# Patient Record
Sex: FEMALE | Race: WHITE | NOT HISPANIC OR LATINO | Employment: OTHER | URBAN - METROPOLITAN AREA
[De-identification: names, ages, dates, MRNs, and addresses within clinical notes are randomized per-mention and may not be internally consistent; named-entity substitution may affect disease eponyms.]

---

## 2017-01-15 DIAGNOSIS — M79.7 FIBROMYALGIA: ICD-10-CM

## 2017-01-16 DIAGNOSIS — M79.7 FIBROMYALGIA: ICD-10-CM

## 2017-01-16 RX ORDER — PREGABALIN 100 MG/1
100 CAPSULE ORAL 2 TIMES DAILY
Qty: 60 CAPSULE | Refills: 0 | Status: SHIPPED | OUTPATIENT
Start: 2017-01-16 | End: 2017-02-20 | Stop reason: SDUPTHER

## 2017-01-30 RX ORDER — FENOFIBRATE 145 MG/1
TABLET, COATED ORAL
Qty: 90 TABLET | Refills: 0 | Status: SHIPPED | OUTPATIENT
Start: 2017-01-30 | End: 2017-04-28 | Stop reason: SDUPTHER

## 2017-02-20 ENCOUNTER — TELEPHONE (OUTPATIENT)
Dept: INTERNAL MEDICINE | Age: 62
End: 2017-02-20

## 2017-02-20 DIAGNOSIS — M79.7 FIBROMYALGIA: ICD-10-CM

## 2017-02-20 RX ORDER — PREGABALIN 100 MG/1
100 CAPSULE ORAL 2 TIMES DAILY
Qty: 60 CAPSULE | Refills: 0 | Status: SHIPPED | OUTPATIENT
Start: 2017-02-20 | End: 2017-03-23 | Stop reason: SDUPTHER

## 2017-03-03 RX ORDER — CLOPIDOGREL BISULFATE 75 MG/1
TABLET ORAL
Qty: 90 TABLET | Refills: 0 | Status: SHIPPED | OUTPATIENT
Start: 2017-03-03 | End: 2017-05-30 | Stop reason: SDUPTHER

## 2017-03-03 RX ORDER — CITALOPRAM 40 MG/1
TABLET ORAL
Qty: 90 TABLET | Refills: 0 | Status: SHIPPED | OUTPATIENT
Start: 2017-03-03 | End: 2017-05-30 | Stop reason: SDUPTHER

## 2017-03-23 ENCOUNTER — TELEPHONE (OUTPATIENT)
Dept: INTERNAL MEDICINE | Age: 62
End: 2017-03-23

## 2017-03-23 DIAGNOSIS — M79.7 FIBROMYALGIA: ICD-10-CM

## 2017-03-23 RX ORDER — PREGABALIN 100 MG/1
100 CAPSULE ORAL 2 TIMES DAILY
Qty: 60 CAPSULE | Refills: 0 | Status: SHIPPED | OUTPATIENT
Start: 2017-03-23 | End: 2017-04-20 | Stop reason: SDUPTHER

## 2017-03-23 NOTE — TELEPHONE ENCOUNTER
PLEASE WRITE, ALSO NOTICED ON HER CHART THAT SHE IS TAKING PLAVIX AND MOBIC BOTH TOGETHER. PLEASE ADVISE.

## 2017-04-12 ENCOUNTER — OFFICE VISIT (OUTPATIENT)
Dept: INTERNAL MEDICINE | Age: 62
End: 2017-04-12

## 2017-04-12 VITALS
SYSTOLIC BLOOD PRESSURE: 120 MMHG | HEIGHT: 63 IN | HEART RATE: 72 BPM | DIASTOLIC BLOOD PRESSURE: 80 MMHG | WEIGHT: 215 LBS | RESPIRATION RATE: 13 BRPM | BODY MASS INDEX: 38.09 KG/M2

## 2017-04-12 DIAGNOSIS — E78.2 MIXED HYPERLIPIDEMIA: Primary | ICD-10-CM

## 2017-04-12 DIAGNOSIS — Z72.0 TOBACCO ABUSE: ICD-10-CM

## 2017-04-12 DIAGNOSIS — E66.9 NON MORBID OBESITY, UNSPECIFIED OBESITY TYPE: ICD-10-CM

## 2017-04-12 LAB
ALBUMIN SERPL-MCNC: 4.2 G/DL (ref 3.5–5.2)
ALBUMIN/GLOB SERPL: 1.6 G/DL
ALP SERPL-CCNC: 55 U/L (ref 39–117)
ALT SERPL-CCNC: 10 U/L (ref 1–33)
AST SERPL-CCNC: 23 U/L (ref 1–32)
BILIRUB SERPL-MCNC: 0.4 MG/DL (ref 0.1–1.2)
BUN SERPL-MCNC: 11 MG/DL (ref 8–23)
BUN/CREAT SERPL: 10.4 (ref 7–25)
CALCIUM SERPL-MCNC: 10.3 MG/DL (ref 8.6–10.5)
CHLORIDE SERPL-SCNC: 102 MMOL/L (ref 98–107)
CHOLEST SERPL-MCNC: 185 MG/DL (ref 0–200)
CO2 SERPL-SCNC: 30 MMOL/L (ref 22–29)
CREAT SERPL-MCNC: 1.06 MG/DL (ref 0.57–1)
GLOBULIN SER CALC-MCNC: 2.7 GM/DL
GLUCOSE SERPL-MCNC: 87 MG/DL (ref 65–99)
HDLC SERPL-MCNC: 44 MG/DL (ref 40–60)
LDLC SERPL CALC-MCNC: 104 MG/DL (ref 0–100)
POTASSIUM SERPL-SCNC: 4.1 MMOL/L (ref 3.5–5.2)
PROT SERPL-MCNC: 6.9 G/DL (ref 6–8.5)
SODIUM SERPL-SCNC: 144 MMOL/L (ref 136–145)
TRIGL SERPL-MCNC: 185 MG/DL (ref 0–150)
VLDLC SERPL CALC-MCNC: 37 MG/DL (ref 5–40)

## 2017-04-12 PROCEDURE — 99214 OFFICE O/P EST MOD 30 MIN: CPT | Performed by: INTERNAL MEDICINE

## 2017-04-12 RX ORDER — VARENICLINE TARTRATE 0.5 MG/1
0.5 TABLET, FILM COATED ORAL 2 TIMES DAILY
Qty: 60 TABLET | Refills: 2 | Status: SHIPPED | OUTPATIENT
Start: 2017-04-12 | End: 2017-12-14 | Stop reason: HOSPADM

## 2017-04-12 NOTE — PROGRESS NOTES
Brenda Michelle is a 61 y.o. female who presents with   Chief Complaint   Patient presents with   • Hyperlipidemia     Checkup; lab updates   • Obesity     Unrelated reason for today's visit: Obesity.  Patient says she keeps putting weight on an as a result cannot exercise because of the discomfort on her joints with excessive weight.  She is interested in a referral for area attic surgical evaluation.   • Nicotine Dependence     Unrelated reason for today's visit: She would like a prescription for Chantix to discontinue smoking   • Colon Polyps     Unrelated reason for today's visit: Prior history of colon polyps.  Family history of colon cancer.  She has not had a follow-up colonoscopy screening procedure in 10 years or more she says.   .    Hyperlipidemia   This is a chronic problem. The current episode started more than 1 year ago. The problem is controlled. Exacerbating diseases include obesity. Current antihyperlipidemic treatment includes fibric acid derivatives. The current treatment provides moderate improvement of lipids. There are no compliance problems.    Obesity   This is a chronic problem. The current episode started more than 1 year ago. The problem has been gradually worsening. She has tried nothing for the symptoms.   Nicotine Dependence   Presents for follow-up visit. Her urge triggers include company of smokers.        The following portions of the patient's history were reviewed and updated as appropriate: allergies, current medications, past medical history and problem list.    Review of Systems   Constitutional: Negative.    HENT: Negative.    Eyes: Negative.    Respiratory: Negative.    Cardiovascular: Negative.    Genitourinary: Negative.    Musculoskeletal: Negative.    Skin: Negative.    Neurological: Negative.    Psychiatric/Behavioral: Negative.        Objective   Physical Exam   Constitutional: She is oriented to person, place, and time. She appears well-developed and well-nourished.  "No distress.   HENT:   Head: Normocephalic and atraumatic.   Eyes: Conjunctivae and EOM are normal. Pupils are equal, round, and reactive to light.   Neck: Normal range of motion. Neck supple. No thyromegaly present.   Neck exam negative.  Carotid auscultation normal-no bruits heard.   Cardiovascular: Normal rate, regular rhythm, normal heart sounds and intact distal pulses.  Exam reveals no gallop and no friction rub.    No murmur heard.  Pulmonary/Chest: Effort normal and breath sounds normal. No respiratory distress. She has no wheezes. She has no rales. She exhibits no tenderness.   Neurological: She is alert and oriented to person, place, and time.   Psychiatric: She has a normal mood and affect. Her behavior is normal. Judgment and thought content normal.   Nursing note and vitals reviewed.      Assessment/Plan   Brenda was seen today for hyperlipidemia, obesity, nicotine dependence and colon polyps.    Diagnoses and all orders for this visit:    Mixed hyperlipidemia  -     Comprehensive Metabolic Panel  -     Lipid Panel    Non morbid obesity, unspecified obesity type  -     Ambulatory Referral to Bariatric Surgery    Tobacco abuse  -     varenicline (CHANTIX) 0.5 MG tablet; Take 1 tablet by mouth 2 (Two) Times a Day.        Plan: Labs as above.  Referral as requested for obesity.  Begin Chantix 0.5 milligrams twice a day.  Tentatively we will plan a six-month checkup/lab update visit.    I have suggested a follow-up colonoscopy and she is agreeable however I have given her the name of Dr. Vinny Lopez is a referral.  She would prefer to call that office first to find out if she can use a \"pill prep\" because she has difficulty tolerating the liquid preps.  Once she finds out this information she will let us know for a referral if needed.       "

## 2017-04-20 DIAGNOSIS — M79.7 FIBROMYALGIA: ICD-10-CM

## 2017-04-20 RX ORDER — PREGABALIN 100 MG/1
100 CAPSULE ORAL 2 TIMES DAILY
Qty: 60 CAPSULE | Refills: 0 | Status: SHIPPED | OUTPATIENT
Start: 2017-04-20 | End: 2017-05-30 | Stop reason: SDUPTHER

## 2017-04-28 DIAGNOSIS — E78.2 MIXED HYPERLIPIDEMIA: Primary | ICD-10-CM

## 2017-04-28 RX ORDER — FENOFIBRATE 145 MG/1
TABLET, COATED ORAL
Qty: 90 TABLET | Refills: 1 | Status: SHIPPED | OUTPATIENT
Start: 2017-04-28 | End: 2017-10-25 | Stop reason: SDUPTHER

## 2017-05-23 ENCOUNTER — TELEPHONE (OUTPATIENT)
Dept: INTERNAL MEDICINE | Age: 62
End: 2017-05-23

## 2017-05-30 DIAGNOSIS — M79.7 FIBROMYALGIA: ICD-10-CM

## 2017-05-30 DIAGNOSIS — M79.2 NERVE PAIN: Primary | ICD-10-CM

## 2017-05-30 RX ORDER — CITALOPRAM 40 MG/1
TABLET ORAL
Qty: 90 TABLET | Refills: 1 | Status: SHIPPED | OUTPATIENT
Start: 2017-05-30 | End: 2017-12-01 | Stop reason: SDUPTHER

## 2017-05-30 RX ORDER — PREGABALIN 100 MG/1
100 CAPSULE ORAL 2 TIMES DAILY
Qty: 60 CAPSULE | Refills: 0 | Status: SHIPPED | OUTPATIENT
Start: 2017-05-30 | End: 2017-07-03 | Stop reason: SDUPTHER

## 2017-05-30 RX ORDER — CLOPIDOGREL BISULFATE 75 MG/1
TABLET ORAL
Qty: 90 TABLET | Refills: 1 | Status: SHIPPED | OUTPATIENT
Start: 2017-05-30 | End: 2017-12-01 | Stop reason: SDUPTHER

## 2017-06-04 LAB — DRUGS UR: NORMAL

## 2017-06-14 RX ORDER — MELOXICAM 15 MG/1
TABLET ORAL
Qty: 90 TABLET | Refills: 0 | Status: SHIPPED | OUTPATIENT
Start: 2017-06-14 | End: 2017-09-12 | Stop reason: SDUPTHER

## 2017-07-02 DIAGNOSIS — M79.7 FIBROMYALGIA: ICD-10-CM

## 2017-07-03 DIAGNOSIS — M79.7 FIBROMYALGIA: ICD-10-CM

## 2017-07-03 RX ORDER — PREGABALIN 100 MG/1
100 CAPSULE ORAL 2 TIMES DAILY
Qty: 60 CAPSULE | Refills: 0 | Status: SHIPPED | OUTPATIENT
Start: 2017-07-03 | End: 2017-08-14 | Stop reason: SDUPTHER

## 2017-08-14 DIAGNOSIS — M79.7 FIBROMYALGIA: ICD-10-CM

## 2017-08-14 RX ORDER — PREGABALIN 100 MG/1
100 CAPSULE ORAL 2 TIMES DAILY
Qty: 60 CAPSULE | Refills: 0 | Status: SHIPPED | OUTPATIENT
Start: 2017-08-14 | End: 2017-09-22 | Stop reason: SDUPTHER

## 2017-09-12 ENCOUNTER — TELEPHONE (OUTPATIENT)
Dept: INTERNAL MEDICINE | Age: 62
End: 2017-09-12

## 2017-09-12 RX ORDER — MELOXICAM 15 MG/1
TABLET ORAL
Qty: 90 TABLET | Refills: 0 | Status: ON HOLD | OUTPATIENT
Start: 2017-09-12 | End: 2017-12-11 | Stop reason: SDUPTHER

## 2017-09-12 NOTE — TELEPHONE ENCOUNTER
Pt called stating she spoke with [facility] for refill on her Vit D and was told they have faxed requests, but I don't see anything in pt's chart regarding that medication. She needs her Vit D, 90 day supply.  [facility]   Pt's # [phone]  Thanks SP

## 2017-09-12 NOTE — TELEPHONE ENCOUNTER
Called patient 9/12/17 @ 10:39 to let her know that we did not deny her vitamin D because [facility] did not send over a refill for and instead they sent over a refill request for vitamin K injectable that she does not take. I mailed her a copy of the refill for the vit K that was denied. I also informed her the she has not had lab work for her vitamin D since last year. She is going to wait until her next visit with you to have her lab drawn.

## 2017-09-22 DIAGNOSIS — M79.7 FIBROMYALGIA: ICD-10-CM

## 2017-09-22 RX ORDER — PREGABALIN 100 MG/1
100 CAPSULE ORAL 2 TIMES DAILY
Qty: 60 CAPSULE | Refills: 0 | Status: SHIPPED | OUTPATIENT
Start: 2017-09-22 | End: 2017-10-23 | Stop reason: SDUPTHER

## 2017-10-11 ENCOUNTER — OFFICE VISIT (OUTPATIENT)
Dept: INTERNAL MEDICINE | Age: 62
End: 2017-10-11

## 2017-10-11 VITALS
DIASTOLIC BLOOD PRESSURE: 78 MMHG | RESPIRATION RATE: 12 BRPM | HEIGHT: 63 IN | SYSTOLIC BLOOD PRESSURE: 118 MMHG | BODY MASS INDEX: 38.62 KG/M2 | HEART RATE: 72 BPM | WEIGHT: 218 LBS

## 2017-10-11 DIAGNOSIS — Z00.00 HEALTHCARE MAINTENANCE: ICD-10-CM

## 2017-10-11 DIAGNOSIS — Z23 ENCOUNTER FOR IMMUNIZATION: ICD-10-CM

## 2017-10-11 DIAGNOSIS — D12.6 ADENOMATOUS POLYP OF COLON, UNSPECIFIED PART OF COLON: ICD-10-CM

## 2017-10-11 DIAGNOSIS — E78.2 MIXED HYPERLIPIDEMIA: Primary | ICD-10-CM

## 2017-10-11 PROCEDURE — 99214 OFFICE O/P EST MOD 30 MIN: CPT | Performed by: INTERNAL MEDICINE

## 2017-10-11 PROCEDURE — 90658 IIV3 VACCINE SPLT 0.5 ML IM: CPT | Performed by: INTERNAL MEDICINE

## 2017-10-11 PROCEDURE — 90471 IMMUNIZATION ADMIN: CPT | Performed by: INTERNAL MEDICINE

## 2017-10-23 DIAGNOSIS — M79.7 FIBROMYALGIA: ICD-10-CM

## 2017-10-23 RX ORDER — PREGABALIN 100 MG/1
100 CAPSULE ORAL 2 TIMES DAILY
Qty: 60 CAPSULE | Refills: 0 | Status: SHIPPED | OUTPATIENT
Start: 2017-10-23 | End: 2017-11-22 | Stop reason: SDUPTHER

## 2017-10-25 DIAGNOSIS — E78.2 MIXED HYPERLIPIDEMIA: ICD-10-CM

## 2017-10-25 RX ORDER — FENOFIBRATE 145 MG/1
TABLET, COATED ORAL
Qty: 90 TABLET | Refills: 1 | Status: SHIPPED | OUTPATIENT
Start: 2017-10-25 | End: 2018-04-23 | Stop reason: SDUPTHER

## 2017-11-22 DIAGNOSIS — M79.7 FIBROMYALGIA: ICD-10-CM

## 2017-11-22 RX ORDER — PREGABALIN 100 MG/1
100 CAPSULE ORAL 2 TIMES DAILY
Qty: 60 CAPSULE | Refills: 0 | Status: SHIPPED | OUTPATIENT
Start: 2017-11-22 | End: 2017-12-21 | Stop reason: SDUPTHER

## 2017-12-01 DIAGNOSIS — I77.9 PERIPHERAL ARTERIAL OCCLUSIVE DISEASE (HCC): Primary | ICD-10-CM

## 2017-12-01 DIAGNOSIS — F32.A DEPRESSION, UNSPECIFIED DEPRESSION TYPE: ICD-10-CM

## 2017-12-01 RX ORDER — CLOPIDOGREL BISULFATE 75 MG/1
75 TABLET ORAL DAILY
Qty: 90 TABLET | Refills: 1 | Status: SHIPPED | OUTPATIENT
Start: 2017-12-01 | End: 2018-05-30 | Stop reason: SDUPTHER

## 2017-12-01 RX ORDER — CITALOPRAM 40 MG/1
40 TABLET ORAL DAILY
Qty: 90 TABLET | Refills: 1 | Status: SHIPPED | OUTPATIENT
Start: 2017-12-01 | End: 2018-05-30 | Stop reason: SDUPTHER

## 2017-12-10 ENCOUNTER — HOSPITAL ENCOUNTER (INPATIENT)
Facility: HOSPITAL | Age: 62
LOS: 4 days | Discharge: HOME HEALTH | End: 2017-12-14
Attending: INTERNAL MEDICINE | Admitting: INTERNAL MEDICINE

## 2017-12-10 DIAGNOSIS — S22.41XA CLOSED FRACTURE OF MULTIPLE RIBS OF RIGHT SIDE, INITIAL ENCOUNTER: Primary | ICD-10-CM

## 2017-12-10 DIAGNOSIS — J96.01 ACUTE RESPIRATORY FAILURE WITH HYPOXIA (HCC): ICD-10-CM

## 2017-12-10 PROBLEM — R09.02 HYPOXIA: Status: ACTIVE | Noted: 2017-12-10

## 2017-12-10 PROBLEM — S22.39XA RIB FRACTURE: Status: ACTIVE | Noted: 2017-12-10

## 2017-12-10 PROCEDURE — 25010000002 HYDROMORPHONE PER 4 MG: Performed by: INTERNAL MEDICINE

## 2017-12-10 PROCEDURE — G0378 HOSPITAL OBSERVATION PER HR: HCPCS

## 2017-12-10 RX ORDER — PREGABALIN 100 MG/1
100 CAPSULE ORAL 2 TIMES DAILY
Status: DISCONTINUED | OUTPATIENT
Start: 2017-12-10 | End: 2017-12-14 | Stop reason: HOSPADM

## 2017-12-10 RX ORDER — ACETAMINOPHEN 325 MG/1
650 TABLET ORAL EVERY 4 HOURS PRN
Status: DISCONTINUED | OUTPATIENT
Start: 2017-12-10 | End: 2017-12-14 | Stop reason: HOSPADM

## 2017-12-10 RX ORDER — MELOXICAM 7.5 MG/1
15 TABLET ORAL DAILY
Status: DISCONTINUED | OUTPATIENT
Start: 2017-12-10 | End: 2017-12-12

## 2017-12-10 RX ORDER — NALOXONE HCL 0.4 MG/ML
0.4 VIAL (ML) INJECTION
Status: DISCONTINUED | OUTPATIENT
Start: 2017-12-10 | End: 2017-12-14 | Stop reason: HOSPADM

## 2017-12-10 RX ORDER — BISACODYL 5 MG/1
5 TABLET, DELAYED RELEASE ORAL DAILY PRN
Status: DISCONTINUED | OUTPATIENT
Start: 2017-12-10 | End: 2017-12-14 | Stop reason: HOSPADM

## 2017-12-10 RX ORDER — HYDROCODONE BITARTRATE AND ACETAMINOPHEN 7.5; 325 MG/1; MG/1
1 TABLET ORAL EVERY 4 HOURS PRN
Status: DISCONTINUED | OUTPATIENT
Start: 2017-12-10 | End: 2017-12-11

## 2017-12-10 RX ORDER — SENNA AND DOCUSATE SODIUM 50; 8.6 MG/1; MG/1
2 TABLET, FILM COATED ORAL 2 TIMES DAILY
Status: DISCONTINUED | OUTPATIENT
Start: 2017-12-10 | End: 2017-12-14 | Stop reason: HOSPADM

## 2017-12-10 RX ORDER — HYDROMORPHONE HYDROCHLORIDE 1 MG/ML
0.5 INJECTION, SOLUTION INTRAMUSCULAR; INTRAVENOUS; SUBCUTANEOUS
Status: DISCONTINUED | OUTPATIENT
Start: 2017-12-10 | End: 2017-12-14 | Stop reason: HOSPADM

## 2017-12-10 RX ORDER — ALUMINA, MAGNESIA, AND SIMETHICONE 2400; 2400; 240 MG/30ML; MG/30ML; MG/30ML
15 SUSPENSION ORAL EVERY 6 HOURS PRN
Status: DISCONTINUED | OUTPATIENT
Start: 2017-12-10 | End: 2017-12-14 | Stop reason: HOSPADM

## 2017-12-10 RX ORDER — CITALOPRAM 40 MG/1
40 TABLET ORAL DAILY
Status: DISCONTINUED | OUTPATIENT
Start: 2017-12-10 | End: 2017-12-14 | Stop reason: HOSPADM

## 2017-12-10 RX ORDER — ONDANSETRON 4 MG/1
4 TABLET, FILM COATED ORAL EVERY 6 HOURS PRN
Status: DISCONTINUED | OUTPATIENT
Start: 2017-12-10 | End: 2017-12-14 | Stop reason: HOSPADM

## 2017-12-10 RX ORDER — ONDANSETRON 4 MG/1
4 TABLET, ORALLY DISINTEGRATING ORAL EVERY 6 HOURS PRN
Status: DISCONTINUED | OUTPATIENT
Start: 2017-12-10 | End: 2017-12-14 | Stop reason: HOSPADM

## 2017-12-10 RX ORDER — ASPIRIN 81 MG/1
81 TABLET ORAL DAILY
Status: DISCONTINUED | OUTPATIENT
Start: 2017-12-10 | End: 2017-12-14 | Stop reason: HOSPADM

## 2017-12-10 RX ORDER — ONDANSETRON 2 MG/ML
4 INJECTION INTRAMUSCULAR; INTRAVENOUS EVERY 6 HOURS PRN
Status: DISCONTINUED | OUTPATIENT
Start: 2017-12-10 | End: 2017-12-14 | Stop reason: HOSPADM

## 2017-12-10 RX ORDER — CLOPIDOGREL BISULFATE 75 MG/1
75 TABLET ORAL DAILY
Status: DISCONTINUED | OUTPATIENT
Start: 2017-12-10 | End: 2017-12-14 | Stop reason: HOSPADM

## 2017-12-10 RX ADMIN — HYDROCODONE BITARTRATE AND ACETAMINOPHEN 1 TABLET: 7.5; 325 TABLET ORAL at 20:45

## 2017-12-10 RX ADMIN — MELOXICAM 15 MG: 7.5 TABLET ORAL at 22:02

## 2017-12-10 RX ADMIN — PREGABALIN 100 MG: 100 CAPSULE ORAL at 22:03

## 2017-12-10 RX ADMIN — DOCUSATE SODIUM -SENNOSIDES 2 TABLET: 50; 8.6 TABLET, COATED ORAL at 22:03

## 2017-12-10 RX ADMIN — HYDROMORPHONE HYDROCHLORIDE 0.5 MG: 1 INJECTION, SOLUTION INTRAMUSCULAR; INTRAVENOUS; SUBCUTANEOUS at 22:07

## 2017-12-10 RX ADMIN — ASPIRIN 81 MG: 81 TABLET ORAL at 22:02

## 2017-12-10 RX ADMIN — CLOPIDOGREL 75 MG: 75 TABLET, FILM COATED ORAL at 22:03

## 2017-12-10 RX ADMIN — CITALOPRAM 40 MG: 40 TABLET, FILM COATED ORAL at 22:03

## 2017-12-11 PROBLEM — J96.01 ACUTE RESPIRATORY FAILURE WITH HYPOXIA: Status: ACTIVE | Noted: 2017-12-11

## 2017-12-11 PROBLEM — F17.200 TOBACCO DEPENDENCE: Status: ACTIVE | Noted: 2017-12-11
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
## 2017-12-11 PROBLEM — J44.9 COPD (CHRONIC OBSTRUCTIVE PULMONARY DISEASE) (HCC): Status: ACTIVE | Noted: 2017-12-11

## 2017-12-11 LAB
ANION GAP SERPL CALCULATED.3IONS-SCNC: 8.7 MMOL/L
BASOPHILS # BLD AUTO: 0.02 10*3/MM3 (ref 0–0.2)
BASOPHILS NFR BLD AUTO: 0.3 % (ref 0–1.5)
BUN BLD-MCNC: 15 MG/DL (ref 8–23)
BUN/CREAT SERPL: 17.9 (ref 7–25)
CALCIUM SPEC-SCNC: 9.4 MG/DL (ref 8.6–10.5)
CHLORIDE SERPL-SCNC: 102 MMOL/L (ref 98–107)
CO2 SERPL-SCNC: 31.3 MMOL/L (ref 22–29)
CREAT BLD-MCNC: 0.84 MG/DL (ref 0.57–1)
DEPRECATED RDW RBC AUTO: 64.3 FL (ref 37–54)
EOSINOPHIL # BLD AUTO: 0.11 10*3/MM3 (ref 0–0.7)
EOSINOPHIL NFR BLD AUTO: 1.5 % (ref 0.3–6.2)
ERYTHROCYTE [DISTWIDTH] IN BLOOD BY AUTOMATED COUNT: 16.4 % (ref 11.7–13)
GFR SERPL CREATININE-BSD FRML MDRD: 69 ML/MIN/1.73
GLUCOSE BLD-MCNC: 108 MG/DL (ref 65–99)
HCT VFR BLD AUTO: 44.4 % (ref 35.6–45.5)
HGB BLD-MCNC: 14 G/DL (ref 11.9–15.5)
IMM GRANULOCYTES # BLD: 0.02 10*3/MM3 (ref 0–0.03)
IMM GRANULOCYTES NFR BLD: 0.3 % (ref 0–0.5)
LYMPHOCYTES # BLD AUTO: 2.02 10*3/MM3 (ref 0.9–4.8)
LYMPHOCYTES NFR BLD AUTO: 27.4 % (ref 19.6–45.3)
MCH RBC QN AUTO: 34 PG (ref 26.9–32)
MCHC RBC AUTO-ENTMCNC: 31.5 G/DL (ref 32.4–36.3)
MCV RBC AUTO: 107.8 FL (ref 80.5–98.2)
MONOCYTES # BLD AUTO: 0.76 10*3/MM3 (ref 0.2–1.2)
MONOCYTES NFR BLD AUTO: 10.3 % (ref 5–12)
NEUTROPHILS # BLD AUTO: 4.45 10*3/MM3 (ref 1.9–8.1)
NEUTROPHILS NFR BLD AUTO: 60.2 % (ref 42.7–76)
PLAT MORPH BLD: NORMAL
PLATELET # BLD AUTO: 200 10*3/MM3 (ref 140–500)
PMV BLD AUTO: 10.7 FL (ref 6–12)
POTASSIUM BLD-SCNC: 4.1 MMOL/L (ref 3.5–5.2)
RBC # BLD AUTO: 4.12 10*6/MM3 (ref 3.9–5.2)
RBC MORPH BLD: NORMAL
SODIUM BLD-SCNC: 142 MMOL/L (ref 136–145)
WBC MORPH BLD: NORMAL
WBC NRBC COR # BLD: 7.38 10*3/MM3 (ref 4.5–10.7)

## 2017-12-11 PROCEDURE — 97161 PT EVAL LOW COMPLEX 20 MIN: CPT

## 2017-12-11 PROCEDURE — 94640 AIRWAY INHALATION TREATMENT: CPT

## 2017-12-11 PROCEDURE — 85007 BL SMEAR W/DIFF WBC COUNT: CPT | Performed by: INTERNAL MEDICINE

## 2017-12-11 PROCEDURE — G8979 MOBILITY GOAL STATUS: HCPCS

## 2017-12-11 PROCEDURE — 94799 UNLISTED PULMONARY SVC/PX: CPT

## 2017-12-11 PROCEDURE — G8980 MOBILITY D/C STATUS: HCPCS

## 2017-12-11 PROCEDURE — 85025 COMPLETE CBC W/AUTO DIFF WBC: CPT | Performed by: INTERNAL MEDICINE

## 2017-12-11 PROCEDURE — G8978 MOBILITY CURRENT STATUS: HCPCS

## 2017-12-11 PROCEDURE — 80048 BASIC METABOLIC PNL TOTAL CA: CPT | Performed by: INTERNAL MEDICINE

## 2017-12-11 RX ORDER — IPRATROPIUM BROMIDE AND ALBUTEROL SULFATE 2.5; .5 MG/3ML; MG/3ML
3 SOLUTION RESPIRATORY (INHALATION)
Status: DISCONTINUED | OUTPATIENT
Start: 2017-12-11 | End: 2017-12-14 | Stop reason: HOSPADM

## 2017-12-11 RX ORDER — HYDROCODONE BITARTRATE AND ACETAMINOPHEN 10; 325 MG/1; MG/1
1 TABLET ORAL EVERY 4 HOURS PRN
Status: DISCONTINUED | OUTPATIENT
Start: 2017-12-11 | End: 2017-12-14 | Stop reason: HOSPADM

## 2017-12-11 RX ORDER — LIDOCAINE 50 MG/G
1 PATCH TOPICAL
Status: DISCONTINUED | OUTPATIENT
Start: 2017-12-11 | End: 2017-12-14 | Stop reason: HOSPADM

## 2017-12-11 RX ORDER — HYDROCODONE BITARTRATE AND ACETAMINOPHEN 10; 325 MG/1; MG/1
2 TABLET ORAL EVERY 4 HOURS PRN
Status: DISCONTINUED | OUTPATIENT
Start: 2017-12-11 | End: 2017-12-14 | Stop reason: HOSPADM

## 2017-12-11 RX ORDER — GUAIFENESIN 600 MG/1
600 TABLET, EXTENDED RELEASE ORAL 2 TIMES DAILY
Status: DISCONTINUED | OUTPATIENT
Start: 2017-12-11 | End: 2017-12-14 | Stop reason: HOSPADM

## 2017-12-11 RX ADMIN — PREGABALIN 100 MG: 100 CAPSULE ORAL at 17:32

## 2017-12-11 RX ADMIN — ASPIRIN 81 MG: 81 TABLET ORAL at 21:18

## 2017-12-11 RX ADMIN — MELOXICAM 15 MG: 7.5 TABLET ORAL at 21:18

## 2017-12-11 RX ADMIN — CITALOPRAM 40 MG: 40 TABLET, FILM COATED ORAL at 23:01

## 2017-12-11 RX ADMIN — HYDROCODONE BITARTRATE AND ACETAMINOPHEN 1 TABLET: 10; 325 TABLET ORAL at 06:20

## 2017-12-11 RX ADMIN — IPRATROPIUM BROMIDE AND ALBUTEROL SULFATE 3 ML: .5; 3 SOLUTION RESPIRATORY (INHALATION) at 11:20

## 2017-12-11 RX ADMIN — DOCUSATE SODIUM -SENNOSIDES 2 TABLET: 50; 8.6 TABLET, COATED ORAL at 17:29

## 2017-12-11 RX ADMIN — PREGABALIN 100 MG: 100 CAPSULE ORAL at 09:54

## 2017-12-11 RX ADMIN — GUAIFENESIN 600 MG: 600 TABLET, EXTENDED RELEASE ORAL at 17:29

## 2017-12-11 RX ADMIN — GUAIFENESIN 600 MG: 600 TABLET, EXTENDED RELEASE ORAL at 09:50

## 2017-12-11 RX ADMIN — DOCUSATE SODIUM -SENNOSIDES 2 TABLET: 50; 8.6 TABLET, COATED ORAL at 09:50

## 2017-12-11 RX ADMIN — IPRATROPIUM BROMIDE AND ALBUTEROL SULFATE 3 ML: .5; 3 SOLUTION RESPIRATORY (INHALATION) at 16:46

## 2017-12-11 RX ADMIN — HYDROCODONE BITARTRATE AND ACETAMINOPHEN 1 TABLET: 10; 325 TABLET ORAL at 09:55

## 2017-12-11 RX ADMIN — HYDROCODONE BITARTRATE AND ACETAMINOPHEN 1 TABLET: 10; 325 TABLET ORAL at 16:20

## 2017-12-11 RX ADMIN — LIDOCAINE 1 PATCH: 50 PATCH CUTANEOUS at 21:20

## 2017-12-11 RX ADMIN — CLOPIDOGREL 75 MG: 75 TABLET, FILM COATED ORAL at 21:18

## 2017-12-11 RX ADMIN — HYDROCODONE BITARTRATE AND ACETAMINOPHEN 1 TABLET: 10; 325 TABLET ORAL at 01:00

## 2017-12-11 NOTE — PLAN OF CARE
Problem: Patient Care Overview (Adult)  Goal: Plan of Care Review    12/11/17 0832   Coping/Psychosocial Response Interventions   Plan Of Care Reviewed With patient   Outcome Evaluation   Outcome Summary/Follow up Plan Pt presents from home w/rib fracture of R 6th rib. Upon exam, pt demonstrates pain in rib cage but otherwise w/o complaint. Pt demonstrates independence with all mobility; no significant deficits in strength, gait, or balance. Ambulated independently in hallway with pain limiting distance; however, pt states she does not typically walk long distances even at baseline. Pt appears to be at or near baseline mobility and is appropriate to continue ambulating with [facility] staff during remainder of stay. Pt verbalized understanding of activity recommendations for walking and getting up to chair daily; no further acute PT concerns. Discussed above with RNLinda. Will sign off.

## 2017-12-11 NOTE — THERAPY DISCHARGE NOTE
Acute Care - Physical Therapy Initial Eval/Discharge  [facility]     Patient Name: Brenda Michelle  : 1955  MRN: [id number]  Today's Date: 2017   Onset of Illness/Injury or Date of Surgery Date: 12/10/17     Referring Physician: Emeka      Admit Date: 12/10/2017    Visit Dx:  No diagnosis found.  Patient Active Problem List   Diagnosis   • Acute deep vein thrombosis of distal leg   • Adenomatous polyp of colon   • Depression   • Fibromyalgia   • Hyperlipidemia   • Low back pain   • Peripheral arterial occlusive disease   • Vitamin D deficiency   • Gastroesophageal reflux disease   • Irritable bowel syndrome   • Disorder of intervertebral disc   • Peripheral neuropathy   • Rib fracture   • Fall   • Tobacco dependence   • Acute respiratory failure with hypoxia   • COPD (chronic obstructive pulmonary disease)     Past Medical History:   Diagnosis Date   • Anxiety    • benign polyps of large intestine    • Depression    • Disc degeneration, lumbar    • Fibromyositis    • Hyperlipidemia    • IBS (irritable bowel syndrome)    • Nephrolithiasis    • PVD (peripheral vascular disease)    • Vitamin D deficiency      Past Surgical History:   Procedure Laterality Date   • COLONOSCOPY  2004    Colon polyps; family history of colon cancer   • KNEE ARTHROPLASTY, PARTIAL REPLACEMENT     • KNEE ARTHROSCOPY Left     Meniscus   • POPLITEAL ARTERY ANGIOPLASTY Bilateral    • TONSILLECTOMY     • TUBAL ABDOMINAL LIGATION            PT ASSESSMENT (last 72 hours)      PT Evaluation       17 0813 12/10/17 2223    Rehab Evaluation    Document Type evaluation;discharge summary  -EE     Subjective Information agree to therapy;complains of;pain  -EE     Patient Effort, Rehab Treatment good  -EE     Symptoms Noted During/After Treatment none  -EE     General Information    Onset of Illness/Injury or Date of Surgery Date 12/10/17  -EE     Referring Physician Emeka  -EE     General Observations Pt supine in bed in no acute  distress  -EE     Pertinent History Of Current Problem R 6th rib fx, COPD  -EE     Precautions/Limitations fall precautions;oxygen therapy device and L/min   3 L O2  -EE     Prior Level of Function independent:;all household mobility   states she doesn't walk long distances at home  -EE     Equipment Currently Used at Home none  -EE     Plans/Goals Discussed With patient;agreed upon  -EE     Barriers to Rehab none identified  -EE     Living Environment    Lives With  spouse  -JJ    Living Arrangements  house  -JJ    Home Accessibility stairs to enter home  -EE     Number of Stairs to Enter Home 3  -EE     Stair Railings at Home outside, present at both sides  -EE outside, present at both sides;inside, present on left side  -JJ    Type of Financial/Environmental Concern  none  -JJ    Transportation Available  car  -JJ    Clinical Impression    Patient/Family Goals Statement Decrease pain, go home  -EE     Criteria for Skilled Therapeutic Interventions Met no problems identified which require skilled intervention  -EE     Vital Signs    Pre SpO2 (%) 92  -EE     O2 Delivery Pre Treatment supplemental O2  -EE     Intra SpO2 (%) 87   immediately after returning from ambulation  -EE     O2 Delivery Intra Treatment supplemental O2  -EE     Post SpO2 (%) 91  -EE     O2 Delivery Post Treatment supplemental O2  -EE     Pre Patient Position Supine  -EE     Intra Patient Position Standing  -EE     Post Patient Position Sitting  -EE     Recovery Time 1 min  -EE     Pain Assessment    Pain Assessment 0-10  -EE     Pain Score 8  -EE     Pain Type Acute pain  -EE     Pain Location Rib cage  -EE     Pain Orientation Right  -EE     Pain Intervention(s) Repositioned;Ambulation/increased activity  -EE     Response to Interventions tolerated  -EE     Cognitive Assessment/Intervention    Current Cognitive/Communication Assessment functional  -EE     Orientation Status oriented x 4  -EE     Follows Commands/Answers Questions 100% of the  time  -EE     Personal Safety WNL/WFL  -EE     Personal Safety Interventions fall prevention program maintained;nonskid shoes/slippers when out of bed;supervised activity  -EE     ROM (Range of Motion)    General ROM no range of motion deficits identified  -EE     MMT (Manual Muscle Testing)    General MMT Assessment no strength deficits identified  -EE     General MMT Assessment Detail B LEs grossly WFL  -EE     Bed Mobility, Assessment/Treatment    Bed Mob, Supine to Sit, [place] independent  -EE     Bed Mob, Sit to Supine, [place] not tested   up in chair  -EE     Bed Mobility, Impairments pain  -EE     Transfer Assessment/Treatment    Transfers, Sit-Stand [place] conditional independence  -EE     Transfers, Stand-Sit [place] conditional independence  -EE     Transfer, Impairments pain  -EE     Gait Assessment/Treatment    Gait, [place] Level conditional independence  -EE     Gait, Distance (Feet) 110  -EE     Gait, Gait Deviations marta decreased;step length decreased  -EE     Gait, Safety Issues step length decreased;supplemental O2  -EE     Gait, Impairments pain  -EE     Gait, Comment Pain limiting distance  -EE     Motor Skills/Interventions    Additional Documentation Balance Skills Training (Group)  -EE     Balance Skills Training    Sitting-Level of Assistance Independent  -EE     Standing-Level of Assistance Independent  -EE     Gait Balance-Level of Assistance Independent  -EE     Positioning and Restraints    Pre-Treatment Position in bed  -EE     Post Treatment Position chair  -EE     In Chair sitting;call light within reach;encouraged to call for assist;notified ns  -EE       12/10/17 0735       General Information    Equipment Currently Used at Home none  -JJ       User Key  (r) = Recorded By, (t) = Taken By, (c) = Cosigned By    Initials Name Provider Type    EE Lawanda Cui PT Physical Therapist    SHARON Kline RN Registered Nurse          Physical Therapy  Education     Title: PT OT SLP Therapies (Resolved)     Topic: Physical Therapy (Resolved)     Point: Mobility training (Resolved)    Learning Progress Summary    Learner Readiness Method Response Comment Documented by Status   Patient Acceptance JESSICA DURON   12/11/17 0832 Done                      User Key     Initials Effective Dates Name Provider Type Discipline     12/01/15 -  Lawanda Cui, AYLA Physical Therapist PT                PT Recommendation and Plan  PT Frequency: evaluation only  Plan of Care Review  Plan Of Care Reviewed With: patient  Outcome Summary/Follow up Plan: Pt presents from home w/rib fracture of R 6th rib. Upon exam, pt demonstrates pain in rib cage but otherwise w/o complaint. Pt demonstrates independence with all mobility; no significant deficits in strength, gait, or balance. Ambulated independently in hallway with pain limiting distance; however, pt states she does not typically walk long distances even at baseline. Pt appears to be at or near baseline mobility and is appropriate to continue ambulating with [facility] during remainder of stay. Pt verbalized understanding of activity recommendations for walking and getting up to chair daily; no further acute PT concerns. Discussed above with RNLinda. Will sign off.               Outcome Measures       12/11/17 0800          How much help from another person do you currently need...    Turning from your back to your side while in flat bed without using bedrails? 4  -EE      Moving from lying on back to sitting on the side of a flat bed without bedrails? 4  -EE      Moving to and from a bed to a chair (including a wheelchair)? 4  -EE      Standing up from a chair using your arms (e.g., wheelchair, bedside chair)? 4  -EE      Climbing 3-5 steps with a railing? 4  -EE      To walk in hospital room? 4  -EE      AM-PAC 6 Clicks Score 24  -EE      Functional Assessment    Outcome Measure Options AM-PAC 6 Clicks Basic Mobility (PT)  -EE         User Key  (r) = Recorded By, (t) = Taken By, (c) = Cosigned By    Initials Name Provider Type    EE Lawanda Cui PT Physical Therapist           Time Calculation:         PT Charges       12/11/17 0835          Time Calculation    Start Time 0812  -EE      Stop Time 0825  -EE      Time Calculation (min) 13 min  -EE      PT Received On 12/11/17  -EE        User Key  (r) = Recorded By, (t) = Taken By, (c) = Cosigned By    Initials Name Provider Type    EE Lawanda Cui PT Physical Therapist          Therapy Charges for Today     Code Description Service Date Service Provider Modifiers Qty    70292237593 HC PT MOBILITY CURRENT 12/11/2017 Lawanda Cui, PT GP, [place] 1    69717892786 HC PT MOBILITY PROJECTED 12/11/2017 Lawanda Cui PT GP, CH 1    62275291981 HC PT MOBILITY DISCHARGE 12/11/2017 Lawanda Cui PT GP, [place] 1    76023271969 HC PT EVAL LOW COMPLEXITY 1 12/11/2017 Lawanda Cui, PT GP 1          PT G-Codes  Outcome Measure Options: AM-PAC 6 Clicks Basic Mobility (PT)  Functional Limitation: Mobility: Walking and moving around  Mobility: Walking and Moving Around Current Status (): 0 percent impaired, limited or restricted  Mobility: Walking and Moving Around Goal Status (): 0 percent impaired, limited or restricted  Mobility: Walking and Moving Around Discharge Status (): 0 percent impaired, limited or restricted    PT Discharge Summary  Reason for Discharge: Independent    Lawanda Cui PT  12/11/2017

## 2017-12-11 NOTE — PROGRESS NOTES
[facility]     LOS: 0 days     Subjective:    Eating ok    Pain 10/10 at th worst    Feels soa    Chest pain on the right    Objective:    Vital Signs:  Temp:  [97.1 °F (36.2 °C)-98.4 °F (36.9 °C)] 97.7 °F (36.5 °C)  Heart Rate:  [65-71] 66  Resp:  [14-16] 16  BP: (122-145)/(73-79) 133/78    General Appearance: no acute distress, appears comfortable  Heart: regular rate and rhythm  Lungs: wheezing poor air entry  Abdomen: soft, non-tender, no guarding, no rebound, non-distended  Extremities: no edema, no cyanosis  Neurology: speech normal, CN grossly normal  Psychiatric: normal mood and affect    Results Review:    Glucose   Date Value Ref Range Status   12/11/2017 108 (H) 65 - 99 mg/dL Final       Results from last 7 days  Lab Units 12/11/17  0443   WBC 10*3/mm3 7.38   HEMOGLOBIN g/dL 14.0   HEMATOCRIT % 44.4   PLATELETS 10*3/mm3 200       Results from last 7 days  Lab Units 12/11/17  0027   SODIUM mmol/L 142   POTASSIUM mmol/L 4.1   CHLORIDE mmol/L 102   CO2 mmol/L 31.3*   BUN mg/dL 15   CREATININE mg/dL 0.84   CALCIUM mg/dL 9.4   GLUCOSE mg/dL 108*                 Cultures:       I have reviewed daily medications and changes in CPOE    Scheduled meds    aspirin 81 mg Oral Daily   citalopram 40 mg Oral Daily   clopidogrel 75 mg Oral Daily   guaiFENesin 600 mg Oral BID   ipratropium-albuterol 3 mL Nebulization 4x Daily - RT   lidocaine 1 patch Transdermal Q24H   meloxicam 15 mg Oral Daily   pregabalin 100 mg Oral BID   sennosides-docusate sodium 2 tablet Oral BID          PRN meds  •  acetaminophen  •  aluminum-magnesium hydroxide-simethicone  •  bisacodyl  •  HYDROcodone-acetaminophen **OR** HYDROcodone-acetaminophen  •  HYDROmorphone **AND** naloxone  •  ondansetron **OR** ondansetron ODT **OR** ondansetron      Active Problems:    Rib fracture    Fall    Tobacco dependence    Acute respiratory failure with hypoxia    COPD (chronic obstructive pulmonary  disease)      Assessment/Plan:      Rib fracture-  - mobic  -  -mininebs  - continuous o2 sat monitor      Fall      Tobacco dependence      Acute respiratory failure with hypoxia    pvd- aspirin and plavix      COPD (chronic obstructive pulmonary disease)  - duonebs  - pulmonary consult    Wilmer Tenorio MD  12/11/17  4:39 PM

## 2017-12-11 NOTE — PROGRESS NOTES
Discharge Planning Assessment  [facility]     Patient Name: Brenda Michelle  MRN: [id number]  Today's Date: 12/11/2017    Admit Date: 12/10/2017          Discharge Needs Assessment       12/11/17 0425    Living Environment    Lives With spouse    Living Arrangements house    Home Accessibility bed and bath on same level;stairs (1 railing present);stairs to enter home;stairs within home;grab bars present (bathtub)    Number of Stairs to Enter Home 3    Number of Stairs Within Home 12    Stair Railings at Home inside, present on left side;outside, present on left side    Type of Financial/Environmental Concern none    Transportation Available car;family or friend will provide    Living Environment    Provides Primary Care For no one, unable/limited ability to care for self    Primary Care Provided By spouse/significant other    Quality Of Family Relationships supportive;helpful;involved    Able to Return to Prior Living Arrangements yes    Discharge Needs Assessment    Concerns To Be Addressed discharge planning concerns    Equipment Currently Used at Home none    Equipment Needed After Discharge oxygen    Discharge Disposition home or self-care;home healthcare service            Discharge Plan       12/11/17 8920    Case Management/Social Work Plan    Plan Home with assistance from her spouse and Kaley with [facility] following to assist with arranging home O2.     Patient/Family In Agreement With Plan yes    Additional Comments Met with the patient at bedside; explained role of CCP, verified facesheet and discussed discharge planning needs. The patient stated that she plans to return home with assistance from her spouse, uses no DME, has 3 steps to enter her single story home with a basement.  The patient's PCP is Víctor Contreras, pharmacy is [facility] and the patient denies any trouble remembering to take her medication or with affording her medication.  The patient denies any HH/SNF history, her spouse  was provided with a HH/SNF list and the patient stated that she would be agreeable to [facility] to assist with home O2.  Kaley with [facility] was contacted to assist with home O2.  CCP consult submitted for home O2 for patient and an oximetry is needed on the patient to obtain qualifying sats.  Sticky note left for MD to submit orders or sign DWO on chart/ a DWO is also on table in CCP office in case MD comes in there first.  CCP will follow to assist the patient with obtaining home O2 from [facility].  CHRISTINA Wang        Discharge Placement     No information found                Demographic Summary       12/11/17 1655    Referral Information    Admission Type inpatient    Arrived From home or self-care    Referral Source nursing;physician    Reason For Consult discharge planning    Record Reviewed medical record;history and physical    Primary Care Physician Information    Name Víctor Contreras MD            Functional Status       12/11/17 6269    Functional Status Current    Ambulation 2-->assistive person    Transferring 2-->assistive person    Toileting 2-->assistive person    Bathing 2-->assistive person    Dressing 0-->independent    Eating 0-->independent    Communication 0-->understands/communicates without difficulty    Swallowing (if score 2 or more for any item, consult Rehab Services) 0-->swallows foods/liquids without difficulty    Functional Status Prior    Ambulation 0-->independent    Transferring 0-->independent    Toileting 0-->independent    Bathing 0-->independent    Dressing 0-->independent    Eating 0-->independent    Communication 0-->understands/communicates without difficulty    Swallowing 0-->swallows foods/liquids without difficulty    IADL    Medications assistive person    Meal Preparation assistive person    Housekeeping assistive person    Laundry assistive person    Shopping assistive person    Oral Care assistive person    Cognitive/Perceptual/Developmental    Current Mental  Status/Cognitive Functioning no deficits noted    Recent Changes in Mental Status/Cognitive Functioning no changes    Developmental Stage (Eriks[place]'s Stages of Development) Stage 7 (35-65 years/Middle Adulthood) Generativity vs. Stagnation            Psychosocial     None            Abuse/Neglect     None            Legal     None            Substance Abuse     None            Patient Forms       12/11/17 9745    Patient Forms    Provider Choice List Delivered    Delivered to Support person   Patient's spouse    Method of delivery In person          CHRISTINA Shook

## 2017-12-11 NOTE — H&P
Name: Brenda Michelle ADMIT: 12/10/2017   : 1955  PCP: Víctor Contreras MD    MRN: [id number] LOS: 0 days   AGE/SEX: 62 y.o. female  ROOM: [facility]     No chief complaint on file.       History of Present Illness  62-year-old female who fell 2 days ago while she was walking to the bathroom in the dark.  She thinks that she hit the sink and she developed immediate right chest wall pain.  This has worsened to the point that she was seen at the [facility] emergency room where an x-ray revealed a right sixth rib fracture.  When she initially arrived there her O2 sat on room air was 83%.  She does smoke one half packs per day although she has not had a cigarette since the injury.  She has never been diagnosed with COPD but the chest x-ray did reveal chronic fibrotic changes that would be consistent with this.  She also has a chronic cough that is usually productive of clear sputum.  She is having problems managing to  get anything up because it hurts so much to try and cough      Past Medical History:   Diagnosis Date   • Anxiety    • benign polyps of large intestine    • Depression    • Disc degeneration, lumbar    • Fibromyositis    • Hyperlipidemia    • IBS (irritable bowel syndrome)    • Nephrolithiasis    • PVD (peripheral vascular disease)    • Vitamin D deficiency      Past Surgical History:   Procedure Laterality Date   • COLONOSCOPY      Colon polyps; family history of colon cancer   • KNEE ARTHROPLASTY, PARTIAL REPLACEMENT     • KNEE ARTHROSCOPY Left     Meniscus   • POPLITEAL ARTERY ANGIOPLASTY Bilateral    • TONSILLECTOMY     • TUBAL ABDOMINAL LIGATION         Allergies:  Aripiprazole; Atorvastatin; Bupropion; Cilostazol; Codeine sulfate; Ferrous sulfate; Welchol  [colesevelam hcl]; and Adhesive tape    Prescriptions Prior to Admission   Medication Sig Dispense Refill Last Dose   • Aspirin (ASPIR-81 PO) Take 81 mg by mouth Daily.      • citalopram (CeleXA) 40 MG tablet Take 1 tablet by mouth  "Daily. 90 tablet 1    • clopidogrel (PLAVIX) 75 MG tablet Take 1 tablet by mouth Daily. 90 tablet 1    • fenofibrate (TRICOR) 145 MG tablet TAKE 1 TABLET DAILY 90 tablet 1    • meloxicam (MOBIC) 15 MG tablet TAKE 1 TABLET DAILY 90 tablet 0    • pregabalin (LYRICA) 100 MG capsule Take 1 capsule by mouth 2 (Two) Times a Day. 60 capsule 0    • D3-50 88908 UNITS capsule TAKE 1 CAPSULE EVERY 7 DAYS 12 capsule 2    • oxyCODONE-acetaminophen (PERCOCET) 7.5-325 MG per tablet Take 1 tablet by mouth every 3 (three) hours as needed for moderate pain (4-6) (1 to 2 tablets 3 times a day as needed for pain).      • traMADol (ULTRAM) 50 MG tablet       • varenicline (CHANTIX) 0.5 MG tablet Take 1 tablet by mouth 2 (Two) Times a Day. 60 tablet 2        Social History   Substance Use Topics   • Smoking status: Current Every Day Smoker   • Smokeless tobacco: Never Used   • Alcohol use Yes       Family History   Problem Relation Age of Onset   • COPD Mother       at 67 yo    • Colon cancer Mother    • Lung cancer Father      mesothelioma       Review of Systems   Constitutional: weight stable. Appetite good. No recent fevers. No chills.  Had to take medical leave because of fibromyalgia.   HEENT: No vision changes. No rhinorrhea, sore throat.  No change in hearing.   Respiratory: activity is limited by fibromyalgia pain. Doesn't get MITCHELL. No PND  Cardiovascular: No chest pain or palpitations.  No problems with edema  Gastrointestinal:  occasional problems with heartburn or indigestion. Bowel movements normal. No blood noted. Has c-scope scheduled  Endocrine: no DM  Genitourinary: No difficulty urinating, dysuria or frequency. Has \"horrible\" smell to it. No recent problems with stones  Musculoskeletal:  has chronic lower back pain. Right leg doesn't feel the same as left leg. Seems to be weaker or more swollen. Left leg is shorter than right leg.  Skin: No rash    Neurological: Negative for syncope or headaches, except she did have " one today. No paresthesias  Hematological:  Does bruise easily. No h/o DVTs  Psychiatric/Behavioral: No confusion. The patient is not nervous/anxious.       Objective    Vital Signs  Temp:  [97.1 °F (36.2 °C)-97.5 °F (36.4 °C)] 97.5 °F (36.4 °C)  Heart Rate:  [65-71] 65  Resp:  [16] 16  BP: (133-145)/(76-79) 133/76  SpO2:  [90 %-92 %] 92 %  on  Flow (L/min):  [3] 3;   O2 Device: nasal cannula  Body mass index is 38.44 kg/(m^2).    Physical Exam   Obese female in obvious pain  PERRL, EOMI, sclera nonicteric. Oropharynx benign, tongue midline, palate elevates symmetrically. Neck supple without adenopathy or thyromegaly. No JVD.  Lungs scattered rhonchi & expiratory wheezing. This did improve once she was able to finally cough more but did not completely clear  Heart RRR without murmur, gallop or rub.   Abdomen soft, nontender, bowel sounds present throughout.  Extremities no cyanosis, clubbing or edema.   Skin warm & dry  Neuro  alert & oriented. Speech fluent.       Results Review:   I reviewed the patient's new clinical results.    Lab Results (last 24 hours)     Procedure Component Value Units Date/Time    Basic Metabolic Panel [[id number]]  (Abnormal) Collected:  12/11/17 0027    Specimen:  Blood Updated:  12/11/17 0053     Glucose 108 (H) mg/dL      BUN 15 mg/dL      Creatinine 0.84 mg/dL      Sodium 142 mmol/L      Potassium 4.1 mmol/L      Chloride 102 mmol/L      CO2 31.3 (H) mmol/L      Calcium 9.4 mg/dL      eGFR Non African Amer 69 mL/min/1.73      BUN/Creatinine Ratio 17.9     Anion Gap 8.7 mmol/L     Narrative:       GFR Normal >60  Chronic Kidney Disease <60  Kidney Failure <15                Results from last 7 days  Lab Units 12/11/17 0027   SODIUM mmol/L 142   POTASSIUM mmol/L 4.1   CHLORIDE mmol/L 102   CO2 mmol/L 31.3*   BUN mg/dL 15   CREATININE mg/dL 0.84   GLUCOSE mg/dL 108*   Estimated Creatinine Clearance: 77.6 mL/min (by C-G formula based on Cr of 0.84).          No orders to display      Assessment/Plan   Assessment:     Active Hospital Problems (** Indicates Principal Problem)    Diagnosis Date Noted   • Fall [W19.XXXA] 12/11/2017   • Tobacco dependence [F17.200] 12/11/2017   • Acute respiratory failure with hypoxia [J96.01] 12/11/2017   • COPD (chronic obstructive pulmonary disease) [J44.9] 12/11/2017   • Rib fracture [S22.39XA] 12/10/2017      Resolved Hospital Problems    Diagnosis Date Noted Date Resolved   No resolved problems to display.       Plan:     Blood work wasn't done at [facility] so I will order a CBC & BMP.   She was trying to cough when I was in seeing her and she was congested and wheezing.  I think she definitely has COPD and has little to no lung reserve.  On 3 L of nasal cannula her O2 sat is only 90-92%.  I'm going to start some scheduled Mini-Neb's and add on some Mucinex to try and help break up the mucus.  This is only one rib fracture but she is at significant risk of developing pneumonia.  I did stress to her the importance of smoking cessation or she will soon be permanently on O2.  She said that in the past she has never really wanted to quit but this time she does.  I've ordered hydrocodone and IV dilaudid prn for the pain.  In addition to the mini nebs I've also ordered incentive spirometry.  As I am finishing this dictation, her BMP has come back and does show a mild elevation of the CO2 level.  The last lab available to me in the computer is from May 2015 and at that time her CO2 level was 24.  Currently it is 31.3 so she likely is already having some CO2 retention.  There could also be some obstructive sleep apnea present.      I discussed the patients findings and my recommendations with pt.  Patient was seen prior to midnight on 12/10/2017 although this dictation is being completed later          Tere Hendrickson MD  [facility]  12/11/17  12:59 AM

## 2017-12-11 NOTE — PLAN OF CARE
Problem: Patient Care Overview (Adult)  Goal: Plan of Care Review  Outcome: Ongoing (interventions implemented as appropriate)    12/11/17 6883   Coping/Psychosocial Response Interventions   Plan Of Care Reviewed With patient;spouse   Patient Care Overview   Progress progress toward functional goals as expected   Outcome Evaluation   Outcome Summary/Follow up Plan dilaudid x 1 and norco for pain. O2 at 3 L nasal cannula. neb treatments. tolerates diet, no c/o nausea.

## 2017-12-12 ENCOUNTER — APPOINTMENT (OUTPATIENT)
Dept: GENERAL RADIOLOGY | Facility: HOSPITAL | Age: 62
End: 2017-12-12
Attending: INTERNAL MEDICINE

## 2017-12-12 LAB
ANION GAP SERPL CALCULATED.3IONS-SCNC: 7.1 MMOL/L
BASOPHILS # BLD AUTO: 0.02 10*3/MM3 (ref 0–0.2)
BASOPHILS NFR BLD AUTO: 0.3 % (ref 0–1.5)
BUN BLD-MCNC: 18 MG/DL (ref 8–23)
BUN/CREAT SERPL: 25.7 (ref 7–25)
CALCIUM SPEC-SCNC: 9.3 MG/DL (ref 8.6–10.5)
CHLORIDE SERPL-SCNC: 101 MMOL/L (ref 98–107)
CO2 SERPL-SCNC: 33.9 MMOL/L (ref 22–29)
CREAT BLD-MCNC: 0.7 MG/DL (ref 0.57–1)
DEPRECATED RDW RBC AUTO: 63.5 FL (ref 37–54)
EOSINOPHIL # BLD AUTO: 0.18 10*3/MM3 (ref 0–0.7)
EOSINOPHIL NFR BLD AUTO: 2.3 % (ref 0.3–6.2)
ERYTHROCYTE [DISTWIDTH] IN BLOOD BY AUTOMATED COUNT: 16 % (ref 11.7–13)
GFR SERPL CREATININE-BSD FRML MDRD: 85 ML/MIN/1.73
GLUCOSE BLD-MCNC: 83 MG/DL (ref 65–99)
HCT VFR BLD AUTO: 44.6 % (ref 35.6–45.5)
HGB BLD-MCNC: 13.7 G/DL (ref 11.9–15.5)
IMM GRANULOCYTES # BLD: 0.02 10*3/MM3 (ref 0–0.03)
IMM GRANULOCYTES NFR BLD: 0.3 % (ref 0–0.5)
LYMPHOCYTES # BLD AUTO: 1.17 10*3/MM3 (ref 0.9–4.8)
LYMPHOCYTES NFR BLD AUTO: 15.3 % (ref 19.6–45.3)
MCH RBC QN AUTO: 33.3 PG (ref 26.9–32)
MCHC RBC AUTO-ENTMCNC: 30.7 G/DL (ref 32.4–36.3)
MCV RBC AUTO: 108.5 FL (ref 80.5–98.2)
MONOCYTES # BLD AUTO: 0.69 10*3/MM3 (ref 0.2–1.2)
MONOCYTES NFR BLD AUTO: 9 % (ref 5–12)
NEUTROPHILS # BLD AUTO: 5.58 10*3/MM3 (ref 1.9–8.1)
NEUTROPHILS NFR BLD AUTO: 72.8 % (ref 42.7–76)
PLATELET # BLD AUTO: 211 10*3/MM3 (ref 140–500)
PMV BLD AUTO: 10.7 FL (ref 6–12)
POTASSIUM BLD-SCNC: 4.4 MMOL/L (ref 3.5–5.2)
PROCALCITONIN SERPL-MCNC: 0.06 NG/ML (ref 0.1–0.25)
RBC # BLD AUTO: 4.11 10*6/MM3 (ref 3.9–5.2)
SODIUM BLD-SCNC: 142 MMOL/L (ref 136–145)
WBC NRBC COR # BLD: 7.66 10*3/MM3 (ref 4.5–10.7)

## 2017-12-12 PROCEDURE — 25010000002 METHYLPREDNISOLONE PER 40 MG: Performed by: INTERNAL MEDICINE

## 2017-12-12 PROCEDURE — 80048 BASIC METABOLIC PNL TOTAL CA: CPT | Performed by: INTERNAL MEDICINE

## 2017-12-12 PROCEDURE — 85025 COMPLETE CBC W/AUTO DIFF WBC: CPT | Performed by: INTERNAL MEDICINE

## 2017-12-12 PROCEDURE — 71020 HC CHEST PA AND LATERAL: CPT

## 2017-12-12 PROCEDURE — 84145 PROCALCITONIN (PCT): CPT | Performed by: INTERNAL MEDICINE

## 2017-12-12 PROCEDURE — 94799 UNLISTED PULMONARY SVC/PX: CPT

## 2017-12-12 RX ORDER — MELOXICAM 15 MG/1
TABLET ORAL
Qty: 90 TABLET | Refills: 0 | Status: SHIPPED | OUTPATIENT
Start: 2017-12-12 | End: 2018-03-11 | Stop reason: SDUPTHER

## 2017-12-12 RX ORDER — METHYLPREDNISOLONE SODIUM SUCCINATE 40 MG/ML
40 INJECTION, POWDER, LYOPHILIZED, FOR SOLUTION INTRAMUSCULAR; INTRAVENOUS EVERY 8 HOURS
Status: DISCONTINUED | OUTPATIENT
Start: 2017-12-12 | End: 2017-12-14 | Stop reason: HOSPADM

## 2017-12-12 RX ORDER — PANTOPRAZOLE SODIUM 40 MG/1
40 TABLET, DELAYED RELEASE ORAL
Status: DISCONTINUED | OUTPATIENT
Start: 2017-12-12 | End: 2017-12-14 | Stop reason: HOSPADM

## 2017-12-12 RX ADMIN — GUAIFENESIN 600 MG: 600 TABLET, EXTENDED RELEASE ORAL at 08:23

## 2017-12-12 RX ADMIN — HYDROCODONE BITARTRATE AND ACETAMINOPHEN 1 TABLET: 10; 325 TABLET ORAL at 04:42

## 2017-12-12 RX ADMIN — METHYLPREDNISOLONE SODIUM SUCCINATE 40 MG: 40 INJECTION, POWDER, FOR SOLUTION INTRAMUSCULAR; INTRAVENOUS at 09:54

## 2017-12-12 RX ADMIN — ASPIRIN 81 MG: 81 TABLET ORAL at 21:30

## 2017-12-12 RX ADMIN — PREGABALIN 100 MG: 100 CAPSULE ORAL at 08:23

## 2017-12-12 RX ADMIN — CLOPIDOGREL 75 MG: 75 TABLET, FILM COATED ORAL at 22:45

## 2017-12-12 RX ADMIN — IPRATROPIUM BROMIDE AND ALBUTEROL SULFATE 3 ML: .5; 3 SOLUTION RESPIRATORY (INHALATION) at 20:38

## 2017-12-12 RX ADMIN — HYDROCODONE BITARTRATE AND ACETAMINOPHEN 1 TABLET: 10; 325 TABLET ORAL at 21:30

## 2017-12-12 RX ADMIN — IPRATROPIUM BROMIDE AND ALBUTEROL SULFATE 3 ML: .5; 3 SOLUTION RESPIRATORY (INHALATION) at 15:19

## 2017-12-12 RX ADMIN — GUAIFENESIN 600 MG: 600 TABLET, EXTENDED RELEASE ORAL at 18:08

## 2017-12-12 RX ADMIN — HYDROCODONE BITARTRATE AND ACETAMINOPHEN 1 TABLET: 10; 325 TABLET ORAL at 14:55

## 2017-12-12 RX ADMIN — METHYLPREDNISOLONE SODIUM SUCCINATE 40 MG: 40 INJECTION, POWDER, FOR SOLUTION INTRAMUSCULAR; INTRAVENOUS at 22:45

## 2017-12-12 RX ADMIN — PREGABALIN 100 MG: 100 CAPSULE ORAL at 18:08

## 2017-12-12 RX ADMIN — LIDOCAINE 1 PATCH: 50 PATCH CUTANEOUS at 08:26

## 2017-12-12 RX ADMIN — METHYLPREDNISOLONE SODIUM SUCCINATE 40 MG: 40 INJECTION, POWDER, FOR SOLUTION INTRAMUSCULAR; INTRAVENOUS at 15:43

## 2017-12-12 RX ADMIN — PANTOPRAZOLE SODIUM 40 MG: 40 TABLET, DELAYED RELEASE ORAL at 21:37

## 2017-12-12 RX ADMIN — CITALOPRAM 40 MG: 40 TABLET, FILM COATED ORAL at 21:30

## 2017-12-12 NOTE — PROGRESS NOTES
[facility]     LOS: 2 days     Subjective:    Eating ok    Pain 10/10 at the worst but is slightly better    Feels soa    Chest pain on the right    Objective:    Vital Signs:  Temp:  [97 °F (36.1 °C)-98.4 °F (36.9 °C)] 97.3 °F (36.3 °C)  Heart Rate:  [63-71] 68  Resp:  [16-20] 20  BP: (122-149)/(68-85) 131/85    General Appearance: no acute distress, appears comfortable  Heart: regular rate and rhythm  Lungs: wheezing poor air entry  Abdomen: soft, non-tender, no guarding, no rebound, non-distended  Extremities: no edema, no cyanosis  Neurology: speech normal, CN grossly normal  Psychiatric: normal mood and affect    Results Review:    Glucose   Date Value Ref Range Status   12/12/2017 83 65 - 99 mg/dL Final   12/11/2017 108 (H) 65 - 99 mg/dL Final       Results from last 7 days  Lab Units 12/12/17  0631   WBC 10*3/mm3 7.66   HEMOGLOBIN g/dL 13.7   HEMATOCRIT % 44.6   PLATELETS 10*3/mm3 211       Results from last 7 days  Lab Units 12/12/17  0631   SODIUM mmol/L 142   POTASSIUM mmol/L 4.4   CHLORIDE mmol/L 101   CO2 mmol/L 33.9*   BUN mg/dL 18   CREATININE mg/dL 0.70   CALCIUM mg/dL 9.3   GLUCOSE mg/dL 83                 Cultures:       I have reviewed daily medications and changes in CPOE    Scheduled meds    aspirin 81 mg Oral Daily   citalopram 40 mg Oral Daily   clopidogrel 75 mg Oral Daily   guaiFENesin 600 mg Oral BID   ipratropium-albuterol 3 mL Nebulization 4x Daily - RT   lidocaine 1 patch Transdermal Q24H   meloxicam 15 mg Oral Daily   methylPREDNISolone sodium succinate 40 mg Intravenous Q8H   pregabalin 100 mg Oral BID   sennosides-docusate sodium 2 tablet Oral BID          PRN meds  •  acetaminophen  •  aluminum-magnesium hydroxide-simethicone  •  bisacodyl  •  HYDROcodone-acetaminophen **OR** HYDROcodone-acetaminophen  •  HYDROmorphone **AND** naloxone  •  ondansetron **OR** ondansetron ODT **OR** ondansetron      Active Problems:    Rib fracture    Fall    Tobacco  dependence    Acute respiratory failure with hypoxia    COPD (chronic obstructive pulmonary disease)      Assessment/Plan:      Rib fracture-  -steroids so will start high dose ppi  -IS about the same  -helen  - continuous o2 sat monitor  -lidoderm patch    Fall    Tobacco dependence    Acute respiratory failure with hypoxia- requiring 4 liters of oxygen    pvd- aspirin and plavix    COPD (chronic obstructive pulmonary disease)  - tre Tenorio MD  12/12/17  6:06 PM

## 2017-12-12 NOTE — CONSULTS
"Group: [facility]         CONSULT NOTE    Patient Identification:    Brenda Michelle  62 y.o.  female  1955  [id number]            Patient Care Team:  Víctor Contreras MD as PCP - General    Requesting physician:     Reason for Consultation:  SOA    CC: Chest pain     History of Present Illness:  Miss Brenda Michelle is a resident morbidly obese 62-year-old white female active long-term smoker without diagnosis of COPD or emphysema with a past medical history significant for peripheral arterial disease requiring angioplasties chronic,  who was admitted to the hospital after mechanical fall when she slipped in the bathroom and hit her right chest to the sink area.  She says that she noticed one of her leg \"gave away\".  She denies any loss of consciousness, worsening cough, shortness of breath or hemoptysis before or after the fall.  She has noted acute pain which gets worse with deep breath and cough which limits her breathing movements at this point.  She denies any radiation palpitations or syncope or presyncopal episodes.  She is unable to take a deep breath because of the pain involved.     She was diagnosed to have right-sided rib fracture clinically and was placed on oral NSAID's To help with pain.  Patient at baseline and does not use any supplemental oxygen but is requiring supplemental oxygen up to 2-3 L nasal cannula while at rest now.  She was never tested or diagnosed of obstructive sleep apnea.  She says that since she came in her pain as bothering her significantly and she is requiring her as needed pain medication frequently.     On a daily basis patient coughs with clear to white sputum production without any evidence of loss of weight or loss of appetite along with hemoptysis.  He has at least moderate to exacerbations requiring steroids and antibiotics being prescribed as an outpatient and has not had hospital admissions with COPD exacerbations or pneumonia     Review of " Systems:  Constitutional: No fever, chills, weight loss or loss of appetite.   ENMT: No sinus congestion, post nasal drip, epistaxis, sore throat  Cardiovascular: No chest pain, palpitation or legs swelling.    Respiratory: No hemoptysis, orthopnea, PND.  Gastrointestinal: No constipation, diarrhea or abdominal pain   Neurology: No headache, focal weakness, numbness or dizziness.   Musculoskeletal: No joints stiffness or swelling.   Psychiatry: No agitation or behavioral changes  Lymphatic: No swollen neck glands.  Integumentary: No rash.    Past Medical History:  Past Medical History:   Diagnosis Date   • Anxiety    • benign polyps of large intestine    • Depression    • Disc degeneration, lumbar    • Fibromyositis    • Hyperlipidemia    • IBS (irritable bowel syndrome)    • Nephrolithiasis    • PVD (peripheral vascular disease)    • Vitamin D deficiency        Past Surgical History:  Past Surgical History:   Procedure Laterality Date   • COLONOSCOPY  2004    Colon polyps; family history of colon cancer   • KNEE ARTHROPLASTY, PARTIAL REPLACEMENT  2015   • KNEE ARTHROSCOPY Left     Meniscus   • POPLITEAL ARTERY ANGIOPLASTY Bilateral    • TONSILLECTOMY     • TUBAL ABDOMINAL LIGATION          Home Meds:  Prescriptions Prior to Admission   Medication Sig Dispense Refill Last Dose   • Aspirin (ASPIR-81 PO) Take 81 mg by mouth Daily.      • citalopram (CeleXA) 40 MG tablet Take 1 tablet by mouth Daily. 90 tablet 1    • clopidogrel (PLAVIX) 75 MG tablet Take 1 tablet by mouth Daily. 90 tablet 1    • fenofibrate (TRICOR) 145 MG tablet TAKE 1 TABLET DAILY 90 tablet 1    • meloxicam (MOBIC) 15 MG tablet TAKE 1 TABLET DAILY 90 tablet 0    • pregabalin (LYRICA) 100 MG capsule Take 1 capsule by mouth 2 (Two) Times a Day. 60 capsule 0    • D3-50 53226 UNITS capsule TAKE 1 CAPSULE EVERY 7 DAYS 12 capsule 2    • oxyCODONE-acetaminophen (PERCOCET) 7.5-325 MG per tablet Take 1 tablet by mouth every 3 (three) hours as needed for  "moderate pain (4-6) (1 to 2 tablets 3 times a day as needed for pain).      • traMADol (ULTRAM) 50 MG tablet       • varenicline (CHANTIX) 0.5 MG tablet Take 1 tablet by mouth 2 (Two) Times a Day. 60 tablet 2        Allergies:  Allergies   Allergen Reactions   • Aripiprazole    • Atorvastatin Other (See Comments)     MYALGIAS   • Bupropion Itching   • Cilostazol Other (See Comments)     HA   • Codeine Sulfate    • Ferrous Sulfate Nausea Only   • Welchol  [Colesevelam Hcl] Nausea Only   • Adhesive Tape Rash       Social History:   Social History     Social History   • Marital status:      Spouse name: N/A   • Number of children: N/A   • Years of education: N/A     Occupational History   • Not on file.     Social History Main Topics   • Smoking status: Current Every Day Smoker   • Smokeless tobacco: Never Used   • Alcohol use Yes   • Drug use: Not on file   • Sexual activity: Not on file     Other Topics Concern   • Not on file     Social History Narrative       Family History:  Family History   Problem Relation Age of Onset   • COPD Mother       at 67 yo    • Colon cancer Mother    • Lung cancer Father      mesothelioma       Physical Exam:  /72 (BP Location: Left arm)  Pulse 67  Temp 97.5 °F (36.4 °C) (Oral)   Resp 16  Ht 160 cm (63\")  Wt 98.4 kg (217 lb)  SpO2 (!) 88% Comment: -  BMI 38.44 kg/m2 Body mass index is 38.44 kg/(m^2). (!) 88% 98.4 kg (217 lb)    Physical Exam     Constitutional: Morbidly obese white female complaining of pain but in no acute respiratory distress  Head: - NCAT  Eyes: No pallor, Anicteric conjunctiva, EOMI.  ENMT:  Mallampati 4 ,No oral thrush. No palpable cervical LApathy  Heart: RRR, no murmur  Lungs: SCARLET +, bilateral wheezes and rhonchi heard but no crackles.  Point tenderness noted on the right lower ribs anteriorly    Abdomen: Obese. Soft. No tenderness, guarding or rigidity  Extremities: No cyanosis or clubbing. Pitting edema  Neuro: Conscious, alert, " oriented x3, no gross focal neuro deficits    Lab Review:   LABS:  Lab Results   Component Value Date    CALCIUM 9.4 12/11/2017     Results from last 7 days  Lab Units 12/11/17  0443 12/11/17  0027   SODIUM mmol/L  --  142   POTASSIUM mmol/L  --  4.1   CHLORIDE mmol/L  --  102   CO2 mmol/L  --  31.3*   BUN mg/dL  --  15   CREATININE mg/dL  --  0.84   GLUCOSE mg/dL  --  108*   CALCIUM mg/dL  --  9.4   WBC 10*3/mm3 7.38  --    HEMOGLOBIN g/dL 14.0  --    PLATELETS 10*3/mm3 200  --      No results found for: CKTOTAL, CKMB, CKMBINDEX, TROPONINI, TROPONINT                  Lab Results   Component Value Date    TSH 1.190 09/28/2016     Estimated Creatinine Clearance: 77.6 mL/min (by C-G formula based on Cr of 0.84).         Imaging: I personally visualized the images of scans/x-rays performed within last 3 days.    Imaging Results (all)     None          ASSESSMENT:  Acute hypoxic respiratory failure  Acute COPD exacerbation  Right rib contusion from mechanical trauma  Bilateral atelectasis  Tobacco abuse    RECOMMENDATIONS:  Patient's hypoxia is likely related to atelectasis more than pneumonia.  I will get a pro-calcitonin to rule out pneumonia at this point.  I will get a chest x-ray to confirm the diagnosis of rib fractures.  I will give her shortness dose of steroids and hold antibiotics  We will offer the patient nicotine patch.  Patient will need good pain control to prevent a pneumonia.  I will use as needed narcotics which she uses at home anyways.  Full code DVT prophylaxis  OOB/PT/ Flutter valve    Thank you for letting me participate in the care of this pleasant patient.  I have discussed my findings and recommendations with patient, nursing staff and primary care team.     Gal Ruiz MD  12/12/2017  5:56 AM

## 2017-12-12 NOTE — PLAN OF CARE
Problem: Patient Care Overview (Adult)  Goal: Plan of Care Review  Outcome: Ongoing (interventions implemented as appropriate)    12/12/17 0456   Coping/Psychosocial Response Interventions   Plan Of Care Reviewed With patient   Patient Care Overview   Progress no change   Outcome Evaluation   Outcome Summary/Follow up Plan VSS. SOA due to pain. Pain medicine given. O2 saturation ranges from 88-91 O2 inhalation at 4L/min. With nasal congestion.  Encouraged deep breathing and use of IS.        Goal: Adult Individualization and Mutuality  Outcome: Ongoing (interventions implemented as appropriate)  Goal: Discharge Needs Assessment  Outcome: Ongoing (interventions implemented as appropriate)    Problem: Respiratory Insufficiency (Adult)  Goal: Identify Related Risk Factors and Signs and Symptoms  Outcome: Ongoing (interventions implemented as appropriate)  Goal: Acid/Base Balance  Outcome: Ongoing (interventions implemented as appropriate)  Goal: Effective Ventilation  Outcome: Ongoing (interventions implemented as appropriate)

## 2017-12-12 NOTE — PLAN OF CARE
Problem: Patient Care Overview (Adult)  Goal: Plan of Care Review  Outcome: Ongoing (interventions implemented as appropriate)    12/12/17 1641   Coping/Psychosocial Response Interventions   Plan Of Care Reviewed With patient   Patient Care Overview   Progress improving   Outcome Evaluation   Outcome Summary/Follow up Plan VSS. SOA with exertion. O2 cont on 4L. Amb in [place] with . IV steroids started. CXR complete. IS enc. Pain meds given as needed.        Goal: Adult Individualization and Mutuality  Outcome: Ongoing (interventions implemented as appropriate)  Goal: Discharge Needs Assessment  Outcome: Ongoing (interventions implemented as appropriate)    Problem: Respiratory Insufficiency (Adult)  Goal: Identify Related Risk Factors and Signs and Symptoms  Outcome: Ongoing (interventions implemented as appropriate)  Goal: Acid/Base Balance  Outcome: Ongoing (interventions implemented as appropriate)  Goal: Effective Ventilation  Outcome: Ongoing (interventions implemented as appropriate)

## 2017-12-13 ENCOUNTER — APPOINTMENT (OUTPATIENT)
Dept: GENERAL RADIOLOGY | Facility: HOSPITAL | Age: 62
End: 2017-12-13
Attending: INTERNAL MEDICINE

## 2017-12-13 PROBLEM — S22.41XA FRACTURE OF MULTIPLE RIBS OF RIGHT SIDE: Status: ACTIVE | Noted: 2017-12-10

## 2017-12-13 LAB
ANION GAP SERPL CALCULATED.3IONS-SCNC: 9.3 MMOL/L
BASOPHILS # BLD AUTO: 0 10*3/MM3 (ref 0–0.2)
BASOPHILS NFR BLD AUTO: 0 % (ref 0–1.5)
BUN BLD-MCNC: 17 MG/DL (ref 8–23)
BUN/CREAT SERPL: 25.8 (ref 7–25)
CALCIUM SPEC-SCNC: 9.9 MG/DL (ref 8.6–10.5)
CHLORIDE SERPL-SCNC: 102 MMOL/L (ref 98–107)
CO2 SERPL-SCNC: 29.7 MMOL/L (ref 22–29)
CREAT BLD-MCNC: 0.66 MG/DL (ref 0.57–1)
DEPRECATED RDW RBC AUTO: 63.2 FL (ref 37–54)
EOSINOPHIL # BLD AUTO: 0 10*3/MM3 (ref 0–0.7)
EOSINOPHIL NFR BLD AUTO: 0 % (ref 0.3–6.2)
ERYTHROCYTE [DISTWIDTH] IN BLOOD BY AUTOMATED COUNT: 16.1 % (ref 11.7–13)
GFR SERPL CREATININE-BSD FRML MDRD: 91 ML/MIN/1.73
GLUCOSE BLD-MCNC: 122 MG/DL (ref 65–99)
HCT VFR BLD AUTO: 44.2 % (ref 35.6–45.5)
HGB BLD-MCNC: 13.9 G/DL (ref 11.9–15.5)
IMM GRANULOCYTES # BLD: 0 10*3/MM3 (ref 0–0.03)
IMM GRANULOCYTES NFR BLD: 0 % (ref 0–0.5)
LYMPHOCYTES # BLD AUTO: 0.52 10*3/MM3 (ref 0.9–4.8)
LYMPHOCYTES NFR BLD AUTO: 8.4 % (ref 19.6–45.3)
MCH RBC QN AUTO: 33.9 PG (ref 26.9–32)
MCHC RBC AUTO-ENTMCNC: 31.4 G/DL (ref 32.4–36.3)
MCV RBC AUTO: 107.8 FL (ref 80.5–98.2)
MONOCYTES # BLD AUTO: 0.14 10*3/MM3 (ref 0.2–1.2)
MONOCYTES NFR BLD AUTO: 2.3 % (ref 5–12)
NEUTROPHILS # BLD AUTO: 5.51 10*3/MM3 (ref 1.9–8.1)
NEUTROPHILS NFR BLD AUTO: 89.3 % (ref 42.7–76)
PLATELET # BLD AUTO: 237 10*3/MM3 (ref 140–500)
PMV BLD AUTO: 10.6 FL (ref 6–12)
POTASSIUM BLD-SCNC: 4.8 MMOL/L (ref 3.5–5.2)
RBC # BLD AUTO: 4.1 10*6/MM3 (ref 3.9–5.2)
SODIUM BLD-SCNC: 141 MMOL/L (ref 136–145)
WBC NRBC COR # BLD: 6.17 10*3/MM3 (ref 4.5–10.7)

## 2017-12-13 PROCEDURE — 94799 UNLISTED PULMONARY SVC/PX: CPT

## 2017-12-13 PROCEDURE — 71101 X-RAY EXAM UNILAT RIBS/CHEST: CPT

## 2017-12-13 PROCEDURE — 85025 COMPLETE CBC W/AUTO DIFF WBC: CPT | Performed by: INTERNAL MEDICINE

## 2017-12-13 PROCEDURE — 25010000002 METHYLPREDNISOLONE PER 40 MG: Performed by: INTERNAL MEDICINE

## 2017-12-13 PROCEDURE — 80048 BASIC METABOLIC PNL TOTAL CA: CPT | Performed by: INTERNAL MEDICINE

## 2017-12-13 PROCEDURE — 99253 IP/OBS CNSLTJ NEW/EST LOW 45: CPT | Performed by: NURSE PRACTITIONER

## 2017-12-13 RX ADMIN — ASPIRIN 81 MG: 81 TABLET ORAL at 21:17

## 2017-12-13 RX ADMIN — DOCUSATE SODIUM -SENNOSIDES 2 TABLET: 50; 8.6 TABLET, COATED ORAL at 18:06

## 2017-12-13 RX ADMIN — IPRATROPIUM BROMIDE AND ALBUTEROL SULFATE 3 ML: .5; 3 SOLUTION RESPIRATORY (INHALATION) at 20:51

## 2017-12-13 RX ADMIN — HYDROCODONE BITARTRATE AND ACETAMINOPHEN 1 TABLET: 10; 325 TABLET ORAL at 21:16

## 2017-12-13 RX ADMIN — CLOPIDOGREL 75 MG: 75 TABLET, FILM COATED ORAL at 21:17

## 2017-12-13 RX ADMIN — IPRATROPIUM BROMIDE AND ALBUTEROL SULFATE 3 ML: .5; 3 SOLUTION RESPIRATORY (INHALATION) at 14:56

## 2017-12-13 RX ADMIN — HYDROCODONE BITARTRATE AND ACETAMINOPHEN 1 TABLET: 10; 325 TABLET ORAL at 14:17

## 2017-12-13 RX ADMIN — PANTOPRAZOLE SODIUM 40 MG: 40 TABLET, DELAYED RELEASE ORAL at 06:36

## 2017-12-13 RX ADMIN — HYDROCODONE BITARTRATE AND ACETAMINOPHEN 1 TABLET: 10; 325 TABLET ORAL at 06:38

## 2017-12-13 RX ADMIN — PANTOPRAZOLE SODIUM 40 MG: 40 TABLET, DELAYED RELEASE ORAL at 16:46

## 2017-12-13 RX ADMIN — DOCUSATE SODIUM -SENNOSIDES 2 TABLET: 50; 8.6 TABLET, COATED ORAL at 08:53

## 2017-12-13 RX ADMIN — CITALOPRAM 40 MG: 40 TABLET, FILM COATED ORAL at 21:16

## 2017-12-13 RX ADMIN — METHYLPREDNISOLONE SODIUM SUCCINATE 40 MG: 40 INJECTION, POWDER, FOR SOLUTION INTRAMUSCULAR; INTRAVENOUS at 14:17

## 2017-12-13 RX ADMIN — GUAIFENESIN 600 MG: 600 TABLET, EXTENDED RELEASE ORAL at 08:53

## 2017-12-13 RX ADMIN — METHYLPREDNISOLONE SODIUM SUCCINATE 40 MG: 40 INJECTION, POWDER, FOR SOLUTION INTRAMUSCULAR; INTRAVENOUS at 23:18

## 2017-12-13 RX ADMIN — PREGABALIN 100 MG: 100 CAPSULE ORAL at 08:53

## 2017-12-13 RX ADMIN — GUAIFENESIN 600 MG: 600 TABLET, EXTENDED RELEASE ORAL at 18:07

## 2017-12-13 RX ADMIN — METHYLPREDNISOLONE SODIUM SUCCINATE 40 MG: 40 INJECTION, POWDER, FOR SOLUTION INTRAMUSCULAR; INTRAVENOUS at 06:36

## 2017-12-13 RX ADMIN — IPRATROPIUM BROMIDE AND ALBUTEROL SULFATE 3 ML: .5; 3 SOLUTION RESPIRATORY (INHALATION) at 10:49

## 2017-12-13 RX ADMIN — PREGABALIN 100 MG: 100 CAPSULE ORAL at 18:07

## 2017-12-13 RX ADMIN — HYDROCODONE BITARTRATE AND ACETAMINOPHEN 1 TABLET: 10; 325 TABLET ORAL at 10:16

## 2017-12-13 NOTE — PLAN OF CARE
Problem: Patient Care Overview (Adult)  Goal: Plan of Care Review  Outcome: Ongoing (interventions implemented as appropriate)    12/13/17 0428   Patient Care Overview   Progress improving   Outcome Evaluation   Outcome Summary/Follow up Plan VSS.SOA with exertion. Still on O2 @2L . O2 Saturation above 91% .Medicated for pain.       Goal: Adult Individualization and Mutuality  Outcome: Ongoing (interventions implemented as appropriate)  Goal: Discharge Needs Assessment  Outcome: Ongoing (interventions implemented as appropriate)    Problem: Respiratory Insufficiency (Adult)  Goal: Identify Related Risk Factors and Signs and Symptoms  Outcome: Ongoing (interventions implemented as appropriate)  Goal: Acid/Base Balance  Outcome: Ongoing (interventions implemented as appropriate)  Goal: Effective Ventilation  Outcome: Ongoing (interventions implemented as appropriate)

## 2017-12-13 NOTE — PLAN OF CARE
Problem: Patient Care Overview (Adult)  Goal: Plan of Care Review  Outcome: Ongoing (interventions implemented as appropriate)    12/13/17 7281   Coping/Psychosocial Response Interventions   Plan Of Care Reviewed With patient;spouse   Patient Care Overview   Progress no change   Outcome Evaluation   Outcome Summary/Follow up Plan SOA with exertion, O2 4L. Will go home with O2. C/o constant right chest pain r/t rib fracture x 3. Medicated with PO pain medication. Consulted thoracic surgery.       Goal: Adult Individualization and Mutuality  Outcome: Ongoing (interventions implemented as appropriate)  Goal: Discharge Needs Assessment  Outcome: Ongoing (interventions implemented as appropriate)    Problem: Respiratory Insufficiency (Adult)  Goal: Identify Related Risk Factors and Signs and Symptoms  Outcome: Outcome(s) achieved Date Met:  12/13/17  Goal: Acid/Base Balance  Outcome: Ongoing (interventions implemented as appropriate)  Goal: Effective Ventilation  Outcome: Ongoing (interventions implemented as appropriate)

## 2017-12-13 NOTE — PROGRESS NOTES
Continued Stay Note  [facility]     Patient Name: Brenda Michelle  MRN: [id number]  Today's Date: 12/13/2017    Admit Date: 12/10/2017          Discharge Plan       12/13/17 0946    Case Management/Social Work Plan    Plan Home with family, Felida to provide oxygen at discharge    Additional Comments Yesterday I spoke with Kaley and updated her that sat on 4L was 88%, she said that will support need for home oxygen, I advised attending anticipates discharge in a couple of days (Hard copy for DME order obtained).  Today I spoke with Sara at PCP's office to make (LACE) follow-up visit [facility] PCP Dr Contreras, she said PCP is out next week, she made follow-up visit ELI Tijerina Dec 21 @ 10:00 AM              Discharge Codes     None            Briana Ruiz RN

## 2017-12-13 NOTE — PROGRESS NOTES
[facility]     LOS: 3 days     Subjective:    Eating is alright    Pain 10/10 when she moves, not any improved, doesn't want adjustment in meds    Feels soa    Chest pain on the right    Rib xray reviewed    Objective:    Vital Signs:  Temp:  [97.3 °F (36.3 °C)-98.8 °F (37.1 °C)] 97.7 °F (36.5 °C)  Heart Rate:  [63-78] 78  Resp:  [18-20] 18  BP: (127-149)/(65-85) 127/65    General Appearance: no acute distress, appears comfortable  Heart: regular rate and rhythm  Lungs: wheezing poor air entry b/l, right side chest tenderness in anterior/lateral  Abdomen: soft, non-tender, no guarding, no rebound, non-distended  Extremities: no edema, no cyanosis  Neurology: speech normal, CN grossly normal  Psychiatric: normal mood and affect    Results Review:    Glucose   Date Value Ref Range Status   12/13/2017 122 (H) 65 - 99 mg/dL Final   12/12/2017 83 65 - 99 mg/dL Final   12/11/2017 108 (H) 65 - 99 mg/dL Final       Results from last 7 days  Lab Units 12/13/17  0551   WBC 10*3/mm3 6.17   HEMOGLOBIN g/dL 13.9   HEMATOCRIT % 44.2   PLATELETS 10*3/mm3 237       Results from last 7 days  Lab Units 12/13/17  0551   SODIUM mmol/L 141   POTASSIUM mmol/L 4.8   CHLORIDE mmol/L 102   CO2 mmol/L 29.7*   BUN mg/dL 17   CREATININE mg/dL 0.66   CALCIUM mg/dL 9.9   GLUCOSE mg/dL 122*                 Cultures:       I have reviewed daily medications and changes in CPOE    Scheduled meds    aspirin 81 mg Oral Daily   citalopram 40 mg Oral Daily   clopidogrel 75 mg Oral Daily   guaiFENesin 600 mg Oral BID   ipratropium-albuterol 3 mL Nebulization 4x Daily - RT   lidocaine 1 patch Transdermal Q24H   methylPREDNISolone sodium succinate 40 mg Intravenous Q8H   pantoprazole 40 mg Oral BID AC   pregabalin 100 mg Oral BID   sennosides-docusate sodium 2 tablet Oral BID          PRN meds  •  acetaminophen  •  aluminum-magnesium hydroxide-simethicone  •  bisacodyl  •  HYDROcodone-acetaminophen **OR**  HYDROcodone-acetaminophen  •  HYDROmorphone **AND** naloxone  •  ondansetron **OR** ondansetron ODT **OR** ondansetron      Principal Problem:    Fracture of multiple ribs of right side  Active Problems:    Fall    Tobacco dependence    Acute respiratory failure with hypoxia    COPD (chronic obstructive pulmonary disease)      Assessment/Plan:      Rib fractures- rib xray revealed fx 4-6  -ask thoracic to see given multiple rib fractures and continued pain  -on steroids so  ppi started  -instructed on IS, did poorly due to pain  -shellybs  -continuous o2 sat monitor  -lidoderm patch    Fall    Tobacco dependence    Acute respiratory failure with hypoxia- atelecatsis from rib fractures, requiring 4 liters of oxygen    pvd- aspirin and plavix    COPD (chronic obstructive pulmonary disease)  - tre Wood MD  12/13/17  9:54 AM

## 2017-12-13 NOTE — CONSULTS
Inpatient Consult to Thoracic Surgery  Consult performed by: LENI LAGUNA  Consult ordered by: AMELIA BERNARDO          Patient Care Team:  Víctor Contreras MD as PCP - General    Chief complaint:  Right rib pain and shortness of breath    Subjective     History of Present Illness  Brenda Michelle is a 62 year old female who presented to the hospital due to pain after a fall on Saturday.  Patient states she was walking to the bathroom in the dark when she believes she hit her side on a sink and developed immediate right-sided chest wall pain.  She went to [facility] emergency room for evaluation in which his chest x-ray was completed which revealed a rib fracture on the right side of the sixth rib.  She had decreased O2 sats in the 80s along with nonproductive cough.  She was transferred to [facility] for further management.  She had complaints of increased chest congestion, wheezing and nonproductive cough.  She was started on Mucinex and mini neb treatments.  O2 sats increased to 90-92% on 3 L per nasal cannula.  Due to her continued acute hypoxia pulmonary service was consulted.  Ordered a repeat chest x-ray with right rib detail.  This x-ray showed 3 fractures on the right side of the fourth, fifth and sixth ribs.  Patient had increased pain at a 10 on 0-to-10 scale yesterday.  States she feels she let her pain get out of control.  Due to the continued O2 requirements and continued rib pain we were consulted for further recommendations.  At time of consult today, she had recently received pain medication which she feels helping control her pain.  She currently rates it at a 2 with movement and cough.  Pain was an 8 on a 0-to-10 scale prior to pain medication given.  Her pain is mainly located under the right breast.  Pain worsens with any movement or cough.  She continues to have a cough that is becoming more productive at this time.  No hemoptysis.  She continues to require O2 at 3 L/m to  keep O2 sats greater than 90%.  She denies any fevers, chills or any other concerns.    Review of Systems   Constitutional: Negative.  Negative for fever.   HENT: Negative.    Respiratory: Positive for cough and shortness of breath.    Cardiovascular: Positive for chest pain.   Gastrointestinal: Negative.    Endocrine: Negative.    Genitourinary: Negative.    Musculoskeletal: Negative.    Skin: Negative.    Neurological: Negative.    Hematological: Negative.    Psychiatric/Behavioral: Negative.         Patient Active Problem List   Diagnosis   • Acute deep vein thrombosis of distal leg   • Adenomatous polyp of colon   • Depression   • Fibromyalgia   • Hyperlipidemia   • Low back pain   • Peripheral arterial occlusive disease   • Vitamin D deficiency   • Gastroesophageal reflux disease   • Irritable bowel syndrome   • Disorder of intervertebral disc   • Peripheral neuropathy   • Fracture of multiple ribs of right side   • Fall   • Tobacco dependence   • Acute respiratory failure with hypoxia   • COPD (chronic obstructive pulmonary disease)     Past Medical History:   Diagnosis Date   • Anxiety    • benign polyps of large intestine    • Depression    • Disc degeneration, lumbar    • Fibromyositis    • Hyperlipidemia    • IBS (irritable bowel syndrome)    • Nephrolithiasis    • PVD (peripheral vascular disease)    • Vitamin D deficiency      Past Surgical History:   Procedure Laterality Date   • COLONOSCOPY  2004    Colon polyps; family history of colon cancer   • KNEE ARTHROPLASTY, PARTIAL REPLACEMENT     • KNEE ARTHROSCOPY Left     Meniscus   • POPLITEAL ARTERY ANGIOPLASTY Bilateral    • TONSILLECTOMY     • TUBAL ABDOMINAL LIGATION       Family History   Problem Relation Age of Onset   • COPD Mother       at 69 yo    • Colon cancer Mother    • Lung cancer Father      mesothelioma     Social History     Social History   • Marital status:      Spouse name: N/A   • Number of children: N/A   • Years of  education: N/A     Occupational History   • Not on file.     Social History Main Topics   • Smoking status: Current Every Day Smoker   • Smokeless tobacco: Never Used   • Alcohol use Yes   • Drug use: Not on file   • Sexual activity: Not on file     Other Topics Concern   • Not on file     Social History Narrative     Prescriptions Prior to Admission   Medication Sig Dispense Refill Last Dose   • Aspirin (ASPIR-81 PO) Take 81 mg by mouth Daily.      • citalopram (CeleXA) 40 MG tablet Take 1 tablet by mouth Daily. 90 tablet 1    • clopidogrel (PLAVIX) 75 MG tablet Take 1 tablet by mouth Daily. 90 tablet 1    • fenofibrate (TRICOR) 145 MG tablet TAKE 1 TABLET DAILY 90 tablet 1    • pregabalin (LYRICA) 100 MG capsule Take 1 capsule by mouth 2 (Two) Times a Day. 60 capsule 0    • D3-50 40731 UNITS capsule TAKE 1 CAPSULE EVERY 7 DAYS 12 capsule 2    • oxyCODONE-acetaminophen (PERCOCET) 7.5-325 MG per tablet Take 1 tablet by mouth every 3 (three) hours as needed for moderate pain (4-6) (1 to 2 tablets 3 times a day as needed for pain).      • traMADol (ULTRAM) 50 MG tablet       • varenicline (CHANTIX) 0.5 MG tablet Take 1 tablet by mouth 2 (Two) Times a Day. 60 tablet 2      Allergies   Allergen Reactions   • Aripiprazole    • Atorvastatin Other (See Comments)     MYALGIAS   • Bupropion Itching   • Cilostazol Other (See Comments)     HA   • Codeine Sulfate    • Ferrous Sulfate Nausea Only   • Welchol  [Colesevelam Hcl] Nausea Only   • Adhesive Tape Rash       Objective      Vital Signs  Temp:  [96.8 °F (36 °C)-98.8 °F (37.1 °C)] 96.8 °F (36 °C)  Heart Rate:  [68-87] 78  Resp:  [16-20] 18  BP: (127-148)/(65-82) 132/72    Intake & Output (last day)       12/12 0701 - 12/13 0700 12/13 0701 - 12/14 0700    P.O. 1080     Total Intake(mL/kg) 1080 (11)     Urine (mL/kg/hr)      Stool      Total Output        Net +1080            Unmeasured Urine Occurrence  4 x          Physical Exam   Constitutional: She is oriented to person,  place, and time. Vital signs are normal. She appears well-developed.   HENT:   Head: Normocephalic and atraumatic.   Neck: Normal range of motion. Neck supple.   Cardiovascular: Normal rate, regular rhythm, normal heart sounds and intact distal pulses.    No murmur heard.  Pulmonary/Chest: Effort normal. She has decreased breath sounds. She has wheezes in the left upper field and the left middle field. She has rhonchi in the left upper field and the left middle field. She has no rales. She exhibits tenderness.   Right chest wall tenderness noted under right breast.  No crepitus, bruising or redness noted.  O2 at 3L/M per N/C.     Abdominal: Soft. There is no tenderness.   Exam limited due to obesity   Musculoskeletal: Normal range of motion. She exhibits no edema or tenderness.   Neurological: She is alert and oriented to person, place, and time. She has normal strength.   Skin: Skin is warm and dry. No rash noted. No cyanosis or erythema.   Psychiatric: She has a normal mood and affect. Her behavior is normal.       Results Review:    I reviewed the patient's new clinical results.  I reviewed the patient's new imaging results and agree with the interpretation.    Imaging Results (last 24 hours)     Procedure Component Value Units Date/Time    XR Ribs Right With PA Chest [[id number]] Collected:  12/13/17 0759     Updated:  12/13/17 0804    Narrative:       CHEST X-RAY AND RIGHT RIBS 5 VIEWS     HISTORY: Morbidly obese 62-year-old female who fell and struck the right  side of her chest complains of chest pain, currently on oxygen     COMPARISON: 12/12/2017     FINDINGS:  1. Imaging is limited by body habitus.  2. Acute fractures of the right fourth fifth and sixth ribs.  3. Mild right basilar atelectasis, small right pleural effusion no right  pneumothorax.  4. Persistent left basilar atelectasis and effusion, no focal pneumonia.     This report was finalized on 12/13/2017 8:01 AM by Dr. Joseluis Jimenes MD.                Lab Results:  Lab Results (last 24 hours)     Procedure Component Value Units Date/Time    CBC & Differential [[id number]] Collected:  12/13/17 0551    Specimen:  Blood Updated:  12/13/17 0644    Narrative:       The following orders were created for panel order CBC & Differential.  Procedure                               Abnormality         Status                     ---------                               -----------         ------                     Scan Slide[[id number]]                                                                  CBC Auto Differential[[id number]]        Abnormal            Final result                 Please view results for these tests on the individual orders.    CBC Auto Differential [[id number]]  (Abnormal) Collected:  12/13/17 0551    Specimen:  Blood Updated:  12/13/17 0644     WBC 6.17 10*3/mm3      RBC 4.10 10*6/mm3      Hemoglobin 13.9 g/dL      Hematocrit 44.2 %      .8 (H) fL      MCH 33.9 (H) pg      MCHC 31.4 (L) g/dL      RDW 16.1 (H) %      RDW-SD 63.2 (H) fl      MPV 10.6 fL      Platelets 237 10*3/mm3      Neutrophil % 89.3 (H) %      Lymphocyte % 8.4 (L) %      Monocyte % 2.3 (L) %      Eosinophil % 0.0 (L) %      Basophil % 0.0 %      Immature Grans % 0.0 %      Neutrophils, Absolute 5.51 10*3/mm3      Lymphocytes, Absolute 0.52 (L) 10*3/mm3      Monocytes, Absolute 0.14 (L) 10*3/mm3      Eosinophils, Absolute 0.00 10*3/mm3      Basophils, Absolute 0.00 10*3/mm3      Immature Grans, Absolute 0.00 10*3/mm3     Basic Metabolic Panel [[id number]]  (Abnormal) Collected:  12/13/17 0551    Specimen:  Blood Updated:  12/13/17 0654     Glucose 122 (H) mg/dL      BUN 17 mg/dL      Creatinine 0.66 mg/dL      Sodium 141 mmol/L      Potassium 4.8 mmol/L      Chloride 102 mmol/L      CO2 29.7 (H) mmol/L      Calcium 9.9 mg/dL      eGFR Non African Amer 91 mL/min/1.73      BUN/Creatinine Ratio 25.8 (H)     Anion Gap 9.3 mmol/L     Narrative:       GFR Normal >60  Chronic  Kidney Disease <60  Kidney Failure <15              Assessment/Plan     Principal Problem:    Fracture of multiple ribs of right side  Active Problems:    Fall    Tobacco dependence    Acute respiratory failure with hypoxia    COPD (chronic obstructive pulmonary disease)      Assessment:    Condition: In stable condition.   (Multiple right-sided rib fractures).     Plan:   (After evaluation of patient and review of recent chest x-ray with right rib detail, no surgical intervention recommended at this time concerning the right rib fractures of the fourth, fifth and sixth ribs.  Recommend pain management, pulmonary hygiene, and splinting side with cough and deep breath.  All of these measures are currently in place including IS and Aerobika therapy.  Chest x-ray did not show any evidence of pneumothorax.  We will repeat chest x-ray in the a.m.  Recommend continuing current treatment plan at this time.).       I discussed the patients findings and our recommendations with patient and family    Thank you for this consult and allowing us to participate in the care of your patient.  We will follow along with you during this hospitalization.       Kesha Higgins, APRN  [facility]  12/13/17  4:36 PM

## 2017-12-14 ENCOUNTER — APPOINTMENT (OUTPATIENT)
Dept: GENERAL RADIOLOGY | Facility: HOSPITAL | Age: 62
End: 2017-12-14

## 2017-12-14 ENCOUNTER — TELEPHONE (OUTPATIENT)
Dept: INTERNAL MEDICINE | Age: 62
End: 2017-12-14

## 2017-12-14 VITALS
TEMPERATURE: 97.3 F | SYSTOLIC BLOOD PRESSURE: 117 MMHG | WEIGHT: 217 LBS | RESPIRATION RATE: 16 BRPM | DIASTOLIC BLOOD PRESSURE: 71 MMHG | HEIGHT: 63 IN | OXYGEN SATURATION: 91 % | HEART RATE: 68 BPM | BODY MASS INDEX: 38.45 KG/M2

## 2017-12-14 DIAGNOSIS — S22.49XD CLOSED FRACTURE OF MULTIPLE RIBS WITH ROUTINE HEALING, UNSPECIFIED LATERALITY, SUBSEQUENT ENCOUNTER: Primary | ICD-10-CM

## 2017-12-14 LAB
ANION GAP SERPL CALCULATED.3IONS-SCNC: 9 MMOL/L
BASOPHILS # BLD AUTO: 0 10*3/MM3 (ref 0–0.2)
BASOPHILS NFR BLD AUTO: 0 % (ref 0–1.5)
BUN BLD-MCNC: 25 MG/DL (ref 8–23)
BUN/CREAT SERPL: 31.6 (ref 7–25)
CALCIUM SPEC-SCNC: 9.7 MG/DL (ref 8.6–10.5)
CHLORIDE SERPL-SCNC: 102 MMOL/L (ref 98–107)
CO2 SERPL-SCNC: 30 MMOL/L (ref 22–29)
CREAT BLD-MCNC: 0.79 MG/DL (ref 0.57–1)
DEPRECATED RDW RBC AUTO: 62.8 FL (ref 37–54)
EOSINOPHIL # BLD AUTO: 0 10*3/MM3 (ref 0–0.7)
EOSINOPHIL NFR BLD AUTO: 0 % (ref 0.3–6.2)
ERYTHROCYTE [DISTWIDTH] IN BLOOD BY AUTOMATED COUNT: 16 % (ref 11.7–13)
GFR SERPL CREATININE-BSD FRML MDRD: 74 ML/MIN/1.73
GLUCOSE BLD-MCNC: 125 MG/DL (ref 65–99)
HCT VFR BLD AUTO: 44.6 % (ref 35.6–45.5)
HGB BLD-MCNC: 14 G/DL (ref 11.9–15.5)
IMM GRANULOCYTES # BLD: 0.02 10*3/MM3 (ref 0–0.03)
IMM GRANULOCYTES NFR BLD: 0.2 % (ref 0–0.5)
LYMPHOCYTES # BLD AUTO: 0.52 10*3/MM3 (ref 0.9–4.8)
LYMPHOCYTES NFR BLD AUTO: 5.2 % (ref 19.6–45.3)
MCH RBC QN AUTO: 33.6 PG (ref 26.9–32)
MCHC RBC AUTO-ENTMCNC: 31.4 G/DL (ref 32.4–36.3)
MCV RBC AUTO: 107 FL (ref 80.5–98.2)
MONOCYTES # BLD AUTO: 0.37 10*3/MM3 (ref 0.2–1.2)
MONOCYTES NFR BLD AUTO: 3.7 % (ref 5–12)
NEUTROPHILS # BLD AUTO: 9.14 10*3/MM3 (ref 1.9–8.1)
NEUTROPHILS NFR BLD AUTO: 90.9 % (ref 42.7–76)
PLATELET # BLD AUTO: 253 10*3/MM3 (ref 140–500)
PMV BLD AUTO: 10.4 FL (ref 6–12)
POTASSIUM BLD-SCNC: 4.8 MMOL/L (ref 3.5–5.2)
RBC # BLD AUTO: 4.17 10*6/MM3 (ref 3.9–5.2)
SODIUM BLD-SCNC: 141 MMOL/L (ref 136–145)
WBC NRBC COR # BLD: 10.05 10*3/MM3 (ref 4.5–10.7)

## 2017-12-14 PROCEDURE — 25010000002 METHYLPREDNISOLONE PER 40 MG: Performed by: INTERNAL MEDICINE

## 2017-12-14 PROCEDURE — 94620 HC PULMONARY STRESS TEST SIMPLE: CPT

## 2017-12-14 PROCEDURE — 71010 HC CHEST PA OR AP: CPT

## 2017-12-14 PROCEDURE — 94799 UNLISTED PULMONARY SVC/PX: CPT

## 2017-12-14 PROCEDURE — 80048 BASIC METABOLIC PNL TOTAL CA: CPT | Performed by: INTERNAL MEDICINE

## 2017-12-14 PROCEDURE — 85025 COMPLETE CBC W/AUTO DIFF WBC: CPT | Performed by: INTERNAL MEDICINE

## 2017-12-14 RX ORDER — GUAIFENESIN 600 MG/1
600 TABLET, EXTENDED RELEASE ORAL 2 TIMES DAILY
Qty: 30 TABLET | Refills: 0 | Status: SHIPPED | OUTPATIENT
Start: 2017-12-14 | End: 2017-12-14

## 2017-12-14 RX ORDER — ALBUTEROL SULFATE 90 UG/1
2 AEROSOL, METERED RESPIRATORY (INHALATION) EVERY 4 HOURS PRN
Qty: 1 INHALER | Refills: 0 | Status: SHIPPED | OUTPATIENT
Start: 2017-12-14 | End: 2017-12-14

## 2017-12-14 RX ORDER — LIDOCAINE 50 MG/G
1 PATCH TOPICAL
Qty: 30 PATCH | Refills: 0 | Status: SHIPPED | OUTPATIENT
Start: 2017-12-15 | End: 2017-12-14

## 2017-12-14 RX ORDER — LIDOCAINE 50 MG/G
1 PATCH TOPICAL
Qty: 30 PATCH | Refills: 0 | Status: SHIPPED | OUTPATIENT
Start: 2017-12-15 | End: 2017-12-21

## 2017-12-14 RX ORDER — HYDROCODONE BITARTRATE AND ACETAMINOPHEN 10; 325 MG/1; MG/1
1 TABLET ORAL EVERY 4 HOURS PRN
Qty: 18 TABLET | Refills: 0 | Status: SHIPPED | OUTPATIENT
Start: 2017-12-14 | End: 2017-12-21 | Stop reason: SDUPTHER

## 2017-12-14 RX ORDER — ALBUTEROL SULFATE 90 UG/1
2 AEROSOL, METERED RESPIRATORY (INHALATION) EVERY 4 HOURS PRN
Qty: 1 INHALER | Refills: 0 | Status: SHIPPED | OUTPATIENT
Start: 2017-12-14 | End: 2021-12-02

## 2017-12-14 RX ORDER — GUAIFENESIN 600 MG/1
600 TABLET, EXTENDED RELEASE ORAL 2 TIMES DAILY
Qty: 30 TABLET | Refills: 0 | Status: SHIPPED | OUTPATIENT
Start: 2017-12-14 | End: 2018-07-06

## 2017-12-14 RX ADMIN — IPRATROPIUM BROMIDE AND ALBUTEROL SULFATE 3 ML: .5; 3 SOLUTION RESPIRATORY (INHALATION) at 09:05

## 2017-12-14 RX ADMIN — GUAIFENESIN 600 MG: 600 TABLET, EXTENDED RELEASE ORAL at 08:28

## 2017-12-14 RX ADMIN — HYDROCODONE BITARTRATE AND ACETAMINOPHEN 1 TABLET: 10; 325 TABLET ORAL at 03:39

## 2017-12-14 RX ADMIN — PANTOPRAZOLE SODIUM 40 MG: 40 TABLET, DELAYED RELEASE ORAL at 06:53

## 2017-12-14 RX ADMIN — DOCUSATE SODIUM -SENNOSIDES 2 TABLET: 50; 8.6 TABLET, COATED ORAL at 08:28

## 2017-12-14 RX ADMIN — METHYLPREDNISOLONE SODIUM SUCCINATE 40 MG: 40 INJECTION, POWDER, FOR SOLUTION INTRAMUSCULAR; INTRAVENOUS at 06:53

## 2017-12-14 RX ADMIN — LIDOCAINE 1 PATCH: 50 PATCH CUTANEOUS at 08:28

## 2017-12-14 RX ADMIN — HYDROCODONE BITARTRATE AND ACETAMINOPHEN 1 TABLET: 10; 325 TABLET ORAL at 08:29

## 2017-12-14 RX ADMIN — PREGABALIN 100 MG: 100 CAPSULE ORAL at 08:29

## 2017-12-14 NOTE — PROGRESS NOTES
Continued Stay Note  [facility]     Patient Name: Brenda Michelle  MRN: [id number]  Today's Date: 12/14/2017    Admit Date: 12/10/2017          Discharge Plan       12/14/17 1420    Case Management/Social Work Plan    Additional Comments I met with pt and her  Shiva, I provided a home health list and pt selected [facility], referral called to Alyce Brizuela.  I provided LACE appointment with APRN for Dr Contreras,  Fabi Hernandez, ELI for Dec 21 @ 10:00 AM, Kaley with [facility] on unit to provide home oxygen.       12/14/17 1409    Case Management/Social Work Plan    Plan Home with [facility] and [facility] to provide home oxygen              Discharge Codes       12/14/17 1424    Discharge Codes    Discharge Codes 06  Discharged/transferred to home under care of organized home health service organization in anticipation of skilled care        Expected Discharge Date and Time     Expected Discharge Date Expected Discharge Time    Dec 14, 2017             Briana Ruiz RN

## 2017-12-14 NOTE — TELEPHONE ENCOUNTER
"Alyce with [facility] called regarding Dr Contreras pt. She will be d/c today from the hospital and is asking for a verbal to follow the pt for \"skilled nursing\"?  Alyce's # [phone]  Thanks SP  "

## 2017-12-14 NOTE — PROGRESS NOTES
Daily Progress Note.     12/13/2017    Brenda Michelle ,  62 y.o. ,female  [place] [address] [address]      Brief problem (summary)  · Fall  · Right rib fracture 4 to 6  · obesity      Today, c/o pain    PMS/FH/SH: reviewed and unchanged.  Medications:     aspirin 81 mg Oral Daily   citalopram 40 mg Oral Daily   clopidogrel 75 mg Oral Daily   guaiFENesin 600 mg Oral BID   ipratropium-albuterol 3 mL Nebulization 4x Daily - RT   lidocaine 1 patch Transdermal Q24H   methylPREDNISolone sodium succinate 40 mg Intravenous Q8H   pantoprazole 40 mg Oral BID AC   pregabalin 100 mg Oral BID   sennosides-docusate sodium 2 tablet Oral BID          ROS:  Respiratory ROS: positive for - chest wall apin    Objective  Temp:  [96.8 °F (36 °C)-98.1 °F (36.7 °C)] 97 °F (36.1 °C)  Heart Rate:  [70-87] 71  Resp:  [16-20] 18  BP: (125-148)/(63-82) 125/63  I/O last 3 completed shifts:  In: 1080 [P.O.:1080]  Out: -        Physical Exam   Constitutional: She is oriented to person, place, and time. She appears well-developed and well-nourished. No distress.   HENT:   Head: Normocephalic and atraumatic.   Eyes: Pupils are equal, round, and reactive to light. No scleral icterus.   Cardiovascular: Normal rate and regular rhythm.    Pulmonary/Chest: Effort normal. No respiratory distress. She has decreased breath sounds in the right middle field and the right lower field. She has no wheezes.   Abdominal: Soft. Bowel sounds are normal. There is no hepatosplenomegaly.   Neurological: She is alert and oriented to person, place, and time.   Skin: No cyanosis. Nails show no clubbing.   Psychiatric: She has a normal mood and affect. Her behavior is normal.         Results from last 7 days  Lab Units 12/13/17  0551 12/12/17  0631 12/11/17  0443   WBC 10*3/mm3 6.17 7.66 7.38   HEMOGLOBIN g/dL 13.9 13.7 14.0   HEMATOCRIT % 44.2 44.6 44.4   PLATELETS 10*3/mm3 237 211 200       Results from last 7 days  Lab Units 12/13/17  0551 12/12/17  0631  12/11/17  0027   SODIUM mmol/L 141 142 142   POTASSIUM mmol/L 4.8 4.4 4.1   CHLORIDE mmol/L 102 101 102   CO2 mmol/L 29.7* 33.9* 31.3*   BUN mg/dL 17 18 15   CREATININE mg/dL 0.66 0.70 0.84   GLUCOSE mg/dL 122* 83 108*   CALCIUM mg/dL 9.9 9.3 9.4               ASSESSMENT/ PLAN:  · Right rib fracture, pain control  · Hx of fall  · Obseity  · ? COPD, need PFT as a out patient, now now due to pain  · TOB use       OK to dischargev       Geoffrey Retana MD, [facility]  Pulmonary, Critical Care and Sleep Medicine.  [facility]    12/13/2017 ,    Much of this encounter note is an electronic transcription/translation of spoken language to printed text. The electronic translation of spoken language may permit erroneous, or at times, nonsensical words or phrases to be inadvertently transcribed; Although I have reviewed the note for such errors, some may still exist.

## 2017-12-14 NOTE — PROGRESS NOTES
Exercise Oximetry    Patient Name:Brenda Michelle   MRN: [id number]   Date: 12/14/17             ROOM AIR BASELINE   SpO2% 89   Heart Rate 88   Blood Pressure     EXERCISE ON ROOM AIR SpO2% EXERCISE ON O2 @ 4 LPM SpO2%   1 MINUTE 84 1 MINUTE 87   2 MINUTES  2 MINUTES 88   3 MINUTES  3 MINUTES 89   4 MINUTES  4 MINUTES    5 MINUTES  5 MINUTES    6 MINUTES  6 MINUTES               Distance Walked   Distance Walked   Dyspnea (Nancy Scale)   Dyspnea (Nancy Scale)   Fatigue (Nancy Scale) Fatigue (Nancy Scale)   SpO2% Post Exercise 90   SpO2% Post Exercise   HR Post Exercise  74 HR Post Exercise   Time to Recovery  3 minutes Time to Recovery     Comments: pt unable to finish walking oximetry, stated she felt very weak.

## 2017-12-14 NOTE — PLAN OF CARE
Problem: Patient Care Overview (Adult)  Goal: Plan of Care Review  Outcome: Ongoing (interventions implemented as appropriate)    12/14/17 0452   Coping/Psychosocial Response Interventions   Plan Of Care Reviewed With patient   Patient Care Overview   Progress no change   Outcome Evaluation   Outcome Summary/Follow up Plan up ad neelima, still on 3l of O2, tried to lynette down to 2L but sats dropped to 89%, IS and deep breathing encouraged, occassional SOA with exertion, intermittent pain rt thoracic, medicated,        Goal: Adult Individualization and Mutuality  Outcome: Ongoing (interventions implemented as appropriate)  Goal: Discharge Needs Assessment  Outcome: Ongoing (interventions implemented as appropriate)    Problem: Respiratory Insufficiency (Adult)  Goal: Acid/Base Balance  Outcome: Ongoing (interventions implemented as appropriate)  Goal: Effective Ventilation  Outcome: Ongoing (interventions implemented as appropriate)

## 2017-12-14 NOTE — PROGRESS NOTES
"      Chief Complaint: Rib fractures  S/P: Fall    Subjective:  Symptoms:  Stable.  (Patient states she feels better today.  Continues on O2 at 4 L/m per nasal cannula but does not fill any worsening shortness of breath.  Her pain is currently controlled with current treatment regimen.  She feels ready for discharge home.).        Vital Signs:  Temp:  [97 °F (36.1 °C)-97.7 °F (36.5 °C)] 97.3 °F (36.3 °C)  Heart Rate:  [62-78] 68  Resp:  [16-20] 16  BP: (117-147)/(60-79) 117/71    Intake & Output (last day)       12/13 0701 - 12/14 0700 12/14 0701 - 12/15 0700    P.O. 120 460    Total Intake(mL/kg) 120 (1.2) 460 (4.7)    Net +120 +460          Unmeasured Urine Occurrence 6 x           Objective:  General Appearance:  Comfortable and in no acute distress.    Vital signs: (most recent): Blood pressure 117/71, pulse 68, temperature 97.3 °F (36.3 °C), temperature source Oral, resp. rate 16, height 160 cm (63\"), weight 98.4 kg (217 lb), SpO2 91 %.  Vital signs are normal.  No fever.    Lungs:  Normal respiratory rate and normal effort.  (O2 at 4L/M)  Heart: Normal rate.    Extremities: There is no dependent edema.    Neurological: Patient is alert and oriented to person, place and time.    Skin:  Warm and dry.              Results Review:     I reviewed the patient's new clinical results.  I reviewed the patient's new imaging results and agree with the interpretation.    Imaging Results (last 24 hours)     Procedure Component Value Units Date/Time    XR Chest 1 View [[id number]] Collected:  12/14/17 0535     Updated:  12/14/17 0550    Narrative:       X-RAY CHEST 1 VIEW.     HISTORY: Rib fracture.     COMPARISON: 12/13/2017.     FINDINGS: Limited study due to underpenetration.     Cardiomediastinal silhouette is within normal limits.         Bilateral lung opacities, there is slight worsening. Lung volumes are  low.      Right rib fractures are not clearly visualized.          Impression:       Worsening opacities in both " lung bases concerning for atelectasis, less  likely infiltrates. Follow-up is recommended.                 Lab Results:     Lab Results (last 24 hours)     Procedure Component Value Units Date/Time    CBC Auto Differential [[id number]]  (Abnormal) Collected:  12/14/17 0444    Specimen:  Blood Updated:  12/14/17 0526     WBC 10.05 10*3/mm3      RBC 4.17 10*6/mm3      Hemoglobin 14.0 g/dL      Hematocrit 44.6 %      .0 (H) fL      MCH 33.6 (H) pg      MCHC 31.4 (L) g/dL      RDW 16.0 (H) %      RDW-SD 62.8 (H) fl      MPV 10.4 fL      Platelets 253 10*3/mm3      Neutrophil % 90.9 (H) %      Lymphocyte % 5.2 (L) %      Monocyte % 3.7 (L) %      Eosinophil % 0.0 (L) %      Basophil % 0.0 %      Immature Grans % 0.2 %      Neutrophils, Absolute 9.14 (H) 10*3/mm3      Lymphocytes, Absolute 0.52 (L) 10*3/mm3      Monocytes, Absolute 0.37 10*3/mm3      Eosinophils, Absolute 0.00 10*3/mm3      Basophils, Absolute 0.00 10*3/mm3      Immature Grans, Absolute 0.02 10*3/mm3     CBC & Differential [[id number]] Collected:  12/14/17 0444    Specimen:  Blood Updated:  12/14/17 0526    Narrative:       The following orders were created for panel order CBC & Differential.  Procedure                               Abnormality         Status                     ---------                               -----------         ------                     Scan Slide[[id number]]                                                                  CBC Auto Differential[[id number]]        Abnormal            Final result                 Please view results for these tests on the individual orders.    Basic Metabolic Panel [[id number]]  (Abnormal) Collected:  12/14/17 0444    Specimen:  Blood Updated:  12/14/17 0542     Glucose 125 (H) mg/dL      BUN 25 (H) mg/dL      Creatinine 0.79 mg/dL      Sodium 141 mmol/L      Potassium 4.8 mmol/L      Chloride 102 mmol/L      CO2 30.0 (H) mmol/L      Calcium 9.7 mg/dL      eGFR Non African Amer 74 mL/min/1.73       BUN/Creatinine Ratio 31.6 (H)     Anion Gap 9.0 mmol/L     Narrative:       GFR Normal >60  Chronic Kidney Disease <60  Kidney Failure <15           Assessment/Plan     Principal Problem:    Fracture of multiple ribs of right side  Active Problems:    Fall    Tobacco dependence    Acute respiratory failure with hypoxia    COPD (chronic obstructive pulmonary disease)       Assessment:    Condition: In stable condition.  Improving.   (Right rib fractures).     Plan:   (Patient's pain is currently controlled concerning the right-sided rib fractures with current treatment regimen.  Chest x-ray continues to show atelectasis bilaterally.  She needs to continue strict pulmonary hygiene including incentive spirometer and Aerobika therapy.  Also to splint right side with cough and deep breath.  Plans are for home with oxygen also.  No surgical intervention recommended at this time concerning the right rib fractures.  She is okay for discharge prior standpoint.  She is scheduled for follow-up appointment with me on 01/03/2018 with a repeat chest x-ray at 12:45 PM in the meantime, instructed to contact us sooner for any problems or concerns.).       Kesha Higgins, APRN  [facility]  12/14/17  1:57 PM

## 2017-12-14 NOTE — DISCHARGE SUMMARY
Name: Brenda Michelle  Age: 62 y.o.  Sex: female  :  1955  MRN: [id number]         Primary Care Physician: Víctor Contreras MD      Date of Admission:  12/10/2017  Date of Discharge:  2017      CHIEF COMPLAINT  No chief complaint on file.  Chief complaint is fall and right rib pain    DISCHARGE DIAGNOSIS  Active Hospital Problems (** Indicates Principal Problem)    Diagnosis Date Noted   • **Fracture of multiple ribs of right side [S22.41XA] 12/10/2017   • Fall [W19.XXXA] 2017   • Tobacco dependence [F17.200] 2017   • Acute respiratory failure with hypoxia [J96.01] 2017   • COPD (chronic obstructive pulmonary disease) [J44.9] 2017      Resolved Hospital Problems    Diagnosis Date Noted Date Resolved   No resolved problems to display.       SECONDARY DIAGNOSES  Past Medical History:   Diagnosis Date   • Anxiety    • benign polyps of large intestine    • Depression    • Disc degeneration, lumbar    • Fibromyositis    • Hyperlipidemia    • IBS (irritable bowel syndrome)    • Nephrolithiasis    • PVD (peripheral vascular disease)    • Vitamin D deficiency        CONSULTS   Dr. Ruiz: Pulmonary disease  Dr. Leon: Thoracic surgery    PROCEDURES PERFORMED    Study Result      CHEST X-RAY AND RIGHT RIBS 5 VIEWS      HISTORY: Morbidly obese 62-year-old female who fell and struck the right  side of her chest complains of chest pain, currently on oxygen      COMPARISON: 2017      FINDINGS:  1. Imaging is limited by body habitus.  2. Acute fractures of the right fourth fifth and sixth ribs.  3. Mild right basilar atelectasis, small right pleural effusion no right  pneumothorax.  4. Persistent left basilar atelectasis and effusion, no focal pneumonia.       HOSPITAL COURSE    The patient is a 62-year-old female who fell 2 days prior to admission.  She sustained injury to the right RIBS and came to the hospital.  X-ray revealed a rib fracture on the right side and she was  admitted to our service for hypoxia and hypoxic respiratory failure satting 83% on room air.  She was admitted to our service and was continued on supplemental oxygen, initially it was thought that she may have pneumonia but later decided that she only had atelectasis due to her severe rib pain and decreased deep inspirations.   She was given some steroids for her pain and dyspnea.  This improved her pain.  She continued to improve and did walk with nursing staff.  She does require home oxygen at 3 L nasal cannula O2 saturations greater than 90%.  Repeat x-ray showed multiple rib fractures 4 through 6 and thoracic surgery was consulted.  There is no surgical need and there is no signs of pneumothorax and she is going to follow-up with thoracic surgery on January 3, 2018 with repeat chest x-ray at 12:45 PM.  I've requested her to follow-up with her primary doctor within one week.  I will discharge her with albuterol inhaler but I do not believe steroids are needed at this time.  PHYSICAL EXAM  Temp:  [97 °F (36.1 °C)-97.7 °F (36.5 °C)] 97.3 °F (36.3 °C)  Heart Rate:  [62-74] 68  Resp:  [16-18] 16  BP: (117-147)/(60-79) 117/71  Body mass index is 38.44 kg/(m^2).  Physical Exam  General Appearance: no acute distress, appears comfortable  Heart: regular rate and rhythm  Lungs: wheezing poor air entry b/l, right side chest tenderness in anterior/lateral  Abdomen: soft, non-tender, no guarding, no rebound, non-distended  Extremities: no edema, no cyanosis  Neurology: speech normal, CN grossly normal  Psychiatric: normal mood and affect    CONDITION ON DISCHARGE  Stable.      DISCHARGE DISPOSITION   Home      ALLERGIES  Allergies   Allergen Reactions   • Aripiprazole    • Atorvastatin Other (See Comments)     MYALGIAS   • Bupropion Itching   • Cilostazol Other (See Comments)     HA   • Codeine Sulfate    • Ferrous Sulfate Nausea Only   • Welchol  [Colesevelam Hcl] Nausea Only   • Adhesive Tape Rash         DISCHARGE  MEDICATIONS     Your medication list       As of 12/14/2017  2:13 PM      START taking these medications       Instructions Last Dose Given Next Dose Due    albuterol 108 (90 Base) MCG/ACT inhaler   Commonly known as:  PROVENTIL HFA;VENTOLIN HFA        Inhale 2 puffs Every 4 (Four) Hours As Needed for Wheezing.         guaiFENesin 600 MG 12 hr tablet   Commonly known as:  MUCINEX        Take 1 tablet by mouth 2 (Two) Times a Day.         HYDROcodone-acetaminophen  MG per tablet   Commonly known as:  NORCO        Take 1 tablet by mouth Every 4 (Four) Hours As Needed for Moderate Pain  for up to 7 days.         lidocaine 5 %   Commonly known as:  LIDODERM   Start taking on:  12/15/2017        Place 1 patch on the skin Daily. Remove & Discard patch within 12 hours or as directed by MD           CONTINUE taking these medications       Instructions Last Dose Given Next Dose Due    ASPIR-81 PO              citalopram 40 MG tablet   Commonly known as:  CeleXA        Take 1 tablet by mouth Daily.         clopidogrel 75 MG tablet   Commonly known as:  PLAVIX        Take 1 tablet by mouth Daily.         fenofibrate 145 MG tablet   Commonly known as:  TRICOR        TAKE 1 TABLET DAILY         meloxicam 15 MG tablet   Commonly known as:  MOBIC        TAKE 1 TABLET DAILY         pregabalin 100 MG capsule   Commonly known as:  LYRICA        Take 1 capsule by mouth 2 (Two) Times a Day.           STOP taking these medications          D3-50 04136 units capsule   Generic drug:  cholecalciferol           oxyCODONE-acetaminophen 7.5-325 MG per tablet   Commonly known as:  PERCOCET           traMADol 50 MG tablet   Commonly known as:  ULTRAM           varenicline 0.5 MG tablet   Commonly known as:  CHANTIX                Where to Get Your Medications      These medications were sent to [facility] - [place], [place] - [address] [place] [address] - [phone]  - [phone] [address]  [address]     Phone:  [phone]    • meloxicam 15 MG tablet         These medications were sent to [address] - [address] [address] - [phone]  - [phone]   [address] [address], [facility] [address]     Phone:  [phone]    • albuterol 108 (90 Base) MCG/ACT inhaler   • guaiFENesin 600 MG 12 hr tablet   • lidocaine 5 %         You can get these medications from any pharmacy     Bring a paper prescription for each of these medications    • HYDROcodone-acetaminophen  MG per tablet            Future Appointments  Date Time Provider Department Center   12/21/2017 10:00 AM ELI Redman None   1/3/2018 1:45 PM ELI Matute TS [facility] None   4/11/2018 10:40 AM MD HILARIO Underwood None     Additional Instructions for the Follow-ups that You Need to Schedule     Ambulatory Referral to Home Health    As directed    Face to Face Visit Date:  12/14/2017    Follow-up Provider for Plan of Care?:  I will be treating the patient on an ongoing basis.  Please send me the Plan of Care for signature.    Follow-up Provider:  GILDARDO DOLL [[id number]]    Reason/Clinical Findings:  hypoxia    Describe mobility limitations that make leaving home difficult:  hypoxia    Nursing/Therapeutic Services Requested:  Skilled Nursing    Skilled nursing orders:  O2 instruction Cardiopulmonary assessments    Frequency:  1 Week 1                 Follow-up Information     Follow up with ELI Lane. Go on 1/3/2018.    Specialty:  Thoracic Surgery    Why:  CXR first at [facility] at 12:45 pm, then to office for appointment.      Contact information:    [address]  [place] [id number] [phone]          Follow up with Gildardo Doll MD Follow up in 3 day(s).    Specialty:  Internal Medicine    Contact information:    [address]  [facility] [id number] [phone]          Follow up with [facility] .    Specialty:  Home  Health Services    Contact information:    [address]  [facility] [address] [phone]          TEST  RESULTS PENDING AT DISCHARGE  None.     CODE STATUS  Full Code        Lionel Wood MD  [facility]  12/14/17  3:09 PM      Time: greater than 30 minutes.

## 2017-12-15 NOTE — PROGRESS NOTES
Continued Stay Note  [facility]     Patient Name: Brenda Michelle  MRN: [id number]  Today's Date: 12/15/2017    Admit Date: 12/10/2017          Discharge Plan       12/15/17 1543    Case Management/Social Work Plan    Additional Comments Mercedes with [facility] confirmed they have picked pt up              Discharge Codes     None        Expected Discharge Date and Time     Expected Discharge Date Expected Discharge Time    Dec 14, 2017             Briana Ruiz RN

## 2017-12-18 ENCOUNTER — TELEPHONE (OUTPATIENT)
Dept: INTERNAL MEDICINE | Age: 62
End: 2017-12-18

## 2017-12-18 DIAGNOSIS — S22.49XS OPEN FRACTURE OF MULTIPLE RIBS, UNSPECIFIED LATERALITY, SEQUELA: Primary | ICD-10-CM

## 2017-12-18 NOTE — PROGRESS NOTES
Case Management Discharge Note    Final Note: Home; Jacklyn Hernandez CSW     Discharge Placement     Facility/Agency Request Status Selected? Address Phone Number Fax Number    [facility] Accepted    Yes [address] [place] [address], [facility] [address] [phone]        Other: Other (car)    Discharge Codes: 06  Discharged/transferred to home under care of organized home health service organization in anticipation of skilled care

## 2017-12-18 NOTE — TELEPHONE ENCOUNTER
----- Message from Víctor Contreras MD sent at 12/17/2017  1:59 PM EST -----  Chest x-ray shows no evidence of pneumonia .  Continue any  treatment as prescribed.  Follow-up is recommended by radiology. Would suggest another chest x-ray in 8 weeks.

## 2017-12-19 ENCOUNTER — TELEPHONE (OUTPATIENT)
Dept: INTERNAL MEDICINE | Age: 62
End: 2017-12-19

## 2017-12-19 NOTE — TELEPHONE ENCOUNTER
P/Pipe w/[facility], pt is requesting a rx refill of Norco 10-325mg that was prescribed during the pt's hosp stay by Dr. Lionel Wood due to pt's rib fractures on RT side.    Pt is scheduled to see ELI Dunlap on Thursday 12/21/17 for a hosp f/u.    Pls advise.    Pls call Pipe #[phone].

## 2017-12-19 NOTE — TELEPHONE ENCOUNTER
Patient was discharged on the 14th, with a prescription for Norco for 7 days.  That will be the same day  that she is seen here in the office on the 21st.  No prescription needed at this time.

## 2017-12-20 NOTE — TELEPHONE ENCOUNTER
She has appt tomorrow with you. She is going to ask about pain refill then. Dr Matias is wanting her to wait til then.

## 2017-12-21 ENCOUNTER — OFFICE VISIT (OUTPATIENT)
Dept: INTERNAL MEDICINE | Age: 62
End: 2017-12-21

## 2017-12-21 VITALS
BODY MASS INDEX: 39.05 KG/M2 | HEIGHT: 63 IN | TEMPERATURE: 97.2 F | OXYGEN SATURATION: 98 % | SYSTOLIC BLOOD PRESSURE: 138 MMHG | DIASTOLIC BLOOD PRESSURE: 78 MMHG | HEART RATE: 63 BPM | WEIGHT: 220.4 LBS

## 2017-12-21 DIAGNOSIS — J44.9 CHRONIC OBSTRUCTIVE PULMONARY DISEASE, UNSPECIFIED COPD TYPE (HCC): ICD-10-CM

## 2017-12-21 DIAGNOSIS — G62.9 PERIPHERAL POLYNEUROPATHY: ICD-10-CM

## 2017-12-21 DIAGNOSIS — Z99.81 REQUIRES OXYGEN THERAPY: ICD-10-CM

## 2017-12-21 DIAGNOSIS — S22.41XA CLOSED FRACTURE OF MULTIPLE RIBS OF RIGHT SIDE, INITIAL ENCOUNTER: Primary | ICD-10-CM

## 2017-12-21 DIAGNOSIS — M79.7 FIBROMYALGIA: ICD-10-CM

## 2017-12-21 PROCEDURE — 99214 OFFICE O/P EST MOD 30 MIN: CPT | Performed by: NURSE PRACTITIONER

## 2017-12-21 RX ORDER — HYDROCODONE BITARTRATE AND ACETAMINOPHEN 10; 325 MG/1; MG/1
1 TABLET ORAL EVERY 4 HOURS PRN
Qty: 18 TABLET | Refills: 0 | Status: SHIPPED | OUTPATIENT
Start: 2017-12-21 | End: 2017-12-26 | Stop reason: SDUPTHER

## 2017-12-21 RX ORDER — PREGABALIN 100 MG/1
100 CAPSULE ORAL
Qty: 60 CAPSULE | Refills: 0 | Status: SHIPPED | OUTPATIENT
Start: 2017-12-21 | End: 2018-01-23 | Stop reason: SDUPTHER

## 2017-12-21 NOTE — PROGRESS NOTES
"[facility] INTERNAL MEDICINE      Brenda SIMON Michelle / 62 y.o. / female  12/21/2017      MEDICATIONS   Current Outpatient Prescriptions   Medication Sig Dispense Refill   • albuterol (PROVENTIL HFA;VENTOLIN HFA) 108 (90 Base) MCG/ACT inhaler Inhale 2 puffs Every 4 (Four) Hours As Needed for Wheezing. 1 inhaler 0   • Aspirin (ASPIR-81 PO) Take 81 mg by mouth Daily.     • citalopram (CeleXA) 40 MG tablet Take 1 tablet by mouth Daily. 90 tablet 1   • clopidogrel (PLAVIX) 75 MG tablet Take 1 tablet by mouth Daily. 90 tablet 1   • fenofibrate (TRICOR) 145 MG tablet TAKE 1 TABLET DAILY 90 tablet 1   • guaiFENesin (MUCINEX) 600 MG 12 hr tablet Take 1 tablet by mouth 2 (Two) Times a Day. 30 tablet 0   • HYDROcodone-acetaminophen (NORCO)  MG per tablet Take 1 tablet by mouth Every 4 (Four) Hours As Needed for Moderate Pain  for up to 7 days. 18 tablet 0   • meloxicam (MOBIC) 15 MG tablet TAKE 1 TABLET DAILY 90 tablet 0   • pregabalin (LYRICA) 100 MG capsule Take 1 capsule by mouth 2 (Two) Times a Day. 60 capsule 0     No current facility-administered medications for this visit.          VITALS    Visit Vitals   • /78   • Pulse 63   • Temp 97.2 °F (36.2 °C)   • Ht 160 cm (63\")   • Wt 100 kg (220 lb 6.4 oz)   • SpO2 98%   • BMI 39.04 kg/m2       BP Readings from Last 3 Encounters:   12/21/17 138/78   12/14/17 117/71   10/11/17 118/78     Wt Readings from Last 3 Encounters:   12/21/17 100 kg (220 lb 6.4 oz)   12/10/17 98.4 kg (217 lb)   10/11/17 98.9 kg (218 lb)      Body mass index is 39.04 kg/(m^2).    CC:  Main reason(s) for today's visit: Hospital Follow Up (hosp f/u 12/10/17-12/14/17 fractured ribs, right side after fall)      HPI:     Date of admission/discharge: 12/10/17- 12/14/17  Hospital: [facility]  Principle Dx: multiple rib fractures   Secondary Dx: COPD  History prior to hospitalization:   Evaluation/Treatment:   Course: Patient was admitted to the hospital from a fall at home that subsequently " resulted in multiple broken ribs on the right side.  She was admitted for hypoxemia and respiratory failure and continued on oxygen while in hospital and is now on home oxygen as well.  She was seen by pulmonology in addition to thoracic surgery, who determined there was no need for surgical intervention and he will be following up with her on January 3 in addition to follow up CXR.     Patient reports that she was seen by pulmonology while in hospital and told she has COPD, but she has not had a previous diagnosis of this nor has she been on home oxygen therapy. Has a 50 year smoking history. She reports that she has been out of pain medication for a few days and still has considerable pain, which is limiting her ability to use her incentive spirometer and cough.    Patient Care Team:  Víctor Contreras MD as PCP - General  ____________________________________________________________________    ASSESSMENT & PLAN:    1. Closed fracture of multiple ribs of right side, initial encounter    2. Chronic obstructive pulmonary disease, unspecified COPD type    3. Requires oxygen therapy    4. Fibromyalgia    5. Peripheral polyneuropathy      Orders Placed This Encounter   Procedures   • Ambulatory Referral to Pulmonology       Summary/Discussion:    1. Closed fracture of multiple ribs of right side, initial encounter  Patient with recent discharge from hospital for multiple fractures of the right side.  She still having considerable pain with movement and reports this interferes with her ability to use her incentive spirometer and deep breathe.  I will provide patient with an additional 7 days of opioid due to the fact that she will likely have considerable rib pain for at least a few more weeks related to her injury and this pain interferes with her pulmonary status.  She was instructed to follow-up with Dr. Contreras next week for reevaluation for need for continuing narcotic pain medication.  Lung assessment demonstrates a  patiently likely still has continued atelectasis.  Discussed the importance of continuing to use her incentive spirometer, coughing, and deep breathing to help clear this. I have also completed [facility] paperwork today for patient's  as she is still requiring significant assistance at home in addition to transportation to medical follow up visits.     - HYDROcodone-acetaminophen (NORCO)  MG per tablet; Take 1 tablet by mouth Every 4 (Four) Hours As Needed for Moderate Pain  for up to 7 days.  Dispense: 18 tablet; Refill: 0  - Ambulatory Referral to Pulmonology    2. Chronic obstructive pulmonary disease, unspecified COPD type  Patient was seen by pulmonology in hospital, diagnosed with COPD at that time (this is a new diagnosis).  She is currently still on oxygen therapy at home since her discharge.  I'm going to place referral to pulmonologist who saw her in hospital for further evaluation and management of her COPD and need for home O2.  - Ambulatory Referral to Pulmonology    3. Requires oxygen therapy    - Ambulatory Referral to Pulmonology    4. Fibromyalgia  Patient reports she needs refill of her Lyrica today.  Last UDS was in May 2017.  Patient reports to me that she was told by Dr. Contreras that she only has to have this drug screen performed on a yearly basis.  I will provide her with one refill today, she will need to obtain additional refills from Dr. Contreras.     - pregabalin (LYRICA) 100 MG capsule; Take 1 capsule by mouth 2 (Two) Times a Day.  Dispense: 60 capsule; Refill: 0    5. Peripheral polyneuropathy    - pregabalin (LYRICA) 100 MG capsule; Take 1 capsule by mouth 2 (Two) Times a Day.  Dispense: 60 capsule; Refill: 0    Current outpatient and discharge medications have been reconciled for the patient.  ELI Redman      Return in about 5 days (around 12/26/2017) for Next scheduled follow up with Dr. Contreras .    Future Appointments  Date Time Provider Department Center   12/26/2017 2:00  PM MD [facility] None   1/3/2018 1:45 PM Kesha RODRIGUEZ Head, APRN [facility] None   4/11/2018 10:40 AM MD [facility] None     ____________________________________________________________________    REVIEW OF SYSTEMS    Review of Systems   Constitutional: Negative for chills, fatigue and fever.   Respiratory: Positive for shortness of breath. Negative for cough.    Cardiovascular: Negative for chest pain.   Musculoskeletal:        Right lateral rib pain         PHYSICAL EXAMINATION    Physical Exam   Constitutional: She is oriented to person, place, and time. Vital signs are normal. She appears well-developed and well-nourished. She is cooperative. She does not appear ill. No distress.   Cardiovascular: Normal rate, regular rhythm, S1 normal, S2 normal and normal heart sounds.    No murmur heard.  Pulmonary/Chest: Effort normal. She has decreased breath sounds. She has no wheezes. She has no rhonchi. She has no rales.   Still appears to have some fine crackles at bases of lungs    Neurological: She is alert and oriented to person, place, and time.   Skin: Skin is warm, dry and intact.   Psychiatric: She has a normal mood and affect. Her speech is normal and behavior is normal. Judgment and thought content normal. Cognition and memory are normal.   Nursing note and vitals reviewed.        REVIEWED DATA:    Labs:   Admission on 12/10/2017, Discharged on 12/14/2017   Component Date Value Ref Range Status   • WBC 12/11/2017 7.38  4.50 - 10.70 10*3/mm3 Final   • RBC 12/11/2017 4.12  3.90 - 5.20 10*6/mm3 Final   • Hemoglobin 12/11/2017 14.0  11.9 - 15.5 g/dL Final   • Hematocrit 12/11/2017 44.4  35.6 - 45.5 % Final   • MCV 12/11/2017 107.8* 80.5 - 98.2 fL Final   • MCH 12/11/2017 34.0* 26.9 - 32.0 pg Final   • MCHC 12/11/2017 31.5* 32.4 - 36.3 g/dL Final   • RDW 12/11/2017 16.4* 11.7 - 13.0 % Final   • RDW-SD 12/11/2017 64.3* 37.0 - 54.0 fl Final   • MPV 12/11/2017 10.7  6.0 - 12.0 fL Final   •  Platelets 12/11/2017 200  140 - 500 10*3/mm3 Final   • Neutrophil % 12/11/2017 60.2  42.7 - 76.0 % Final   • Lymphocyte % 12/11/2017 27.4  19.6 - 45.3 % Final   • Monocyte % 12/11/2017 10.3  5.0 - 12.0 % Final   • Eosinophil % 12/11/2017 1.5  0.3 - 6.2 % Final   • Basophil % 12/11/2017 0.3  0.0 - 1.5 % Final   • Immature Grans % 12/11/2017 0.3  0.0 - 0.5 % Final   • Neutrophils, Absolute 12/11/2017 4.45  1.90 - 8.10 10*3/mm3 Final   • Lymphocytes, Absolute 12/11/2017 2.02  0.90 - 4.80 10*3/mm3 Final   • Monocytes, Absolute 12/11/2017 0.76  0.20 - 1.20 10*3/mm3 Final   • Eosinophils, Absolute 12/11/2017 0.11  0.00 - 0.70 10*3/mm3 Final   • Basophils, Absolute 12/11/2017 0.02  0.00 - 0.20 10*3/mm3 Final   • Immature Grans, Absolute 12/11/2017 0.02  0.00 - 0.03 10*3/mm3 Final   • Glucose 12/11/2017 108* 65 - 99 mg/dL Final   • BUN 12/11/2017 15  8 - 23 mg/dL Final   • Creatinine 12/11/2017 0.84  0.57 - 1.00 mg/dL Final   • Sodium 12/11/2017 142  136 - 145 mmol/L Final   • Potassium 12/11/2017 4.1  3.5 - 5.2 mmol/L Final   • Chloride 12/11/2017 102  98 - 107 mmol/L Final   • CO2 12/11/2017 31.3* 22.0 - 29.0 mmol/L Final   • Calcium 12/11/2017 9.4  8.6 - 10.5 mg/dL Final   • eGFR Non African Amer 12/11/2017 69  >60 mL/min/1.73 Final   • BUN/Creatinine Ratio 12/11/2017 17.9  7.0 - 25.0 Final   • Anion Gap 12/11/2017 8.7  mmol/L Final   • RBC Morphology 12/11/2017 Normal  Normal Final   • WBC Morphology 12/11/2017 Normal  Normal Final   • Platelet Morphology 12/11/2017 Normal  Normal Final   • Glucose 12/12/2017 83  65 - 99 mg/dL Final   • BUN 12/12/2017 18  8 - 23 mg/dL Final   • Creatinine 12/12/2017 0.70  0.57 - 1.00 mg/dL Final   • Sodium 12/12/2017 142  136 - 145 mmol/L Final   • Potassium 12/12/2017 4.4  3.5 - 5.2 mmol/L Final   • Chloride 12/12/2017 101  98 - 107 mmol/L Final   • CO2 12/12/2017 33.9* 22.0 - 29.0 mmol/L Final   • Calcium 12/12/2017 9.3  8.6 - 10.5 mg/dL Final   • eGFR Non African Amer 12/12/2017 85   >60 mL/min/1.73 Final   • BUN/Creatinine Ratio 12/12/2017 25.7* 7.0 - 25.0 Final   • Anion Gap 12/12/2017 7.1  mmol/L Final   • WBC 12/12/2017 7.66  4.50 - 10.70 10*3/mm3 Final   • RBC 12/12/2017 4.11  3.90 - 5.20 10*6/mm3 Final   • Hemoglobin 12/12/2017 13.7  11.9 - 15.5 g/dL Final   • Hematocrit 12/12/2017 44.6  35.6 - 45.5 % Final   • MCV 12/12/2017 108.5* 80.5 - 98.2 fL Final   • MCH 12/12/2017 33.3* 26.9 - 32.0 pg Final   • MCHC 12/12/2017 30.7* 32.4 - 36.3 g/dL Final   • RDW 12/12/2017 16.0* 11.7 - 13.0 % Final   • RDW-SD 12/12/2017 63.5* 37.0 - 54.0 fl Final   • MPV 12/12/2017 10.7  6.0 - 12.0 fL Final   • Platelets 12/12/2017 211  140 - 500 10*3/mm3 Final   • Neutrophil % 12/12/2017 72.8  42.7 - 76.0 % Final   • Lymphocyte % 12/12/2017 15.3* 19.6 - 45.3 % Final   • Monocyte % 12/12/2017 9.0  5.0 - 12.0 % Final   • Eosinophil % 12/12/2017 2.3  0.3 - 6.2 % Final   • Basophil % 12/12/2017 0.3  0.0 - 1.5 % Final   • Immature Grans % 12/12/2017 0.3  0.0 - 0.5 % Final   • Neutrophils, Absolute 12/12/2017 5.58  1.90 - 8.10 10*3/mm3 Final   • Lymphocytes, Absolute 12/12/2017 1.17  0.90 - 4.80 10*3/mm3 Final   • Monocytes, Absolute 12/12/2017 0.69  0.20 - 1.20 10*3/mm3 Final   • Eosinophils, Absolute 12/12/2017 0.18  0.00 - 0.70 10*3/mm3 Final   • Basophils, Absolute 12/12/2017 0.02  0.00 - 0.20 10*3/mm3 Final   • Immature Grans, Absolute 12/12/2017 0.02  0.00 - 0.03 10*3/mm3 Final   • Procalcitonin 12/12/2017 0.06* 0.10 - 0.25 ng/mL Final   • Glucose 12/13/2017 122* 65 - 99 mg/dL Final   • BUN 12/13/2017 17  8 - 23 mg/dL Final   • Creatinine 12/13/2017 0.66  0.57 - 1.00 mg/dL Final   • Sodium 12/13/2017 141  136 - 145 mmol/L Final   • Potassium 12/13/2017 4.8  3.5 - 5.2 mmol/L Final   • Chloride 12/13/2017 102  98 - 107 mmol/L Final   • CO2 12/13/2017 29.7* 22.0 - 29.0 mmol/L Final   • Calcium 12/13/2017 9.9  8.6 - 10.5 mg/dL Final   • eGFR Non African Amer 12/13/2017 91  >60 mL/min/1.73 Final   • BUN/Creatinine  Ratio 12/13/2017 25.8* 7.0 - 25.0 Final   • Anion Gap 12/13/2017 9.3  mmol/L Final   • WBC 12/13/2017 6.17  4.50 - 10.70 10*3/mm3 Final   • RBC 12/13/2017 4.10  3.90 - 5.20 10*6/mm3 Final   • Hemoglobin 12/13/2017 13.9  11.9 - 15.5 g/dL Final   • Hematocrit 12/13/2017 44.2  35.6 - 45.5 % Final   • MCV 12/13/2017 107.8* 80.5 - 98.2 fL Final   • MCH 12/13/2017 33.9* 26.9 - 32.0 pg Final   • MCHC 12/13/2017 31.4* 32.4 - 36.3 g/dL Final   • RDW 12/13/2017 16.1* 11.7 - 13.0 % Final   • RDW-SD 12/13/2017 63.2* 37.0 - 54.0 fl Final   • MPV 12/13/2017 10.6  6.0 - 12.0 fL Final   • Platelets 12/13/2017 237  140 - 500 10*3/mm3 Final   • Neutrophil % 12/13/2017 89.3* 42.7 - 76.0 % Final   • Lymphocyte % 12/13/2017 8.4* 19.6 - 45.3 % Final   • Monocyte % 12/13/2017 2.3* 5.0 - 12.0 % Final   • Eosinophil % 12/13/2017 0.0* 0.3 - 6.2 % Final   • Basophil % 12/13/2017 0.0  0.0 - 1.5 % Final   • Immature Grans % 12/13/2017 0.0  0.0 - 0.5 % Final   • Neutrophils, Absolute 12/13/2017 5.51  1.90 - 8.10 10*3/mm3 Final   • Lymphocytes, Absolute 12/13/2017 0.52* 0.90 - 4.80 10*3/mm3 Final   • Monocytes, Absolute 12/13/2017 0.14* 0.20 - 1.20 10*3/mm3 Final   • Eosinophils, Absolute 12/13/2017 0.00  0.00 - 0.70 10*3/mm3 Final   • Basophils, Absolute 12/13/2017 0.00  0.00 - 0.20 10*3/mm3 Final   • Immature Grans, Absolute 12/13/2017 0.00  0.00 - 0.03 10*3/mm3 Final   • Glucose 12/14/2017 125* 65 - 99 mg/dL Final   • BUN 12/14/2017 25* 8 - 23 mg/dL Final   • Creatinine 12/14/2017 0.79  0.57 - 1.00 mg/dL Final   • Sodium 12/14/2017 141  136 - 145 mmol/L Final   • Potassium 12/14/2017 4.8  3.5 - 5.2 mmol/L Final   • Chloride 12/14/2017 102  98 - 107 mmol/L Final   • CO2 12/14/2017 30.0* 22.0 - 29.0 mmol/L Final   • Calcium 12/14/2017 9.7  8.6 - 10.5 mg/dL Final   • eGFR Non  Amer 12/14/2017 74  >60 mL/min/1.73 Final   • BUN/Creatinine Ratio 12/14/2017 31.6* 7.0 - 25.0 Final   • Anion Gap 12/14/2017 9.0  mmol/L Final   • WBC 12/14/2017  10.05  4.50 - 10.70 10*3/mm3 Final   • RBC 12/14/2017 4.17  3.90 - 5.20 10*6/mm3 Final   • Hemoglobin 12/14/2017 14.0  11.9 - 15.5 g/dL Final   • Hematocrit 12/14/2017 44.6  35.6 - 45.5 % Final   • MCV 12/14/2017 107.0* 80.5 - 98.2 fL Final   • MCH 12/14/2017 33.6* 26.9 - 32.0 pg Final   • MCHC 12/14/2017 31.4* 32.4 - 36.3 g/dL Final   • RDW 12/14/2017 16.0* 11.7 - 13.0 % Final   • RDW-SD 12/14/2017 62.8* 37.0 - 54.0 fl Final   • MPV 12/14/2017 10.4  6.0 - 12.0 fL Final   • Platelets 12/14/2017 253  140 - 500 10*3/mm3 Final   • Neutrophil % 12/14/2017 90.9* 42.7 - 76.0 % Final   • Lymphocyte % 12/14/2017 5.2* 19.6 - 45.3 % Final   • Monocyte % 12/14/2017 3.7* 5.0 - 12.0 % Final   • Eosinophil % 12/14/2017 0.0* 0.3 - 6.2 % Final   • Basophil % 12/14/2017 0.0  0.0 - 1.5 % Final   • Immature Grans % 12/14/2017 0.2  0.0 - 0.5 % Final   • Neutrophils, Absolute 12/14/2017 9.14* 1.90 - 8.10 10*3/mm3 Final   • Lymphocytes, Absolute 12/14/2017 0.52* 0.90 - 4.80 10*3/mm3 Final   • Monocytes, Absolute 12/14/2017 0.37  0.20 - 1.20 10*3/mm3 Final   • Eosinophils, Absolute 12/14/2017 0.00  0.00 - 0.70 10*3/mm3 Final   • Basophils, Absolute 12/14/2017 0.00  0.00 - 0.20 10*3/mm3 Final   • Immature Grans, Absolute 12/14/2017 0.02  0.00 - 0.03 10*3/mm3 Final       Imaging:   Xr Ribs Right With Pa Chest    Result Date: 12/13/2017  Narrative: CHEST X-RAY AND RIGHT RIBS 5 VIEWS  HISTORY: Morbidly obese 62-year-old female who fell and struck the right side of her chest complains of chest pain, currently on oxygen  COMPARISON: 12/12/2017  FINDINGS: 1. Imaging is limited by body habitus. 2. Acute fractures of the right fourth fifth and sixth ribs. 3. Mild right basilar atelectasis, small right pleural effusion no right pneumothorax. 4. Persistent left basilar atelectasis and effusion, no focal pneumonia.  This report was finalized on 12/13/2017 8:01 AM by Dr. Joseluis Jimenes MD.      Xr Chest 1 View    Result Date: 12/17/2017  Narrative:  X-RAY CHEST 1 VIEW.  HISTORY: Rib fracture.  COMPARISON: 12/13/2017.  FINDINGS: Limited study due to underpenetration.  Cardiomediastinal silhouette is within normal limits.     Bilateral lung opacities, there is slight worsening. Lung volumes are low.  Right rib fractures are not clearly visualized.       Impression: Worsening opacities in both lung bases concerning for atelectasis, less likely infiltrates. Follow-up is recommended.     This report was finalized on 12/17/2017 1:24 AM by Dr. Martín Lopez MD.      Xr Chest Pa & Lateral    Result Date: 12/12/2017  Narrative: TWO-VIEW CHEST  HISTORY: Fell with recent right rib fracture. Shortness of breath.  FINDINGS:  The lungs are moderately expanded with some minimal vascular crowding and atelectasis at the bases when compared to the study of 05/06/2015. The heart remains slightly enlarged.  This report was finalized on 12/12/2017 1:01 PM by Dr. Kameron Sanches MD.         Medical Tests:        Summary of old records / correspondence / consultant report:   DC summary re: issues addressed on HPI    Request outside records:       ALLERGIES  Allergies   Allergen Reactions   • Aripiprazole    • Atorvastatin Other (See Comments)     MYALGIAS   • Bupropion Itching   • Cilostazol Other (See Comments)     HA   • Codeine Sulfate    • Ferrous Sulfate Nausea Only   • Welchol  [Colesevelam Hcl] Nausea Only   • Adhesive Tape Rash        PFSH:     The following portions of the patient's history were reviewed and updated as appropriate: Allergies / Current Medications / Past Medical History / Surgical History / Social History / Family History    PROBLEM LIST   Patient Active Problem List   Diagnosis   • Acute deep vein thrombosis of distal leg   • Adenomatous polyp of colon   • Depression   • Fibromyalgia   • Hyperlipidemia   • Low back pain   • Peripheral arterial occlusive disease   • Vitamin D deficiency   • Gastroesophageal reflux disease   • Irritable bowel syndrome   •  Disorder of intervertebral disc   • Peripheral neuropathy   • Fracture of multiple ribs of right side   • Fall   • Tobacco dependence   • Acute respiratory failure with hypoxia   • COPD (chronic obstructive pulmonary disease)       PAST MEDICAL HISTORY  Past Medical History:   Diagnosis Date   • Anxiety    • benign polyps of large intestine    • Depression    • Disc degeneration, lumbar    • Fibromyositis    • Hyperlipidemia    • IBS (irritable bowel syndrome)    • Nephrolithiasis    • PVD (peripheral vascular disease)    • Vitamin D deficiency        SURGICAL HISTORY  Past Surgical History:   Procedure Laterality Date   • COLONOSCOPY  2004    Colon polyps; family history of colon cancer   • KNEE ARTHROPLASTY, PARTIAL REPLACEMENT     • KNEE ARTHROSCOPY Left     Meniscus   • POPLITEAL ARTERY ANGIOPLASTY Bilateral    • TONSILLECTOMY     • TUBAL ABDOMINAL LIGATION         SOCIAL HISTORY  Social History     Social History   • Marital status:      Spouse name: N/A   • Number of children: N/A   • Years of education: N/A     Social History Main Topics   • Smoking status: Former Smoker     Types: Cigarettes     Quit date: 2017   • Smokeless tobacco: Never Used   • Alcohol use Yes   • Drug use: None   • Sexual activity: Not Asked     Other Topics Concern   • None     Social History Narrative   • None       FAMILY HISTORY  Family History   Problem Relation Age of Onset   • COPD Mother       at 67 yo    • Colon cancer Mother    • Lung cancer Father      mesothelioma         **Dragon Disclaimer:   Much of this encounter note is an electronic transcription/translation of spoken language to printed text. The electronic translation of spoken language may permit erroneous, or at times, nonsensical words or phrases to be inadvertently transcribed. Although I have reviewed the note for such errors, some may still exist.

## 2017-12-21 NOTE — PROGRESS NOTES
"[facility] INTERNAL MEDICINE        Brenda SIMON Michelle / 62 y.o. / female  12/21/2017      MEDICATIONS   Current Outpatient Prescriptions   Medication Sig Dispense Refill   • albuterol (PROVENTIL HFA;VENTOLIN HFA) 108 (90 Base) MCG/ACT inhaler Inhale 2 puffs Every 4 (Four) Hours As Needed for Wheezing. 1 inhaler 0   • Aspirin (ASPIR-81 PO) Take 81 mg by mouth Daily.     • citalopram (CeleXA) 40 MG tablet Take 1 tablet by mouth Daily. 90 tablet 1   • clopidogrel (PLAVIX) 75 MG tablet Take 1 tablet by mouth Daily. 90 tablet 1   • fenofibrate (TRICOR) 145 MG tablet TAKE 1 TABLET DAILY 90 tablet 1   • guaiFENesin (MUCINEX) 600 MG 12 hr tablet Take 1 tablet by mouth 2 (Two) Times a Day. 30 tablet 0   • HYDROcodone-acetaminophen (NORCO)  MG per tablet Take 1 tablet by mouth Every 4 (Four) Hours As Needed for Moderate Pain  for up to 7 days. 18 tablet 0   • meloxicam (MOBIC) 15 MG tablet TAKE 1 TABLET DAILY 90 tablet 0   • pregabalin (LYRICA) 100 MG capsule Take 1 capsule by mouth 2 (Two) Times a Day. 60 capsule 0     No current facility-administered medications for this visit.          VITALS    Visit Vitals   • /78   • Pulse 63   • Temp 97.2 °F (36.2 °C)   • Ht 160 cm (63\")   • Wt 100 kg (220 lb 6.4 oz)   • SpO2 98%   • BMI 39.04 kg/m2       BP Readings from Last 3 Encounters:   12/21/17 138/78   12/14/17 117/71   10/11/17 118/78     Wt Readings from Last 3 Encounters:   12/21/17 100 kg (220 lb 6.4 oz)   12/10/17 98.4 kg (217 lb)   10/11/17 98.9 kg (218 lb)      Body mass index is 39.04 kg/(m^2).    CC:  Main reason(s) for today's visit: Hospital Follow Up (hosp f/u 12/10/17-12/14/17 fractured ribs, right side after fall)      HPI:     Date of admission/discharge: ***  Hospital: {[facility] NAMES:[id number]::\"[facility]\"}  Principle Dx: ***  Secondary Dx: ***  History prior to hospitalization: ***  Evaluation/Treatment: ***  Course: ***    Patient Care Team:  Víctor Contreras MD as PCP - " General  ____________________________________________________________________    ASSESSMENT & PLAN:    1. Chronic obstructive pulmonary disease, unspecified COPD type    2. Requires oxygen therapy    3. Closed fracture of multiple ribs of right side, initial encounter    4. Fibromyalgia    5. Peripheral polyneuropathy      Orders Placed This Encounter   Procedures   • Ambulatory Referral to Pulmonology       Summary/Discussion:      No Follow-up on file.    Future Appointments  Date Time Provider Department Center   1/3/2018 1:45 PM Kesha Higgins, ELI [facility] None   4/11/2018 10:40 AM MD HILARIO [facility] None     ____________________________________________________________________    REVIEW OF SYSTEMS    Review of Systems      PHYSICAL EXAMINATION    Physical Exam      REVIEWED DATA:    Labs:   Admission on 12/10/2017, Discharged on 12/14/2017   Component Date Value Ref Range Status   • WBC 12/11/2017 7.38  4.50 - 10.70 10*3/mm3 Final   • RBC 12/11/2017 4.12  3.90 - 5.20 10*6/mm3 Final   • Hemoglobin 12/11/2017 14.0  11.9 - 15.5 g/dL Final   • Hematocrit 12/11/2017 44.4  35.6 - 45.5 % Final   • MCV 12/11/2017 107.8* 80.5 - 98.2 fL Final   • MCH 12/11/2017 34.0* 26.9 - 32.0 pg Final   • MCHC 12/11/2017 31.5* 32.4 - 36.3 g/dL Final   • RDW 12/11/2017 16.4* 11.7 - 13.0 % Final   • RDW-SD 12/11/2017 64.3* 37.0 - 54.0 fl Final   • MPV 12/11/2017 10.7  6.0 - 12.0 fL Final   • Platelets 12/11/2017 200  140 - 500 10*3/mm3 Final   • Neutrophil % 12/11/2017 60.2  42.7 - 76.0 % Final   • Lymphocyte % 12/11/2017 27.4  19.6 - 45.3 % Final   • Monocyte % 12/11/2017 10.3  5.0 - 12.0 % Final   • Eosinophil % 12/11/2017 1.5  0.3 - 6.2 % Final   • Basophil % 12/11/2017 0.3  0.0 - 1.5 % Final   • Immature Grans % 12/11/2017 0.3  0.0 - 0.5 % Final   • Neutrophils, Absolute 12/11/2017 4.45  1.90 - 8.10 10*3/mm3 Final   • Lymphocytes, Absolute 12/11/2017 2.02  0.90 - 4.80 10*3/mm3 Final   • Monocytes, Absolute 12/11/2017 0.76   0.20 - 1.20 10*3/mm3 Final   • Eosinophils, Absolute 12/11/2017 0.11  0.00 - 0.70 10*3/mm3 Final   • Basophils, Absolute 12/11/2017 0.02  0.00 - 0.20 10*3/mm3 Final   • Immature Grans, Absolute 12/11/2017 0.02  0.00 - 0.03 10*3/mm3 Final   • Glucose 12/11/2017 108* 65 - 99 mg/dL Final   • BUN 12/11/2017 15  8 - 23 mg/dL Final   • Creatinine 12/11/2017 0.84  0.57 - 1.00 mg/dL Final   • Sodium 12/11/2017 142  136 - 145 mmol/L Final   • Potassium 12/11/2017 4.1  3.5 - 5.2 mmol/L Final   • Chloride 12/11/2017 102  98 - 107 mmol/L Final   • CO2 12/11/2017 31.3* 22.0 - 29.0 mmol/L Final   • Calcium 12/11/2017 9.4  8.6 - 10.5 mg/dL Final   • eGFR Non African Amer 12/11/2017 69  >60 mL/min/1.73 Final   • BUN/Creatinine Ratio 12/11/2017 17.9  7.0 - 25.0 Final   • Anion Gap 12/11/2017 8.7  mmol/L Final   • RBC Morphology 12/11/2017 Normal  Normal Final   • WBC Morphology 12/11/2017 Normal  Normal Final   • Platelet Morphology 12/11/2017 Normal  Normal Final   • Glucose 12/12/2017 83  65 - 99 mg/dL Final   • BUN 12/12/2017 18  8 - 23 mg/dL Final   • Creatinine 12/12/2017 0.70  0.57 - 1.00 mg/dL Final   • Sodium 12/12/2017 142  136 - 145 mmol/L Final   • Potassium 12/12/2017 4.4  3.5 - 5.2 mmol/L Final   • Chloride 12/12/2017 101  98 - 107 mmol/L Final   • CO2 12/12/2017 33.9* 22.0 - 29.0 mmol/L Final   • Calcium 12/12/2017 9.3  8.6 - 10.5 mg/dL Final   • eGFR Non African Amer 12/12/2017 85  >60 mL/min/1.73 Final   • BUN/Creatinine Ratio 12/12/2017 25.7* 7.0 - 25.0 Final   • Anion Gap 12/12/2017 7.1  mmol/L Final   • WBC 12/12/2017 7.66  4.50 - 10.70 10*3/mm3 Final   • RBC 12/12/2017 4.11  3.90 - 5.20 10*6/mm3 Final   • Hemoglobin 12/12/2017 13.7  11.9 - 15.5 g/dL Final   • Hematocrit 12/12/2017 44.6  35.6 - 45.5 % Final   • MCV 12/12/2017 108.5* 80.5 - 98.2 fL Final   • MCH 12/12/2017 33.3* 26.9 - 32.0 pg Final   • MCHC 12/12/2017 30.7* 32.4 - 36.3 g/dL Final   • RDW 12/12/2017 16.0* 11.7 - 13.0 % Final   • RDW-SD 12/12/2017  63.5* 37.0 - 54.0 fl Final   • MPV 12/12/2017 10.7  6.0 - 12.0 fL Final   • Platelets 12/12/2017 211  140 - 500 10*3/mm3 Final   • Neutrophil % 12/12/2017 72.8  42.7 - 76.0 % Final   • Lymphocyte % 12/12/2017 15.3* 19.6 - 45.3 % Final   • Monocyte % 12/12/2017 9.0  5.0 - 12.0 % Final   • Eosinophil % 12/12/2017 2.3  0.3 - 6.2 % Final   • Basophil % 12/12/2017 0.3  0.0 - 1.5 % Final   • Immature Grans % 12/12/2017 0.3  0.0 - 0.5 % Final   • Neutrophils, Absolute 12/12/2017 5.58  1.90 - 8.10 10*3/mm3 Final   • Lymphocytes, Absolute 12/12/2017 1.17  0.90 - 4.80 10*3/mm3 Final   • Monocytes, Absolute 12/12/2017 0.69  0.20 - 1.20 10*3/mm3 Final   • Eosinophils, Absolute 12/12/2017 0.18  0.00 - 0.70 10*3/mm3 Final   • Basophils, Absolute 12/12/2017 0.02  0.00 - 0.20 10*3/mm3 Final   • Immature Grans, Absolute 12/12/2017 0.02  0.00 - 0.03 10*3/mm3 Final   • Procalcitonin 12/12/2017 0.06* 0.10 - 0.25 ng/mL Final   • Glucose 12/13/2017 122* 65 - 99 mg/dL Final   • BUN 12/13/2017 17  8 - 23 mg/dL Final   • Creatinine 12/13/2017 0.66  0.57 - 1.00 mg/dL Final   • Sodium 12/13/2017 141  136 - 145 mmol/L Final   • Potassium 12/13/2017 4.8  3.5 - 5.2 mmol/L Final   • Chloride 12/13/2017 102  98 - 107 mmol/L Final   • CO2 12/13/2017 29.7* 22.0 - 29.0 mmol/L Final   • Calcium 12/13/2017 9.9  8.6 - 10.5 mg/dL Final   • eGFR Non African Amer 12/13/2017 91  >60 mL/min/1.73 Final   • BUN/Creatinine Ratio 12/13/2017 25.8* 7.0 - 25.0 Final   • Anion Gap 12/13/2017 9.3  mmol/L Final   • WBC 12/13/2017 6.17  4.50 - 10.70 10*3/mm3 Final   • RBC 12/13/2017 4.10  3.90 - 5.20 10*6/mm3 Final   • Hemoglobin 12/13/2017 13.9  11.9 - 15.5 g/dL Final   • Hematocrit 12/13/2017 44.2  35.6 - 45.5 % Final   • MCV 12/13/2017 107.8* 80.5 - 98.2 fL Final   • MCH 12/13/2017 33.9* 26.9 - 32.0 pg Final   • MCHC 12/13/2017 31.4* 32.4 - 36.3 g/dL Final   • RDW 12/13/2017 16.1* 11.7 - 13.0 % Final   • RDW-SD 12/13/2017 63.2* 37.0 - 54.0 fl Final   • MPV  12/13/2017 10.6  6.0 - 12.0 fL Final   • Platelets 12/13/2017 237  140 - 500 10*3/mm3 Final   • Neutrophil % 12/13/2017 89.3* 42.7 - 76.0 % Final   • Lymphocyte % 12/13/2017 8.4* 19.6 - 45.3 % Final   • Monocyte % 12/13/2017 2.3* 5.0 - 12.0 % Final   • Eosinophil % 12/13/2017 0.0* 0.3 - 6.2 % Final   • Basophil % 12/13/2017 0.0  0.0 - 1.5 % Final   • Immature Grans % 12/13/2017 0.0  0.0 - 0.5 % Final   • Neutrophils, Absolute 12/13/2017 5.51  1.90 - 8.10 10*3/mm3 Final   • Lymphocytes, Absolute 12/13/2017 0.52* 0.90 - 4.80 10*3/mm3 Final   • Monocytes, Absolute 12/13/2017 0.14* 0.20 - 1.20 10*3/mm3 Final   • Eosinophils, Absolute 12/13/2017 0.00  0.00 - 0.70 10*3/mm3 Final   • Basophils, Absolute 12/13/2017 0.00  0.00 - 0.20 10*3/mm3 Final   • Immature Grans, Absolute 12/13/2017 0.00  0.00 - 0.03 10*3/mm3 Final   • Glucose 12/14/2017 125* 65 - 99 mg/dL Final   • BUN 12/14/2017 25* 8 - 23 mg/dL Final   • Creatinine 12/14/2017 0.79  0.57 - 1.00 mg/dL Final   • Sodium 12/14/2017 141  136 - 145 mmol/L Final   • Potassium 12/14/2017 4.8  3.5 - 5.2 mmol/L Final   • Chloride 12/14/2017 102  98 - 107 mmol/L Final   • CO2 12/14/2017 30.0* 22.0 - 29.0 mmol/L Final   • Calcium 12/14/2017 9.7  8.6 - 10.5 mg/dL Final   • eGFR Non  Amer 12/14/2017 74  >60 mL/min/1.73 Final   • BUN/Creatinine Ratio 12/14/2017 31.6* 7.0 - 25.0 Final   • Anion Gap 12/14/2017 9.0  mmol/L Final   • WBC 12/14/2017 10.05  4.50 - 10.70 10*3/mm3 Final   • RBC 12/14/2017 4.17  3.90 - 5.20 10*6/mm3 Final   • Hemoglobin 12/14/2017 14.0  11.9 - 15.5 g/dL Final   • Hematocrit 12/14/2017 44.6  35.6 - 45.5 % Final   • MCV 12/14/2017 107.0* 80.5 - 98.2 fL Final   • MCH 12/14/2017 33.6* 26.9 - 32.0 pg Final   • MCHC 12/14/2017 31.4* 32.4 - 36.3 g/dL Final   • RDW 12/14/2017 16.0* 11.7 - 13.0 % Final   • RDW-SD 12/14/2017 62.8* 37.0 - 54.0 fl Final   • MPV 12/14/2017 10.4  6.0 - 12.0 fL Final   • Platelets 12/14/2017 253  140 - 500 10*3/mm3 Final   •  Neutrophil % 12/14/2017 90.9* 42.7 - 76.0 % Final   • Lymphocyte % 12/14/2017 5.2* 19.6 - 45.3 % Final   • Monocyte % 12/14/2017 3.7* 5.0 - 12.0 % Final   • Eosinophil % 12/14/2017 0.0* 0.3 - 6.2 % Final   • Basophil % 12/14/2017 0.0  0.0 - 1.5 % Final   • Immature Grans % 12/14/2017 0.2  0.0 - 0.5 % Final   • Neutrophils, Absolute 12/14/2017 9.14* 1.90 - 8.10 10*3/mm3 Final   • Lymphocytes, Absolute 12/14/2017 0.52* 0.90 - 4.80 10*3/mm3 Final   • Monocytes, Absolute 12/14/2017 0.37  0.20 - 1.20 10*3/mm3 Final   • Eosinophils, Absolute 12/14/2017 0.00  0.00 - 0.70 10*3/mm3 Final   • Basophils, Absolute 12/14/2017 0.00  0.00 - 0.20 10*3/mm3 Final   • Immature Grans, Absolute 12/14/2017 0.02  0.00 - 0.03 10*3/mm3 Final       Imaging:   Xr Ribs Right With Pa Chest    Result Date: 12/13/2017  Narrative: CHEST X-RAY AND RIGHT RIBS 5 VIEWS  HISTORY: Morbidly obese 62-year-old female who fell and struck the right side of her chest complains of chest pain, currently on oxygen  COMPARISON: 12/12/2017  FINDINGS: 1. Imaging is limited by body habitus. 2. Acute fractures of the right fourth fifth and sixth ribs. 3. Mild right basilar atelectasis, small right pleural effusion no right pneumothorax. 4. Persistent left basilar atelectasis and effusion, no focal pneumonia.  This report was finalized on 12/13/2017 8:01 AM by Dr. Joseluis Jimenes MD.      Xr Chest 1 View    Result Date: 12/17/2017  Narrative: X-RAY CHEST 1 VIEW.  HISTORY: Rib fracture.  COMPARISON: 12/13/2017.  FINDINGS: Limited study due to underpenetration.  Cardiomediastinal silhouette is within normal limits.     Bilateral lung opacities, there is slight worsening. Lung volumes are low.  Right rib fractures are not clearly visualized.       Impression: Worsening opacities in both lung bases concerning for atelectasis, less likely infiltrates. Follow-up is recommended.     This report was finalized on 12/17/2017 1:24 AM by Dr. Martín Lopez MD.      Xr Chest Pa &  Lateral    Result Date: 12/12/2017  Narrative: TWO-VIEW CHEST  HISTORY: Fell with recent right rib fracture. Shortness of breath.  FINDINGS:  The lungs are moderately expanded with some minimal vascular crowding and atelectasis at the bases when compared to the study of 05/06/2015. The heart remains slightly enlarged.  This report was finalized on 12/12/2017 1:01 PM by Dr. Kameron Sanches MD.         Medical Tests:        Summary of old records / correspondence / consultant report:   {[facility]  Hospitalreports:85960}    Request outside records:       ALLERGIES  Allergies   Allergen Reactions   • Aripiprazole    • Atorvastatin Other (See Comments)     MYALGIAS   • Bupropion Itching   • Cilostazol Other (See Comments)     HA   • Codeine Sulfate    • Ferrous Sulfate Nausea Only   • Welchol  [Colesevelam Hcl] Nausea Only   • Adhesive Tape Rash        PFSH:     The following portions of the patient's history were reviewed and updated as appropriate: Allergies / Current Medications / Past Medical History / Surgical History / Social History / Family History    PROBLEM LIST   Patient Active Problem List   Diagnosis   • Acute deep vein thrombosis of distal leg   • Adenomatous polyp of colon   • Depression   • Fibromyalgia   • Hyperlipidemia   • Low back pain   • Peripheral arterial occlusive disease   • Vitamin D deficiency   • Gastroesophageal reflux disease   • Irritable bowel syndrome   • Disorder of intervertebral disc   • Peripheral neuropathy   • Fracture of multiple ribs of right side   • Fall   • Tobacco dependence   • Acute respiratory failure with hypoxia   • COPD (chronic obstructive pulmonary disease)       PAST MEDICAL HISTORY  Past Medical History:   Diagnosis Date   • Anxiety    • benign polyps of large intestine    • Depression    • Disc degeneration, lumbar    • Fibromyositis    • Hyperlipidemia    • IBS (irritable bowel syndrome)    • Nephrolithiasis    • PVD (peripheral vascular disease)    • Vitamin D  deficiency        SURGICAL HISTORY  Past Surgical History:   Procedure Laterality Date   • COLONOSCOPY  2004    Colon polyps; family history of colon cancer   • KNEE ARTHROPLASTY, PARTIAL REPLACEMENT     • KNEE ARTHROSCOPY Left     Meniscus   • POPLITEAL ARTERY ANGIOPLASTY Bilateral    • TONSILLECTOMY     • TUBAL ABDOMINAL LIGATION         SOCIAL HISTORY  Social History     Social History   • Marital status:      Spouse name: N/A   • Number of children: N/A   • Years of education: N/A     Social History Main Topics   • Smoking status: Former Smoker     Types: Cigarettes     Quit date: 2017   • Smokeless tobacco: Never Used   • Alcohol use Yes   • Drug use: None   • Sexual activity: Not Asked     Other Topics Concern   • None     Social History Narrative   • None       FAMILY HISTORY  Family History   Problem Relation Age of Onset   • COPD Mother       at 67 yo    • Colon cancer Mother    • Lung cancer Father      mesothelioma         **[place] Disclaimer:   Much of this encounter note is an electronic transcription/translation of spoken language to printed text. The electronic translation of spoken language may permit erroneous, or at times, nonsensical words or phrases to be inadvertently transcribed. Although I have reviewed the note for such errors, some may still exist.

## 2017-12-26 ENCOUNTER — OFFICE VISIT (OUTPATIENT)
Dept: INTERNAL MEDICINE | Age: 62
End: 2017-12-26

## 2017-12-26 VITALS
HEART RATE: 70 BPM | HEIGHT: 63 IN | DIASTOLIC BLOOD PRESSURE: 70 MMHG | SYSTOLIC BLOOD PRESSURE: 110 MMHG | BODY MASS INDEX: 38.98 KG/M2 | RESPIRATION RATE: 12 BRPM | TEMPERATURE: 97.1 F | OXYGEN SATURATION: 92 % | WEIGHT: 220 LBS

## 2017-12-26 DIAGNOSIS — S22.41XA CLOSED FRACTURE OF MULTIPLE RIBS OF RIGHT SIDE, INITIAL ENCOUNTER: ICD-10-CM

## 2017-12-26 DIAGNOSIS — J44.9 CHRONIC OBSTRUCTIVE PULMONARY DISEASE, UNSPECIFIED COPD TYPE (HCC): Primary | ICD-10-CM

## 2017-12-26 PROCEDURE — 99214 OFFICE O/P EST MOD 30 MIN: CPT | Performed by: INTERNAL MEDICINE

## 2017-12-26 RX ORDER — HYDROCODONE BITARTRATE AND ACETAMINOPHEN 10; 325 MG/1; MG/1
1 TABLET ORAL EVERY 4 HOURS PRN
Qty: 30 TABLET | Refills: 0 | Status: SHIPPED | OUTPATIENT
Start: 2017-12-26 | End: 2018-01-02

## 2017-12-26 RX ORDER — FLUCONAZOLE 100 MG/1
100 TABLET ORAL DAILY
Qty: 3 TABLET | Refills: 1 | Status: SHIPPED | OUTPATIENT
Start: 2017-12-26 | End: 2018-07-06

## 2017-12-26 NOTE — PROGRESS NOTES
"  Brenda Michelle is a 62 y.o. female who presents with   Chief Complaint   Patient presents with   • Rib [id number]     Patient was in [facility] from December 10 through December 14 after she had fallen at home and sustained multiple right sided rib fractures at the fourth fifth-6 levels.  She was admitted with hypoxemia and respiratory failure and was seen by pulmonary and thoracic during her hospital stay.  She has an order entered for referral to pulmonary.  She is to see thoracic surgery for follow-up within the next 24 hours according to her .   • COPD     Patient up until her rib fractures was smoking anywhere from 1-2 packs of cigarettes daily but has since quit.  She has a known history of COPD.   .    COPD   This is a chronic problem. The current episode started more than 1 year ago. The problem has been gradually worsening. Exacerbated by: Former smoker as noted above.  Patient has stopped smoking she says over the past 2 weeks. She has tried nothing for the symptoms.      Rib fractures-right side: History as outlined above.      The following portions of the patient's history were reviewed and updated as appropriate: allergies, current medications, past medical history and problem list.    Review of Systems   Constitutional: Negative.    HENT: Negative.         Patient has been on steroids/antibiotics in the hospital and is complaining of \"thrush\".   Eyes: Negative.    Respiratory: Negative.         Right rib fracture history as outlined above.  Patient continuing to have significant pain with her rib fractures.   Cardiovascular: Negative.    Genitourinary: Negative.    Musculoskeletal: Negative.    Skin: Negative.    Neurological: Negative.    Psychiatric/Behavioral: Negative.        Objective   Physical Exam   Constitutional: She is oriented to person, place, and time. She appears well-developed and well-nourished. No distress.   HENT:   Head: Normocephalic and atraumatic. "   Mild whitish coat to her tongue is noted compatible with thrush.   Eyes: Conjunctivae and EOM are normal. Pupils are equal, round, and reactive to light.   Neck: Normal range of motion. Neck supple. No thyromegaly present.   Neck exam negative.  Carotid auscultation normal-no bruits heard.   Cardiovascular: Normal rate, regular rhythm, normal heart sounds and intact distal pulses.  Exam reveals no gallop and no friction rub.    No murmur heard.  Pulmonary/Chest: Effort normal and breath sounds normal. No respiratory distress. She has no wheezes. She has no rales. She exhibits no tenderness.   Neurological: She is alert and oriented to person, place, and time.   Psychiatric: She has a normal mood and affect. Her behavior is normal. Judgment and thought content normal.   Nursing note and vitals reviewed.      Assessment/Plan   Brenda was seen today for rib cxzhzyvtg-[id number]-ur and copd.    Diagnoses and all orders for this visit:    Chronic obstructive pulmonary disease, unspecified COPD type    Closed fracture of multiple ribs of right side, initial encounter  -     HYDROcodone-acetaminophen (NORCO)  MG per tablet; Take 1 tablet by mouth Every 4 (Four) Hours As Needed for Moderate Pain  for up to 7 days.    Other orders  -     fluconazole (DIFLUCAN) 100 MG tablet; Take 1 tablet by mouth Daily.      Plan: We will refill her pain medication as above.  We will increase her quantity from 18 tablets to 30 tablets.  She will follow through with her pulmonary referral has already made by the nurse practitioner from our office who saw her approximately 5 days ago.  She will follow through with the thoracic follow-up as planned.    Oral Diflucan as noted-daily ×3 days-one refill of needed for oral thrush.    COPD: She is currently using oxygen at 3 L -- 24/7.  Pulmonary follow-up as above.    We will plan on seeing her in March for her six-month checkup

## 2018-01-03 ENCOUNTER — HOSPITAL ENCOUNTER (OUTPATIENT)
Dept: GENERAL RADIOLOGY | Facility: HOSPITAL | Age: 63
Discharge: HOME | End: 2018-01-03
Admitting: NURSE PRACTITIONER

## 2018-01-03 ENCOUNTER — OFFICE VISIT (OUTPATIENT)
Dept: SURGERY | Facility: CLINIC | Age: 63
End: 2018-01-03

## 2018-01-03 VITALS
OXYGEN SATURATION: 93 % | DIASTOLIC BLOOD PRESSURE: 76 MMHG | SYSTOLIC BLOOD PRESSURE: 126 MMHG | HEART RATE: 72 BPM | BODY MASS INDEX: 38.45 KG/M2 | HEIGHT: 63 IN | WEIGHT: 217 LBS

## 2018-01-03 DIAGNOSIS — S22.49XD CLOSED FRACTURE OF MULTIPLE RIBS WITH ROUTINE HEALING, UNSPECIFIED LATERALITY, SUBSEQUENT ENCOUNTER: ICD-10-CM

## 2018-01-03 DIAGNOSIS — S22.41XA CLOSED FRACTURE OF MULTIPLE RIBS OF RIGHT SIDE, INITIAL ENCOUNTER: Primary | ICD-10-CM

## 2018-01-03 PROCEDURE — 99213 OFFICE O/P EST LOW 20 MIN: CPT | Performed by: NURSE PRACTITIONER

## 2018-01-03 PROCEDURE — 71046 X-RAY EXAM CHEST 2 VIEWS: CPT

## 2018-01-03 NOTE — PROGRESS NOTES
Patient ID: Brenda Michelle is a 62 y.o. female is here today for follow-up.    Subjective     Chief Complaint   Patient presents with   • Follow-up     f/u with cxr       History of Present Illness    Brenda Michelle presents to the office today for follow-up after being seen for consult on 12/13/17 in the hospital due to multiple right-sided rib fractures after a fall. No surgical intervention recommended concerning the right rib fractures of the fourth, fifth and sixth ribs.  Recommended pain management, pulmonary hygiene, and splinting side with cough and deep breath.  Follow-up chest x-rays were stable with no evidence of pneumothorax.  She did exhibit atelectasis bilaterally.  Her main concerns during hospitalization was the need for continuous oxygen.  She was discharged home on 3-4 L per nasal cannula. She was also sent home with hydrocodone for pain management.  Since she was discharged, she feels her condition has improved.  Her right-sided rib pain has decreased.  She is able to cough and take in deep breaths with minimal discomfort.  She is still on oxygen at 4L/M and she has an appointment scheduled with pulmonary service soon. She continues to have shortness of breath with exertion which is relieved with rest and oxygen. She recently seen her PCP and obtained refill on her pain medication. Since then she feels her condition has improved.  She is able to cough and take in deep breaths with minimal discomfort. She denies any complaints of fever, hemoptysis, or any other concerns.      Patient Active Problem List   Diagnosis   • Acute deep vein thrombosis of distal leg   • Adenomatous polyp of colon   • Depression   • Fibromyalgia   • Hyperlipidemia   • Low back pain   • Peripheral arterial occlusive disease   • Vitamin D deficiency   • Gastroesophageal reflux disease   • Irritable bowel syndrome   • Disorder of intervertebral disc   • Peripheral neuropathy   • Fracture of multiple ribs of right side   •  Fall   • Tobacco dependence   • Acute respiratory failure with hypoxia   • COPD (chronic obstructive pulmonary disease)     Past Medical History:   Diagnosis Date   • Anxiety    • benign polyps of large intestine    • Depression    • Disc degeneration, lumbar    • Fibromyositis    • Hyperlipidemia    • IBS (irritable bowel syndrome)    • Nephrolithiasis    • PVD (peripheral vascular disease)    • Vitamin D deficiency      Past Surgical History:   Procedure Laterality Date   • COLONOSCOPY  2004    Colon polyps; family history of colon cancer   • KNEE ARTHROPLASTY, PARTIAL REPLACEMENT     • KNEE ARTHROSCOPY Left     Meniscus   • POPLITEAL ARTERY ANGIOPLASTY Bilateral    • TONSILLECTOMY     • TUBAL ABDOMINAL LIGATION       Family History   Problem Relation Age of Onset   • COPD Mother       at 67 yo    • Colon cancer Mother    • Lung cancer Father      mesothelioma     Social History     Social History   • Marital status:      Spouse name: N/A   • Number of children: N/A   • Years of education: N/A     Occupational History   • Not on file.     Social History Main Topics   • Smoking status: Former Smoker     Types: Cigarettes     Quit date: 2017   • Smokeless tobacco: Never Used   • Alcohol use Yes   • Drug use: Not on file   • Sexual activity: Not on file     Other Topics Concern   • Not on file     Social History Narrative       Current Outpatient Prescriptions:   •  albuterol (PROVENTIL HFA;VENTOLIN HFA) 108 (90 Base) MCG/ACT inhaler, Inhale 2 puffs Every 4 (Four) Hours As Needed for Wheezing., Disp: 1 inhaler, Rfl: 0  •  Aspirin (ASPIR-81 PO), Take 81 mg by mouth Daily., Disp: , Rfl:   •  citalopram (CeleXA) 40 MG tablet, Take 1 tablet by mouth Daily., Disp: 90 tablet, Rfl: 1  •  clopidogrel (PLAVIX) 75 MG tablet, Take 1 tablet by mouth Daily., Disp: 90 tablet, Rfl: 1  •  fenofibrate (TRICOR) 145 MG tablet, TAKE 1 TABLET DAILY, Disp: 90 tablet, Rfl: 1  •  fluconazole (DIFLUCAN) 100 MG tablet,  Take 1 tablet by mouth Daily., Disp: 3 tablet, Rfl: 1  •  guaiFENesin (MUCINEX) 600 MG 12 hr tablet, Take 1 tablet by mouth 2 (Two) Times a Day., Disp: 30 tablet, Rfl: 0  •  meloxicam (MOBIC) 15 MG tablet, TAKE 1 TABLET DAILY, Disp: 90 tablet, Rfl: 0  •  pregabalin (LYRICA) 100 MG capsule, Take 1 capsule by mouth 2 (Two) Times a Day., Disp: 60 capsule, Rfl: 0  Allergies   Allergen Reactions   • Aripiprazole    • Atorvastatin Other (See Comments)     MYALGIAS   • Bupropion Itching   • Cilostazol Other (See Comments)     HA   • Codeine Sulfate    • Ferrous Sulfate Nausea Only   • Welchol  [Colesevelam Hcl] Nausea Only   • Adhesive Tape Rash         Review of Systems   Constitution: Negative.   HENT: Negative.    Eyes: Negative.    Cardiovascular: Negative.         Right rib pain   Respiratory: Positive for shortness of breath.    Endocrine: Negative.    Hematologic/Lymphatic: Negative.    Skin: Negative.    Musculoskeletal: Negative.    Gastrointestinal: Negative.    Genitourinary: Negative.    Neurological: Negative.    Psychiatric/Behavioral: Negative.        Vitals:    01/03/18 1303   BP: 126/76   Pulse: 72   SpO2: 93%       Objective     Physical Exam   Constitutional: She is oriented to person, place, and time. Vital signs are normal. She appears well-developed.   HENT:   Head: Normocephalic and atraumatic.   Neck: Normal range of motion. Neck supple.   Cardiovascular: Normal rate, regular rhythm, normal heart sounds and intact distal pulses.    No murmur heard.  Pulmonary/Chest: Effort normal. She has decreased breath sounds in the right lower field and the left lower field. She has no wheezes. She has no rhonchi. She has no rales.   Minimal right sided rib pain with palpation.  No bruising or crepitus noted.    Currently on oxygen at 4L/M with O2 sats of 93%.     Abdominal: Soft. There is no tenderness.   Musculoskeletal: Normal range of motion. She exhibits no edema or tenderness.   Neurological: She is alert  and oriented to person, place, and time. She has normal strength.   Skin: Skin is warm and dry. No rash noted. No cyanosis or erythema.   Psychiatric: She has a normal mood and affect. Her behavior is normal.       Review of Radiographic Studies:    Study Result   XR CHEST 2 VW-      HISTORY: Female who is 62 years-old,  rib fractures      TECHNIQUE: Frontal and lateral views of the chest      COMPARISON: 12/14/2017      FINDINGS: Size is normal. Aorta appears tortuous. Aeration at the bases  appears partially improved with minimal residual bibasilar likely  atelectasis. Minimal residual pleural effusion. No pneumothorax.  Previous right fourth, fifth, sixth rib fractures are not well  visualized with this technique, not obviously changed.      IMPRESSION:  Partially improved aeration at the bases.      This report was finalized on 1/3/2018 12:46 PM by Dr. Víctor Cheng MD.         Assessment/Plan   Diagnoses and all orders for this visit:    Closed fracture of multiple ribs of right side, initial encounter      SUMMARY  Brenda Michelle condition has improved since discharge from the hospital.  Her rib pain is controlled and she has noticed improvement.  She states she has enough pain medication at home to take as needed.  CXR completed prior to office visit remains stable with improvement in the atelectasis. No effusions or pneumothorax noted.  Encouraged to continue using her incentive spirometer at least 4 times per day.  Avoid any heavy lifting greater than 10 pounds for another 2 weeks, then she may advance her activity as tolerated.  Follow-up with pulmonary as scheduled.  No surgical intervention indicated concerning her rib fractures.  She will only need to follow-up with us as needed.      Kesha Higgins, APRN  01/05/18  3:36 PM

## 2018-01-23 DIAGNOSIS — G62.9 PERIPHERAL POLYNEUROPATHY: ICD-10-CM

## 2018-01-23 DIAGNOSIS — M79.7 FIBROMYALGIA: ICD-10-CM

## 2018-01-23 NOTE — TELEPHONE ENCOUNTER
Last OV 12/26/17  Next OV 4/11/18  Last RF 12/21/17 #60 No Refills  (ELI Dunlap)  Last KSP On File  UDS On File 5/30/17  Contract On File    KD

## 2018-02-23 DIAGNOSIS — G62.9 PERIPHERAL POLYNEUROPATHY: ICD-10-CM

## 2018-02-23 DIAGNOSIS — M79.7 FIBROMYALGIA: ICD-10-CM

## 2018-02-26 NOTE — TELEPHONE ENCOUNTER
Last OV 12/26/17  Next OV 4/11/18  Last RF 1/23/18 #60 No Refills  Last KSP Printed 2/26/18  UDS On File 5/30/17  Contract On File    KD

## 2018-03-12 RX ORDER — MELOXICAM 15 MG/1
TABLET ORAL
Qty: 90 TABLET | Refills: 0 | Status: SHIPPED | OUTPATIENT
Start: 2018-03-12 | End: 2018-06-10 | Stop reason: SDUPTHER

## 2018-03-27 ENCOUNTER — APPOINTMENT (OUTPATIENT)
Dept: SLEEP MEDICINE | Facility: HOSPITAL | Age: 63
End: 2018-03-27

## 2018-04-23 DIAGNOSIS — E78.2 MIXED HYPERLIPIDEMIA: ICD-10-CM

## 2018-04-23 RX ORDER — FENOFIBRATE 145 MG/1
TABLET, COATED ORAL
Qty: 90 TABLET | Refills: 0 | Status: SHIPPED | OUTPATIENT
Start: 2018-04-23 | End: 2018-07-17 | Stop reason: SINTOL

## 2018-05-29 DIAGNOSIS — G62.9 PERIPHERAL POLYNEUROPATHY: ICD-10-CM

## 2018-05-29 DIAGNOSIS — M79.7 FIBROMYALGIA: ICD-10-CM

## 2018-05-30 DIAGNOSIS — I77.9 PERIPHERAL ARTERIAL OCCLUSIVE DISEASE (HCC): ICD-10-CM

## 2018-05-30 DIAGNOSIS — F32.A DEPRESSION, UNSPECIFIED DEPRESSION TYPE: ICD-10-CM

## 2018-05-30 RX ORDER — CLOPIDOGREL BISULFATE 75 MG/1
TABLET ORAL
Qty: 90 TABLET | Refills: 1 | Status: SHIPPED | OUTPATIENT
Start: 2018-05-30 | End: 2018-11-26 | Stop reason: SDUPTHER

## 2018-05-30 RX ORDER — CITALOPRAM 40 MG/1
TABLET ORAL
Qty: 90 TABLET | Refills: 1 | Status: SHIPPED | OUTPATIENT
Start: 2018-05-30 | End: 2018-06-05

## 2018-05-31 ENCOUNTER — TELEPHONE (OUTPATIENT)
Dept: INTERNAL MEDICINE | Age: 63
End: 2018-05-31

## 2018-05-31 NOTE — TELEPHONE ENCOUNTER
Pt's  called asking if pt's Riaz was ready to p/u but not in front drawer.  He asked to be called when ready # [phone]  Thanks SP

## 2018-05-31 NOTE — TELEPHONE ENCOUNTER
LOV 12.26.17 NOV 06.05.18  CONTRACT ON FILE   MANJU 02.26.18  UDS ON FILE   LAST  02.26.18 #60 NO RF

## 2018-06-05 ENCOUNTER — OFFICE VISIT (OUTPATIENT)
Dept: INTERNAL MEDICINE | Age: 63
End: 2018-06-05

## 2018-06-05 VITALS
SYSTOLIC BLOOD PRESSURE: 138 MMHG | WEIGHT: 240 LBS | BODY MASS INDEX: 42.52 KG/M2 | OXYGEN SATURATION: 90 % | RESPIRATION RATE: 13 BRPM | HEIGHT: 63 IN | TEMPERATURE: 94.8 F | DIASTOLIC BLOOD PRESSURE: 70 MMHG | HEART RATE: 88 BPM

## 2018-06-05 DIAGNOSIS — F32.A DEPRESSION, UNSPECIFIED DEPRESSION TYPE: ICD-10-CM

## 2018-06-05 DIAGNOSIS — Z51.81 ENCOUNTER FOR THERAPEUTIC DRUG LEVEL MONITORING: ICD-10-CM

## 2018-06-05 DIAGNOSIS — E78.2 MIXED HYPERLIPIDEMIA: Primary | ICD-10-CM

## 2018-06-05 PROCEDURE — 99214 OFFICE O/P EST MOD 30 MIN: CPT | Performed by: INTERNAL MEDICINE

## 2018-06-05 NOTE — PROGRESS NOTES
Brenda Michelle is a 62 y.o. female who presents with   Chief Complaint   Patient presents with   • Hyperlipidemia   • Depression   • Urine drug screen-yearly-Lyrica usage   .    62-year-old female presents for her six-month checkup/lab update visit.  She would like to increase her citalopram 40 mg daily or change to something else if possible.  She feels that citalopram 40 mg daily is no longer effective.  She also needs her yearly drug screen for Lyrica usage.  Also her weight has increased from 217 pounds in January to her current weight of 240 pounds.  Weight watchers has been advised.      Hyperlipidemia   This is a chronic problem. The current episode started more than 1 year ago. The problem is controlled. Recent lipid tests were reviewed and are normal. Current antihyperlipidemic treatment includes fibric acid derivatives. Compliance problems include adherence to diet and adherence to exercise.    Depression   Visit Type: follow-up  Patient presents with the following symptoms: decreased concentration, depressed mood and weight gain.  Severity: moderate   Compliance with medications:  %             The following portions of the patient's history were reviewed and updated as appropriate: allergies, current medications, past medical history and problem list.    Review of Systems   Constitutional: Positive for weight gain.   HENT: Negative.    Eyes: Negative.    Respiratory: Negative.    Cardiovascular: Negative.    Genitourinary: Negative.    Musculoskeletal: Negative.    Skin: Negative.    Neurological: Negative.    Psychiatric/Behavioral: Positive for decreased concentration.       Objective   Physical Exam   Constitutional: She is oriented to person, place, and time. She appears well-developed and well-nourished. No distress.   HENT:   Head: Normocephalic and atraumatic.   Eyes: Conjunctivae and EOM are normal. Pupils are equal, round, and reactive to light.   Neck: Normal range of motion. Neck  supple. No thyromegaly present.   Neck exam negative.  Carotid auscultation normal-no bruits heard.   Cardiovascular: Normal rate, regular rhythm, normal heart sounds and intact distal pulses.  Exam reveals no gallop and no friction rub.    No murmur heard.  Pulmonary/Chest: Effort normal and breath sounds normal. No respiratory distress. She has no wheezes. She has no rales. She exhibits no tenderness.   Neurological: She is alert and oriented to person, place, and time.   Psychiatric: She has a normal mood and affect. Her behavior is normal. Judgment and thought content normal.   Nursing note and vitals reviewed.      Assessment/Plan   Brenda was seen today for hyperlipidemia, depression and urine drug screen-yearly-lyrica usage.    Diagnoses and all orders for this visit:    Mixed hyperlipidemia  -     Comprehensive Metabolic Panel  -     Lipid Panel  -     TSH+Free T4    Depression, unspecified depression type  -     Comprehensive Metabolic Panel  -     TSH+Free T4    Encounter for therapeutic drug level monitoring  -     Compliance Drug Analysis, Ur - Urine, Clean Catch    Other orders  -     sertraline (ZOLOFT) 50 MG tablet; Take 1 tablet by mouth Daily.      Plan: Labs as above for the issues as outlined.  Weight watchers has been advised for weight loss.  We'll discontinue Celexa and try her on Zoloft 50 mg at at bedtime.    Continue other medications as prescribed for now.  Will do urine drug screen.  She has requested doing it on a yearly basis because of cost involved.    Tentatively we'll plan on a six-month checkup/lab update visit.

## 2018-06-07 LAB
ALBUMIN SERPL-MCNC: 4.1 G/DL (ref 3.6–4.8)
ALBUMIN/GLOB SERPL: 1.5 {RATIO} (ref 1.2–2.2)
ALP SERPL-CCNC: 96 IU/L (ref 39–117)
ALT SERPL-CCNC: 14 IU/L (ref 0–32)
AST SERPL-CCNC: 39 IU/L (ref 0–40)
BILIRUB SERPL-MCNC: 0.3 MG/DL (ref 0–1.2)
BUN SERPL-MCNC: 9 MG/DL (ref 8–27)
BUN/CREAT SERPL: 11 (ref 12–28)
CALCIUM SERPL-MCNC: 10.4 MG/DL (ref 8.7–10.3)
CHLORIDE SERPL-SCNC: 100 MMOL/L (ref 96–106)
CHOLEST SERPL-MCNC: 195 MG/DL (ref 100–199)
CO2 SERPL-SCNC: 28 MMOL/L (ref 18–29)
CREAT SERPL-MCNC: 0.85 MG/DL (ref 0.57–1)
DRUGS UR: NORMAL
GFR SERPLBLD CREATININE-BSD FMLA CKD-EPI: 74 ML/MIN/1.73
GFR SERPLBLD CREATININE-BSD FMLA CKD-EPI: 85 ML/MIN/1.73
GLOBULIN SER CALC-MCNC: 2.7 G/DL (ref 1.5–4.5)
GLUCOSE SERPL-MCNC: 98 MG/DL (ref 65–99)
HDLC SERPL-MCNC: 47 MG/DL
LDLC SERPL CALC-MCNC: 109 MG/DL (ref 0–99)
POTASSIUM SERPL-SCNC: 4.7 MMOL/L (ref 3.5–5.2)
PROT SERPL-MCNC: 6.8 G/DL (ref 6–8.5)
SODIUM SERPL-SCNC: 144 MMOL/L (ref 134–144)
T4 FREE SERPL-MCNC: 1.27 NG/DL (ref 0.82–1.77)
TRIGL SERPL-MCNC: 197 MG/DL (ref 0–149)
TSH SERPL DL<=0.005 MIU/L-ACNC: 3.31 UIU/ML (ref 0.45–4.5)
VLDLC SERPL CALC-MCNC: 39 MG/DL (ref 5–40)

## 2018-06-11 RX ORDER — MELOXICAM 15 MG/1
TABLET ORAL
Qty: 90 TABLET | Refills: 0 | Status: SHIPPED | OUTPATIENT
Start: 2018-06-11 | End: 2018-07-17

## 2018-07-01 DIAGNOSIS — G62.9 PERIPHERAL POLYNEUROPATHY: ICD-10-CM

## 2018-07-01 DIAGNOSIS — M79.7 FIBROMYALGIA: ICD-10-CM

## 2018-07-02 ENCOUNTER — TELEPHONE (OUTPATIENT)
Dept: INTERNAL MEDICINE | Age: 63
End: 2018-07-02

## 2018-07-02 NOTE — TELEPHONE ENCOUNTER
LOV 06.05.18  NOV 12.12.18  CONTRACT 05.31.18  MANJU 02.26.18  UDS 06.05.18  LAST RF #05.31.18 #60 1 RF

## 2018-07-06 ENCOUNTER — OFFICE VISIT (OUTPATIENT)
Dept: INTERNAL MEDICINE | Age: 63
End: 2018-07-06

## 2018-07-06 ENCOUNTER — HOSPITAL ENCOUNTER (EMERGENCY)
Facility: HOSPITAL | Age: 63
Discharge: HOME | End: 2018-07-06
Attending: EMERGENCY MEDICINE | Admitting: EMERGENCY MEDICINE

## 2018-07-06 ENCOUNTER — APPOINTMENT (OUTPATIENT)
Dept: CARDIOLOGY | Facility: HOSPITAL | Age: 63
End: 2018-07-06

## 2018-07-06 ENCOUNTER — APPOINTMENT (OUTPATIENT)
Dept: GENERAL RADIOLOGY | Facility: HOSPITAL | Age: 63
End: 2018-07-06

## 2018-07-06 ENCOUNTER — APPOINTMENT (OUTPATIENT)
Dept: CT IMAGING | Facility: HOSPITAL | Age: 63
End: 2018-07-06

## 2018-07-06 ENCOUNTER — TELEPHONE (OUTPATIENT)
Dept: INTERNAL MEDICINE | Age: 63
End: 2018-07-06

## 2018-07-06 VITALS
HEART RATE: 76 BPM | HEIGHT: 62 IN | RESPIRATION RATE: 18 BRPM | WEIGHT: 241 LBS | SYSTOLIC BLOOD PRESSURE: 139 MMHG | BODY MASS INDEX: 44.35 KG/M2 | DIASTOLIC BLOOD PRESSURE: 79 MMHG | OXYGEN SATURATION: 93 % | TEMPERATURE: 97.5 F

## 2018-07-06 VITALS
SYSTOLIC BLOOD PRESSURE: 104 MMHG | DIASTOLIC BLOOD PRESSURE: 60 MMHG | BODY MASS INDEX: 42.7 KG/M2 | RESPIRATION RATE: 13 BRPM | HEIGHT: 63 IN | HEART RATE: 82 BPM | WEIGHT: 241 LBS | OXYGEN SATURATION: 94 % | TEMPERATURE: 98.1 F

## 2018-07-06 DIAGNOSIS — R06.00 DYSPNEA, UNSPECIFIED TYPE: Primary | ICD-10-CM

## 2018-07-06 DIAGNOSIS — M79.89 PAIN AND SWELLING OF LEFT LOWER LEG: ICD-10-CM

## 2018-07-06 DIAGNOSIS — M79.89 LEFT LEG SWELLING: Primary | ICD-10-CM

## 2018-07-06 DIAGNOSIS — N39.0 ACUTE UTI: ICD-10-CM

## 2018-07-06 DIAGNOSIS — R06.02 SHORTNESS OF BREATH: ICD-10-CM

## 2018-07-06 DIAGNOSIS — M79.662 PAIN AND SWELLING OF LEFT LOWER LEG: ICD-10-CM

## 2018-07-06 DIAGNOSIS — J44.1 COPD EXACERBATION (HCC): ICD-10-CM

## 2018-07-06 LAB
ALBUMIN SERPL-MCNC: 4.2 G/DL (ref 3.5–5.2)
ALBUMIN/GLOB SERPL: 1.4 G/DL
ALP SERPL-CCNC: 83 U/L (ref 39–117)
ALT SERPL W P-5'-P-CCNC: 8 U/L (ref 1–33)
ANION GAP SERPL CALCULATED.3IONS-SCNC: 10.7 MMOL/L
AST SERPL-CCNC: 20 U/L (ref 1–32)
BACTERIA UR QL AUTO: ABNORMAL /HPF
BASOPHILS # BLD AUTO: 0.03 10*3/MM3 (ref 0–0.2)
BASOPHILS NFR BLD AUTO: 0.4 % (ref 0–1.5)
BH CV LOWER VASCULAR LEFT COMMON FEMORAL AUGMENT: NORMAL
BH CV LOWER VASCULAR LEFT COMMON FEMORAL COMPETENT: NORMAL
BH CV LOWER VASCULAR LEFT COMMON FEMORAL COMPRESS: NORMAL
BH CV LOWER VASCULAR LEFT COMMON FEMORAL PHASIC: NORMAL
BH CV LOWER VASCULAR LEFT COMMON FEMORAL SPONT: NORMAL
BH CV LOWER VASCULAR LEFT DISTAL FEMORAL COMPRESS: NORMAL
BH CV LOWER VASCULAR LEFT GASTRONEMIUS COMPRESS: NORMAL
BH CV LOWER VASCULAR LEFT GREATER SAPH AK COMPRESS: NORMAL
BH CV LOWER VASCULAR LEFT GREATER SAPH BK COMPRESS: NORMAL
BH CV LOWER VASCULAR LEFT LESSER SAPH COMPRESS: NORMAL
BH CV LOWER VASCULAR LEFT MID FEMORAL AUGMENT: NORMAL
BH CV LOWER VASCULAR LEFT MID FEMORAL COMPETENT: NORMAL
BH CV LOWER VASCULAR LEFT MID FEMORAL COMPRESS: NORMAL
BH CV LOWER VASCULAR LEFT MID FEMORAL PHASIC: NORMAL
BH CV LOWER VASCULAR LEFT MID FEMORAL SPONT: NORMAL
BH CV LOWER VASCULAR LEFT PERONEAL COMPRESS: NORMAL
BH CV LOWER VASCULAR LEFT POPLITEAL AUGMENT: NORMAL
BH CV LOWER VASCULAR LEFT POPLITEAL COMPETENT: NORMAL
BH CV LOWER VASCULAR LEFT POPLITEAL COMPRESS: NORMAL
BH CV LOWER VASCULAR LEFT POPLITEAL PHASIC: NORMAL
BH CV LOWER VASCULAR LEFT POPLITEAL SPONT: NORMAL
BH CV LOWER VASCULAR LEFT POSTERIOR TIBIAL COMPRESS: NORMAL
BH CV LOWER VASCULAR LEFT PROXIMAL FEMORAL COMPRESS: NORMAL
BH CV LOWER VASCULAR LEFT SAPHENOFEMORAL JUNCTION AUGMENT: NORMAL
BH CV LOWER VASCULAR LEFT SAPHENOFEMORAL JUNCTION COMPETENT: NORMAL
BH CV LOWER VASCULAR LEFT SAPHENOFEMORAL JUNCTION COMPRESS: NORMAL
BH CV LOWER VASCULAR LEFT SAPHENOFEMORAL JUNCTION PHASIC: NORMAL
BH CV LOWER VASCULAR LEFT SAPHENOFEMORAL JUNCTION SPONT: NORMAL
BH CV LOWER VASCULAR RIGHT COMMON FEMORAL AUGMENT: NORMAL
BH CV LOWER VASCULAR RIGHT COMMON FEMORAL COMPETENT: NORMAL
BH CV LOWER VASCULAR RIGHT COMMON FEMORAL COMPRESS: NORMAL
BH CV LOWER VASCULAR RIGHT COMMON FEMORAL PHASIC: NORMAL
BH CV LOWER VASCULAR RIGHT COMMON FEMORAL SPONT: NORMAL
BILIRUB SERPL-MCNC: 0.4 MG/DL (ref 0.1–1.2)
BILIRUB UR QL STRIP: NEGATIVE
BUN BLD-MCNC: 12 MG/DL (ref 8–23)
BUN/CREAT SERPL: 13 (ref 7–25)
CALCIUM SPEC-SCNC: 10.8 MG/DL (ref 8.6–10.5)
CHLORIDE SERPL-SCNC: 100 MMOL/L (ref 98–107)
CLARITY UR: CLEAR
CO2 SERPL-SCNC: 31.3 MMOL/L (ref 22–29)
COLOR UR: YELLOW
CREAT BLD-MCNC: 0.92 MG/DL (ref 0.57–1)
DEPRECATED RDW RBC AUTO: 56.9 FL (ref 37–54)
EOSINOPHIL # BLD AUTO: 0.19 10*3/MM3 (ref 0–0.7)
EOSINOPHIL NFR BLD AUTO: 2.4 % (ref 0.3–6.2)
ERYTHROCYTE [DISTWIDTH] IN BLOOD BY AUTOMATED COUNT: 14.6 % (ref 11.7–13)
GFR SERPL CREATININE-BSD FRML MDRD: 62 ML/MIN/1.73
GLOBULIN UR ELPH-MCNC: 2.9 GM/DL
GLUCOSE BLD-MCNC: 98 MG/DL (ref 65–99)
GLUCOSE UR STRIP-MCNC: NEGATIVE MG/DL
HCT VFR BLD AUTO: 43.8 % (ref 35.6–45.5)
HGB BLD-MCNC: 13.5 G/DL (ref 11.9–15.5)
HGB UR QL STRIP.AUTO: NEGATIVE
HYALINE CASTS UR QL AUTO: ABNORMAL /LPF
IMM GRANULOCYTES # BLD: 0 10*3/MM3 (ref 0–0.03)
IMM GRANULOCYTES NFR BLD: 0 % (ref 0–0.5)
KETONES UR QL STRIP: NEGATIVE
LEUKOCYTE ESTERASE UR QL STRIP.AUTO: ABNORMAL
LYMPHOCYTES # BLD AUTO: 1.9 10*3/MM3 (ref 0.9–4.8)
LYMPHOCYTES NFR BLD AUTO: 23.8 % (ref 19.6–45.3)
MCH RBC QN AUTO: 32.7 PG (ref 26.9–32)
MCHC RBC AUTO-ENTMCNC: 30.8 G/DL (ref 32.4–36.3)
MCV RBC AUTO: 106.1 FL (ref 80.5–98.2)
MONOCYTES # BLD AUTO: 0.63 10*3/MM3 (ref 0.2–1.2)
MONOCYTES NFR BLD AUTO: 7.9 % (ref 5–12)
NEUTROPHILS # BLD AUTO: 5.23 10*3/MM3 (ref 1.9–8.1)
NEUTROPHILS NFR BLD AUTO: 65.5 % (ref 42.7–76)
NITRITE UR QL STRIP: NEGATIVE
NT-PROBNP SERPL-MCNC: 141.2 PG/ML (ref 0–900)
PH UR STRIP.AUTO: 8.5 [PH] (ref 5–8)
PLATELET # BLD AUTO: 240 10*3/MM3 (ref 140–500)
PMV BLD AUTO: 10.4 FL (ref 6–12)
POTASSIUM BLD-SCNC: 4.7 MMOL/L (ref 3.5–5.2)
PROT SERPL-MCNC: 7.1 G/DL (ref 6–8.5)
PROT UR QL STRIP: NEGATIVE
RBC # BLD AUTO: 4.13 10*6/MM3 (ref 3.9–5.2)
RBC # UR: ABNORMAL /HPF
REF LAB TEST METHOD: ABNORMAL
SODIUM BLD-SCNC: 142 MMOL/L (ref 136–145)
SP GR UR STRIP: 1.01 (ref 1–1.03)
SQUAMOUS #/AREA URNS HPF: ABNORMAL /HPF
TROPONIN T SERPL-MCNC: <0.01 NG/ML (ref 0–0.03)
UROBILINOGEN UR QL STRIP: ABNORMAL
WBC NRBC COR # BLD: 7.98 10*3/MM3 (ref 4.5–10.7)
WBC UR QL AUTO: ABNORMAL /HPF

## 2018-07-06 PROCEDURE — 71275 CT ANGIOGRAPHY CHEST: CPT

## 2018-07-06 PROCEDURE — 81001 URINALYSIS AUTO W/SCOPE: CPT | Performed by: EMERGENCY MEDICINE

## 2018-07-06 PROCEDURE — 93971 EXTREMITY STUDY: CPT

## 2018-07-06 PROCEDURE — 71046 X-RAY EXAM CHEST 2 VIEWS: CPT

## 2018-07-06 PROCEDURE — 85025 COMPLETE CBC W/AUTO DIFF WBC: CPT | Performed by: NURSE PRACTITIONER

## 2018-07-06 PROCEDURE — 96365 THER/PROPH/DIAG IV INF INIT: CPT

## 2018-07-06 PROCEDURE — 93005 ELECTROCARDIOGRAM TRACING: CPT | Performed by: EMERGENCY MEDICINE

## 2018-07-06 PROCEDURE — 93010 ELECTROCARDIOGRAM REPORT: CPT | Performed by: INTERNAL MEDICINE

## 2018-07-06 PROCEDURE — 36415 COLL VENOUS BLD VENIPUNCTURE: CPT

## 2018-07-06 PROCEDURE — 25010000002 METHYLPREDNISOLONE PER 125 MG: Performed by: EMERGENCY MEDICINE

## 2018-07-06 PROCEDURE — 84484 ASSAY OF TROPONIN QUANT: CPT | Performed by: EMERGENCY MEDICINE

## 2018-07-06 PROCEDURE — 99214 OFFICE O/P EST MOD 30 MIN: CPT | Performed by: INTERNAL MEDICINE

## 2018-07-06 PROCEDURE — 25010000002 CEFTRIAXONE PER 250 MG: Performed by: EMERGENCY MEDICINE

## 2018-07-06 PROCEDURE — 99284 EMERGENCY DEPT VISIT MOD MDM: CPT

## 2018-07-06 PROCEDURE — 80053 COMPREHEN METABOLIC PANEL: CPT | Performed by: NURSE PRACTITIONER

## 2018-07-06 PROCEDURE — 83880 ASSAY OF NATRIURETIC PEPTIDE: CPT | Performed by: NURSE PRACTITIONER

## 2018-07-06 PROCEDURE — 94799 UNLISTED PULMONARY SVC/PX: CPT

## 2018-07-06 PROCEDURE — 25010000002 IOPAMIDOL 61 % SOLUTION: Performed by: EMERGENCY MEDICINE

## 2018-07-06 PROCEDURE — 96375 TX/PRO/DX INJ NEW DRUG ADDON: CPT

## 2018-07-06 PROCEDURE — 94640 AIRWAY INHALATION TREATMENT: CPT

## 2018-07-06 RX ORDER — CEFTRIAXONE SODIUM 1 G/50ML
1 INJECTION, SOLUTION INTRAVENOUS ONCE
Status: COMPLETED | OUTPATIENT
Start: 2018-07-06 | End: 2018-07-06

## 2018-07-06 RX ORDER — METHYLPREDNISOLONE 4 MG/1
TABLET ORAL
Qty: 21 EACH | Refills: 0 | Status: SHIPPED | OUTPATIENT
Start: 2018-07-06 | End: 2018-07-17

## 2018-07-06 RX ORDER — IPRATROPIUM BROMIDE AND ALBUTEROL SULFATE 2.5; .5 MG/3ML; MG/3ML
3 SOLUTION RESPIRATORY (INHALATION) ONCE
Status: COMPLETED | OUTPATIENT
Start: 2018-07-06 | End: 2018-07-06

## 2018-07-06 RX ORDER — CEPHALEXIN 500 MG/1
500 CAPSULE ORAL 2 TIMES DAILY
Qty: 14 CAPSULE | Refills: 0 | Status: SHIPPED | OUTPATIENT
Start: 2018-07-06 | End: 2018-07-17

## 2018-07-06 RX ORDER — METHYLPREDNISOLONE SODIUM SUCCINATE 125 MG/2ML
125 INJECTION, POWDER, LYOPHILIZED, FOR SOLUTION INTRAMUSCULAR; INTRAVENOUS ONCE
Status: COMPLETED | OUTPATIENT
Start: 2018-07-06 | End: 2018-07-06

## 2018-07-06 RX ORDER — FUROSEMIDE 20 MG/1
20 TABLET ORAL DAILY
Qty: 7 TABLET | Refills: 0 | Status: SHIPPED | OUTPATIENT
Start: 2018-07-06 | End: 2019-06-07

## 2018-07-06 RX ADMIN — IOPAMIDOL 95 ML: 612 INJECTION, SOLUTION INTRAVENOUS at 19:09

## 2018-07-06 RX ADMIN — METHYLPREDNISOLONE SODIUM SUCCINATE 125 MG: 125 INJECTION, POWDER, FOR SOLUTION INTRAMUSCULAR; INTRAVENOUS at 17:39

## 2018-07-06 RX ADMIN — CEFTRIAXONE SODIUM 1 G: 1 INJECTION, SOLUTION INTRAVENOUS at 18:39

## 2018-07-06 RX ADMIN — IPRATROPIUM BROMIDE AND ALBUTEROL SULFATE 3 ML: .5; 3 SOLUTION RESPIRATORY (INHALATION) at 17:47

## 2018-07-06 NOTE — TELEPHONE ENCOUNTER
Pt called complaining of swelling in her left foot and ankle (twice the size) and also s.o.a. I told the pt with the s.o.a [place] [facility] [place]. Pt stated she wants to see Dr Contreras today if possible, again I told the pt she needs to go to the ER, but pt wanted to see Dr Contreras if possible.  Pt's # [phone]  Thanks SP

## 2018-07-06 NOTE — PROGRESS NOTES
Brenda Michelle is a 62 y.o. female who presents with   Chief Complaint   Patient presents with   • Swelling of the left leg   • Shortness of Breath   .    62-year-old female presents with a one-week history of swelling of her left foot and left lower extremity with shortness of breath.  She has had a prior past history of a DVT, peripheral vascular disease, peripheral neuropathy and COPD.      Shortness of Breath   This is a chronic problem. The current episode started more than 1 month ago. The problem has been gradually worsening. Treatments tried: Patient uses oxygen at home.      Leg swelling:-Left leg-one week.  No injury.  Some shortness of breath with exertion more so than what she usually has she says.  Oxygen saturation drops into the 80s with exertion.  Currently 94% at rest.    The following portions of the patient's history were reviewed and updated as appropriate: allergies, current medications, past medical history and problem list.    Review of Systems   Constitutional: Negative.    HENT: Negative.    Eyes: Negative.    Respiratory: Positive for shortness of breath.    Cardiovascular: Negative.    Genitourinary: Negative.    Musculoskeletal: Negative.    Skin: Negative.    Neurological: Negative.    Psychiatric/Behavioral: Negative.        Objective   Physical Exam   Constitutional: She is oriented to person, place, and time. She appears well-developed and well-nourished. No distress.   HENT:   Head: Normocephalic and atraumatic.   Eyes: Conjunctivae and EOM are normal. Pupils are equal, round, and reactive to light.   Neck: Normal range of motion. Neck supple. No thyromegaly present.   Neck exam negative.  Carotid auscultation normal-no bruits heard.   Cardiovascular: Normal rate, regular rhythm, normal heart sounds and intact distal pulses.  Exam reveals no gallop and no friction rub.    No murmur heard.  Pulmonary/Chest: Effort normal and breath sounds normal. No respiratory distress. She has no  wheezes. She has no rales. She exhibits no tenderness.   Musculoskeletal: She exhibits edema. She exhibits no tenderness or deformity.   Left leg measures 41.5 cm at the calf right leg measures 42.5 cm at the calf.  There is evidence of a right total knee replacement.  There is no calf tenderness on palpation on either side.  There is 2+ edema of the dorsum of the left foot and the left ankle.  Distal pulsations poorly palpable because of the edema.   Neurological: She is alert and oriented to person, place, and time.   Psychiatric: She has a normal mood and affect. Her behavior is normal. Judgment and thought content normal.   Nursing note and vitals reviewed.      Assessment/Plan   Brenda was seen today for swelling of the left leg and shortness of breath.    Diagnoses and all orders for this visit:    Left leg swelling    Shortness of breath        Plan: Given her prior past history of DVT and given the fact that it is later in the afternoon on a Friday and given the fact that we are not likely to get any information as quickly as we need to on an outpatient basis as to whether the patient does or does not have a new DVT in the left leg and/or blood clots to the lungs, I'm going to send her down to the emergency room for evaluation so that she can be seen and get answers quickly.  For now she will continue all current medicines as prescribed pending ER evaluation.

## 2018-07-06 NOTE — ED PROVIDER NOTES
EMERGENCY DEPARTMENT ENCOUNTER    CHIEF COMPLAINT  Chief Complaint: Leg edema  History given by: pt  History limited by: none  Room Number: 27/27  PMD: Víctor Contreras MD      HPI:  Pt is a 62 y.o. female who presents complaining of left  leg swelling and SOA that she claims to be baseline. Pt reports leg edema began 5 days ago. Pt denies any bug bites. The pt reports it was at its worst 2 days ago and is now resolving. Pt reports she isn't urinating often. Pt reports she is on oxygen at home.       Duration:  5 days  Onset: gradual  Timing: constant  Location: left leg  Radiation: none  Quality: swelling  Intensity/Severity: moderate  Progression: progressively better  Associated Symptoms: unable to urinate often  Aggravating Factors: walking  Alleviating Factors: none  Previous Episodes: none  Treatment before arrival: none    PAST MEDICAL HISTORY  Active Ambulatory Problems     Diagnosis Date Noted   • Acute deep vein thrombosis of distal leg (CMS/[facility]) 01/24/2016   • Adenomatous polyp of colon 01/24/2016   • Depression 01/24/2016   • Fibromyalgia 01/24/2016   • Hyperlipidemia 01/24/2016   • Low back pain 01/24/2016   • Peripheral arterial occlusive disease (CMS/HCC) 01/24/2016   • Vitamin D deficiency 01/24/2016   • Gastroesophageal reflux disease 02/13/2012   • Irritable bowel syndrome 02/13/2012   • Disorder of intervertebral disc 02/13/2012   • Peripheral neuropathy 02/13/2012   • Fracture of multiple ribs of right side 12/10/2017   • Fall 12/11/2017   • Tobacco dependence 12/11/2017   • Acute respiratory failure with hypoxia (CMS/[facility]) 12/11/2017   • COPD (chronic obstructive pulmonary disease) (CMS/[facility]) 12/11/2017     Resolved Ambulatory Problems     Diagnosis Date Noted   • No Resolved Ambulatory Problems     Past Medical History:   Diagnosis Date   • Anxiety    • benign polyps of large intestine    • Depression    • Disc degeneration, lumbar    • Fibromyositis    • Hyperlipidemia    • IBS (irritable bowel  syndrome)    • Nephrolithiasis    • PVD (peripheral vascular disease) (CMS/HCC)    • Vitamin D deficiency        PAST SURGICAL HISTORY  Past Surgical History:   Procedure Laterality Date   • COLONOSCOPY  2004    Colon polyps; family history of colon cancer   • KNEE ARTHROPLASTY, PARTIAL REPLACEMENT     • KNEE ARTHROSCOPY Left     Meniscus   • POPLITEAL ARTERY ANGIOPLASTY Bilateral    • TONSILLECTOMY     • TUBAL ABDOMINAL LIGATION         FAMILY HISTORY  Family History   Problem Relation Age of Onset   • COPD Mother          at 67 yo    • Colon cancer Mother    • Lung cancer Father         mesothelioma       SOCIAL HISTORY  Social History     Social History   • Marital status:      Spouse name: N/A   • Number of children: N/A   • Years of education: N/A     Occupational History   • Not on file.     Social History Main Topics   • Smoking status: Former Smoker     Types: Cigarettes     Quit date: 2017   • Smokeless tobacco: Never Used   • Alcohol use Yes   • Drug use: Unknown   • Sexual activity: Not on file     Other Topics Concern   • Not on file     Social History Narrative   • No narrative on file       ALLERGIES  Ambien [zolpidem tartrate]; Aripiprazole; Atorvastatin; Bupropion; Cilostazol; Codeine sulfate; Ferrous sulfate; Welchol  [colesevelam hcl]; and Adhesive tape    REVIEW OF SYSTEMS  Review of Systems   Constitutional: Negative for fever.   HENT: Negative for sore throat.    Eyes: Negative.    Respiratory: Negative for cough and shortness of breath.    Cardiovascular: Positive for leg swelling. Negative for chest pain.   Gastrointestinal: Negative for abdominal pain, diarrhea and vomiting.   Genitourinary: Positive for difficulty urinating. Negative for dysuria.   Musculoskeletal: Negative for neck pain.   Skin: Negative for rash.   Allergic/Immunologic: Negative.    Neurological: Negative for weakness, numbness and headaches.   Hematological: Negative.    Psychiatric/Behavioral:  Negative.    All other systems reviewed and are negative.      PHYSICAL EXAM  ED Triage Vitals   Temp Heart Rate Resp BP SpO2   07/06/18 1403 07/06/18 1403 07/06/18 1403 07/06/18 1501 07/06/18 1403   97.5 °F (36.4 °C) 89 14 149/95 92 %      Temp src Heart Rate Source Patient Position BP Location FiO2 (%)   07/06/18 1403 07/06/18 1403 07/06/18 1631 -- --   Tympanic Monitor Lying         Physical Exam   Constitutional: She is oriented to person, place, and time. No distress.   HENT:   Head: Normocephalic and atraumatic.   Mouth/Throat: Oropharynx is clear and moist.   Eyes: EOM are normal. Pupils are equal, round, and reactive to light.   Neck: Normal range of motion. Neck supple.   Cardiovascular: Normal rate, regular rhythm and normal heart sounds.    Pulmonary/Chest: Effort normal and breath sounds normal. No respiratory distress.   Abdominal: Soft. There is no tenderness. There is no rebound and no guarding.   Musculoskeletal: Normal range of motion. She exhibits edema (Left leg edema compared to the right with no erythema or warmth). She exhibits no tenderness (calf).   palpable pulse in left foot with a cap refill less the 2 and the foot is warm and dry   Neurological: She is alert and oriented to person, place, and time. She has normal sensation and normal strength.   Skin: Skin is warm and dry. No rash noted.   Psychiatric: Mood and affect normal.   Nursing note and vitals reviewed.      LAB RESULTS  Lab Results (last 24 hours)     Procedure Component Value Units Date/Time    CBC & Differential [[id number]] Collected:  07/06/18 1614    Specimen:  Blood Updated:  07/06/18 1640    Narrative:       The following orders were created for panel order CBC & Differential.  Procedure                               Abnormality         Status                     ---------                               -----------         ------                     Scan Slide[[id number]]                                                                   CBC Auto Differential[[id number]]        Abnormal            Final result                 Please view results for these tests on the individual orders.    Comprehensive Metabolic Panel [[id number]]  (Abnormal) Collected:  07/06/18 1614    Specimen:  Blood Updated:  07/06/18 1649     Glucose 98 mg/dL      BUN 12 mg/dL      Creatinine 0.92 mg/dL      Sodium 142 mmol/L      Potassium 4.7 mmol/L      Chloride 100 mmol/L      CO2 31.3 (H) mmol/L      Calcium 10.8 (H) mg/dL      Total Protein 7.1 g/dL      Albumin 4.20 g/dL      ALT (SGPT) 8 U/L      AST (SGOT) 20 U/L      Alkaline Phosphatase 83 U/L      Total Bilirubin 0.4 mg/dL      eGFR Non African Amer 62 mL/min/1.73      Globulin 2.9 gm/dL      A/G Ratio 1.4 g/dL      BUN/Creatinine Ratio 13.0     Anion Gap 10.7 mmol/L     BNP [[id number]]  (Normal) Collected:  07/06/18 1614    Specimen:  Blood Updated:  07/06/18 1648     proBNP 141.2 pg/mL     Narrative:       Among patients with dyspnea, NT-proBNP is highly sensitive for the detection of acute congestive heart failure. In addition NT-proBNP of <300 pg/ml effectively rules out acute congestive heart failure with 99% negative predictive value.    CBC Auto Differential [[id number]]  (Abnormal) Collected:  07/06/18 1614    Specimen:  Blood Updated:  07/06/18 1640     WBC 7.98 10*3/mm3      RBC 4.13 10*6/mm3      Hemoglobin 13.5 g/dL      Hematocrit 43.8 %      .1 (H) fL      MCH 32.7 (H) pg      MCHC 30.8 (L) g/dL      RDW 14.6 (H) %      RDW-SD 56.9 (H) fl      MPV 10.4 fL      Platelets 240 10*3/mm3      Neutrophil % 65.5 %      Lymphocyte % 23.8 %      Monocyte % 7.9 %      Eosinophil % 2.4 %      Basophil % 0.4 %      Immature Grans % 0.0 %      Neutrophils, Absolute 5.23 10*3/mm3      Lymphocytes, Absolute 1.90 10*3/mm3      Monocytes, Absolute 0.63 10*3/mm3      Eosinophils, Absolute 0.19 10*3/mm3      Basophils, Absolute 0.03 10*3/mm3      Immature Grans, Absolute 0.00 10*3/mm3     Troponin  [[id number]]  (Normal) Collected:  07/06/18 1614    Specimen:  Blood Updated:  07/06/18 1725     Troponin T <0.010 ng/mL     Narrative:       Troponin T Reference Ranges:  Less than 0.03 ng/mL:    Negative for AMI  0.03 to 0.09 ng/mL:      Indeterminant for AMI  Greater than 0.09 ng/mL: Positive for AMI    Urinalysis With Microscopic If Indicated (No Culture) - Urine, Clean Catch [[id number]]  (Abnormal) Collected:  07/06/18 1734    Specimen:  Urine from Urine, Clean Catch Updated:  07/06/18 1750     Color, UA Yellow     Appearance, UA Clear     pH, UA 8.5 (H)     Specific Gravity, UA 1.011     Glucose, UA Negative     Ketones, UA Negative     Bilirubin, UA Negative     Blood, UA Negative     Protein, UA Negative     Leuk Esterase, UA Moderate (2+) (A)     Nitrite, UA Negative     Urobilinogen, UA 0.2 E.U./dL    Urinalysis, Microscopic Only - Urine, Clean Catch [[id number]]  (Abnormal) Collected:  07/06/18 1734    Specimen:  Urine from Urine, Clean Catch Updated:  07/06/18 1750     RBC, UA 0-2 /HPF      WBC, UA 21-30 (A) /HPF      Bacteria, UA 3+ (A) /HPF      Squamous Epithelial Cells, UA 0-2 /HPF      Hyaline Casts, UA None Seen /LPF      Methodology Automated Microscopy          I ordered the above labs and reviewed the results    RADIOLOGY  CT Angiogram Chest With Contrast   Final Result        No central pulmonary embolism. Artifact limits assessment of branch   pulmonary arteries, especially at the lower levels, possibility of small   branch pulmonary emboli cannot be excluded on the basis of this exam;   with persistent indication, repeat exam or VQ scan could be considered.       Discussed by telephone with Dr. Chirinos at time of interpretation, 1946,   7/6/2018.           This report was finalized on 7/6/2018 7:50 PM by Dr. Víctor Cheng M.D.          XR Chest 2 View   Final Result   No focal pulmonary consolidation. Follow-up as clinical   indications persist.       This report was finalized on 7/6/2018  4:52 PM by Dr. Víctor Cheng M.D.       Doppler  Negative        I ordered the above noted radiological studies. Interpreted by radiologist. Discussed with radiologist (Dr Cheng). Reviewed by me in PACS.       PROCEDURES  Procedures  EKG    EKG time: 1718  Rhythm/Rate: normal sinus rhythm rate of 76  No Acute Ischemia  Non-Specific ST-T changes    unchanged compared to prior on 2015    Interpreted Contemporaneously by me.  Independently viewed by me      PROGRESS AND CONSULTS  ED Course as of Jul 06 1955 Fri Jul 06, 2018   1505 Pt is a 62 yof with a cc of left leg swelling x 3 days . Reports no injury or fall. Sent here by PCP whom she saw today and referred her here for rule out of DVT. She is also oxygen dependent , 2.5 L and feeling increased SOA. No hx of CHF or leg swelling. No hx of blood clot. No recent prolong travel , immobilization or hospitalization, leg pain, surgery. No use of HRT. +Smoker.   EXAM: awake, alert. No distress. L distal extremity and foot 1+ edema. No tenderness of the foot or calf. DP pulse 2+ cap refill brisk.   [JS]   1555 Mariela, vascular technician, called with report to Doppler LLE. Negative for DVT.  [JS]      ED Course User Index  [JS] Pascale Hoyos, APRN   1704  Ordered EKG and troponin for further evaluation    1710  Ordered urinalysis for further evaluation.     1735  Pt attempted to walk to the bathroom and her room air stats dropped to 71%. Now the pt stats have gone up to 92% on 2 liters.     1736  Ordered solumedrol and duo-neb for the pt SOA.    1749   Ordered Chest CTA for further evaluation.    1800  Rechecked patient who is resting comfortably. Pt reports her oxygen dropping has happened before after she broke three ribs last months. Pt also reports she's been outside a lot. Pt denies any dysuria but admits to going a ittle more than usual. Discussed plan to do a chest CT for further evaluation. Pt understands and agrees with the plan. All questions have  been answered.    1804  Ordered Rocephin for pt UTI.    1949  Rechecked patient who is resting comfortably. Discussed all lab and test results. Discussed plan to put pt on steroids, diuretic, and antibiotics for UTI and respiratory trouble. Pt should also start using oxygen at home. Pt understands and agrees with the plan. All questions have been answered.        MEDICAL DECISION MAKING  Results were reviewed/discussed with the patient and they were also made aware of online access. Pt also made aware that some labs, such as cultures, will not be resulted during ER visit and follow up with PMD is necessary.     MDM  Number of Diagnoses or Management Options  Acute UTI:   Dyspnea, unspecified type:   Pain and swelling of left lower leg:      Amount and/or Complexity of Data Reviewed  Clinical lab tests: ordered and reviewed (proBNP 141.2, UA: WBC 21-30, Bacteria 3+)  Tests in the radiology section of CPT®: ordered and reviewed (Chest XR - NAD  CTA - No PE. No pneumonia  Doppler - negative)  Tests in the medicine section of CPT®: ordered and reviewed (Refer to the procedure section of the note for EKG results)  Discussion of test results with the performing providers: yes (Dr. Cheng)  Decide to obtain previous medical records or to obtain history from someone other than the patient: yes (Reviewed from EPIC)  Review and summarize past medical records: yes (Seen by Dr. Contreras, PCP, today for one week swelling of her left leg and SOA. Pt has a history of COPD so her PCP sent her to the ER. )           DIAGNOSIS  Final diagnoses:   Dyspnea, unspecified type   Acute UTI   Pain and swelling of left lower leg   COPD exacerbation (CMS/[facility])       DISPOSITION  DISCHARGE    Patient discharged in stable condition.    Reviewed implications of results, diagnosis, meds, responsibility to follow up, warning signs and symptoms of possible worsening, potential complications and reasons to return to ER.    Patient/Family voiced  understanding of above instructions.    Discussed plan for discharge, as there is no emergent indication for admission. Patient referred to primary care provider for BP management due to today's BP. Pt/family is agreeable and understands need for follow up and repeat testing.  Pt is aware that discharge does not mean that nothing is wrong but it indicates no emergency is present that requires admission and they must continue care with follow-up as given below or physician of their choice.     FOLLOW-UP  Víctor Contreras MD  [address] [address]  [phone]    In 1 week  For recheck         Medication List      New Prescriptions    cephalexin 500 MG capsule  Commonly known as:  KEFLEX  Take 1 capsule by mouth 2 (Two) Times a Day.     furosemide 20 MG tablet  Commonly known as:  LASIX  Take 1 tablet by mouth Daily.     MethylPREDNISolone 4 MG tablet  Commonly known as:  MEDROL (PASHA)  Take as directed on package instructions.              Latest Documented Vital Signs:  As of 7:55 PM  BP- 159/82 HR- 76 Temp- 97.5 °F (36.4 °C) (Tympanic) O2 sat- 94%    --  Documentation assistance provided by eunice Merlos for Dr. Chirinos.  Information recorded by the scribe was done at my direction and has been verified and validated by me.        Francia Merlos  07/1955       Solomon Chirinos MD  07/06/18 1957

## 2018-07-06 NOTE — ED NOTES
"Pt also states that she is not urinating as frequently as normal \"maybe two times a day\"     Tita Gillespie, JAY  07/06/18 1699    "

## 2018-07-17 ENCOUNTER — OFFICE VISIT (OUTPATIENT)
Dept: INTERNAL MEDICINE | Age: 63
End: 2018-07-17

## 2018-07-17 VITALS
BODY MASS INDEX: 43.61 KG/M2 | DIASTOLIC BLOOD PRESSURE: 80 MMHG | RESPIRATION RATE: 13 BRPM | WEIGHT: 237 LBS | HEART RATE: 97 BPM | OXYGEN SATURATION: 85 % | SYSTOLIC BLOOD PRESSURE: 120 MMHG | TEMPERATURE: 97.4 F | HEIGHT: 62 IN

## 2018-07-17 DIAGNOSIS — M79.89 LEG SWELLING: ICD-10-CM

## 2018-07-17 DIAGNOSIS — M79.10 MYALGIA: Primary | ICD-10-CM

## 2018-07-17 PROCEDURE — 99214 OFFICE O/P EST MOD 30 MIN: CPT | Performed by: INTERNAL MEDICINE

## 2018-07-17 RX ORDER — DULOXETIN HYDROCHLORIDE 60 MG/1
60 CAPSULE, DELAYED RELEASE ORAL DAILY
Qty: 30 CAPSULE | Refills: 5 | Status: SHIPPED | OUTPATIENT
Start: 2018-07-17 | End: 2019-01-07 | Stop reason: SDUPTHER

## 2018-07-17 NOTE — PROGRESS NOTES
Brenda Michelle is a 62 y.o. female who presents with   Chief Complaint   Patient presents with   • Leg swelling-left-follow-up   • Myalgias   .    62-year-old female presents for follow-up on her left leg swelling.  She was seen a few days ago and subsequently sent to the emergency room where a workup for pulmonary embolus via a CT angiogram was negative and also a Doppler study was negative for DVT.  She returns today for follow-up feeling that her from that standpoint but now is having muscular aches and pains which have gone beyond the efficacy of Lyrica she says.         The following portions of the patient's history were reviewed and updated as appropriate: allergies, current medications, past medical history and problem list.    Review of Systems   Constitutional: Negative.    HENT: Negative.    Eyes: Negative.    Respiratory: Negative.    Cardiovascular: Negative.    Genitourinary: Negative.    Musculoskeletal: Positive for arthralgias and myalgias.   Skin: Negative.    Neurological: Negative.    Psychiatric/Behavioral: Negative.        Objective   Physical Exam   Constitutional: She is oriented to person, place, and time. She appears well-developed and well-nourished. No distress.   HENT:   Head: Normocephalic and atraumatic.   Eyes: Conjunctivae and EOM are normal. Pupils are equal, round, and reactive to light.   Neck: Normal range of motion. Neck supple. No thyromegaly present.   Neck exam negative.  Carotid auscultation normal-no bruits heard.   Cardiovascular: Normal rate, regular rhythm, normal heart sounds and intact distal pulses.  Exam reveals no gallop and no friction rub.    No murmur heard.  Pulmonary/Chest: Effort normal and breath sounds normal. No respiratory distress. She has no wheezes. She has no rales. She exhibits no tenderness.   Musculoskeletal: Normal range of motion. She exhibits no edema, tenderness or deformity.   She seems to have some weakness of her quadriceps muscles in both  lower extremities.   Neurological: She is alert and oriented to person, place, and time.   Psychiatric: She has a normal mood and affect. Her behavior is normal. Judgment and thought content normal.   Nursing note and vitals reviewed.      Assessment/Plan   Brenda was seen today for leg swelling-left-follow-up and myalgias.    Diagnoses and all orders for this visit:    Myalgia  -     DULoxetine (CYMBALTA) 60 MG capsule; Take 1 capsule by mouth Daily.  -     PAZ  -     CK  -     Sedimentation Rate    Leg swelling      Plan: We'll get labs as above to consider the possibility of intrinsic muscular pain.  We'll also discontinue fenofibrate for now on the chance that it may be causing some of her myalgias.  We will discontinue her Zoloft in favor of Cymbalta 60 mg daily for the depression/pain effects from the Cymbalta.  For now she will continue her Lyrica as prescribed.    May need referral to -at [facility] for rheumatology evaluation/evaluation for fibromyalgia, polymyalgia rheumatica and possibly polymyositis

## 2018-07-18 LAB
ANA SER QL: NEGATIVE
CK SERPL-CCNC: 30 U/L (ref 24–173)
ERYTHROCYTE [SEDIMENTATION RATE] IN BLOOD BY WESTERGREN METHOD: 18 MM/HR (ref 0–40)

## 2018-07-23 DIAGNOSIS — E78.2 MIXED HYPERLIPIDEMIA: ICD-10-CM

## 2018-07-23 RX ORDER — FENOFIBRATE 145 MG/1
145 TABLET, COATED ORAL DAILY
Qty: 90 TABLET | Refills: 0 | Status: SHIPPED | OUTPATIENT
Start: 2018-07-23 | End: 2018-10-21 | Stop reason: SDUPTHER

## 2018-08-05 DIAGNOSIS — M79.7 FIBROMYALGIA: ICD-10-CM

## 2018-08-05 DIAGNOSIS — G62.9 PERIPHERAL POLYNEUROPATHY: ICD-10-CM

## 2018-08-07 NOTE — TELEPHONE ENCOUNTER
S/W PT TO INFORM THEM THEIR PRESCRIPTION IS AVAILABLE TO BE PICKED UP AT . PT VERBALIZED AN UNDERSTANDING.

## 2018-08-07 NOTE — TELEPHONE ENCOUNTER
LOV 07.06.18  NOV 12.12.18  CONTRACT 05.31.18  MANJU 02.26.18  UDS 06.05.18  LAST RF 07.02.18 #60 NO RF

## 2018-09-05 DIAGNOSIS — M79.7 FIBROMYALGIA: ICD-10-CM

## 2018-09-05 DIAGNOSIS — G62.9 PERIPHERAL POLYNEUROPATHY: ICD-10-CM

## 2018-09-06 NOTE — TELEPHONE ENCOUNTER
LOV 07.17.18  NOV 12.12.18  CONTRACT ON FILE   MANJU ON FILE   UDS 06.05.18  LAST RF 08.07.18 #60 NO RF

## 2018-09-10 RX ORDER — MELOXICAM 15 MG/1
TABLET ORAL
Qty: 90 TABLET | Refills: 1 | Status: SHIPPED | OUTPATIENT
Start: 2018-09-10 | End: 2019-06-07

## 2018-10-21 DIAGNOSIS — E78.2 MIXED HYPERLIPIDEMIA: ICD-10-CM

## 2018-10-21 RX ORDER — FENOFIBRATE 145 MG/1
TABLET, COATED ORAL
Qty: 90 TABLET | Refills: 0 | Status: SHIPPED | OUTPATIENT
Start: 2018-10-21 | End: 2019-01-19 | Stop reason: SDUPTHER

## 2018-11-06 ENCOUNTER — OFFICE VISIT (OUTPATIENT)
Dept: INTERNAL MEDICINE | Age: 63
End: 2018-11-06

## 2018-11-06 VITALS
OXYGEN SATURATION: 92 % | HEART RATE: 90 BPM | HEIGHT: 62 IN | WEIGHT: 227 LBS | DIASTOLIC BLOOD PRESSURE: 70 MMHG | TEMPERATURE: 97.6 F | RESPIRATION RATE: 12 BRPM | BODY MASS INDEX: 41.77 KG/M2 | SYSTOLIC BLOOD PRESSURE: 120 MMHG

## 2018-11-06 DIAGNOSIS — J43.9 PULMONARY EMPHYSEMA, UNSPECIFIED EMPHYSEMA TYPE (HCC): ICD-10-CM

## 2018-11-06 DIAGNOSIS — N30.90 CYSTITIS: ICD-10-CM

## 2018-11-06 DIAGNOSIS — E55.9 VITAMIN D DEFICIENCY: Primary | ICD-10-CM

## 2018-11-06 DIAGNOSIS — Z23 NEED FOR INFLUENZA VACCINATION: ICD-10-CM

## 2018-11-06 PROCEDURE — 90674 CCIIV4 VAC NO PRSV 0.5 ML IM: CPT | Performed by: INTERNAL MEDICINE

## 2018-11-06 PROCEDURE — 90471 IMMUNIZATION ADMIN: CPT | Performed by: INTERNAL MEDICINE

## 2018-11-06 PROCEDURE — 99214 OFFICE O/P EST MOD 30 MIN: CPT | Performed by: INTERNAL MEDICINE

## 2018-11-06 RX ORDER — SULFAMETHOXAZOLE AND TRIMETHOPRIM 800; 160 MG/1; MG/1
1 TABLET ORAL 2 TIMES DAILY
Qty: 14 TABLET | Refills: 0 | Status: SHIPPED | OUTPATIENT
Start: 2018-11-06 | End: 2019-06-07

## 2018-11-06 RX ORDER — FLUCONAZOLE 100 MG/1
100 TABLET ORAL DAILY
Qty: 3 TABLET | Refills: 0 | Status: SHIPPED | OUTPATIENT
Start: 2018-11-06 | End: 2019-06-07

## 2018-11-06 NOTE — PROGRESS NOTES
"  Brenda Michelle is a 63 y.o. female who presents with   Chief Complaint   Patient presents with   • Certification for continued oxygen therapy     Request from [facility].   .    63-year-old female presents with a letter from [facility] requesting written certification for continued usage of oxygen.  The patient states that she uses oxygen 24/7.  She uses continuous flow at 2 L/m.  She states that she also uses it during sleep as well as during the day she has a history of COPD/emphysema necessitating oxygen usage.  Also during the visit she complained that she thought she \"might have a urinary tract infection\" she has had dysuria off and on for 4 months she says she denies any discharge but has seen blood on toilet tissue.  She also is requesting a flu shot on today's visit.         The following portions of the patient's history were reviewed and updated as appropriate: allergies, current medications, past medical history and problem list.    Review of Systems   Constitutional: Negative.    HENT: Negative.    Eyes: Negative.    Respiratory: Negative.    Cardiovascular: Negative.    Genitourinary: Negative.    Musculoskeletal: Negative.    Skin: Negative.    Neurological: Negative.    Psychiatric/Behavioral: Negative.        Objective   Physical Exam   Constitutional: She is oriented to person, place, and time. She appears well-developed and well-nourished. No distress.   HENT:   Head: Normocephalic and atraumatic.   Eyes: Pupils are equal, round, and reactive to light. Conjunctivae and EOM are normal.   Neck: Normal range of motion. Neck supple. No thyromegaly present.   Neck exam negative.  Carotid auscultation normal-no bruits heard.   Cardiovascular: Normal rate, regular rhythm, normal heart sounds and intact distal pulses.  Exam reveals no gallop and no friction rub.    No murmur heard.  Pulmonary/Chest: Effort normal and breath sounds normal. No respiratory distress. She has no " "wheezes. She has no rales. She exhibits no tenderness.   Neurological: She is alert and oriented to person, place, and time.   Psychiatric: She has a normal mood and affect. Her behavior is normal. Judgment and thought content normal.   Nursing note and vitals reviewed.      Assessment/Plan   Brenda was seen today for certification for continued oxygen therapy.    Diagnoses and all orders for this visit:    Vitamin D deficiency  -     Vitamin D 25 Hydroxy    Cystitis  -     sulfamethoxazole-trimethoprim (BACTRIM DS,SEPTRA DS) 800-160 MG per tablet; Take 1 tablet by mouth 2 (Two) Times a Day.  -     Urinalysis With Culture If Indicated - Urine, Clean Catch    Pulmonary emphysema, unspecified emphysema type (CMS/HCC)    Other orders  -     fluconazole (DIFLUCAN) 100 MG tablet; Take 1 tablet by mouth Daily.      Plan: Labs/treatment as above pending today's urinalysis and culture if needed.    A vitamin D level will be done on today's visit since she has not been taking any vitamin D for the last 6 months since her rib fractures then she says.    Her six-month checkup/lab update visit will be due sometime in December or January.    A flu shot will be given on today's visit.  She states she had a Pneumovax \"several years ago\".       "

## 2018-11-09 LAB
25(OH)D3+25(OH)D2 SERPL-MCNC: 61.3 NG/ML (ref 30–100)
APPEARANCE UR: CLEAR
BACTERIA #/AREA URNS HPF: ABNORMAL /HPF
BACTERIA UR CULT: ABNORMAL
BACTERIA UR CULT: ABNORMAL
BILIRUB UR QL STRIP: NEGATIVE
CASTS URNS MICRO: ABNORMAL
CASTS URNS QL MICRO: PRESENT /LPF
COLOR UR: YELLOW
EPI CELLS #/AREA URNS HPF: ABNORMAL /HPF
GLUCOSE UR QL: NEGATIVE
HGB UR QL STRIP: NEGATIVE
KETONES UR QL STRIP: NEGATIVE
LEUKOCYTE ESTERASE UR QL STRIP: ABNORMAL
MICRO URNS: ABNORMAL
MUCOUS THREADS URNS QL MICRO: PRESENT /HPF
NITRITE UR QL STRIP: POSITIVE
OTHER ANTIBIOTIC SUSC ISLT: ABNORMAL
PH UR STRIP: 8 [PH] (ref 5–7.5)
PROT UR QL STRIP: ABNORMAL
RBC #/AREA URNS HPF: ABNORMAL /HPF
SP GR UR: 1.01 (ref 1–1.03)
URINALYSIS REFLEX: ABNORMAL
UROBILINOGEN UR STRIP-MCNC: 0.2 MG/DL (ref 0.2–1)
WBC #/AREA URNS HPF: >30 /HPF

## 2018-11-26 DIAGNOSIS — I77.9 PERIPHERAL ARTERIAL OCCLUSIVE DISEASE (HCC): ICD-10-CM

## 2018-11-26 RX ORDER — CLOPIDOGREL BISULFATE 75 MG/1
TABLET ORAL
Qty: 90 TABLET | Refills: 1 | Status: SHIPPED | OUTPATIENT
Start: 2018-11-26 | End: 2019-08-27 | Stop reason: SDUPTHER

## 2018-12-10 ENCOUNTER — TELEPHONE (OUTPATIENT)
Dept: INTERNAL MEDICINE | Age: 63
End: 2018-12-10

## 2018-12-10 DIAGNOSIS — Z12.31 SCREENING MAMMOGRAM, ENCOUNTER FOR: Primary | ICD-10-CM

## 2018-12-10 NOTE — TELEPHONE ENCOUNTER
Radha dubon/[facility] is scheduled for Tuesday, 12/11/18 for a routine MMG screening.    [facility] fax order #[phone].

## 2019-01-02 DIAGNOSIS — G62.9 PERIPHERAL POLYNEUROPATHY: ICD-10-CM

## 2019-01-02 DIAGNOSIS — M79.7 FIBROMYALGIA: ICD-10-CM

## 2019-01-02 NOTE — TELEPHONE ENCOUNTER
LOV 11.06.18  NOV 05.08.19  CONTRACT 04.06.17 *WILL UPDATE WHEN PICKING UP NEW RX   MANJU 01.02.19  LAST RF 09.06.18 #60 NO RF

## 2019-01-07 DIAGNOSIS — M79.10 MYALGIA: ICD-10-CM

## 2019-01-08 RX ORDER — DULOXETIN HYDROCHLORIDE 60 MG/1
CAPSULE, DELAYED RELEASE ORAL
Qty: 30 CAPSULE | Refills: 4 | Status: SHIPPED | OUTPATIENT
Start: 2019-01-08 | End: 2019-06-07 | Stop reason: SDUPTHER

## 2019-01-19 DIAGNOSIS — E78.2 MIXED HYPERLIPIDEMIA: ICD-10-CM

## 2019-01-21 RX ORDER — FENOFIBRATE 145 MG/1
TABLET, COATED ORAL
Qty: 90 TABLET | Refills: 0 | Status: SHIPPED | OUTPATIENT
Start: 2019-01-21 | End: 2019-04-19 | Stop reason: SDUPTHER

## 2019-02-02 DIAGNOSIS — G62.9 PERIPHERAL POLYNEUROPATHY: ICD-10-CM

## 2019-02-02 DIAGNOSIS — M79.7 FIBROMYALGIA: ICD-10-CM

## 2019-02-13 ENCOUNTER — LAB REQUISITION (OUTPATIENT)
Dept: LAB | Facility: HOSPITAL | Age: 64
End: 2019-02-13

## 2019-02-13 DIAGNOSIS — N39.0 URINARY TRACT INFECTION: ICD-10-CM

## 2019-02-13 PROCEDURE — 87077 CULTURE AEROBIC IDENTIFY: CPT | Performed by: UROLOGY

## 2019-02-13 PROCEDURE — 87086 URINE CULTURE/COLONY COUNT: CPT | Performed by: UROLOGY

## 2019-02-13 PROCEDURE — 87186 SC STD MICRODIL/AGAR DIL: CPT | Performed by: UROLOGY

## 2019-02-15 LAB — BACTERIA SPEC AEROBE CULT: ABNORMAL

## 2019-04-16 ENCOUNTER — TELEPHONE (OUTPATIENT)
Dept: INTERNAL MEDICINE | Age: 64
End: 2019-04-16

## 2019-04-16 DIAGNOSIS — G62.9 PERIPHERAL POLYNEUROPATHY: ICD-10-CM

## 2019-04-16 DIAGNOSIS — M79.7 FIBROMYALGIA: Primary | ICD-10-CM

## 2019-04-16 NOTE — TELEPHONE ENCOUNTER
History of fibromyalgia, peripheral neuropathy and chronic low back pain.  I have no preference for pain management referrals.  Set her up with whoever can see her expeditiously.

## 2019-04-16 NOTE — TELEPHONE ENCOUNTER
Pt called and needs a referral to pain mangement but wants to know who Dr. Ben claudio.    Pls advise    Pt # [phone]

## 2019-04-19 DIAGNOSIS — E78.2 MIXED HYPERLIPIDEMIA: ICD-10-CM

## 2019-04-19 RX ORDER — FENOFIBRATE 145 MG/1
TABLET, COATED ORAL
Qty: 90 TABLET | Refills: 0 | Status: SHIPPED | OUTPATIENT
Start: 2019-04-19 | End: 2019-07-18 | Stop reason: SDUPTHER

## 2019-05-25 DIAGNOSIS — I77.9 PERIPHERAL ARTERIAL OCCLUSIVE DISEASE (HCC): ICD-10-CM

## 2019-05-28 RX ORDER — CLOPIDOGREL BISULFATE 75 MG/1
TABLET ORAL
Qty: 90 TABLET | Refills: 1 | OUTPATIENT
Start: 2019-05-28

## 2019-06-04 DIAGNOSIS — M79.7 FIBROMYALGIA: ICD-10-CM

## 2019-06-04 DIAGNOSIS — G62.9 PERIPHERAL POLYNEUROPATHY: ICD-10-CM

## 2019-06-07 ENCOUNTER — OFFICE VISIT (OUTPATIENT)
Dept: INTERNAL MEDICINE | Age: 64
End: 2019-06-07

## 2019-06-07 VITALS
TEMPERATURE: 97.7 F | HEIGHT: 62 IN | HEART RATE: 78 BPM | RESPIRATION RATE: 13 BRPM | DIASTOLIC BLOOD PRESSURE: 80 MMHG | OXYGEN SATURATION: 98 % | WEIGHT: 232 LBS | BODY MASS INDEX: 42.69 KG/M2 | SYSTOLIC BLOOD PRESSURE: 120 MMHG

## 2019-06-07 DIAGNOSIS — E78.2 MIXED HYPERLIPIDEMIA: Primary | ICD-10-CM

## 2019-06-07 DIAGNOSIS — G62.9 PERIPHERAL POLYNEUROPATHY: ICD-10-CM

## 2019-06-07 DIAGNOSIS — M79.10 MYALGIA: ICD-10-CM

## 2019-06-07 PROCEDURE — 99214 OFFICE O/P EST MOD 30 MIN: CPT | Performed by: INTERNAL MEDICINE

## 2019-06-07 RX ORDER — PYRAZINAMIDE 500 MG/1
1 TABLET ORAL EVERY 4 HOURS PRN
Status: ON HOLD | COMMUNITY
End: 2019-11-15

## 2019-06-07 RX ORDER — DULOXETIN HYDROCHLORIDE 60 MG/1
60 CAPSULE, DELAYED RELEASE ORAL DAILY
Qty: 90 CAPSULE | Refills: 1 | Status: SHIPPED | OUTPATIENT
Start: 2019-06-07 | End: 2019-06-08 | Stop reason: SDUPTHER

## 2019-06-07 NOTE — PROGRESS NOTES
Brenda Michelle is a 63 y.o. female who presents with   Chief Complaint   Patient presents with   • Hyperlipidemia   • Peripheral Neuropathy   .    63-year-old female presents for her 6-month checkup/lab update visit.  She also is currently seeing Dr. Osiel Ogden-urology for UTIs and currently is taking Levaquin for that.  She sees orthopedics for bilateral knee pain and has been told she needs a left total knee replacement.  She sees pain management for back pain and is getting epidurals for that and also sees Dr. De Jesus for circulatory problems in the legs and is due to see him in August.  She says that she is having peripheral neuropathic symptoms in her legs and 1 of her physicians switched her from Lyrica to gabapentin and it did absolutely nothing for her she says and she quit taking it and went back to her Lyrica 100 mg twice daily.      Hyperlipidemia   This is a chronic problem. The current episode started more than 1 year ago. The problem is controlled. Current antihyperlipidemic treatment includes fibric acid derivatives. Compliance problems include adherence to diet and adherence to exercise.    Peripheral Neuropathy   This is a chronic problem. The current episode started more than 1 year ago. The problem has been gradually worsening. Treatments tried: Gabapentin-ineffective; Lyrica 100 mg twice daily mildly effective.        The following portions of the patient's history were reviewed and updated as appropriate: allergies, current medications, past medical history and problem list.    Review of Systems   Constitutional: Negative.    HENT: Negative.    Eyes: Negative.    Respiratory: Negative.    Cardiovascular: Negative.    Genitourinary: Negative.    Musculoskeletal: Negative.    Skin: Negative.    Neurological: Negative.         Symptomatic pain of peripheral neuropathy in both lower extremities   Psychiatric/Behavioral: Negative.        Objective   Physical Exam   Constitutional: She is  oriented to person, place, and time. She appears well-developed and well-nourished. No distress.   HENT:   Head: Normocephalic and atraumatic.   Eyes: Conjunctivae and EOM are normal. Pupils are equal, round, and reactive to light.   Neck: Normal range of motion. Neck supple. No thyromegaly present.   Neck exam negative.  Carotid auscultation normal-no bruits heard.   Cardiovascular: Normal rate, regular rhythm, normal heart sounds and intact distal pulses. Exam reveals no gallop and no friction rub.   No murmur heard.  Pulmonary/Chest: Effort normal and breath sounds normal. No respiratory distress. She has no wheezes. She has no rales. She exhibits no tenderness.   Musculoskeletal:   Distal pulsations in the feet and ankles appear palpably normal   Neurological: She is alert and oriented to person, place, and time.   Psychiatric: She has a normal mood and affect. Her behavior is normal. Judgment and thought content normal.   Nursing note and vitals reviewed.      Assessment/Plan   Brenda was seen today for hyperlipidemia and peripheral neuropathy.    Diagnoses and all orders for this visit:    Mixed hyperlipidemia  -     Comprehensive Metabolic Panel  -     Lipid Panel  -     TSH+Free T4  -     Hemoglobin A1c    Peripheral polyneuropathy  -     Comprehensive Metabolic Panel  -     TSH+Free T4  -     Hemoglobin A1c  -     Ambulatory Referral to Neurology        Plan: Labs as above for the issues as outlined.  Refer to neurology for further evaluation of peripheral neuropathic-like pain.    Tentatively we will plan on a 6-month checkup/lab update visit.  Return for follow-up at any time any problems develop

## 2019-06-08 DIAGNOSIS — M79.10 MYALGIA: ICD-10-CM

## 2019-06-08 LAB
ALBUMIN SERPL-MCNC: 3.9 G/DL (ref 3.5–5.2)
ALBUMIN/GLOB SERPL: 1.5 G/DL
ALP SERPL-CCNC: 107 U/L (ref 39–117)
ALT SERPL-CCNC: 6 U/L (ref 1–33)
AST SERPL-CCNC: 21 U/L (ref 1–32)
BILIRUB SERPL-MCNC: 0.4 MG/DL (ref 0.2–1.2)
BUN SERPL-MCNC: 9 MG/DL (ref 8–23)
BUN/CREAT SERPL: 13.4 (ref 7–25)
CALCIUM SERPL-MCNC: 10.6 MG/DL (ref 8.6–10.5)
CHLORIDE SERPL-SCNC: 97 MMOL/L (ref 98–107)
CHOLEST SERPL-MCNC: 156 MG/DL (ref 0–200)
CO2 SERPL-SCNC: 32.5 MMOL/L (ref 22–29)
CREAT SERPL-MCNC: 0.67 MG/DL (ref 0.57–1)
GLOBULIN SER CALC-MCNC: 2.6 GM/DL
GLUCOSE SERPL-MCNC: 100 MG/DL (ref 65–99)
HBA1C MFR BLD: 5.3 % (ref 4.8–5.6)
HDLC SERPL-MCNC: 50 MG/DL (ref 40–60)
LDLC SERPL CALC-MCNC: 85 MG/DL (ref 0–100)
POTASSIUM SERPL-SCNC: 3.9 MMOL/L (ref 3.5–5.2)
PROT SERPL-MCNC: 6.5 G/DL (ref 6–8.5)
SODIUM SERPL-SCNC: 141 MMOL/L (ref 136–145)
T4 FREE SERPL-MCNC: 1.28 NG/DL (ref 0.93–1.7)
TRIGL SERPL-MCNC: 104 MG/DL (ref 0–150)
TSH SERPL DL<=0.005 MIU/L-ACNC: 2.14 MIU/ML (ref 0.27–4.2)
VLDLC SERPL CALC-MCNC: 20.8 MG/DL

## 2019-06-10 RX ORDER — DULOXETIN HYDROCHLORIDE 60 MG/1
CAPSULE, DELAYED RELEASE ORAL
Qty: 30 CAPSULE | Refills: 3 | Status: SHIPPED | OUTPATIENT
Start: 2019-06-10 | End: 2021-05-26 | Stop reason: SDUPTHER

## 2019-06-20 ENCOUNTER — LAB REQUISITION (OUTPATIENT)
Dept: LAB | Facility: HOSPITAL | Age: 64
End: 2019-06-20

## 2019-06-20 DIAGNOSIS — Z00.00 ENCOUNTER FOR GENERAL ADULT MEDICAL EXAMINATION WITHOUT ABNORMAL FINDINGS: ICD-10-CM

## 2019-06-20 PROCEDURE — 87077 CULTURE AEROBIC IDENTIFY: CPT | Performed by: UROLOGY

## 2019-06-20 PROCEDURE — 87186 SC STD MICRODIL/AGAR DIL: CPT | Performed by: UROLOGY

## 2019-06-20 PROCEDURE — 87086 URINE CULTURE/COLONY COUNT: CPT | Performed by: UROLOGY

## 2019-06-22 LAB — BACTERIA SPEC AEROBE CULT: ABNORMAL

## 2019-07-18 DIAGNOSIS — E78.2 MIXED HYPERLIPIDEMIA: ICD-10-CM

## 2019-07-18 RX ORDER — FENOFIBRATE 145 MG/1
TABLET, COATED ORAL
Qty: 90 TABLET | Refills: 0 | Status: SHIPPED | OUTPATIENT
Start: 2019-07-18 | End: 2019-10-16 | Stop reason: SDUPTHER

## 2019-08-19 ENCOUNTER — OFFICE VISIT (OUTPATIENT)
Dept: NEUROLOGY | Facility: CLINIC | Age: 64
End: 2019-08-19

## 2019-08-19 VITALS
SYSTOLIC BLOOD PRESSURE: 118 MMHG | HEART RATE: 86 BPM | BODY MASS INDEX: 42.43 KG/M2 | HEIGHT: 62 IN | OXYGEN SATURATION: 87 % | DIASTOLIC BLOOD PRESSURE: 70 MMHG

## 2019-08-19 DIAGNOSIS — Z12.9 SCREENING FOR MALIGNANT NEOPLASM: ICD-10-CM

## 2019-08-19 DIAGNOSIS — G60.9 IDIOPATHIC PERIPHERAL NEUROPATHY: Primary | ICD-10-CM

## 2019-08-19 DIAGNOSIS — G89.29 CHRONIC BILATERAL LOW BACK PAIN WITH BILATERAL SCIATICA: ICD-10-CM

## 2019-08-19 DIAGNOSIS — M54.41 CHRONIC BILATERAL LOW BACK PAIN WITH BILATERAL SCIATICA: ICD-10-CM

## 2019-08-19 DIAGNOSIS — M54.42 CHRONIC BILATERAL LOW BACK PAIN WITH BILATERAL SCIATICA: ICD-10-CM

## 2019-08-19 DIAGNOSIS — M54.12 CERVICAL RADICULOPATHY AT C7: ICD-10-CM

## 2019-08-19 PROCEDURE — 99205 OFFICE O/P NEW HI 60 MIN: CPT | Performed by: PSYCHIATRY & NEUROLOGY

## 2019-08-19 NOTE — PROGRESS NOTES
CC: Low back pain and bilateral leg pain with tingling burning in both legs especially with ambulation    HPI:  Brenda Michelle is a  63 y.o.  right-handed white female who was sent for neurological consultation by Dr. Contreras regarding low back pain and bilateral leg pain with tingling and burning in the legs.  This is particularly exacerbated by walking even short distances.  The patient states that so bad she cannot really walk across the room.  She has complicated history which includes bilateral iliofemoral artery disease requiring stents being followed by Dr. De Jesus.  She is a long-standing smoker and trying to quit.  She has hyperlipidemia.  She states her first symptoms were pain in the feet beginning several years ago and that is when she was put on Lyrica.  Pain improved some then got worse and her dosage was increased.  It has worsened again.    Most recently 8/9/2019 she had a bilateral lower extremity vascular study showing mild disease.  Arterial Doppler demonstrates moderate iliac stenosis bilaterally.  Carotid ultrasound shows 50 to 60% carotid stenosis.  The patient had an MRI of the lumbar spine at [facility] and open MRI which showed mild to moderate disease but no severe stenosis at any level per report.  She was seen by Dr. Jara for pain management.  The patient states she had 4 shots and none of them really helped more than 20 minutes.  She no longer follows with him.  She denies having had an EMG.    She is on Lyrica 100 mg twice daily plus duloxetine 60 mg daily.  She has been tried on gabapentin which did not work and she went back to the Lyrica.  She also notes that first thing in the morning when she gets up she walks like a drunk.  This does improve a bit.  She denies any hand numbness.  She has numbness in her feet fairly severe for several months.  She also has COPD and a diagnosis early on of fibromyalgia.    She also has bilateral knee pain more so on the left.  She states that she  needs surgery on the left knee but because of her other issues she cannot have an operation        Past Medical History:   Diagnosis Date   • Anxiety    • Arthritis    • benign polyps of large intestine    • COPD (chronic obstructive pulmonary disease) (CMS/HCC)    • Depression    • Difficulty walking    • Disc degeneration, lumbar    • Fibromyositis    • Hyperlipidemia    • IBS (irritable bowel syndrome)    • Kidney stones    • Movement disorder    • Nephrolithiasis    • Peripheral neuropathy    • PVD (peripheral vascular disease) (CMS/HCC)    • Shingles    • Vitamin D deficiency          Past Surgical History:   Procedure Laterality Date   • COLONOSCOPY  2004    Colon polyps; family history of colon cancer   • KNEE ARTHROPLASTY, PARTIAL REPLACEMENT     • KNEE ARTHROSCOPY Left     Meniscus   • POPLITEAL ARTERY ANGIOPLASTY Bilateral    • STEROID INJECTION     • TONSILLECTOMY     • TUBAL ABDOMINAL LIGATION             Current Outpatient Medications:   •  Aspirin (ASPIR-81 PO), Take 81 mg by mouth Daily., Disp: , Rfl:   •  clopidogrel (PLAVIX) 75 MG tablet, TAKE 1 TABLET DAILY, Disp: 90 tablet, Rfl: 1  •  DULoxetine (CYMBALTA) 60 MG capsule, TAKE ONE CAPSULE BY MOUTH DAILY, Disp: 30 capsule, Rfl: 3  •  fenofibrate (TRICOR) 145 MG tablet, TAKE 1 TABLET DAILY, Disp: 90 tablet, Rfl: 0  •  LYRICA 100 MG capsule, TAKE ONE CAPSULE BY MOUTH TWICE A DAY, Disp: 60 capsule, Rfl: 2  •  acetaminophen-codeine (TYLENOL/CODEINE #3) 300-30 MG per tablet, Take 1 tablet by mouth Every 4 (Four) Hours As Needed for Moderate Pain ., Disp: , Rfl:   •  albuterol (PROVENTIL HFA;VENTOLIN HFA) 108 (90 Base) MCG/ACT inhaler, Inhale 2 puffs Every 4 (Four) Hours As Needed for Wheezing., Disp: 1 inhaler, Rfl: 0      Family History   Problem Relation Age of Onset   • COPD Mother          at 69 yo    • Colon cancer Mother    • Heart disease Mother    • Stroke Mother    • Lung cancer Father         mesothelioma   • Heart disease Maternal  Grandmother    • Heart disease Maternal Grandfather    • Heart disease Paternal Grandfather          Social History     Socioeconomic History   • Marital status:      Spouse name: Not on file   • Number of children: 2   • Years of education: 12    • Highest education level: Not on file   Occupational History   • Occupation: retired   Tobacco Use   • Smoking status: Current Every Day Smoker     Packs/day: 0.50     Types: Cigarettes     Last attempt to quit: 2017     Years since quittin.6   • Smokeless tobacco: Never Used   Substance and Sexual Activity   • Alcohol use: No   • Drug use: No         Allergies   Allergen Reactions   • Ambien [Zolpidem Tartrate] Other (See Comments)     Nightmares   • Aripiprazole    • Atorvastatin Other (See Comments)     MYALGIAS   • Atorvastatin Calcium Other (See Comments)     MYALGIAS   • Bupropion Itching   • Cilostazol Other (See Comments)     HA   • Codeine Sulfate    • Ferrous Sulfate Nausea Only   • Welchol  [Colesevelam Hcl] Nausea Only   • Adhesive Tape Rash         Pain Scale: 9/10        ROS:  Review of Systems   Constitutional: Positive for activity change and unexpected weight change. Negative for appetite change.   HENT: Positive for drooling and sinus pressure. Negative for facial swelling, trouble swallowing and voice change.    Eyes: Negative for photophobia, pain and visual disturbance.   Respiratory: Positive for shortness of breath. Negative for chest tightness and wheezing.    Cardiovascular: Positive for leg swelling. Negative for chest pain and palpitations.   Gastrointestinal: Negative for abdominal pain, nausea and vomiting.   Endocrine: Negative for polydipsia and polyphagia.   Musculoskeletal: Positive for back pain, gait problem, joint swelling and myalgias. Negative for arthralgias, neck pain and neck stiffness.   Neurological: Positive for light-headedness. Negative for dizziness, tremors, seizures, syncope, facial asymmetry, speech  "difficulty, weakness, numbness and headaches.   Hematological: Bruises/bleeds easily.   Psychiatric/Behavioral: Negative for agitation, behavioral problems, confusion, decreased concentration, dysphoric mood, hallucinations, self-injury, sleep disturbance and suicidal ideas. The patient is not nervous/anxious and is not hyperactive.          I have reviewed and agree with the above ROS completed by the medical assistant.      Physical Exam:  Vitals:    08/19/19 1318   Height: 157.5 cm (62\")     Orthostatic BP:    Body mass index is 42.43 kg/m².    Physical Exam  General: M obese white female no acute distress  HEENT:  Normocephalic no evidence of trauma.  Discs flat.  No AV nicking.  Throat negative.  Neck: Supple.  No thyromegaly.  No cervical bruits.  Radial pulses were strong and simultaneous  Heart: Regular rate and rhythm no murmurs  Extremities: No cyanosis clubbing or edema.  Straight leg raising signs were negative bilaterally      Neurological Exam:   Mental Status: Awake, alert, oriented to person, place and time.  Conversant without evidence of an affective disorder, thought disorder, delusions or hallucinations.  Attention span and concentration are normal.  HCF: No aphasia, apraxia or dysarthria.  Recent and remote memory intact.  Knowledge of recent events intact.  CN: I:   II: Visual fields full without left inattention   III, IV, VI: Eye movements intact without nystagmus or ptosis.  Pupils equal round and reactive to light.   V,VII: Light touch and pinprick intact all 3 divisions of V.  Facial muscles symmetrical.   VIII: Hearing intact to finger rub   IX,X: Soft palate elevates symmetrically   XI: Sternomastoid and trapezius are strong.   XII: Tongue midline without atrophy or fasciculations  Motor: Normal tone and bulk in the upper and lower extremities   Power testing: There is normal power in the biceps.  Some perhaps give way in the deltoids.  More smooth weakness in the triceps, finger and " thumb extensors, interossei and abductor pollicis brevis muscles.  Pronator teres appears weak both sides also.  Flexor pollicis longus and wrist extension seems strong on both sides.  In the lower extremities psoas muscles are questionably weak with some giveaway.  Other proximal muscles appeared strong.  Tibialis anterior appears strong but toe extension seems weak.  Reflexes: Upper extremities: +3 uppers        Lower extremities: +1 knee jerk and absent ankle jerk bilaterally        Toe signs: Downgoing  Sensory: Light touch: Reduced on the thumbs; in the legs below the knees it is reduced worst in the feet        Pinprick:  Reduced on the thumbs otherwise intact in the upper extremities.  Reduced from the knees down in the legs              Vibration: Intact at the ankles        Position: States cannot tell any direction in the toes    Cerebellar: Finger-to-nose: Intact intact           Rapid movement:           Heel-to-shin: Intact  Gait and Station: Casual walk is mildly broad-based.  States her legs feel like they are tightening up.    Results:      Lab Results   Component Value Date    GLUCOSE 98 07/06/2018    BUN 9 06/07/2019    CREATININE 0.67 06/07/2019    EGFRIFNONA 89 06/07/2019    EGFRIFAFRI 108 06/07/2019    BCR 13.4 06/07/2019    CO2 32.5 (H) 06/07/2019    CALCIUM 10.6 (H) 06/07/2019    PROTENTOTREF 6.5 06/07/2019    ALBUMIN 3.90 06/07/2019    LABIL2 1.5 06/07/2019    AST 21 06/07/2019    ALT 6 06/07/2019       Lab Results   Component Value Date    WBC 7.98 07/06/2018    HGB 13.5 07/06/2018    HCT 43.8 07/06/2018    .1 (H) 07/06/2018     07/06/2018         .No results found for: RPR      Lab Results   Component Value Date    TSH 2.140 06/07/2019         No results found for: [id number]      No results found for: FOLATE      Lab Results   Component Value Date    HGBA1C 5.30 06/07/2019               Assessment:  1.  Symptom history suggests combination of claudication and peripheral  neuropathy.  It appears spinal stenosis in the lumbar spine has been excluded.  She does have vascular disease in the legs but according to the patient Dr. De Jesus does not feel it is the main problem with symptoms currently  2.  Suspect C7 radiculopathy with possible cord involvement            Plan:  1.  MRI cervical spine  2.  EMG all 4 extremities.  3.  Labs for treatable causes including a sed rate, RPR, B12 and folate, thyroid functions, SPEP and IEP, cryoglobulins, heavy metals, copper level, hemoglobin A1c  4.  She is already on Lyrica and Cymbalta at usual doses  5.  Lumbar spine x-rays with flexion and extension  6.  To follow-up for her EMG and discuss further          >50% of this 60-minute consult was spent counseling the patient on at least 4 or 5 contributors to her overall neurologic picture and how they are differentiated from one another as well as limitations on further medical management of her pain syndrome            Dictated utilizing Dragon dictation.

## 2019-08-23 ENCOUNTER — LAB (OUTPATIENT)
Dept: LAB | Facility: HOSPITAL | Age: 64
End: 2019-08-23

## 2019-08-23 DIAGNOSIS — G60.9 IDIOPATHIC PERIPHERAL NEUROPATHY: ICD-10-CM

## 2019-08-23 LAB
CK SERPL-CCNC: 21 U/L (ref 20–180)
ERYTHROCYTE [SEDIMENTATION RATE] IN BLOOD: 37 MM/HR (ref 0–30)
FOLATE SERPL-MCNC: 3.5 NG/ML (ref 4.78–24.2)
HBA1C MFR BLD: 5.2 % (ref 4.8–5.6)
T4 FREE SERPL-MCNC: 1.39 NG/DL (ref 0.93–1.7)
TSH SERPL DL<=0.05 MIU/L-ACNC: 3.33 MIU/ML (ref 0.27–4.2)
VIT B12 BLD-MCNC: 274 PG/ML (ref 211–946)

## 2019-08-23 PROCEDURE — 83655 ASSAY OF LEAD: CPT | Performed by: PSYCHIATRY & NEUROLOGY

## 2019-08-23 PROCEDURE — 82595 ASSAY OF CRYOGLOBULIN: CPT

## 2019-08-23 PROCEDURE — 84155 ASSAY OF PROTEIN SERUM: CPT | Performed by: PSYCHIATRY & NEUROLOGY

## 2019-08-23 PROCEDURE — 86334 IMMUNOFIX E-PHORESIS SERUM: CPT

## 2019-08-23 PROCEDURE — 85652 RBC SED RATE AUTOMATED: CPT | Performed by: PSYCHIATRY & NEUROLOGY

## 2019-08-23 PROCEDURE — 82784 ASSAY IGA/IGD/IGG/IGM EACH: CPT

## 2019-08-23 PROCEDURE — 82550 ASSAY OF CK (CPK): CPT

## 2019-08-23 PROCEDURE — 82746 ASSAY OF FOLIC ACID SERUM: CPT | Performed by: PSYCHIATRY & NEUROLOGY

## 2019-08-23 PROCEDURE — 83036 HEMOGLOBIN GLYCOSYLATED A1C: CPT | Performed by: PSYCHIATRY & NEUROLOGY

## 2019-08-23 PROCEDURE — 84439 ASSAY OF FREE THYROXINE: CPT | Performed by: PSYCHIATRY & NEUROLOGY

## 2019-08-23 PROCEDURE — 84443 ASSAY THYROID STIM HORMONE: CPT | Performed by: PSYCHIATRY & NEUROLOGY

## 2019-08-23 PROCEDURE — 86592 SYPHILIS TEST NON-TREP QUAL: CPT | Performed by: PSYCHIATRY & NEUROLOGY

## 2019-08-23 PROCEDURE — 82607 VITAMIN B-12: CPT | Performed by: PSYCHIATRY & NEUROLOGY

## 2019-08-23 PROCEDURE — 36415 COLL VENOUS BLD VENIPUNCTURE: CPT | Performed by: PSYCHIATRY & NEUROLOGY

## 2019-08-23 PROCEDURE — 84165 PROTEIN E-PHORESIS SERUM: CPT | Performed by: PSYCHIATRY & NEUROLOGY

## 2019-08-23 PROCEDURE — 82525 ASSAY OF COPPER: CPT

## 2019-08-23 PROCEDURE — 83825 ASSAY OF MERCURY: CPT | Performed by: PSYCHIATRY & NEUROLOGY

## 2019-08-23 PROCEDURE — 82175 ASSAY OF ARSENIC: CPT | Performed by: PSYCHIATRY & NEUROLOGY

## 2019-08-24 LAB — RPR SER QL: NORMAL

## 2019-08-25 DIAGNOSIS — G60.9 IDIOPATHIC PERIPHERAL NEUROPATHY: Primary | ICD-10-CM

## 2019-08-26 ENCOUNTER — TELEPHONE (OUTPATIENT)
Dept: NEUROLOGY | Facility: CLINIC | Age: 64
End: 2019-08-26

## 2019-08-26 ENCOUNTER — TELEPHONE (OUTPATIENT)
Dept: INTERNAL MEDICINE | Age: 64
End: 2019-08-26

## 2019-08-26 DIAGNOSIS — Z12.9 SCREENING FOR MALIGNANT NEOPLASM: Primary | ICD-10-CM

## 2019-08-26 DIAGNOSIS — I77.9 PERIPHERAL ARTERIAL OCCLUSIVE DISEASE (HCC): ICD-10-CM

## 2019-08-26 LAB
ALBUMIN SERPL-MCNC: 3.4 G/DL (ref 2.9–4.4)
ALBUMIN/GLOB SERPL: 1 {RATIO} (ref 0.7–1.7)
ALPHA1 GLOB FLD ELPH-MCNC: 0.4 G/DL (ref 0–0.4)
ALPHA2 GLOB SERPL ELPH-MCNC: 0.9 G/DL (ref 0.4–1)
B-GLOBULIN SERPL ELPH-MCNC: 1.3 G/DL (ref 0.7–1.3)
GAMMA GLOB SERPL ELPH-MCNC: 0.9 G/DL (ref 0.4–1.8)
GLOBULIN SER CALC-MCNC: 3.4 G/DL (ref 2.2–3.9)
IGA SERPL-MCNC: 302 MG/DL (ref 87–352)
IGG SERPL-MCNC: 881 MG/DL (ref 700–1600)
IGM SERPL-MCNC: 98 MG/DL (ref 26–217)
Lab: NORMAL
M-SPIKE: NORMAL G/DL
PROT PATTERN SERPL ELPH-IMP: NORMAL
PROT PATTERN SERPL IFE-IMP: NORMAL
PROT SERPL-MCNC: 6.8 G/DL (ref 6–8.5)

## 2019-08-26 NOTE — TELEPHONE ENCOUNTER
Spoke with patient and notified her of the low b12 and folate. Let her know about the additional lab orders to be completed.

## 2019-08-26 NOTE — TELEPHONE ENCOUNTER
I have no knowledge of Plavix being canceled.  Reorder it.  Also go ahead and order her mammogram per request.

## 2019-08-26 NOTE — TELEPHONE ENCOUNTER
----- Message from Juvencio Hoyos MD sent at 8/25/2019 11:32 AM EDT -----  Low folate at 3.5. B12 is low normal 274. ESR high at 37    Damesha   She needs a CBC and methylmalonic acid level (MMA) which I have ordered. Not sure if th e ESR elevation is relavent.  She needs to take folate 1mg and B12 1 mg daily after her labs get drawn\Please let herknow    gns

## 2019-08-26 NOTE — TELEPHONE ENCOUNTER
Pt called and stated she request a refill of Plavix through [facility] and it was cancelled and she wanted to know why? And she needed a order to have mammographam at [facility].    PT#[phone]

## 2019-08-27 LAB
ARSENIC BLD-MCNC: 4 UG/L (ref 2–23)
COPPER SERPL-MCNC: 122 UG/DL (ref 72–166)
LEAD BLD-MCNC: 1 UG/DL (ref 0–4)
MERCURY BLD-MCNC: NORMAL UG/L (ref 0–14.9)

## 2019-08-27 RX ORDER — CLOPIDOGREL BISULFATE 75 MG/1
75 TABLET ORAL DAILY
Qty: 90 TABLET | Refills: 1 | Status: SHIPPED | OUTPATIENT
Start: 2019-08-27 | End: 2020-03-02

## 2019-08-28 DIAGNOSIS — R93.89 ABNORMAL MRI, NECK: Primary | ICD-10-CM

## 2019-08-28 DIAGNOSIS — M54.12 CERVICAL RADICULOPATHY AT C7: ICD-10-CM

## 2019-08-29 LAB — CRYOGLOB SER QL 1D COLD INC: NORMAL

## 2019-08-30 ENCOUNTER — LAB (OUTPATIENT)
Dept: LAB | Facility: HOSPITAL | Age: 64
End: 2019-08-30

## 2019-08-30 ENCOUNTER — HOSPITAL ENCOUNTER (OUTPATIENT)
Dept: GENERAL RADIOLOGY | Facility: HOSPITAL | Age: 64
Discharge: HOME | End: 2019-08-30
Admitting: PSYCHIATRY & NEUROLOGY

## 2019-08-30 DIAGNOSIS — M54.41 CHRONIC BILATERAL LOW BACK PAIN WITH BILATERAL SCIATICA: ICD-10-CM

## 2019-08-30 DIAGNOSIS — M54.42 CHRONIC BILATERAL LOW BACK PAIN WITH BILATERAL SCIATICA: ICD-10-CM

## 2019-08-30 DIAGNOSIS — G89.29 CHRONIC BILATERAL LOW BACK PAIN WITH BILATERAL SCIATICA: ICD-10-CM

## 2019-08-30 DIAGNOSIS — G60.9 IDIOPATHIC PERIPHERAL NEUROPATHY: ICD-10-CM

## 2019-08-30 LAB
BASOPHILS # BLD AUTO: 0.04 10*3/MM3 (ref 0–0.2)
BASOPHILS NFR BLD AUTO: 0.6 % (ref 0–1.5)
DEPRECATED RDW RBC AUTO: 66.3 FL (ref 37–54)
EOSINOPHIL # BLD AUTO: 0.2 10*3/MM3 (ref 0–0.4)
EOSINOPHIL NFR BLD AUTO: 3 % (ref 0.3–6.2)
ERYTHROCYTE [DISTWIDTH] IN BLOOD BY AUTOMATED COUNT: 15.3 % (ref 12.3–15.4)
HCT VFR BLD AUTO: 41.9 % (ref 34–46.6)
HGB BLD-MCNC: 12.7 G/DL (ref 12–15.9)
LYMPHOCYTES # BLD AUTO: 1.89 10*3/MM3 (ref 0.7–3.1)
LYMPHOCYTES NFR BLD AUTO: 28.1 % (ref 19.6–45.3)
MCH RBC QN AUTO: 35.1 PG (ref 26.6–33)
MCHC RBC AUTO-ENTMCNC: 30.3 G/DL (ref 31.5–35.7)
MCV RBC AUTO: 115.7 FL (ref 79–97)
MONOCYTES # BLD AUTO: 0.51 10*3/MM3 (ref 0.1–0.9)
MONOCYTES NFR BLD AUTO: 7.6 % (ref 5–12)
NEUTROPHILS # BLD AUTO: 4.07 10*3/MM3 (ref 1.7–7)
NEUTROPHILS NFR BLD AUTO: 60.4 % (ref 42.7–76)
PLATELET # BLD AUTO: 303 10*3/MM3 (ref 140–450)
PMV BLD AUTO: 9.8 FL (ref 6–12)
RBC # BLD AUTO: 3.62 10*6/MM3 (ref 3.77–5.28)
WBC NRBC COR # BLD: 6.73 10*3/MM3 (ref 3.4–10.8)

## 2019-08-30 PROCEDURE — 72114 X-RAY EXAM L-S SPINE BENDING: CPT

## 2019-08-30 PROCEDURE — 36415 COLL VENOUS BLD VENIPUNCTURE: CPT | Performed by: PSYCHIATRY & NEUROLOGY

## 2019-08-30 PROCEDURE — 83921 ORGANIC ACID SINGLE QUANT: CPT

## 2019-08-30 PROCEDURE — 85025 COMPLETE CBC W/AUTO DIFF WBC: CPT | Performed by: PSYCHIATRY & NEUROLOGY

## 2019-08-30 RX ORDER — FOLIC ACID 1 MG/1
1 TABLET ORAL DAILY
Qty: 30 TABLET | Refills: 3 | Status: SHIPPED | OUTPATIENT
Start: 2019-08-30 | End: 2020-02-20

## 2019-09-04 DIAGNOSIS — M79.7 FIBROMYALGIA: ICD-10-CM

## 2019-09-04 DIAGNOSIS — G62.9 PERIPHERAL POLYNEUROPATHY: ICD-10-CM

## 2019-09-04 LAB
Lab: ABNORMAL
METHYLMALONATE SERPL-SCNC: 488 NMOL/L (ref 0–378)

## 2019-09-05 ENCOUNTER — TELEPHONE (OUTPATIENT)
Dept: NEUROLOGY | Facility: CLINIC | Age: 64
End: 2019-09-05

## 2019-09-05 NOTE — TELEPHONE ENCOUNTER
LOV    6/7/19  NOV   12/2/19  UDS   NOT DONE   CONTRACT  1/4/19  KSP   1/2/19  LAST REFILL  6/7/19     #60, 2RF

## 2019-09-05 NOTE — TELEPHONE ENCOUNTER
S/w patient and notified her lab result and informed her to start taking a B12 daily. She stated she had already started taking one for about a week now.

## 2019-09-18 DIAGNOSIS — R93.89 ABNORMAL MRI, NECK: ICD-10-CM

## 2019-10-16 DIAGNOSIS — E78.2 MIXED HYPERLIPIDEMIA: ICD-10-CM

## 2019-10-16 RX ORDER — FENOFIBRATE 145 MG/1
TABLET, COATED ORAL
Qty: 90 TABLET | Refills: 4 | Status: SHIPPED | OUTPATIENT
Start: 2019-10-16 | End: 2020-02-10

## 2019-10-17 ENCOUNTER — PROCEDURE VISIT (OUTPATIENT)
Dept: NEUROLOGY | Facility: CLINIC | Age: 64
End: 2019-10-17

## 2019-10-17 VITALS — WEIGHT: 232 LBS | HEIGHT: 62 IN | BODY MASS INDEX: 42.69 KG/M2

## 2019-10-17 DIAGNOSIS — I69.30 SEQUELAE, POST-STROKE: Primary | ICD-10-CM

## 2019-10-17 DIAGNOSIS — G60.9 IDIOPATHIC PERIPHERAL NEUROPATHY: ICD-10-CM

## 2019-10-17 PROCEDURE — 95886 MUSC TEST DONE W/N TEST COMP: CPT | Performed by: PSYCHIATRY & NEUROLOGY

## 2019-10-17 PROCEDURE — 95909 NRV CNDJ TST 5-6 STUDIES: CPT | Performed by: PSYCHIATRY & NEUROLOGY

## 2019-10-17 NOTE — PROGRESS NOTES
EMG and Nerve Conduction Studies    I.      Instrument used: Neuromax 1002  II.     Please see data sheets for tabular summary of NCS and details on methods, temperatures and lab standards.   III.    EMG muscles tested for upper extremity studies include the deltoid, biceps, triceps, pronator teres, extensor digitorum communis, first dorsal interosseous and abductor pollicis brevis.    IV.   EMG muscles tested for lower extremity studies include the vastus lateralis, tibialis anterior, peroneus longus, medial gastrocnemius and extensor digitorum brevis.    V.    Additional muscles tested as needed.  Paraspinal muscles tested as needed.   VI.   Please see data sheets for tabular summary of EMG findings.   VII. The complete report includes the data sheets.      Indication: Peripheral neuropathy  History: 64-year-old woman with numbness tingling feet.      Ht: 157.5 cm  Wt: 105 kg; BMI 42.4  HbA1C:   Lab Results   Component Value Date    HGBA1C 5.20 08/23/2019     TSH:   Lab Results   Component Value Date    TSH 3.330 08/23/2019       Technical summary:  Nerve conduction studies were obtained in the left leg with 1 comparison on the right.  Skin temperature was 32 °C or more measured at the ankles after warming the feet with exception of the tibial study where the temperature dropped to 31 °C.  Needle examination was obtained on selected muscles in both legs.    Results:  1.  Absent left sural sensory potential  2.  Absent left superficial peroneal sensory potential.  3.  Slow left peroneal motor velocity below the knee at 38.5 m/s with normal velocity in the short segment across the fibular head.  Normal distal latency with mildly low amplitude of 1.9 mV.  Slow right peroneal motor velocity below the knee at 35 m/s and in the short segment across the fibular head at 38.7 m/s.  Normal distal latency with normal amplitudes.  4.  Slow left tibial motor velocity at 36.7 m/s with normal distal latency and amplitudes.  5.   Examination of selected muscles in both legs showed normal insertional activity throughout.  There were normal motor units and recruitment patterns.    Impression:  Abnormal study showing evidence of mild to moderate peripheral neuropathy.  No evidence of a lumbosacral radiculopathy on either side by this study.  Study results were discussed with the patient.    Juvencio Hoyos M.D.        Addendum:  Review of labs performed for treatable causes of neuropathy included a vitamin B12 level which was still in the normal range at 274 but was below 300 therefore a methylmalonic acid level was obtained which was mildly elevated at 488 consistent with a mild degree of B12 deficiency.  Also her folate was low at 3.5.  She has been on supplemental B12 and folate since then.  Additional labs included the SPEP and IEP which showed no evidence of an MGUS.  Cryoglobulins were negative.  Hemoglobin A1c was normal at 5.2%.  Sed rate was 37 which is mildly elevated by standard criteria but given the patient's age this is not worrisome.  RPR nonreactive.  Free T4 1.39 and TSH 3.33.  Copper level was 122.  Heavy metals were negative.    Since the patient had some upper motor neuron appearing findings I obtained an MRI of the cervical spine.  The cervical spine was essentially normal but the hermes was visible showing signal change in the hermes.  Follow-up MRI of the brain was obtained without and with contrast showing a very large medium dense bright signal change in the hermes.  No expansion of the brainstem is seen.    The pontine finding was quite surprising.  The patient's history is not abrupt but slowly progressive.  This argues against acute infarction or central pontine myelinolysis.  I really saw no other suspicious lesions that could be multiple sclerosis on the MRI of the brain or cervical spine.    The origin of the pontine findings is probably small vessel.  She should have a CT angiogram of the neck and brain arteries to verify  no other large vessel abnormality is present.  We will obtain these and have her follow back.      Dictated utilizing Dragon dictation.

## 2019-11-05 ENCOUNTER — APPOINTMENT (OUTPATIENT)
Dept: CT IMAGING | Facility: HOSPITAL | Age: 64
End: 2019-11-05

## 2019-11-08 ENCOUNTER — HOSPITAL ENCOUNTER (OUTPATIENT)
Dept: CT IMAGING | Facility: HOSPITAL | Age: 64
Discharge: HOME | End: 2019-11-08
Admitting: PSYCHIATRY & NEUROLOGY

## 2019-11-08 DIAGNOSIS — I69.30 SEQUELAE, POST-STROKE: ICD-10-CM

## 2019-11-08 LAB — CREAT BLDA-MCNC: 0.8 MG/DL (ref 0.6–1.3)

## 2019-11-08 PROCEDURE — 82565 ASSAY OF CREATININE: CPT

## 2019-11-08 PROCEDURE — 70496 CT ANGIOGRAPHY HEAD: CPT

## 2019-11-08 PROCEDURE — 25010000002 IOPAMIDOL 61 % SOLUTION: Performed by: PSYCHIATRY & NEUROLOGY

## 2019-11-08 PROCEDURE — 70498 CT ANGIOGRAPHY NECK: CPT

## 2019-11-08 RX ADMIN — IOPAMIDOL 85 ML: 612 INJECTION, SOLUTION INTRAVENOUS at 18:57

## 2019-11-15 ENCOUNTER — APPOINTMENT (OUTPATIENT)
Dept: MRI IMAGING | Facility: HOSPITAL | Age: 64
End: 2019-11-15

## 2019-11-15 ENCOUNTER — HOSPITAL ENCOUNTER (INPATIENT)
Facility: HOSPITAL | Age: 64
LOS: 10 days | Discharge: NURSING, REHAB OR LONG-TERM CARE | End: 2019-11-25
Attending: HOSPITALIST | Admitting: HOSPITALIST

## 2019-11-15 ENCOUNTER — OFFICE VISIT (OUTPATIENT)
Dept: NEUROLOGY | Facility: CLINIC | Age: 64
End: 2019-11-15

## 2019-11-15 VITALS — HEIGHT: 62 IN | HEART RATE: 92 BPM | BODY MASS INDEX: 42.43 KG/M2 | OXYGEN SATURATION: 93 %

## 2019-11-15 DIAGNOSIS — G61.0 GUILLAIN-BARRE SYNDROME (HCC): Primary | ICD-10-CM

## 2019-11-15 DIAGNOSIS — G60.9 IDIOPATHIC PERIPHERAL NEUROPATHY: ICD-10-CM

## 2019-11-15 PROBLEM — R53.1 WEAKNESS: Status: ACTIVE | Noted: 2019-11-15

## 2019-11-15 LAB
ALBUMIN SERPL-MCNC: 3.3 G/DL (ref 3.5–5.2)
ALBUMIN SERPL-MCNC: 3.5 G/DL (ref 3.5–5.2)
ALBUMIN/GLOB SERPL: 0.9 G/DL
ALBUMIN/GLOB SERPL: 1.2 G/DL
ALP SERPL-CCNC: 108 U/L (ref 39–117)
ALP SERPL-CCNC: 110 U/L (ref 39–117)
ALT SERPL W P-5'-P-CCNC: 7 U/L (ref 1–33)
ALT SERPL W P-5'-P-CCNC: 8 U/L (ref 1–33)
ANION GAP SERPL CALCULATED.3IONS-SCNC: 11 MMOL/L (ref 5–15)
ANION GAP SERPL CALCULATED.3IONS-SCNC: 20 MMOL/L (ref 5–15)
AST SERPL-CCNC: 29 U/L (ref 1–32)
AST SERPL-CCNC: 30 U/L (ref 1–32)
BILIRUB SERPL-MCNC: 0.6 MG/DL (ref 0.2–1.2)
BILIRUB SERPL-MCNC: 0.6 MG/DL (ref 0.2–1.2)
BUN BLD-MCNC: 8 MG/DL (ref 8–23)
BUN BLD-MCNC: 8 MG/DL (ref 8–23)
BUN/CREAT SERPL: 11.1 (ref 7–25)
BUN/CREAT SERPL: 12.1 (ref 7–25)
CALCIUM SPEC-SCNC: 10.2 MG/DL (ref 8.6–10.5)
CALCIUM SPEC-SCNC: 10.2 MG/DL (ref 8.6–10.5)
CHLORIDE SERPL-SCNC: 101 MMOL/L (ref 98–107)
CHLORIDE SERPL-SCNC: 99 MMOL/L (ref 98–107)
CK SERPL-CCNC: 39 U/L (ref 20–180)
CO2 SERPL-SCNC: 24 MMOL/L (ref 22–29)
CO2 SERPL-SCNC: 32 MMOL/L (ref 22–29)
CREAT BLD-MCNC: 0.66 MG/DL (ref 0.57–1)
CREAT BLD-MCNC: 0.72 MG/DL (ref 0.57–1)
CRP SERPL-MCNC: 1.67 MG/DL (ref 0.01–0.5)
DEPRECATED RDW RBC AUTO: 46.8 FL (ref 37–54)
ERYTHROCYTE [DISTWIDTH] IN BLOOD BY AUTOMATED COUNT: 13.1 % (ref 12.3–15.4)
ERYTHROCYTE [SEDIMENTATION RATE] IN BLOOD: 41 MM/HR (ref 0–30)
GFR SERPL CREATININE-BSD FRML MDRD: 82 ML/MIN/1.73
GFR SERPL CREATININE-BSD FRML MDRD: 90 ML/MIN/1.73
GLOBULIN UR ELPH-MCNC: 3 GM/DL
GLOBULIN UR ELPH-MCNC: 3.6 GM/DL
GLUCOSE BLD-MCNC: 109 MG/DL (ref 65–99)
GLUCOSE BLD-MCNC: 111 MG/DL (ref 65–99)
HCT VFR BLD AUTO: 39.7 % (ref 34–46.6)
HGB BLD-MCNC: 12.4 G/DL (ref 12–15.9)
INR PPP: 0.99 (ref 0.9–1.1)
MCH RBC QN AUTO: 30.7 PG (ref 26.6–33)
MCHC RBC AUTO-ENTMCNC: 31.2 G/DL (ref 31.5–35.7)
MCV RBC AUTO: 98.3 FL (ref 79–97)
PLATELET # BLD AUTO: 307 10*3/MM3 (ref 140–450)
PMV BLD AUTO: 9.5 FL (ref 6–12)
POTASSIUM BLD-SCNC: 3.3 MMOL/L (ref 3.5–5.2)
POTASSIUM BLD-SCNC: 3.6 MMOL/L (ref 3.5–5.2)
PROT SERPL-MCNC: 6.5 G/DL (ref 6–8.5)
PROT SERPL-MCNC: 6.9 G/DL (ref 6–8.5)
PROTHROMBIN TIME: 12.8 SECONDS (ref 11.7–14.2)
RBC # BLD AUTO: 4.04 10*6/MM3 (ref 3.77–5.28)
SODIUM BLD-SCNC: 143 MMOL/L (ref 136–145)
SODIUM BLD-SCNC: 144 MMOL/L (ref 136–145)
VIT B12 BLD-MCNC: 1682 PG/ML (ref 211–946)
WBC NRBC COR # BLD: 8.05 10*3/MM3 (ref 3.4–10.8)

## 2019-11-15 PROCEDURE — 86141 C-REACTIVE PROTEIN HS: CPT | Performed by: PSYCHIATRY & NEUROLOGY

## 2019-11-15 PROCEDURE — 009U3ZX DRAINAGE OF SPINAL CANAL, PERCUTANEOUS APPROACH, DIAGNOSTIC: ICD-10-PCS | Performed by: INTERNAL MEDICINE

## 2019-11-15 PROCEDURE — 99232 SBSQ HOSP IP/OBS MODERATE 35: CPT | Performed by: PSYCHIATRY & NEUROLOGY

## 2019-11-15 PROCEDURE — 80053 COMPREHEN METABOLIC PANEL: CPT | Performed by: HOSPITALIST

## 2019-11-15 PROCEDURE — 85610 PROTHROMBIN TIME: CPT | Performed by: PSYCHIATRY & NEUROLOGY

## 2019-11-15 PROCEDURE — 85027 COMPLETE CBC AUTOMATED: CPT | Performed by: PSYCHIATRY & NEUROLOGY

## 2019-11-15 PROCEDURE — 85652 RBC SED RATE AUTOMATED: CPT | Performed by: PSYCHIATRY & NEUROLOGY

## 2019-11-15 PROCEDURE — 72141 MRI NECK SPINE W/O DYE: CPT

## 2019-11-15 PROCEDURE — 82607 VITAMIN B-12: CPT | Performed by: PSYCHIATRY & NEUROLOGY

## 2019-11-15 PROCEDURE — 80053 COMPREHEN METABOLIC PANEL: CPT | Performed by: PSYCHIATRY & NEUROLOGY

## 2019-11-15 PROCEDURE — 82550 ASSAY OF CK (CPK): CPT | Performed by: PSYCHIATRY & NEUROLOGY

## 2019-11-15 RX ORDER — ASPIRIN 81 MG/1
81 TABLET ORAL DAILY
Status: DISCONTINUED | OUTPATIENT
Start: 2019-11-15 | End: 2019-11-25 | Stop reason: HOSPADM

## 2019-11-15 RX ORDER — INFLUENZA A VIRUS A/MICHIGAN/45/2015 (H1N1) RECOMBINANT HEMAGGLUTININ ANTIGEN, INFLUENZA A VIRUS A/SINGAPORE/INFIMH-16-0019/2016 (H3N2) RECOMBINANT HEMAGGLUTININ ANTIGEN, INFLUENZA B VIRUS B/MARYLAND/15/2016 RECOMBINANT HEMAGGLUTININ ANTIGEN, AND INFLUENZA B VIRUS B/PHUKET/3073/2013 RECOMBINANT HEMAGGLUTININ ANTIGEN 45; 45; 45; 45 UG/.5ML; UG/.5ML; UG/.5ML; UG/.5ML
INJECTION INTRAMUSCULAR
Status: ON HOLD | COMMUNITY
Start: 2019-10-10 | End: 2019-11-15

## 2019-11-15 RX ORDER — FLUCONAZOLE 200 MG/1
TABLET ORAL
Status: ON HOLD | COMMUNITY
Start: 2019-10-10 | End: 2019-11-15

## 2019-11-15 RX ORDER — PREGABALIN 100 MG/1
100 CAPSULE ORAL 2 TIMES DAILY
Status: DISCONTINUED | OUTPATIENT
Start: 2019-11-15 | End: 2019-11-24

## 2019-11-15 RX ORDER — FOLIC ACID 1 MG/1
1 TABLET ORAL DAILY
Status: DISCONTINUED | OUTPATIENT
Start: 2019-11-15 | End: 2019-11-25 | Stop reason: HOSPADM

## 2019-11-15 RX ORDER — ALBUTEROL SULFATE 2.5 MG/3ML
2.5 SOLUTION RESPIRATORY (INHALATION)
Status: DISCONTINUED | OUTPATIENT
Start: 2019-11-15 | End: 2019-11-22

## 2019-11-15 RX ORDER — NICOTINE 21 MG/24HR
1 PATCH, TRANSDERMAL 24 HOURS TRANSDERMAL
Status: DISCONTINUED | OUTPATIENT
Start: 2019-11-15 | End: 2019-11-25 | Stop reason: HOSPADM

## 2019-11-15 RX ORDER — DULOXETIN HYDROCHLORIDE 60 MG/1
60 CAPSULE, DELAYED RELEASE ORAL DAILY
Status: DISCONTINUED | OUTPATIENT
Start: 2019-11-15 | End: 2019-11-25 | Stop reason: HOSPADM

## 2019-11-15 RX ORDER — CLOPIDOGREL BISULFATE 75 MG/1
75 TABLET ORAL DAILY
Status: DISCONTINUED | OUTPATIENT
Start: 2019-11-15 | End: 2019-11-25 | Stop reason: HOSPADM

## 2019-11-15 RX ORDER — SODIUM CHLORIDE 0.9 % (FLUSH) 0.9 %
10 SYRINGE (ML) INJECTION EVERY 12 HOURS SCHEDULED
Status: DISCONTINUED | OUTPATIENT
Start: 2019-11-15 | End: 2019-11-25 | Stop reason: HOSPADM

## 2019-11-15 RX ORDER — SODIUM CHLORIDE 0.9 % (FLUSH) 0.9 %
10 SYRINGE (ML) INJECTION AS NEEDED
Status: DISCONTINUED | OUTPATIENT
Start: 2019-11-15 | End: 2019-11-25 | Stop reason: HOSPADM

## 2019-11-15 RX ADMIN — NICOTINE 1 PATCH: 21 PATCH, EXTENDED RELEASE TRANSDERMAL at 22:58

## 2019-11-15 RX ADMIN — PREGABALIN 100 MG: 100 CAPSULE ORAL at 20:52

## 2019-11-15 RX ADMIN — SODIUM CHLORIDE, PRESERVATIVE FREE 10 ML: 5 INJECTION INTRAVENOUS at 20:52

## 2019-11-15 NOTE — PROGRESS NOTES
CC: Peripheral neuropathy; small vessel changes in the hermes    HPI:  Brenda Michelle is a  64 y.o.  right-handed white female who I am seeing in follow-up regarding idiopathic peripheral neuropathy and small vessel disease of the hermes.  I saw the patient last for her EMG 10/17/2019 and a review of her evaluation comes from that visit as follows:    Review of labs performed for treatable causes of neuropathy included a vitamin B12 level which was still in the normal range at 274 but was below 300 therefore a methylmalonic acid level was obtained which was mildly elevated at 488 consistent with a mild degree of B12 deficiency.  Also her folate was low at 3.5.  She has been on supplemental B12 and folate since then.  Additional labs included the SPEP and IEP which showed no evidence of an MGUS.  Cryoglobulins were negative.  Hemoglobin A1c was normal at 5.2%.  Sed rate was 37 which is mildly elevated by standard criteria but given the patient's age this is not worrisome.  RPR nonreactive.  Free T4 1.39 and TSH 3.33.  Copper level was 122.  Heavy metals were negative.     Since the patient had some upper motor neuron appearing findings with brisk reflexes I obtained an MRI of the cervical spine.  The cervical spine was essentially normal but the hermes was visible showing signal change in the hermes.  Follow-up MRI of the brain was obtained without and with contrast showing a very large medium dense bright signal change in the hermes.  No expansion of the brainstem is seen.     The pontine finding was quite surprising.  The patient's history is not abrupt but slowly progressive.  This argues against acute infarction or central pontine myelinolysis.  I really saw no other suspicious lesions that could be multiple sclerosis on the MRI of the brain or cervical spine.     CT angiogram of the neck and brain arteries was obtained showing multiple small plaques but no hemodynamically significant stenoses were seen particularly in the  vertebrobasilar system.        The patient presents today with a completely different history which started about 3 weeks ago when she developed a respiratory tract infection.  She states it made her very sick and she was coughing up green phlegm.  She had some sweats.  She did not seek physician advice.  She slowly improved and now does have some cough left but feels better that way but approximately 2 weeks after onset of her respiratory illness she developed general weakness.  She feels that her arms and legs are weak which is much different than before.  She also describes an unusual numbness of the face and some burning inside the mouth and the lips.  Additional numbness is present on the body arms and legs different from before.  Both she and her  are very concerned in fact he packed her bag believing we may just admit her.    Past Medical History:   Diagnosis Date   • Anxiety    • Arthritis    • benign polyps of large intestine    • COPD (chronic obstructive pulmonary disease) (CMS/HCC)    • Depression    • Difficulty walking    • Disc degeneration, lumbar    • Fibromyositis    • Hyperlipidemia    • IBS (irritable bowel syndrome)    • Kidney stones    • Movement disorder    • Nephrolithiasis    • Peripheral neuropathy    • PVD (peripheral vascular disease) (CMS/[facility])    • Shingles    • Vitamin D deficiency          Past Surgical History:   Procedure Laterality Date   • COLONOSCOPY  2004    Colon polyps; family history of colon cancer   • KNEE ARTHROPLASTY, PARTIAL REPLACEMENT  2015   • KNEE ARTHROSCOPY Left     Meniscus   • POPLITEAL ARTERY ANGIOPLASTY Bilateral    • STEROID INJECTION     • TONSILLECTOMY     • TUBAL ABDOMINAL LIGATION           No current facility-administered medications for this visit.   No current outpatient medications on file.    Facility-Administered Medications Ordered in Other Visits:   •  albuterol (PROVENTIL) nebulizer solution 0.083% 2.5 mg/3mL, 2.5 mg, Nebulization, Q4H -  RT, Ervin Camargo MD  •  aspirin EC tablet 81 mg, 81 mg, Oral, Daily, Ervin Camargo MD, Stopped at 11/15/19 1854  •  clopidogrel (PLAVIX) tablet 75 mg, 75 mg, Oral, Daily, Ervin Camargo MD, Stopped at 11/15/19 1854  •  DULoxetine (CYMBALTA) DR capsule 60 mg, 60 mg, Oral, Daily, Ervin Camargo MD  •  folic acid (FOLVITE) tablet 1 mg, 1 mg, Oral, Daily, Ervin Camargo MD  •  pregabalin (LYRICA) capsule 100 mg, 100 mg, Oral, BID, Ervin Camargo MD  •  sodium chloride 0.9 % flush 10 mL, 10 mL, Intravenous, Q12H, Ervin Camargo MD  •  sodium chloride 0.9 % flush 10 mL, 10 mL, Intravenous, PRN, Ervin Camargo MD      Family History   Problem Relation Age of Onset   • COPD Mother          at 69 yo    • Colon cancer Mother    • Heart disease Mother    • Stroke Mother    • Lung cancer Father         mesothelioma   • Heart disease Maternal Grandmother    • Heart disease Maternal Grandfather    • Heart disease Paternal Grandfather          Social History     Socioeconomic History   • Marital status:      Spouse name: Not on file   • Number of children: 2   • Years of education: 12    • Highest education level: Not on file   Occupational History   • Occupation: retired   Tobacco Use   • Smoking status: Current Every Day Smoker     Packs/day: 0.50     Types: Cigarettes     Last attempt to quit: 2017     Years since quittin.9   • Smokeless tobacco: Never Used   Substance and Sexual Activity   • Alcohol use: No   • Drug use: No         Allergies   Allergen Reactions   • Ambien [Zolpidem Tartrate] Other (See Comments)     Nightmares   • Aripiprazole    • Atorvastatin Other (See Comments)     MYALGIAS   • Atorvastatin Calcium Other (See Comments)     MYALGIAS   • Bupropion Itching   • Cilostazol Other (See Comments)     HA   • Codeine Sulfate    • Ferrous Sulfate Nausea Only   • Welchol  [Colesevelam Hcl] Nausea Only   • Adhesive Tape Rash         Pain Scale: 8/10        ROS:  Review of  "Systems   Constitutional: Positive for appetite change and fatigue.   HENT: Positive for ear pain. Negative for facial swelling and hearing loss.    Eyes: Negative for pain, redness and itching.   Respiratory: Positive for cough. Negative for choking and shortness of breath.    Cardiovascular: Negative for chest pain and leg swelling.   Gastrointestinal: Positive for constipation. Negative for abdominal pain and vomiting.   Musculoskeletal: Positive for gait problem.   Skin: Negative for color change, pallor and rash.   Allergic/Immunologic: Negative for environmental allergies and food allergies.   Neurological: Positive for dizziness, speech difficulty (slurred speech), weakness, light-headedness and numbness. Negative for tremors, seizures, syncope, facial asymmetry and headaches.   Hematological: Bruises/bleeds easily.   Psychiatric/Behavioral: Positive for confusion, decreased concentration and sleep disturbance. Negative for agitation, behavioral problems, dysphoric mood, hallucinations, self-injury and suicidal ideas. The patient is not nervous/anxious and is not hyperactive.        I have reviewed and agree with the above ROS completed by the medical assistant.      Physical Exam:  Vitals:    11/15/19 1122   Pulse: 92   SpO2: 93%   Height: 157.5 cm (62\")     Orthostatic BP:    Body mass index is 42.43 kg/m².    Physical Exam  General: M obese white female who appears anxious  HEENT: Normocephalic no evidence of trauma  Neck: Supple  Heart: Regular rate and rhythm  Extremities: No pedal edema      Neurological Exam:   Mental Status: Awake, alert, oriented to person, place and time.  Conversant without evidence of an affective disorder, thought disorder, delusions or hallucinations.  Attention span and concentration are normal.  She appears anxious  HCF: No aphasia, apraxia or dysarthria.  Recent and remote memory intact.  Knowledge of recent events intact.  CN: I:   II: Visual fields full without left " inattention   III, IV, VI: Eye movements intact without nystagmus or ptosis.  Pupils equal round and reactive to light.   V,VII: Light touch and pinprick feels reduced around the mouth but otherwise patchy in all 3 divisions of V.  Facial muscles symmetrical.   VIII: Hearing intact to finger rub   IX,X: Soft palate elevates symmetrically   XI: Sternomastoid and trapezius are strong.   XII: Tongue midline without atrophy or fasciculations  Motor: Normal tone and bulk in the upper and lower extremities   Power testing: Diffuse weakness present in the arms and legs.  At times some giveaway quality is identified but other times not.  Reflexes: Upper extremities: Biceps and triceps +1, brachial radialis +2 with some finger flexion noted.        Lower extremities: Absent knee and ankle jerks bilaterally        Toe signs: Downgoing  Sensory: Light touch: Present but feels somewhat unusual        Pinprick: Perhaps decreased        Vibration: Reduced at the ankles        Position: Intact at the great toes    Cerebellar: Finger-to-nose: Intact           Rapid movement: Intact           Heel-to-shin: Intact  Gait and Station: She was not ambulated    Results:      Lab Results   Component Value Date    GLUCOSE 109 (H) 11/15/2019    BUN 8 11/15/2019    CREATININE 0.66 11/15/2019    EGFRIFNONA 90 11/15/2019    EGFRIFAFRI 108 06/07/2019    BCR 12.1 11/15/2019    CO2 32.0 (H) 11/15/2019    CALCIUM 10.2 11/15/2019    PROTENTOTREF 6.8 08/23/2019    ALBUMIN 3.30 (L) 11/15/2019    LABIL2 1.0 08/23/2019    AST 29 11/15/2019    ALT 8 11/15/2019       Lab Results   Component Value Date    WBC 8.05 11/15/2019    HGB 12.4 11/15/2019    HCT 39.7 11/15/2019    MCV 98.3 (H) 11/15/2019     11/15/2019         .  Lab Results   Component Value Date    RPR Non-Reactive 08/23/2019         Lab Results   Component Value Date    TSH 3.330 08/23/2019         Lab Results   Component Value Date    LOKIVMZC91 1,682 (H) 11/15/2019         Lab Results    Component Value Date    FOLATE 3.50 (L) 08/23/2019         Lab Results   Component Value Date    HGBA1C 5.20 08/23/2019         Lab Results   Component Value Date    GLUCOSE 109 (H) 11/15/2019    BUN 8 11/15/2019    CREATININE 0.66 11/15/2019    EGFRIFNONA 90 11/15/2019    EGFRIFAFRI 108 06/07/2019    BCR 12.1 11/15/2019    K 3.3 (L) 11/15/2019    CO2 32.0 (H) 11/15/2019    CALCIUM 10.2 11/15/2019    PROTENTOTREF 6.8 08/23/2019    ALBUMIN 3.30 (L) 11/15/2019    LABIL2 1.0 08/23/2019    AST 29 11/15/2019    ALT 8 11/15/2019         Lab Results   Component Value Date    WBC 8.05 11/15/2019    HGB 12.4 11/15/2019    HCT 39.7 11/15/2019    MCV 98.3 (H) 11/15/2019     11/15/2019             Assessment:   1.  New onset of diffuse weakness and unusual numbness starting about 1 week ago which was about 2 weeks after onset of a respiratory illness with physical exam demonstrating reduced reflexes compared to previously and diffuse weakness and sensory changes.  This is very suspicious for Guillain-Barré syndrome.  2.  Idiopathic peripheral neuropathy,  3.  Presumed small vessel disease of the hermes        Plan:  1.  Admit.  I spoke with the hospitalist group and Dr. Sánchez who is our inpatient neurologist this week.  Most useful test likely lumbar puncture evaluating for protein elevation.  Repeat nerve conductions may be of help to look for a significant difference in slowing and or conduction block compared to previous.  I am told the hospital does not have any IVIG and so other options such as plasmapheresis may be considered          Greater than 50% of this 40-minute follow-up was spent counseling the patient and  regarding concerns over what this presentation may represent and treatment options which may be considered.                Dictated utilizing Dragon dictation.

## 2019-11-16 ENCOUNTER — APPOINTMENT (OUTPATIENT)
Dept: GENERAL RADIOLOGY | Facility: HOSPITAL | Age: 64
End: 2019-11-16

## 2019-11-16 PROBLEM — E87.6 HYPOPOTASSEMIA: Status: ACTIVE | Noted: 2019-11-16

## 2019-11-16 LAB
ANION GAP SERPL CALCULATED.3IONS-SCNC: 11.3 MMOL/L (ref 5–15)
APPEARANCE CSF: CLEAR
BUN BLD-MCNC: 8 MG/DL (ref 8–23)
BUN/CREAT SERPL: 11.9 (ref 7–25)
CALCIUM SPEC-SCNC: 9.6 MG/DL (ref 8.6–10.5)
CHLORIDE SERPL-SCNC: 100 MMOL/L (ref 98–107)
CO2 SERPL-SCNC: 31.7 MMOL/L (ref 22–29)
COLOR CSF: COLORLESS
CREAT BLD-MCNC: 0.67 MG/DL (ref 0.57–1)
DEPRECATED RDW RBC AUTO: 42.8 FL (ref 37–54)
ERYTHROCYTE [DISTWIDTH] IN BLOOD BY AUTOMATED COUNT: 12.6 % (ref 12.3–15.4)
GFR SERPL CREATININE-BSD FRML MDRD: 89 ML/MIN/1.73
GLUCOSE BLD-MCNC: 107 MG/DL (ref 65–99)
GLUCOSE CSF-MCNC: 72 MG/DL (ref 40–70)
HCT VFR BLD AUTO: 36.9 % (ref 34–46.6)
HGB BLD-MCNC: 12.3 G/DL (ref 12–15.9)
MCH RBC QN AUTO: 31.4 PG (ref 26.6–33)
MCHC RBC AUTO-ENTMCNC: 33.3 G/DL (ref 31.5–35.7)
MCV RBC AUTO: 94.1 FL (ref 79–97)
NUC CELL # CSF MANUAL: 1 /MM3 (ref 0–5)
PLATELET # BLD AUTO: 290 10*3/MM3 (ref 140–450)
PMV BLD AUTO: 9.7 FL (ref 6–12)
POTASSIUM BLD-SCNC: 3.3 MMOL/L (ref 3.5–5.2)
PROT CSF-MCNC: 34 MG/DL (ref 15–45)
RBC # BLD AUTO: 3.92 10*6/MM3 (ref 3.77–5.28)
RBC # CSF MANUAL: 17 /MM3 (ref 0–0)
SODIUM BLD-SCNC: 143 MMOL/L (ref 136–145)
TUBE # CSF: 1
WBC NRBC COR # BLD: 5.72 10*3/MM3 (ref 3.4–10.8)

## 2019-11-16 PROCEDURE — 84157 ASSAY OF PROTEIN OTHER: CPT | Performed by: PSYCHIATRY & NEUROLOGY

## 2019-11-16 PROCEDURE — 25010000003 LIDOCAINE 1 % SOLUTION: Performed by: RADIOLOGY

## 2019-11-16 PROCEDURE — 82945 GLUCOSE OTHER FLUID: CPT | Performed by: PSYCHIATRY & NEUROLOGY

## 2019-11-16 PROCEDURE — 94799 UNLISTED PULMONARY SVC/PX: CPT

## 2019-11-16 PROCEDURE — 80048 BASIC METABOLIC PNL TOTAL CA: CPT | Performed by: HOSPITALIST

## 2019-11-16 PROCEDURE — 83873 ASSAY OF CSF PROTEIN: CPT | Performed by: PSYCHIATRY & NEUROLOGY

## 2019-11-16 PROCEDURE — 99233 SBSQ HOSP IP/OBS HIGH 50: CPT | Performed by: PSYCHIATRY & NEUROLOGY

## 2019-11-16 PROCEDURE — 82784 ASSAY IGA/IGD/IGG/IGM EACH: CPT | Performed by: PSYCHIATRY & NEUROLOGY

## 2019-11-16 PROCEDURE — 82040 ASSAY OF SERUM ALBUMIN: CPT | Performed by: PSYCHIATRY & NEUROLOGY

## 2019-11-16 PROCEDURE — 85027 COMPLETE CBC AUTOMATED: CPT | Performed by: HOSPITALIST

## 2019-11-16 PROCEDURE — 83916 OLIGOCLONAL BANDS: CPT | Performed by: PSYCHIATRY & NEUROLOGY

## 2019-11-16 PROCEDURE — 77003 FLUOROGUIDE FOR SPINE INJECT: CPT

## 2019-11-16 PROCEDURE — 89050 BODY FLUID CELL COUNT: CPT | Performed by: PSYCHIATRY & NEUROLOGY

## 2019-11-16 PROCEDURE — 25010000002 METHYLPREDNISOLONE: Performed by: PSYCHIATRY & NEUROLOGY

## 2019-11-16 PROCEDURE — 82042 OTHER SOURCE ALBUMIN QUAN EA: CPT | Performed by: PSYCHIATRY & NEUROLOGY

## 2019-11-16 RX ORDER — DIAZEPAM 2 MG/1
2 TABLET ORAL ONCE
Status: COMPLETED | OUTPATIENT
Start: 2019-11-16 | End: 2019-11-16

## 2019-11-16 RX ORDER — POTASSIUM CHLORIDE 750 MG/1
40 CAPSULE, EXTENDED RELEASE ORAL AS NEEDED
Status: DISCONTINUED | OUTPATIENT
Start: 2019-11-16 | End: 2019-11-25 | Stop reason: HOSPADM

## 2019-11-16 RX ORDER — POTASSIUM CHLORIDE 1.5 G/1.77G
40 POWDER, FOR SOLUTION ORAL AS NEEDED
Status: DISCONTINUED | OUTPATIENT
Start: 2019-11-16 | End: 2019-11-25 | Stop reason: HOSPADM

## 2019-11-16 RX ORDER — LIDOCAINE HYDROCHLORIDE 10 MG/ML
10 INJECTION, SOLUTION INFILTRATION; PERINEURAL ONCE
Status: COMPLETED | OUTPATIENT
Start: 2019-11-16 | End: 2019-11-16

## 2019-11-16 RX ORDER — POTASSIUM CHLORIDE 7.45 MG/ML
10 INJECTION INTRAVENOUS
Status: DISCONTINUED | OUTPATIENT
Start: 2019-11-16 | End: 2019-11-25 | Stop reason: HOSPADM

## 2019-11-16 RX ADMIN — SODIUM CHLORIDE, PRESERVATIVE FREE 10 ML: 5 INJECTION INTRAVENOUS at 20:44

## 2019-11-16 RX ADMIN — SODIUM CHLORIDE 1 G: 900 INJECTION INTRAVENOUS at 19:00

## 2019-11-16 RX ADMIN — DULOXETINE HYDROCHLORIDE 60 MG: 60 CAPSULE, DELAYED RELEASE ORAL at 08:51

## 2019-11-16 RX ADMIN — NICOTINE 1 PATCH: 21 PATCH, EXTENDED RELEASE TRANSDERMAL at 22:30

## 2019-11-16 RX ADMIN — POTASSIUM CHLORIDE 40 MEQ: 750 CAPSULE, EXTENDED RELEASE ORAL at 22:28

## 2019-11-16 RX ADMIN — POLYETHYLENE GLYCOL 3350 17 G: 17 POWDER, FOR SOLUTION ORAL at 20:43

## 2019-11-16 RX ADMIN — DIAZEPAM 2 MG: 2 TABLET ORAL at 17:16

## 2019-11-16 RX ADMIN — LIDOCAINE HYDROCHLORIDE 8 ML: 10 INJECTION, SOLUTION INFILTRATION; PERINEURAL at 13:26

## 2019-11-16 RX ADMIN — SODIUM CHLORIDE, PRESERVATIVE FREE 10 ML: 5 INJECTION INTRAVENOUS at 09:00

## 2019-11-16 RX ADMIN — PREGABALIN 100 MG: 100 CAPSULE ORAL at 08:51

## 2019-11-16 RX ADMIN — FOLIC ACID 1 MG: 1 TABLET ORAL at 08:51

## 2019-11-16 RX ADMIN — PREGABALIN 100 MG: 100 CAPSULE ORAL at 20:43

## 2019-11-17 ENCOUNTER — APPOINTMENT (OUTPATIENT)
Dept: MRI IMAGING | Facility: HOSPITAL | Age: 64
End: 2019-11-17

## 2019-11-17 DIAGNOSIS — M79.10 MYALGIA: ICD-10-CM

## 2019-11-17 LAB — POTASSIUM BLD-SCNC: 4.9 MMOL/L (ref 3.5–5.2)

## 2019-11-17 PROCEDURE — 97530 THERAPEUTIC ACTIVITIES: CPT

## 2019-11-17 PROCEDURE — 94799 UNLISTED PULMONARY SVC/PX: CPT

## 2019-11-17 PROCEDURE — 25010000003 METHYLPREDNISOLONE PER 125 MG: Performed by: PSYCHIATRY & NEUROLOGY

## 2019-11-17 PROCEDURE — 99232 SBSQ HOSP IP/OBS MODERATE 35: CPT | Performed by: PSYCHIATRY & NEUROLOGY

## 2019-11-17 PROCEDURE — 94640 AIRWAY INHALATION TREATMENT: CPT

## 2019-11-17 PROCEDURE — 97162 PT EVAL MOD COMPLEX 30 MIN: CPT

## 2019-11-17 PROCEDURE — 72146 MRI CHEST SPINE W/O DYE: CPT

## 2019-11-17 PROCEDURE — 84132 ASSAY OF SERUM POTASSIUM: CPT | Performed by: INTERNAL MEDICINE

## 2019-11-17 RX ADMIN — SODIUM CHLORIDE, PRESERVATIVE FREE 10 ML: 5 INJECTION INTRAVENOUS at 09:53

## 2019-11-17 RX ADMIN — ASPIRIN 81 MG: 81 TABLET, COATED ORAL at 09:52

## 2019-11-17 RX ADMIN — SODIUM CHLORIDE 1 G: 900 INJECTION INTRAVENOUS at 09:52

## 2019-11-17 RX ADMIN — PREGABALIN 100 MG: 100 CAPSULE ORAL at 09:52

## 2019-11-17 RX ADMIN — ALBUTEROL SULFATE 2.5 MG: 2.5 SOLUTION RESPIRATORY (INHALATION) at 15:59

## 2019-11-17 RX ADMIN — FOLIC ACID 1 MG: 1 TABLET ORAL at 09:52

## 2019-11-17 RX ADMIN — PREGABALIN 100 MG: 100 CAPSULE ORAL at 21:05

## 2019-11-17 RX ADMIN — NICOTINE 1 PATCH: 21 PATCH, EXTENDED RELEASE TRANSDERMAL at 22:46

## 2019-11-17 RX ADMIN — POLYETHYLENE GLYCOL 3350 17 G: 17 POWDER, FOR SOLUTION ORAL at 09:52

## 2019-11-17 RX ADMIN — SODIUM CHLORIDE, PRESERVATIVE FREE 10 ML: 5 INJECTION INTRAVENOUS at 21:05

## 2019-11-17 RX ADMIN — ALBUTEROL SULFATE 2.5 MG: 2.5 SOLUTION RESPIRATORY (INHALATION) at 20:20

## 2019-11-17 RX ADMIN — ALBUTEROL SULFATE 2.5 MG: 2.5 SOLUTION RESPIRATORY (INHALATION) at 11:38

## 2019-11-17 RX ADMIN — CLOPIDOGREL 75 MG: 75 TABLET, FILM COATED ORAL at 09:52

## 2019-11-17 RX ADMIN — DULOXETINE HYDROCHLORIDE 60 MG: 60 CAPSULE, DELAYED RELEASE ORAL at 09:52

## 2019-11-17 RX ADMIN — ALBUTEROL SULFATE 2.5 MG: 2.5 SOLUTION RESPIRATORY (INHALATION) at 09:20

## 2019-11-17 RX ADMIN — POTASSIUM CHLORIDE 40 MEQ: 750 CAPSULE, EXTENDED RELEASE ORAL at 02:51

## 2019-11-18 LAB
ANION GAP SERPL CALCULATED.3IONS-SCNC: 7.8 MMOL/L (ref 5–15)
BUN BLD-MCNC: 15 MG/DL (ref 8–23)
BUN/CREAT SERPL: 20 (ref 7–25)
CALCIUM SPEC-SCNC: 10.1 MG/DL (ref 8.6–10.5)
CHLORIDE SERPL-SCNC: 104 MMOL/L (ref 98–107)
CO2 SERPL-SCNC: 31.2 MMOL/L (ref 22–29)
CREAT BLD-MCNC: 0.75 MG/DL (ref 0.57–1)
DEPRECATED RDW RBC AUTO: 45.3 FL (ref 37–54)
ERYTHROCYTE [DISTWIDTH] IN BLOOD BY AUTOMATED COUNT: 13.1 % (ref 12.3–15.4)
GFR SERPL CREATININE-BSD FRML MDRD: 78 ML/MIN/1.73
GLUCOSE BLD-MCNC: 135 MG/DL (ref 65–99)
HBA1C MFR BLD: 5.6 % (ref 4.8–5.6)
HCT VFR BLD AUTO: 37.9 % (ref 34–46.6)
HGB BLD-MCNC: 12 G/DL (ref 12–15.9)
MAGNESIUM SERPL-MCNC: 1.8 MG/DL (ref 1.6–2.4)
MCH RBC QN AUTO: 29.7 PG (ref 26.6–33)
MCHC RBC AUTO-ENTMCNC: 31.7 G/DL (ref 31.5–35.7)
MCV RBC AUTO: 93.8 FL (ref 79–97)
PLATELET # BLD AUTO: 350 10*3/MM3 (ref 140–450)
PMV BLD AUTO: 9.7 FL (ref 6–12)
POTASSIUM BLD-SCNC: 4.8 MMOL/L (ref 3.5–5.2)
RBC # BLD AUTO: 4.04 10*6/MM3 (ref 3.77–5.28)
SODIUM BLD-SCNC: 143 MMOL/L (ref 136–145)
TSH SERPL DL<=0.05 MIU/L-ACNC: 0.6 UIU/ML (ref 0.27–4.2)
WBC NRBC COR # BLD: 16.87 10*3/MM3 (ref 3.4–10.8)

## 2019-11-18 PROCEDURE — 94799 UNLISTED PULMONARY SVC/PX: CPT

## 2019-11-18 PROCEDURE — 83036 HEMOGLOBIN GLYCOSYLATED A1C: CPT | Performed by: INTERNAL MEDICINE

## 2019-11-18 PROCEDURE — 25010000003 METHYLPREDNISOLONE PER 125 MG: Performed by: PSYCHIATRY & NEUROLOGY

## 2019-11-18 PROCEDURE — 97110 THERAPEUTIC EXERCISES: CPT

## 2019-11-18 PROCEDURE — 83735 ASSAY OF MAGNESIUM: CPT | Performed by: INTERNAL MEDICINE

## 2019-11-18 PROCEDURE — 80048 BASIC METABOLIC PNL TOTAL CA: CPT | Performed by: INTERNAL MEDICINE

## 2019-11-18 PROCEDURE — 85027 COMPLETE CBC AUTOMATED: CPT | Performed by: INTERNAL MEDICINE

## 2019-11-18 PROCEDURE — 99231 SBSQ HOSP IP/OBS SF/LOW 25: CPT | Performed by: PSYCHIATRY & NEUROLOGY

## 2019-11-18 PROCEDURE — 84443 ASSAY THYROID STIM HORMONE: CPT | Performed by: INTERNAL MEDICINE

## 2019-11-18 PROCEDURE — 97535 SELF CARE MNGMENT TRAINING: CPT

## 2019-11-18 PROCEDURE — 97165 OT EVAL LOW COMPLEX 30 MIN: CPT

## 2019-11-18 RX ORDER — DULOXETIN HYDROCHLORIDE 60 MG/1
CAPSULE, DELAYED RELEASE ORAL
Qty: 90 CAPSULE | Refills: 4 | OUTPATIENT
Start: 2019-11-18 | End: 2019-11-25 | Stop reason: HOSPADM

## 2019-11-18 RX ADMIN — ALBUTEROL SULFATE 2.5 MG: 2.5 SOLUTION RESPIRATORY (INHALATION) at 08:37

## 2019-11-18 RX ADMIN — DULOXETINE HYDROCHLORIDE 60 MG: 60 CAPSULE, DELAYED RELEASE ORAL at 08:17

## 2019-11-18 RX ADMIN — POLYETHYLENE GLYCOL 3350 17 G: 17 POWDER, FOR SOLUTION ORAL at 11:31

## 2019-11-18 RX ADMIN — POLYETHYLENE GLYCOL 3350 17 G: 17 POWDER, FOR SOLUTION ORAL at 20:43

## 2019-11-18 RX ADMIN — SODIUM CHLORIDE, PRESERVATIVE FREE 10 ML: 5 INJECTION INTRAVENOUS at 20:43

## 2019-11-18 RX ADMIN — SODIUM CHLORIDE, PRESERVATIVE FREE 10 ML: 5 INJECTION INTRAVENOUS at 11:35

## 2019-11-18 RX ADMIN — ALBUTEROL SULFATE 2.5 MG: 2.5 SOLUTION RESPIRATORY (INHALATION) at 16:51

## 2019-11-18 RX ADMIN — PREGABALIN 100 MG: 100 CAPSULE ORAL at 08:17

## 2019-11-18 RX ADMIN — SODIUM CHLORIDE 1 G: 900 INJECTION INTRAVENOUS at 10:48

## 2019-11-18 RX ADMIN — PREGABALIN 100 MG: 100 CAPSULE ORAL at 20:43

## 2019-11-18 RX ADMIN — CLOPIDOGREL 75 MG: 75 TABLET, FILM COATED ORAL at 08:17

## 2019-11-18 RX ADMIN — FOLIC ACID 1 MG: 1 TABLET ORAL at 08:17

## 2019-11-18 RX ADMIN — ALBUTEROL SULFATE 2.5 MG: 2.5 SOLUTION RESPIRATORY (INHALATION) at 21:22

## 2019-11-18 RX ADMIN — ASPIRIN 81 MG: 81 TABLET, COATED ORAL at 08:15

## 2019-11-18 RX ADMIN — NICOTINE 1 PATCH: 21 PATCH, EXTENDED RELEASE TRANSDERMAL at 22:22

## 2019-11-19 PROCEDURE — 97110 THERAPEUTIC EXERCISES: CPT

## 2019-11-19 PROCEDURE — 99232 SBSQ HOSP IP/OBS MODERATE 35: CPT | Performed by: PSYCHIATRY & NEUROLOGY

## 2019-11-19 PROCEDURE — 94799 UNLISTED PULMONARY SVC/PX: CPT

## 2019-11-19 PROCEDURE — 25010000002 METHYLPREDNISOLONE PER 125 MG: Performed by: PSYCHIATRY & NEUROLOGY

## 2019-11-19 PROCEDURE — 97535 SELF CARE MNGMENT TRAINING: CPT

## 2019-11-19 RX ORDER — SENNA AND DOCUSATE SODIUM 50; 8.6 MG/1; MG/1
2 TABLET, FILM COATED ORAL NIGHTLY
Status: DISCONTINUED | OUTPATIENT
Start: 2019-11-19 | End: 2019-11-25

## 2019-11-19 RX ORDER — BISACODYL 10 MG
10 SUPPOSITORY, RECTAL RECTAL DAILY PRN
Status: DISCONTINUED | OUTPATIENT
Start: 2019-11-19 | End: 2019-11-25

## 2019-11-19 RX ADMIN — FOLIC ACID 1 MG: 1 TABLET ORAL at 11:19

## 2019-11-19 RX ADMIN — PREGABALIN 100 MG: 100 CAPSULE ORAL at 11:18

## 2019-11-19 RX ADMIN — POLYETHYLENE GLYCOL 3350 17 G: 17 POWDER, FOR SOLUTION ORAL at 21:12

## 2019-11-19 RX ADMIN — BISACODYL 10 MG: 10 SUPPOSITORY RECTAL at 11:25

## 2019-11-19 RX ADMIN — CLOPIDOGREL 75 MG: 75 TABLET, FILM COATED ORAL at 11:19

## 2019-11-19 RX ADMIN — SODIUM CHLORIDE, PRESERVATIVE FREE 10 ML: 5 INJECTION INTRAVENOUS at 11:20

## 2019-11-19 RX ADMIN — SODIUM CHLORIDE, PRESERVATIVE FREE 10 ML: 5 INJECTION INTRAVENOUS at 21:12

## 2019-11-19 RX ADMIN — PREGABALIN 100 MG: 100 CAPSULE ORAL at 21:12

## 2019-11-19 RX ADMIN — POLYETHYLENE GLYCOL 3350 17 G: 17 POWDER, FOR SOLUTION ORAL at 11:18

## 2019-11-19 RX ADMIN — DULOXETINE HYDROCHLORIDE 60 MG: 60 CAPSULE, DELAYED RELEASE ORAL at 11:19

## 2019-11-19 RX ADMIN — ALBUTEROL SULFATE 2.5 MG: 2.5 SOLUTION RESPIRATORY (INHALATION) at 10:48

## 2019-11-19 RX ADMIN — SENNOSIDES AND DOCUSATE SODIUM 2 TABLET: 8.6; 5 TABLET ORAL at 21:12

## 2019-11-19 RX ADMIN — SODIUM CHLORIDE 500 MG: 900 INJECTION INTRAVENOUS at 11:19

## 2019-11-19 RX ADMIN — ASPIRIN 81 MG: 81 TABLET, COATED ORAL at 11:19

## 2019-11-19 RX ADMIN — NICOTINE 1 PATCH: 21 PATCH, EXTENDED RELEASE TRANSDERMAL at 22:21

## 2019-11-19 RX ADMIN — ALBUTEROL SULFATE 2.5 MG: 2.5 SOLUTION RESPIRATORY (INHALATION) at 19:25

## 2019-11-20 ENCOUNTER — APPOINTMENT (OUTPATIENT)
Dept: GENERAL RADIOLOGY | Facility: HOSPITAL | Age: 64
End: 2019-11-20

## 2019-11-20 PROBLEM — R30.0 DYSURIA: Status: ACTIVE | Noted: 2019-11-20

## 2019-11-20 PROBLEM — K59.00 CONSTIPATION: Status: ACTIVE | Noted: 2019-11-20

## 2019-11-20 LAB
ALB CSF/SERPL: 5 {RATIO} (ref 0–8)
ALBUMIN CSF-MCNC: 17 MG/DL (ref 11–48)
ALBUMIN SERPL-MCNC: 3.2 G/DL (ref 3.6–4.8)
BACTERIA UR QL AUTO: ABNORMAL /HPF
BILIRUB UR QL STRIP: NEGATIVE
CLARITY UR: CLEAR
COLOR UR: YELLOW
GLUCOSE UR STRIP-MCNC: NEGATIVE MG/DL
HGB UR QL STRIP.AUTO: ABNORMAL
HYALINE CASTS UR QL AUTO: ABNORMAL /LPF
IGG CSF-MCNC: 2.6 MG/DL (ref 0–8.6)
IGG SERPL-MCNC: 924 MG/DL (ref 700–1600)
IGG SYNTH RATE SER+CSF CALC-MRATE: -1.4 MG/DAY
IGG/ALB CLEAR SER+CSF-RTO: 0.5 (ref 0–0.7)
IGG/ALB CSF: 0.15 {RATIO} (ref 0–0.25)
KETONES UR QL STRIP: NEGATIVE
LEUKOCYTE ESTERASE UR QL STRIP.AUTO: ABNORMAL
MBP CSF-MCNC: 5.6 NG/ML (ref 0–1.2)
NITRITE UR QL STRIP: NEGATIVE
OLIGOCLONAL BANDS.IT SER+CSF QL: ABNORMAL
PH UR STRIP.AUTO: 7.5 [PH] (ref 5–8)
PROT UR QL STRIP: ABNORMAL
RBC # UR: ABNORMAL /HPF
REF LAB TEST METHOD: ABNORMAL
SP GR UR STRIP: 1.01 (ref 1–1.03)
SQUAMOUS #/AREA URNS HPF: ABNORMAL /HPF
UROBILINOGEN UR QL STRIP: ABNORMAL
WBC UR QL AUTO: ABNORMAL /HPF

## 2019-11-20 PROCEDURE — 74019 RADEX ABDOMEN 2 VIEWS: CPT

## 2019-11-20 PROCEDURE — 94799 UNLISTED PULMONARY SVC/PX: CPT

## 2019-11-20 PROCEDURE — 97110 THERAPEUTIC EXERCISES: CPT

## 2019-11-20 PROCEDURE — 25010000002 METHYLPREDNISOLONE PER 125 MG: Performed by: PSYCHIATRY & NEUROLOGY

## 2019-11-20 PROCEDURE — 81001 URINALYSIS AUTO W/SCOPE: CPT | Performed by: INTERNAL MEDICINE

## 2019-11-20 PROCEDURE — 99232 SBSQ HOSP IP/OBS MODERATE 35: CPT | Performed by: PSYCHIATRY & NEUROLOGY

## 2019-11-20 RX ORDER — PANTOPRAZOLE SODIUM 40 MG/1
40 TABLET, DELAYED RELEASE ORAL
Status: DISCONTINUED | OUTPATIENT
Start: 2019-11-21 | End: 2019-11-25 | Stop reason: HOSPADM

## 2019-11-20 RX ADMIN — PREGABALIN 100 MG: 100 CAPSULE ORAL at 11:22

## 2019-11-20 RX ADMIN — SODIUM CHLORIDE, PRESERVATIVE FREE 10 ML: 5 INJECTION INTRAVENOUS at 11:23

## 2019-11-20 RX ADMIN — CLOPIDOGREL 75 MG: 75 TABLET, FILM COATED ORAL at 11:22

## 2019-11-20 RX ADMIN — SENNOSIDES AND DOCUSATE SODIUM 2 TABLET: 8.6; 5 TABLET ORAL at 21:12

## 2019-11-20 RX ADMIN — SODIUM CHLORIDE 500 MG: 900 INJECTION INTRAVENOUS at 11:23

## 2019-11-20 RX ADMIN — ALBUTEROL SULFATE 2.5 MG: 2.5 SOLUTION RESPIRATORY (INHALATION) at 19:28

## 2019-11-20 RX ADMIN — POLYETHYLENE GLYCOL 3350 17 G: 17 POWDER, FOR SOLUTION ORAL at 21:12

## 2019-11-20 RX ADMIN — NICOTINE 1 PATCH: 21 PATCH, EXTENDED RELEASE TRANSDERMAL at 22:23

## 2019-11-20 RX ADMIN — PREGABALIN 100 MG: 100 CAPSULE ORAL at 21:12

## 2019-11-20 RX ADMIN — SODIUM CHLORIDE, PRESERVATIVE FREE 10 ML: 5 INJECTION INTRAVENOUS at 21:12

## 2019-11-20 RX ADMIN — DULOXETINE HYDROCHLORIDE 60 MG: 60 CAPSULE, DELAYED RELEASE ORAL at 11:22

## 2019-11-20 RX ADMIN — ALBUTEROL SULFATE 2.5 MG: 2.5 SOLUTION RESPIRATORY (INHALATION) at 07:55

## 2019-11-20 RX ADMIN — ASPIRIN 81 MG: 81 TABLET, COATED ORAL at 11:22

## 2019-11-20 RX ADMIN — FOLIC ACID 1 MG: 1 TABLET ORAL at 11:22

## 2019-11-20 RX ADMIN — ALBUTEROL SULFATE 2.5 MG: 2.5 SOLUTION RESPIRATORY (INHALATION) at 16:08

## 2019-11-20 RX ADMIN — ALBUTEROL SULFATE 2.5 MG: 2.5 SOLUTION RESPIRATORY (INHALATION) at 11:55

## 2019-11-20 RX ADMIN — MAGNESIUM HYDROXIDE 10 ML: 2400 SUSPENSION ORAL at 19:33

## 2019-11-21 LAB
ANION GAP SERPL CALCULATED.3IONS-SCNC: 13 MMOL/L (ref 5–15)
BUN BLD-MCNC: 25 MG/DL (ref 8–23)
BUN/CREAT SERPL: 18.7 (ref 7–25)
CALCIUM SPEC-SCNC: 9.4 MG/DL (ref 8.6–10.5)
CHLORIDE SERPL-SCNC: 101 MMOL/L (ref 98–107)
CO2 SERPL-SCNC: 28 MMOL/L (ref 22–29)
CREAT BLD-MCNC: 1.34 MG/DL (ref 0.57–1)
DEPRECATED RDW RBC AUTO: 45.4 FL (ref 37–54)
ERYTHROCYTE [DISTWIDTH] IN BLOOD BY AUTOMATED COUNT: 13.4 % (ref 12.3–15.4)
GFR SERPL CREATININE-BSD FRML MDRD: 40 ML/MIN/1.73
GLUCOSE BLD-MCNC: 117 MG/DL (ref 65–99)
HCT VFR BLD AUTO: 38.6 % (ref 34–46.6)
HGB BLD-MCNC: 12.6 G/DL (ref 12–15.9)
MCH RBC QN AUTO: 30.3 PG (ref 26.6–33)
MCHC RBC AUTO-ENTMCNC: 32.6 G/DL (ref 31.5–35.7)
MCV RBC AUTO: 92.8 FL (ref 79–97)
PLATELET # BLD AUTO: 372 10*3/MM3 (ref 140–450)
PMV BLD AUTO: 10.1 FL (ref 6–12)
POTASSIUM BLD-SCNC: 4.4 MMOL/L (ref 3.5–5.2)
RBC # BLD AUTO: 4.16 10*6/MM3 (ref 3.77–5.28)
SODIUM BLD-SCNC: 142 MMOL/L (ref 136–145)
WBC NRBC COR # BLD: 19.43 10*3/MM3 (ref 3.4–10.8)

## 2019-11-21 PROCEDURE — 94799 UNLISTED PULMONARY SVC/PX: CPT

## 2019-11-21 PROCEDURE — 80048 BASIC METABOLIC PNL TOTAL CA: CPT | Performed by: INTERNAL MEDICINE

## 2019-11-21 PROCEDURE — 97530 THERAPEUTIC ACTIVITIES: CPT

## 2019-11-21 PROCEDURE — 85027 COMPLETE CBC AUTOMATED: CPT | Performed by: INTERNAL MEDICINE

## 2019-11-21 PROCEDURE — 99232 SBSQ HOSP IP/OBS MODERATE 35: CPT | Performed by: PSYCHIATRY & NEUROLOGY

## 2019-11-21 PROCEDURE — 97535 SELF CARE MNGMENT TRAINING: CPT

## 2019-11-21 PROCEDURE — 97110 THERAPEUTIC EXERCISES: CPT

## 2019-11-21 RX ADMIN — PREGABALIN 100 MG: 100 CAPSULE ORAL at 20:15

## 2019-11-21 RX ADMIN — NICOTINE 1 PATCH: 21 PATCH, EXTENDED RELEASE TRANSDERMAL at 22:27

## 2019-11-21 RX ADMIN — POLYETHYLENE GLYCOL 3350 17 G: 17 POWDER, FOR SOLUTION ORAL at 20:15

## 2019-11-21 RX ADMIN — CLOPIDOGREL 75 MG: 75 TABLET, FILM COATED ORAL at 09:09

## 2019-11-21 RX ADMIN — SODIUM CHLORIDE, PRESERVATIVE FREE 10 ML: 5 INJECTION INTRAVENOUS at 20:15

## 2019-11-21 RX ADMIN — POLYETHYLENE GLYCOL 3350 17 G: 17 POWDER, FOR SOLUTION ORAL at 09:09

## 2019-11-21 RX ADMIN — PREGABALIN 100 MG: 100 CAPSULE ORAL at 09:09

## 2019-11-21 RX ADMIN — FOLIC ACID 1 MG: 1 TABLET ORAL at 09:08

## 2019-11-21 RX ADMIN — SODIUM CHLORIDE, PRESERVATIVE FREE 10 ML: 5 INJECTION INTRAVENOUS at 09:09

## 2019-11-21 RX ADMIN — MAGNESIUM HYDROXIDE 10 ML: 2400 SUSPENSION ORAL at 09:09

## 2019-11-21 RX ADMIN — ASPIRIN 81 MG: 81 TABLET, COATED ORAL at 09:08

## 2019-11-21 RX ADMIN — DULOXETINE HYDROCHLORIDE 60 MG: 60 CAPSULE, DELAYED RELEASE ORAL at 09:08

## 2019-11-21 RX ADMIN — ALBUTEROL SULFATE 2.5 MG: 2.5 SOLUTION RESPIRATORY (INHALATION) at 07:14

## 2019-11-21 RX ADMIN — ALBUTEROL SULFATE 2.5 MG: 2.5 SOLUTION RESPIRATORY (INHALATION) at 15:39

## 2019-11-21 RX ADMIN — SENNOSIDES AND DOCUSATE SODIUM 2 TABLET: 8.6; 5 TABLET ORAL at 20:15

## 2019-11-21 RX ADMIN — PANTOPRAZOLE SODIUM 40 MG: 40 TABLET, DELAYED RELEASE ORAL at 06:26

## 2019-11-22 ENCOUNTER — APPOINTMENT (OUTPATIENT)
Dept: MRI IMAGING | Facility: HOSPITAL | Age: 64
End: 2019-11-22

## 2019-11-22 LAB
ANION GAP SERPL CALCULATED.3IONS-SCNC: 12.3 MMOL/L (ref 5–15)
BUN BLD-MCNC: 20 MG/DL (ref 8–23)
BUN/CREAT SERPL: 17.5 (ref 7–25)
CALCIUM SPEC-SCNC: 9.5 MG/DL (ref 8.6–10.5)
CHLORIDE SERPL-SCNC: 99 MMOL/L (ref 98–107)
CO2 SERPL-SCNC: 29.7 MMOL/L (ref 22–29)
CREAT BLD-MCNC: 1.14 MG/DL (ref 0.57–1)
DEPRECATED RDW RBC AUTO: 44 FL (ref 37–54)
ERYTHROCYTE [DISTWIDTH] IN BLOOD BY AUTOMATED COUNT: 13.1 % (ref 12.3–15.4)
GFR SERPL CREATININE-BSD FRML MDRD: 48 ML/MIN/1.73
GLUCOSE BLD-MCNC: 83 MG/DL (ref 65–99)
HCT VFR BLD AUTO: 38.1 % (ref 34–46.6)
HGB BLD-MCNC: 12.7 G/DL (ref 12–15.9)
MCH RBC QN AUTO: 30.8 PG (ref 26.6–33)
MCHC RBC AUTO-ENTMCNC: 33.3 G/DL (ref 31.5–35.7)
MCV RBC AUTO: 92.3 FL (ref 79–97)
PLATELET # BLD AUTO: 333 10*3/MM3 (ref 140–450)
PMV BLD AUTO: 10.2 FL (ref 6–12)
POTASSIUM BLD-SCNC: 3.9 MMOL/L (ref 3.5–5.2)
RBC # BLD AUTO: 4.13 10*6/MM3 (ref 3.77–5.28)
SODIUM BLD-SCNC: 141 MMOL/L (ref 136–145)
WBC NRBC COR # BLD: 15.86 10*3/MM3 (ref 3.4–10.8)

## 2019-11-22 PROCEDURE — 97110 THERAPEUTIC EXERCISES: CPT

## 2019-11-22 PROCEDURE — 94799 UNLISTED PULMONARY SVC/PX: CPT

## 2019-11-22 PROCEDURE — 72148 MRI LUMBAR SPINE W/O DYE: CPT

## 2019-11-22 PROCEDURE — 80048 BASIC METABOLIC PNL TOTAL CA: CPT | Performed by: INTERNAL MEDICINE

## 2019-11-22 PROCEDURE — 85027 COMPLETE CBC AUTOMATED: CPT | Performed by: INTERNAL MEDICINE

## 2019-11-22 RX ORDER — ALBUTEROL SULFATE 2.5 MG/3ML
2.5 SOLUTION RESPIRATORY (INHALATION) EVERY 6 HOURS PRN
Status: DISCONTINUED | OUTPATIENT
Start: 2019-11-22 | End: 2019-11-25 | Stop reason: HOSPADM

## 2019-11-22 RX ADMIN — ASPIRIN 81 MG: 81 TABLET, COATED ORAL at 08:50

## 2019-11-22 RX ADMIN — PREGABALIN 100 MG: 100 CAPSULE ORAL at 20:34

## 2019-11-22 RX ADMIN — SENNOSIDES AND DOCUSATE SODIUM 2 TABLET: 8.6; 5 TABLET ORAL at 20:34

## 2019-11-22 RX ADMIN — FOLIC ACID 1 MG: 1 TABLET ORAL at 08:50

## 2019-11-22 RX ADMIN — PANTOPRAZOLE SODIUM 40 MG: 40 TABLET, DELAYED RELEASE ORAL at 05:57

## 2019-11-22 RX ADMIN — SODIUM CHLORIDE, PRESERVATIVE FREE 10 ML: 5 INJECTION INTRAVENOUS at 08:50

## 2019-11-22 RX ADMIN — PREGABALIN 100 MG: 100 CAPSULE ORAL at 08:50

## 2019-11-22 RX ADMIN — CLOPIDOGREL 75 MG: 75 TABLET, FILM COATED ORAL at 08:50

## 2019-11-22 RX ADMIN — DULOXETINE HYDROCHLORIDE 60 MG: 60 CAPSULE, DELAYED RELEASE ORAL at 08:50

## 2019-11-22 RX ADMIN — SODIUM CHLORIDE, PRESERVATIVE FREE 10 ML: 5 INJECTION INTRAVENOUS at 20:34

## 2019-11-22 RX ADMIN — POLYETHYLENE GLYCOL 3350 17 G: 17 POWDER, FOR SOLUTION ORAL at 20:34

## 2019-11-22 RX ADMIN — NICOTINE 1 PATCH: 21 PATCH, EXTENDED RELEASE TRANSDERMAL at 23:01

## 2019-11-22 RX ADMIN — ALBUTEROL SULFATE 2.5 MG: 2.5 SOLUTION RESPIRATORY (INHALATION) at 11:14

## 2019-11-22 RX ADMIN — POLYETHYLENE GLYCOL 3350 17 G: 17 POWDER, FOR SOLUTION ORAL at 08:49

## 2019-11-23 ENCOUNTER — APPOINTMENT (OUTPATIENT)
Dept: GENERAL RADIOLOGY | Facility: HOSPITAL | Age: 64
End: 2019-11-23

## 2019-11-23 PROCEDURE — 73620 X-RAY EXAM OF FOOT: CPT

## 2019-11-23 PROCEDURE — 99231 SBSQ HOSP IP/OBS SF/LOW 25: CPT | Performed by: PSYCHIATRY & NEUROLOGY

## 2019-11-23 PROCEDURE — 97110 THERAPEUTIC EXERCISES: CPT

## 2019-11-23 PROCEDURE — 94640 AIRWAY INHALATION TREATMENT: CPT

## 2019-11-23 PROCEDURE — 73560 X-RAY EXAM OF KNEE 1 OR 2: CPT

## 2019-11-23 RX ORDER — HYDROCODONE BITARTRATE AND ACETAMINOPHEN 5; 325 MG/1; MG/1
1 TABLET ORAL EVERY 6 HOURS PRN
Status: DISCONTINUED | OUTPATIENT
Start: 2019-11-23 | End: 2019-11-25 | Stop reason: HOSPADM

## 2019-11-23 RX ADMIN — CLOPIDOGREL 75 MG: 75 TABLET, FILM COATED ORAL at 08:25

## 2019-11-23 RX ADMIN — FOLIC ACID 1 MG: 1 TABLET ORAL at 08:25

## 2019-11-23 RX ADMIN — ASPIRIN 81 MG: 81 TABLET, COATED ORAL at 08:25

## 2019-11-23 RX ADMIN — NICOTINE 1 PATCH: 21 PATCH, EXTENDED RELEASE TRANSDERMAL at 22:00

## 2019-11-23 RX ADMIN — MAGNESIUM HYDROXIDE 10 ML: 2400 SUSPENSION ORAL at 08:25

## 2019-11-23 RX ADMIN — HYDROCODONE BITARTRATE AND ACETAMINOPHEN 1 TABLET: 5; 325 TABLET ORAL at 21:52

## 2019-11-23 RX ADMIN — DULOXETINE HYDROCHLORIDE 60 MG: 60 CAPSULE, DELAYED RELEASE ORAL at 08:25

## 2019-11-23 RX ADMIN — SODIUM CHLORIDE, PRESERVATIVE FREE 10 ML: 5 INJECTION INTRAVENOUS at 08:26

## 2019-11-23 RX ADMIN — SENNOSIDES AND DOCUSATE SODIUM 2 TABLET: 8.6; 5 TABLET ORAL at 20:44

## 2019-11-23 RX ADMIN — PREGABALIN 100 MG: 100 CAPSULE ORAL at 08:25

## 2019-11-23 RX ADMIN — NYSTATIN 500000 UNITS: 100000 SUSPENSION ORAL at 20:45

## 2019-11-23 RX ADMIN — HYDROCODONE BITARTRATE AND ACETAMINOPHEN 1 TABLET: 5; 325 TABLET ORAL at 13:50

## 2019-11-23 RX ADMIN — SODIUM CHLORIDE, PRESERVATIVE FREE 10 ML: 5 INJECTION INTRAVENOUS at 20:46

## 2019-11-23 RX ADMIN — PANTOPRAZOLE SODIUM 40 MG: 40 TABLET, DELAYED RELEASE ORAL at 06:12

## 2019-11-23 RX ADMIN — POLYETHYLENE GLYCOL 3350 17 G: 17 POWDER, FOR SOLUTION ORAL at 08:25

## 2019-11-24 LAB
ANION GAP SERPL CALCULATED.3IONS-SCNC: 13.9 MMOL/L (ref 5–15)
BUN BLD-MCNC: 12 MG/DL (ref 8–23)
BUN/CREAT SERPL: 18.5 (ref 7–25)
CALCIUM SPEC-SCNC: 9.4 MG/DL (ref 8.6–10.5)
CHLORIDE SERPL-SCNC: 98 MMOL/L (ref 98–107)
CO2 SERPL-SCNC: 28.1 MMOL/L (ref 22–29)
CREAT BLD-MCNC: 0.65 MG/DL (ref 0.57–1)
DEPRECATED RDW RBC AUTO: 43.5 FL (ref 37–54)
ERYTHROCYTE [DISTWIDTH] IN BLOOD BY AUTOMATED COUNT: 13.1 % (ref 12.3–15.4)
GFR SERPL CREATININE-BSD FRML MDRD: 92 ML/MIN/1.73
GLUCOSE BLD-MCNC: 93 MG/DL (ref 65–99)
HCT VFR BLD AUTO: 36.5 % (ref 34–46.6)
HGB BLD-MCNC: 12.1 G/DL (ref 12–15.9)
MCH RBC QN AUTO: 30.4 PG (ref 26.6–33)
MCHC RBC AUTO-ENTMCNC: 33.2 G/DL (ref 31.5–35.7)
MCV RBC AUTO: 91.7 FL (ref 79–97)
PLATELET # BLD AUTO: 327 10*3/MM3 (ref 140–450)
PMV BLD AUTO: 10.1 FL (ref 6–12)
POTASSIUM BLD-SCNC: 3.4 MMOL/L (ref 3.5–5.2)
RBC # BLD AUTO: 3.98 10*6/MM3 (ref 3.77–5.28)
SODIUM BLD-SCNC: 140 MMOL/L (ref 136–145)
WBC NRBC COR # BLD: 11.9 10*3/MM3 (ref 3.4–10.8)

## 2019-11-24 PROCEDURE — 80048 BASIC METABOLIC PNL TOTAL CA: CPT | Performed by: INTERNAL MEDICINE

## 2019-11-24 PROCEDURE — 85027 COMPLETE CBC AUTOMATED: CPT | Performed by: INTERNAL MEDICINE

## 2019-11-24 PROCEDURE — 99231 SBSQ HOSP IP/OBS SF/LOW 25: CPT | Performed by: PSYCHIATRY & NEUROLOGY

## 2019-11-24 RX ORDER — BACLOFEN 10 MG/1
10 TABLET ORAL
Status: DISCONTINUED | OUTPATIENT
Start: 2019-11-24 | End: 2019-11-25 | Stop reason: HOSPADM

## 2019-11-24 RX ORDER — SIMETHICONE 80 MG
80 TABLET,CHEWABLE ORAL 4 TIMES DAILY PRN
Status: DISCONTINUED | OUTPATIENT
Start: 2019-11-24 | End: 2019-11-25 | Stop reason: HOSPADM

## 2019-11-24 RX ORDER — LORAZEPAM 0.5 MG/1
0.5 TABLET ORAL EVERY 6 HOURS PRN
Status: DISCONTINUED | OUTPATIENT
Start: 2019-11-24 | End: 2019-11-25 | Stop reason: HOSPADM

## 2019-11-24 RX ORDER — GABAPENTIN 300 MG/1
300 CAPSULE ORAL NIGHTLY
Status: DISCONTINUED | OUTPATIENT
Start: 2019-11-24 | End: 2019-11-25 | Stop reason: HOSPADM

## 2019-11-24 RX ADMIN — SIMETHICONE CHEW TAB 80 MG 80 MG: 80 TABLET ORAL at 14:41

## 2019-11-24 RX ADMIN — BACLOFEN 10 MG: 10 TABLET ORAL at 17:42

## 2019-11-24 RX ADMIN — NYSTATIN 500000 UNITS: 100000 SUSPENSION ORAL at 11:00

## 2019-11-24 RX ADMIN — GABAPENTIN 300 MG: 300 CAPSULE ORAL at 20:05

## 2019-11-24 RX ADMIN — DULOXETINE HYDROCHLORIDE 60 MG: 60 CAPSULE, DELAYED RELEASE ORAL at 08:12

## 2019-11-24 RX ADMIN — NYSTATIN 500000 UNITS: 100000 SUSPENSION ORAL at 08:11

## 2019-11-24 RX ADMIN — PANTOPRAZOLE SODIUM 40 MG: 40 TABLET, DELAYED RELEASE ORAL at 06:05

## 2019-11-24 RX ADMIN — SODIUM CHLORIDE, PRESERVATIVE FREE 10 ML: 5 INJECTION INTRAVENOUS at 08:13

## 2019-11-24 RX ADMIN — POLYETHYLENE GLYCOL 3350 17 G: 17 POWDER, FOR SOLUTION ORAL at 08:11

## 2019-11-24 RX ADMIN — SODIUM CHLORIDE, PRESERVATIVE FREE 10 ML: 5 INJECTION INTRAVENOUS at 20:06

## 2019-11-24 RX ADMIN — CLOPIDOGREL 75 MG: 75 TABLET, FILM COATED ORAL at 08:12

## 2019-11-24 RX ADMIN — NYSTATIN 500000 UNITS: 100000 SUSPENSION ORAL at 20:05

## 2019-11-24 RX ADMIN — FOLIC ACID 1 MG: 1 TABLET ORAL at 08:12

## 2019-11-24 RX ADMIN — HYDROCODONE BITARTRATE AND ACETAMINOPHEN 1 TABLET: 5; 325 TABLET ORAL at 12:03

## 2019-11-24 RX ADMIN — MAGNESIUM HYDROXIDE 10 ML: 2400 SUSPENSION ORAL at 08:11

## 2019-11-24 RX ADMIN — NICOTINE 1 PATCH: 21 PATCH, EXTENDED RELEASE TRANSDERMAL at 23:09

## 2019-11-24 RX ADMIN — ASPIRIN 81 MG: 81 TABLET, COATED ORAL at 08:12

## 2019-11-24 RX ADMIN — NYSTATIN 500000 UNITS: 100000 SUSPENSION ORAL at 17:42

## 2019-11-25 ENCOUNTER — TELEPHONE (OUTPATIENT)
Dept: INTERNAL MEDICINE | Age: 64
End: 2019-11-25

## 2019-11-25 VITALS
BODY MASS INDEX: 42.6 KG/M2 | TEMPERATURE: 97.3 F | RESPIRATION RATE: 18 BRPM | WEIGHT: 231.48 LBS | HEART RATE: 98 BPM | SYSTOLIC BLOOD PRESSURE: 131 MMHG | OXYGEN SATURATION: 93 % | DIASTOLIC BLOOD PRESSURE: 91 MMHG | HEIGHT: 62 IN

## 2019-11-25 LAB
ANION GAP SERPL CALCULATED.3IONS-SCNC: 13.1 MMOL/L (ref 5–15)
BUN BLD-MCNC: 10 MG/DL (ref 8–23)
BUN/CREAT SERPL: 13.2 (ref 7–25)
CALCIUM SPEC-SCNC: 9.7 MG/DL (ref 8.6–10.5)
CHLORIDE SERPL-SCNC: 99 MMOL/L (ref 98–107)
CO2 SERPL-SCNC: 28.9 MMOL/L (ref 22–29)
CREAT BLD-MCNC: 0.76 MG/DL (ref 0.57–1)
GFR SERPL CREATININE-BSD FRML MDRD: 77 ML/MIN/1.73
GLUCOSE BLD-MCNC: 105 MG/DL (ref 65–99)
POTASSIUM BLD-SCNC: 3.4 MMOL/L (ref 3.5–5.2)
SODIUM BLD-SCNC: 141 MMOL/L (ref 136–145)

## 2019-11-25 PROCEDURE — 97110 THERAPEUTIC EXERCISES: CPT

## 2019-11-25 PROCEDURE — 80048 BASIC METABOLIC PNL TOTAL CA: CPT | Performed by: INTERNAL MEDICINE

## 2019-11-25 RX ORDER — BACLOFEN 10 MG/1
10 TABLET ORAL 2 TIMES DAILY PRN
Start: 2019-11-25 | End: 2020-02-10 | Stop reason: SDUPTHER

## 2019-11-25 RX ORDER — GABAPENTIN 300 MG/1
300 CAPSULE ORAL NIGHTLY
Qty: 3 CAPSULE | Refills: 0 | Status: SHIPPED | OUTPATIENT
Start: 2019-11-25 | End: 2020-01-10

## 2019-11-25 RX ORDER — PANTOPRAZOLE SODIUM 40 MG/1
40 TABLET, DELAYED RELEASE ORAL DAILY
Start: 2019-11-25 | End: 2020-02-10

## 2019-11-25 RX ORDER — HYDROCODONE BITARTRATE AND ACETAMINOPHEN 5; 325 MG/1; MG/1
1 TABLET ORAL EVERY 6 HOURS PRN
Qty: 12 TABLET | Refills: 0 | Status: SHIPPED | OUTPATIENT
Start: 2019-11-25 | End: 2020-02-10 | Stop reason: SDUPTHER

## 2019-11-25 RX ORDER — SIMETHICONE 80 MG
80 TABLET,CHEWABLE ORAL 4 TIMES DAILY PRN
Start: 2019-11-25 | End: 2020-02-10

## 2019-11-25 RX ADMIN — BACLOFEN 10 MG: 10 TABLET ORAL at 09:07

## 2019-11-25 RX ADMIN — NYSTATIN 500000 UNITS: 100000 SUSPENSION ORAL at 12:55

## 2019-11-25 RX ADMIN — FOLIC ACID 1 MG: 1 TABLET ORAL at 09:07

## 2019-11-25 RX ADMIN — DULOXETINE HYDROCHLORIDE 60 MG: 60 CAPSULE, DELAYED RELEASE ORAL at 09:07

## 2019-11-25 RX ADMIN — PANTOPRAZOLE SODIUM 40 MG: 40 TABLET, DELAYED RELEASE ORAL at 06:12

## 2019-11-25 RX ADMIN — ASPIRIN 81 MG: 81 TABLET, COATED ORAL at 09:07

## 2019-11-25 RX ADMIN — CLOPIDOGREL 75 MG: 75 TABLET, FILM COATED ORAL at 09:07

## 2019-11-25 RX ADMIN — SODIUM CHLORIDE, PRESERVATIVE FREE 10 ML: 5 INJECTION INTRAVENOUS at 09:09

## 2019-11-25 RX ADMIN — NYSTATIN 500000 UNITS: 100000 SUSPENSION ORAL at 09:07

## 2019-11-25 RX ADMIN — HYDROCODONE BITARTRATE AND ACETAMINOPHEN 1 TABLET: 5; 325 TABLET ORAL at 14:08

## 2019-11-25 NOTE — TELEPHONE ENCOUNTER
Since we do not attend patients in the hospital, while she is in the hospital she will be cared for by the admitting hospitalist and any consultants on the case.  After her discharge then we can review what has been done and make suggestions and recommendations at that point

## 2019-11-25 NOTE — TELEPHONE ENCOUNTER
Patients  stopped in the office his wife is still currently admitted. They have run several test with no answers as to why she is having trouble walking and feeling numb.   wanted Dr Contreras to look into her chart and offer a suggestion of something else to try but was informed that his wife needs to be discharged before she can be seen by Dr Contreras for some additonal thoughts.  He will call us to reschedule once she is completely discharged.

## 2019-12-02 ENCOUNTER — TELEPHONE (OUTPATIENT)
Dept: INTERNAL MEDICINE | Age: 64
End: 2019-12-02

## 2019-12-02 NOTE — TELEPHONE ENCOUNTER
Pt  was read dictation.  requested this information be mailed to home so it may be explained further to pt.

## 2019-12-02 NOTE — TELEPHONE ENCOUNTER
From what I have available and the information that was sent to me from her discharge note it sounds as if her problem is more neurological than it is internal medical and therefore I would suggest that follow-up and further treatment be rendered through neurology since this type of problem is generally out of my area.  If answers to her problems cannot be rendered by anyone in town then I would suggest consideration for referral to possibly [facility] or [facility] for further evaluation and 1 of those facilities.

## 2020-01-10 ENCOUNTER — OFFICE VISIT (OUTPATIENT)
Dept: NEUROLOGY | Facility: CLINIC | Age: 65
End: 2020-01-10

## 2020-01-10 VITALS
HEIGHT: 62 IN | BODY MASS INDEX: 42.33 KG/M2 | DIASTOLIC BLOOD PRESSURE: 84 MMHG | HEART RATE: 86 BPM | SYSTOLIC BLOOD PRESSURE: 128 MMHG | OXYGEN SATURATION: 98 %

## 2020-01-10 DIAGNOSIS — G60.9 IDIOPATHIC PERIPHERAL NEUROPATHY: Primary | ICD-10-CM

## 2020-01-10 PROCEDURE — 99215 OFFICE O/P EST HI 40 MIN: CPT | Performed by: PSYCHIATRY & NEUROLOGY

## 2020-01-10 RX ORDER — FENOFIBRATE 160 MG/1
160 TABLET ORAL DAILY
COMMUNITY
End: 2020-02-10

## 2020-01-10 RX ORDER — DOCUSATE SODIUM 100 MG/1
100 CAPSULE, LIQUID FILLED ORAL 2 TIMES DAILY
COMMUNITY

## 2020-01-10 RX ORDER — PREGABALIN 150 MG/1
150 CAPSULE ORAL 2 TIMES DAILY
Qty: 60 CAPSULE | Refills: 5 | Status: SHIPPED | OUTPATIENT
Start: 2020-01-10 | End: 2020-02-20 | Stop reason: SDUPTHER

## 2020-01-10 RX ORDER — CARVEDILOL PHOSPHATE 10 MG/1
10 CAPSULE, EXTENDED RELEASE ORAL DAILY
COMMUNITY
End: 2020-02-10

## 2020-01-10 RX ORDER — PREGABALIN 100 MG/1
100 CAPSULE ORAL 2 TIMES DAILY
COMMUNITY
End: 2020-01-10 | Stop reason: SDUPTHER

## 2020-01-10 NOTE — PROGRESS NOTES
CC: Weakness    HPI:  Brenda Michelle is a  64 y.o. right-handed white female with peripheral neuropathy who had notable more acute weakness when I saw her last time 11/15/2019 and was sent for admission to the hospital for further evaluation.  She is complex.  She had an MRI of the brain at [facility] and open MRI showing small vessel changes in the hermes.  Upon admission she had MRIs of the entire spine showing some miscellaneous degenerative disc disease but there was no cord compression or intrinsic cord lesion identified nor any severe foraminal stenosis at any level.  A lumbar puncture was obtained demonstrating a normal protein of 34.  The myelin basic protein was elevated but IgG synthesis rate and IgG index were normal.  She had an extensive laboratory evaluation which simply demonstrated a low folic acid level and a normal B12 level of 274 but the methylmalonic acid level was elevated and so she wound up on B12 shots and supplemental oral folate.  She had been treated with pregabalin which was switched to gabapentin for painful neuropathic symptoms but it did not seem to work as well and she was recently switched back to pregabalin 100 mg twice daily.  She feels that it wears off though before the next dose where [facility]  ([facility] her evaluation is summarized from previous note:    Results: EMG 11/15/2019  1.  Absent left sural sensory potential  2.  Absent left superficial peroneal sensory potential.  3.  Slow left peroneal motor velocity below the knee at 38.5 m/s with normal velocity in the short segment across the fibular head.  Normal distal latency with mildly low amplitude of 1.9 mV.  Slow right peroneal motor velocity below the knee at 35 m/s and in the short segment across the fibular head at 38.7 m/s.  Normal distal latency with normal amplitudes.  4.  Slow left tibial motor velocity at 36.7 m/s with normal distal latency and amplitudes.  5.   Examination of selected muscles in both legs showed normal insertional activity throughout.  There were normal motor units and recruitment patterns.     Impression:  Abnormal study showing evidence of mild to moderate peripheral neuropathy.  No evidence of a lumbosacral radiculopathy on either side by this study.  Study results were discussed with the patient.     Juvencio Hoyos M.D.           Addendum:  Review of labs performed for treatable causes of neuropathy included a vitamin B12 level which was still in the normal range at 274 but was below 300 therefore a methylmalonic acid level was obtained which was mildly elevated at 488 consistent with a mild degree of B12 deficiency.  Also her folate was low at 3.5.  She has been on supplemental B12 and folate since then.  Additional labs included the SPEP and IEP which showed no evidence of an MGUS.  Cryoglobulins were negative.  Hemoglobin A1c was normal at 5.2%.  Sed rate was 37 which is mildly elevated by standard criteria but given the patient's age this is not worrisome.  RPR nonreactive.  Free T4 1.39 and TSH 3.33.  Copper level was 122.  Heavy metals were negative.     Since the patient had some upper motor neuron appearing findings I obtained an MRI of the cervical spine.  The cervical spine was essentially normal but the hermes was visible showing signal change in the hermes.  Follow-up MRI of the brain was obtained without and with contrast showing a very large medium dense bright signal change in the hermes.  No expansion of the brainstem is seen.     The pontine finding was quite surprising.  The patient's history is not abrupt but slowly progressive.  This argues against acute infarction or central pontine myelinolysis.  I really saw no other suspicious lesions that could be multiple sclerosis on the MRI of the brain or cervical spine.     The origin of the pontine findings is probably small vessel.  She should have a CT angiogram of the neck and brain arteries  "to verify no other large vessel abnormality is present.  We will obtain these and have her follow back.        Dictated utilizing Dragon dictation.                     While hospitalized she had CT angiogram of the neck and brain arteries showing some plaquing but no hemodynamically significant stenoses.    She continues to have numbness in the hands and feet with pain in the hands and feet.  She has other pains elsewhere which have been thought to be fibromyalgia but has significant knee pain.  She apparently fell on the right knee which has a partial replacement and she has a follow-up appointment with orthopedics next week.  She apparently has \"bone-on-bone\" in the left knee also and so she has significant knee pain upon trying to ambulate.  This has affected her ability to do her rehab.  She also describes some muscle cramping in the legs primarily while doing the rehab.        Past Medical History:   Diagnosis Date   • Anxiety    • Arthritis    • benign polyps of large intestine    • COPD (chronic obstructive pulmonary disease) (CMS/[facility])    • Depression    • Difficulty walking    • Disc degeneration, lumbar    • Fibromyositis    • Hyperlipidemia    • IBS (irritable bowel syndrome)    • Kidney stones    • Movement disorder    • Nephrolithiasis    • Peripheral neuropathy    • PVD (peripheral vascular disease) (CMS/[facility])    • Shingles    • Vitamin D deficiency          Past Surgical History:   Procedure Laterality Date   • COLONOSCOPY  2004    Colon polyps; family history of colon cancer   • KNEE ARTHROPLASTY, PARTIAL REPLACEMENT  2015   • KNEE ARTHROSCOPY Left     Meniscus   • POPLITEAL ARTERY ANGIOPLASTY Bilateral    • STEROID INJECTION     • TONSILLECTOMY     • TUBAL ABDOMINAL LIGATION             Current Outpatient Medications:   •  albuterol (PROVENTIL HFA;VENTOLIN HFA) 108 (90 Base) MCG/ACT inhaler, Inhale 2 puffs Every 4 (Four) Hours As Needed for Wheezing., Disp: 1 inhaler, Rfl: 0  •  Aspirin (ASPIR-81 PO), " Take 81 mg by mouth Daily., Disp: , Rfl:   •  baclofen (LIORESAL) 10 MG tablet, Take 1 tablet by mouth 2 (Two) Times a Day As Needed for Muscle Spasms., Disp: , Rfl:   •  carvedilol CR (COREG CR) 10 MG 24 hr capsule, Take 10 mg by mouth Daily., Disp: , Rfl:   •  clopidogrel (PLAVIX) 75 MG tablet, Take 1 tablet by mouth Daily., Disp: 90 tablet, Rfl: 1  •  diclofenac (VOLTAREN) 1 % gel gel, Apply 4 g topically to the appropriate area as directed 2 (Two) Times a Day As Needed., Disp: , Rfl:   •  docusate sodium (COLACE) 100 MG capsule, Take 100 mg by mouth 2 (Two) Times a Day., Disp: , Rfl:   •  DULoxetine (CYMBALTA) 60 MG capsule, TAKE ONE CAPSULE BY MOUTH DAILY, Disp: 30 capsule, Rfl: 3  •  fenofibrate 160 MG tablet, Take 160 mg by mouth Daily., Disp: , Rfl:   •  folic acid (FOLVITE) 1 MG tablet, Take 1 tablet by mouth Daily., Disp: 30 tablet, Rfl: 3  •  HYDROcodone-acetaminophen (NORCO) 5-325 MG per tablet, Take 1 tablet by mouth Every 6 (Six) Hours As Needed for Moderate Pain  or Severe Pain ., Disp: 12 tablet, Rfl: 0  •  pantoprazole (PROTONIX) 40 MG EC tablet, Take 1 tablet by mouth Daily., Disp: , Rfl:   •  pregabalin (LYRICA) 150 MG capsule, Take 1 capsule by mouth 2 (Two) Times a Day., Disp: 60 capsule, Rfl: 5  •  simethicone (MYLICON) 80 MG chewable tablet, Chew 1 tablet 4 (Four) Times a Day As Needed for Flatulence., Disp: , Rfl:   •  fenofibrate (TRICOR) 145 MG tablet, TAKE 1 TABLET DAILY, Disp: 90 tablet, Rfl: 4      Family History   Problem Relation Age of Onset   • COPD Mother          at 69 yo    • Colon cancer Mother    • Heart disease Mother    • Stroke Mother    • Lung cancer Father         mesothelioma   • Heart disease Maternal Grandmother    • Heart disease Maternal Grandfather    • Heart disease Paternal Grandfather          Social History     Socioeconomic History   • Marital status:      Spouse name: Not on file   • Number of children: 2   • Years of education: 12    • Highest  "education level: Not on file   Occupational History   • Occupation: retired   Tobacco Use   • Smoking status: Current Every Day Smoker     Packs/day: 0.50     Types: Cigarettes     Last attempt to quit: 2017     Years since quittin.0   • Smokeless tobacco: Never Used   Substance and Sexual Activity   • Alcohol use: No   • Drug use: No         Allergies   Allergen Reactions   • Ambien [Zolpidem Tartrate] Other (See Comments)     Nightmares   • Bupropion Itching   • Codeine Sulfate Itching   • Aripiprazole    • Cilostazol Other (See Comments)     HA   • Adhesive Tape Rash   • Atorvastatin Other (See Comments)     MYALGIAS   • Ferrous Sulfate Nausea Only   • Welchol [Colesevelam Hcl] Nausea Only         Pain Scale: 8/10        ROS:  Review of Systems   Constitutional: Positive for fatigue. Negative for activity change and appetite change.   Eyes: Negative for pain, redness and itching.   Respiratory: Negative for cough, choking and shortness of breath.    Cardiovascular: Positive for leg swelling. Negative for chest pain.   Gastrointestinal: Negative for abdominal pain, nausea and vomiting.   Neurological: Positive for weakness, light-headedness and numbness. Negative for dizziness, tremors, seizures, syncope, facial asymmetry, speech difficulty and headaches.   Psychiatric/Behavioral: Positive for decreased concentration and sleep disturbance. Negative for agitation, behavioral problems, confusion, dysphoric mood, hallucinations, self-injury and suicidal ideas. The patient is nervous/anxious. The patient is not hyperactive.          I have reviewed and agree with the above ROS completed by the medical assistant.      Physical Exam:  Vitals:    01/10/20 1613   BP: 128/84   Pulse: 86   SpO2: 98%   Height: 157.5 cm (62.01\")     Orthostatic BP:    Body mass index is 42.33 kg/m².    Physical Exam  General: M obese white female no acute distress  HEENT: Normocephalic no evidence of trauma  Neck: Supple  Heart: " Regular rate and rhythm  Extremities: No vasculitic lesions are seen in the arms or legs.  No edema.      Neurological Exam:   Mental Status: Awake, alert, oriented to person, place and time.  Conversant without evidence of an affective disorder, thought disorder, delusions or hallucinations.  Attention span and concentration are normal.  HCF: No aphasia, apraxia or dysarthria.  Recent and remote memory intact.  Knowledge  of recent events intact.  CN: I:   II: Visual fields full without left inattention   III, IV, VI: Eye movements intact without nystagmus or ptosis.  Pupils equal round and reactive to light.   V,VII: Light touch and pinprick intact all 3 divisions of V.  Facial muscles symmetrical.   VIII: Hearing intact to finger rub   IX,X: Soft palate elevates symmetrically   XI: Sternomastoid and trapezius are strong.   XII: Tongue midline without atrophy or fasciculations  Motor: Normal tone and bulk in the upper and lower extremities   Power testing: Diffuse weakness in the arms worse in the hands but there is some mild giveaway quality to some of the weakness.  Much of it however seems smooth.  In the lower extremities proximal muscles are strong.  There appears to be some ankle dorsiflexor weakness and toe dorsiflexor weakness however.  Reflexes: Upper extremities: +1 diffusely        Lower extremities: Trace knee jerks absent ankle jerks        Toe signs: Downgoing  Sensory: Light touch: Diffusely diminished in the legs more so below the knees.  Significantly reduced on the fingers and dorsum of each hand        Pinprick: Diffusely diminished in the legs more so in the knees.  Reduced on the fingers and dorsum of each hand        Vibration: Unusual pattern where vibration was more persistent at the first metatarsal phalangeal joint and then at the ankle or at the knee either side        Position: Intact at the great toes    Cerebellar: Finger-to-nose: Mildly dysmetric           Rapid movement: Mildly  dysmetric           Heel-to-shin: Dysmetria  Gait and Station: Not attempted due to knee pain    Results:      Lab Results   Component Value Date    GLUCOSE 105 (H) 11/25/2019    BUN 10 11/25/2019    CREATININE 0.76 11/25/2019    EGFRIFNONA 77 11/25/2019    EGFRIFAFRI 108 06/07/2019    BCR 13.2 11/25/2019    CO2 28.9 11/25/2019    CALCIUM 9.7 11/25/2019    PROTENTOTREF 6.8 08/23/2019    ALBUMIN 3.2 (L) 11/16/2019    LABIL2 1.0 08/23/2019    AST 29 11/15/2019    AST 30 11/15/2019    ALT 8 11/15/2019    ALT 7 11/15/2019       Lab Results   Component Value Date    WBC 11.90 (H) 11/24/2019    HGB 12.1 11/24/2019    HCT 36.5 11/24/2019    MCV 91.7 11/24/2019     11/24/2019         .  Lab Results   Component Value Date    RPR Non-Reactive 08/23/2019         Lab Results   Component Value Date    TSH 0.601 11/18/2019         Lab Results   Component Value Date    XREQAECV28 1,682 (H) 11/15/2019         Lab Results   Component Value Date    FOLATE 3.50 (L) 08/23/2019         Lab Results   Component Value Date    HGBA1C 5.60 11/18/2019         Lab Results   Component Value Date    GLUCOSE 105 (H) 11/25/2019    BUN 10 11/25/2019    CREATININE 0.76 11/25/2019    EGFRIFNONA 77 11/25/2019    EGFRIFAFRI 108 06/07/2019    BCR 13.2 11/25/2019    K 3.4 (L) 11/25/2019    CO2 28.9 11/25/2019    CALCIUM 9.7 11/25/2019    PROTENTOTREF 6.8 08/23/2019    ALBUMIN 3.2 (L) 11/16/2019    LABIL2 1.0 08/23/2019    AST 29 11/15/2019    AST 30 11/15/2019    ALT 8 11/15/2019    ALT 7 11/15/2019         Lab Results   Component Value Date    WBC 11.90 (H) 11/24/2019    HGB 12.1 11/24/2019    HCT 36.5 11/24/2019    MCV 91.7 11/24/2019     11/24/2019       MRI of the brain report reviewed as well as MRI of the entire spine.  EMG report reviewed as above also as well as notes from hospitalization Dr. Sánchez      Assessment:   1.  Complex case with peripheral neuropathy and vascular pontine lesions.  The peripheral neuropathy may have  multiple components but a single origin does not seem sufficient enough to cause it.  There are no skin lesions to suggest vasculitis.  CIDP is not suggested due to normal protein in spinal fluid.  The possibility of some superimposed functional overlay is admitted but much of the exam does not look that way.  In addition she has pontine lesions that may contribute to this ataxias.  She is quite unsteady upright she says which is suggestive of a truncal ataxia  2.  Bilateral knee pain which is compounding her therapy issues  3.  Muscle cramps which are also affecting her therapy      Plan:  1.  Continue her therapy regimen  2.  She indicates she has an appointment with orthopedics next week  3.  Continue B12 shots and folate orally  4.  Repeat EMG  5.  We will discuss results when I see her for the EMG  6.  I am hesitant to suggest nerve biopsy in particular since I do not see enough evidence that vasculitis should be the source.  7.  Depending on result of EMG we may consider sending her for a second opinion  8.  Increase pregabalin to 150 mg twice daily.  9.  I asked her to go ahead and routinely take the baclofen 10 mg at night for at least a week or so and if it is not causing some hangover effect to try taking it during the day also for 2 doses daily depending on when she has the most trouble with her muscle cramping.  Alternative drug would be tizanidine which we may be able to load more at night.        >50% of this 40-minute follow-up was spent counseling the patient and her  regarding her combination of problems, further work-up and treatment options            Dictated utilizing Dragon dictation.

## 2020-01-16 ENCOUNTER — LAB (OUTPATIENT)
Dept: LAB | Facility: HOSPITAL | Age: 65
End: 2020-01-16

## 2020-01-16 ENCOUNTER — PROCEDURE VISIT (OUTPATIENT)
Dept: NEUROLOGY | Facility: CLINIC | Age: 65
End: 2020-01-16

## 2020-01-16 VITALS — HEIGHT: 62 IN | WEIGHT: 231 LBS | BODY MASS INDEX: 42.51 KG/M2

## 2020-01-16 DIAGNOSIS — G60.9 IDIOPATHIC PERIPHERAL NEUROPATHY: ICD-10-CM

## 2020-01-16 PROCEDURE — 83516 IMMUNOASSAY NONANTIBODY: CPT

## 2020-01-16 PROCEDURE — 95911 NRV CNDJ TEST 9-10 STUDIES: CPT | Performed by: PSYCHIATRY & NEUROLOGY

## 2020-01-16 PROCEDURE — 36415 COLL VENOUS BLD VENIPUNCTURE: CPT

## 2020-01-16 PROCEDURE — 86255 FLUORESCENT ANTIBODY SCREEN: CPT

## 2020-01-16 PROCEDURE — 95886 MUSC TEST DONE W/N TEST COMP: CPT | Performed by: PSYCHIATRY & NEUROLOGY

## 2020-01-16 NOTE — PROGRESS NOTES
EMG and Nerve Conduction Studies    I.      Instrument used: Neuromax 1002  II.     Please see data sheets for tabular summary of NCS and details on methods, temperatures and lab standards.   III.    EMG muscles tested for upper extremity studies include the deltoid, biceps, triceps, pronator teres, extensor digitorum communis, first dorsal interosseous and abductor pollicis brevis.    IV.   EMG muscles tested for lower extremity studies include the vastus lateralis, tibialis anterior, peroneus longus, medial gastrocnemius and extensor digitorum brevis.    V.    Additional muscles tested as needed.  Paraspinal muscles tested as needed.   VI.   Please see data sheets for tabular summary of EMG findings.   VII. The complete report includes the data sheets.      Indication: Peripheral neuropathy  History: 64-year-old woman with peripheral neuropathy.  Previous study of the lower extremities done in October demonstrated moderate findings.      Ht: 157.5 cm  Wt: 105 kg; BMI 42.3.  HbA1C:   Lab Results   Component Value Date    HGBA1C 5.60 11/18/2019     TSH:   Lab Results   Component Value Date    TSH 0.601 11/18/2019       Technical summary:  Nerve conduction studies were obtained in the left arm and left leg.  Skin temperatures were at least 32 °C measured on the palm and at the ankle with exception of the tibial study where the temperature dropped below this but temperature correction was not needed since the distal latency was normal..  Needle examination was obtained on selected muscles of the left arm and left leg.    Results:  1.  Absent left median sensory potential.  2.  Prolonged left ulnar sensory latency at 4.5 ms with low amplitude of 5.3 µV.  3.  Absent left radial sensory potential.  4.  Normal left median motor latency with slow velocity in the forearm at 42.6 m/s and above the elbow at 46.3 m/s.  Normal amplitude from the wrist at 4.8 mV but somewhat low at the elbow and above at 4.2 mV.  Normal  F-wave.  5.  Slow left ulnar motor conduction velocity in the short segment across the elbow at 39 m/s with normal velocity below the elbow and in the above elbow segment.  Normal distal latency.  Normal amplitude from wrist stimulation at 7.1 mV.  The amplitude was lower progressively further up at 4.7 mV from above the elbow but this did not constitute more than 50% conduction block.  6.  Absent left sural sensory potential.  7.  Absent left superficial peroneal sensory potential.  8.  Slow left peroneal motor velocity below the knee at 37.6 m/s with normal velocity in the short segment across the fibular head.  Normal distal latency with mildly low amplitude of 1.8 mV and no evidence of conduction block.  9.  Normal left tibial motor study.  10.  Needle examination of selected muscles of the left arm and leg showed normal insertional activities throughout.  There were normal motor units and recruitment patterns with exception of the abductor pollicis brevis and first dorsal interosseous showing reduced interference patterns and some question of reduced interference patterns of other distal muscles as noted.    Impression:  Abnormal study showing moderate peripheral neuropathy with severe sensory abnormalities.  No evidence of a left cervical or lumbosacral radiculopathy by this study.  In comparison to the previous study only minor changes were seen which were felt not to be significant.  Study results were discussed with the patient and her     Juvencio Hoyos M.D.        Addendum:  We will proceed with a motor and sensory neuropathy panel to exclude named antibodies to the nerve components.  They will call for results and she will follow-up with me in about 4 months.  We reviewed the suggestion of second opinion and they will discuss and let me know.  Continue progressive exercise.      Dictated utilizing Dragon dictation.

## 2020-01-23 LAB — REF LAB TEST RESULTS: NORMAL

## 2020-02-05 ENCOUNTER — TELEPHONE (OUTPATIENT)
Dept: INTERNAL MEDICINE | Age: 65
End: 2020-02-05

## 2020-02-05 NOTE — TELEPHONE ENCOUNTER
Pt WAS dc FROM [facility] AND NEEDS HOME HEALTH. WILL FAX ORDERS TO BE SIGN   [phone]  NURSE

## 2020-02-10 ENCOUNTER — OFFICE VISIT (OUTPATIENT)
Dept: INTERNAL MEDICINE | Age: 65
End: 2020-02-10

## 2020-02-10 VITALS
BODY MASS INDEX: 42.24 KG/M2 | RESPIRATION RATE: 13 BRPM | HEIGHT: 62 IN | DIASTOLIC BLOOD PRESSURE: 70 MMHG | TEMPERATURE: 98.8 F | OXYGEN SATURATION: 98 % | HEART RATE: 74 BPM | SYSTOLIC BLOOD PRESSURE: 120 MMHG

## 2020-02-10 DIAGNOSIS — Z09 FOLLOW UP: ICD-10-CM

## 2020-02-10 DIAGNOSIS — G60.9 IDIOPATHIC PERIPHERAL NEUROPATHY: Primary | ICD-10-CM

## 2020-02-10 DIAGNOSIS — Z51.81 ENCOUNTER FOR THERAPEUTIC DRUG MONITORING: ICD-10-CM

## 2020-02-10 DIAGNOSIS — E78.2 MIXED HYPERLIPIDEMIA: ICD-10-CM

## 2020-02-10 PROCEDURE — 99214 OFFICE O/P EST MOD 30 MIN: CPT | Performed by: INTERNAL MEDICINE

## 2020-02-10 RX ORDER — HYDROCODONE BITARTRATE AND ACETAMINOPHEN 5; 325 MG/1; MG/1
1 TABLET ORAL EVERY 6 HOURS PRN
Qty: 120 TABLET | Refills: 0 | Status: SHIPPED | OUTPATIENT
Start: 2020-02-10 | End: 2020-02-20 | Stop reason: SDUPTHER

## 2020-02-10 RX ORDER — BACLOFEN 10 MG/1
10 TABLET ORAL 2 TIMES DAILY PRN
Qty: 180 TABLET | Refills: 0 | Status: SHIPPED | OUTPATIENT
Start: 2020-02-10 | End: 2020-02-20 | Stop reason: SDUPTHER

## 2020-02-10 NOTE — PROGRESS NOTES
"  Brenda Michelle is a 64 y.o. female who presents with   Chief Complaint   Patient presents with   • Generalized weakness/peripheral neuropathy     Follow-up hospitalization November 15 through November 25, 2019-[facility]   • Hyperlipidemia     Fenofibrate 160 mg   .    64-year-old female presents with a history of progressive weakness over the last 90 days or so.  She says the weakness now involves her legs to the extent that she cannot walk independently.  Also there is some weakness affecting her upper torso as well.  She was in the hospital at [facility] from November 15 through November 25.  She was seen by neurology on that admission and was worked up for multiple sclerosis and  Guillian- Barré syndrome.  Both of those work-ups were negative and in general neurology could find no specific neurologic reasons for her symptoms.  She was described as having symptoms \"on an intermittent basis\" and there was some question about her having a possible \"conversion reaction\".  The patient vehemently denies that her symptoms are \"in my head\".  She is going to obtain a second neurological opinion from [facility]y-Dr. Marcial.    Hyperlipidemia   This is a chronic problem. The current episode started more than 1 year ago. The problem is controlled. Recent lipid tests were reviewed and are normal. Current antihyperlipidemic treatment includes fibric acid derivatives. The current treatment provides significant improvement of lipids. Compliance problems include adherence to diet.         /70   Pulse 74   Temp 98.8 °F (37.1 °C)   Resp 13   Ht 157.5 cm (62.01\")   LMP  (LMP Unknown)   SpO2 98%   Breastfeeding No   BMI 42.24 kg/m²     The following portions of the patient's history were reviewed and updated as appropriate: allergies, current medications, past medical history, past surgical history and problem list.    Review of Systems   Constitutional: Positive for fatigue.   HENT: Negative.    Eyes: " "Negative.    Respiratory: Negative.         History of COPD.  Currently on oxygen nasally   Cardiovascular: Negative.    Genitourinary: Negative.    Musculoskeletal: Negative.    Skin: Negative.    Neurological: Positive for weakness and numbness.   Psychiatric/Behavioral: Negative.        Objective   Physical Exam   Constitutional: She is oriented to person, place, and time. She appears well-developed and well-nourished. No distress.   HENT:   Head: Normocephalic and atraumatic.   Eyes: Pupils are equal, round, and reactive to light. Conjunctivae and EOM are normal.   Neck: Normal range of motion. Neck supple. No thyromegaly present.   Neck exam negative.  Carotid auscultation normal-no bruits heard.   Cardiovascular: Normal rate, regular rhythm, normal heart sounds and intact distal pulses. Exam reveals no gallop and no friction rub.   No murmur heard.  Pulmonary/Chest: Effort normal and breath sounds normal. No respiratory distress. She has no wheezes. She has no rales. She exhibits no tenderness.   Musculoskeletal:   Patient with apparent progressive weakness of her legs and arms with tingling sensations in both upper and lower extremities.  She says she \"drops things easily\".  Apparently as of yet she has had no definitive diagnosis made.   Neurological: She is alert and oriented to person, place, and time.   Psychiatric: She has a normal mood and affect. Her behavior is normal. Judgment and thought content normal.   Nursing note and vitals reviewed.      Assessment/Plan   Brenda was seen today for generalized weakness/peripheral neuropathy and hyperlipidemia.    Diagnoses and all orders for this visit:    Idiopathic peripheral neuropathy    Mixed hyperlipidemia  -     Comprehensive Metabolic Panel  -     Lipid Panel    Follow up      Plan: Progressive muscular weakness of the upper and lower extremities with a history of idiopathic peripheral neuropathy.  She has not had a diagnosis of diabetes.    Fenofibrate " "has been known to create musculoskeletal symptoms similar to statins.  She is intolerant of statins by history.  We will discontinue her fenofibrate on today's visit.    She was given carvedilol while in the hospital but has no known reason why.  She has never had any cardiac issues she says and has never had hypertension.  This medication will be discontinued and she will taper off as advised over the next week or so.    We will check her CMP/lipid panel today to follow-up on this.    I will plan on seeing her in 4 months for any necessary labs and a general checkup then.    She will see her [facility] neurologist-Dr. Marcial for a \"second opinion\".  I have also suggested for the family to consider referral to [facility] or [facility].  I also suggested a possible referral to the [employer] [facility] of neurology as well.  They will consider all of these options    Continue all other medications as prescribed         "

## 2020-02-11 LAB
ALBUMIN SERPL-MCNC: 3.7 G/DL (ref 3.8–4.8)
ALBUMIN/GLOB SERPL: 1.5 {RATIO} (ref 1.2–2.2)
ALP SERPL-CCNC: 105 IU/L (ref 39–117)
ALT SERPL-CCNC: 6 IU/L (ref 0–32)
AST SERPL-CCNC: 18 IU/L (ref 0–40)
BILIRUB SERPL-MCNC: 0.3 MG/DL (ref 0–1.2)
BUN SERPL-MCNC: 11 MG/DL (ref 8–27)
BUN/CREAT SERPL: 15 (ref 12–28)
CALCIUM SERPL-MCNC: 10 MG/DL (ref 8.7–10.3)
CHLORIDE SERPL-SCNC: 101 MMOL/L (ref 96–106)
CHOLEST SERPL-MCNC: 158 MG/DL (ref 100–199)
CO2 SERPL-SCNC: 32 MMOL/L (ref 20–29)
CREAT SERPL-MCNC: 0.74 MG/DL (ref 0.57–1)
GLOBULIN SER CALC-MCNC: 2.4 G/DL (ref 1.5–4.5)
GLUCOSE SERPL-MCNC: 90 MG/DL (ref 65–99)
HDLC SERPL-MCNC: 50 MG/DL
LDLC SERPL CALC-MCNC: 81 MG/DL (ref 0–99)
POTASSIUM SERPL-SCNC: 4.1 MMOL/L (ref 3.5–5.2)
PROT SERPL-MCNC: 6.1 G/DL (ref 6–8.5)
SODIUM SERPL-SCNC: 145 MMOL/L (ref 134–144)
TRIGL SERPL-MCNC: 134 MG/DL (ref 0–149)
VLDLC SERPL CALC-MCNC: 27 MG/DL (ref 5–40)

## 2020-02-12 ENCOUNTER — TELEPHONE (OUTPATIENT)
Dept: INTERNAL MEDICINE | Age: 65
End: 2020-02-12

## 2020-02-12 NOTE — TELEPHONE ENCOUNTER
Pt called stating [facility] went to [facility] [address] to  her prescription for HYDROcodone-acetaminophen (NORCO) 5-325 MG per tablet    They stated it would be 90$ because of the way the script was written (the quantity) on it so pt only picked up a weeks worth is suggesting a different med or written a different way. Please advise.

## 2020-02-12 NOTE — TELEPHONE ENCOUNTER
Pt notified that med was given for a month. Pts concerns were related to cost and not changing dose or med.   Pt mentioned a PA. Pt was notified that pharm would contact this office for PA if insurance deemed necessary. ZIA

## 2020-02-12 NOTE — TELEPHONE ENCOUNTER
Pt was given one month worth based off of 1 tab Q6 hrs prn. Contract signed.   Do you want to change amt?   Change med?   Or leave it the way it is and let her pick it up as needed based on price? MM

## 2020-02-14 ENCOUNTER — TELEPHONE (OUTPATIENT)
Dept: INTERNAL MEDICINE | Age: 65
End: 2020-02-14

## 2020-02-14 NOTE — TELEPHONE ENCOUNTER
Tita a physical therapist with [facility] called to inform Dr. Contreras that pt had experienced a fall earlier today onto her knees but she denies any problems or issues and states she is ok at this time. Call back number incase is [phone].

## 2020-02-16 LAB
DRUGS UR: NORMAL
WRITTEN AUTHORIZATION: NORMAL

## 2020-02-17 NOTE — PROGRESS NOTES
The urine drug compliance testing shows the expected prescription medication indicating usage as directed.

## 2020-02-20 DIAGNOSIS — G60.9 IDIOPATHIC PERIPHERAL NEUROPATHY: ICD-10-CM

## 2020-02-20 RX ORDER — HYDROCODONE BITARTRATE AND ACETAMINOPHEN 5; 325 MG/1; MG/1
1 TABLET ORAL EVERY 6 HOURS PRN
Qty: 120 TABLET | Refills: 0 | Status: SHIPPED | OUTPATIENT
Start: 2020-02-20 | End: 2020-04-15 | Stop reason: SDUPTHER

## 2020-02-20 RX ORDER — PREGABALIN 150 MG/1
150 CAPSULE ORAL 2 TIMES DAILY
Qty: 60 CAPSULE | Refills: 5 | Status: SHIPPED | OUTPATIENT
Start: 2020-02-20 | End: 2020-08-25 | Stop reason: SDUPTHER

## 2020-02-20 RX ORDER — BACLOFEN 10 MG/1
10 TABLET ORAL 2 TIMES DAILY PRN
Qty: 180 TABLET | Refills: 0 | Status: SHIPPED | OUTPATIENT
Start: 2020-02-20 | End: 2020-03-06 | Stop reason: SDUPTHER

## 2020-02-20 RX ORDER — PREGABALIN 150 MG/1
150 CAPSULE ORAL 2 TIMES DAILY
Qty: 60 CAPSULE | Refills: 5 | Status: CANCELLED | OUTPATIENT
Start: 2020-02-20

## 2020-02-20 RX ORDER — FOLIC ACID 1 MG/1
TABLET ORAL
Qty: 30 TABLET | Refills: 5 | Status: SHIPPED | OUTPATIENT
Start: 2020-02-20 | End: 2020-04-15 | Stop reason: SDUPTHER

## 2020-02-20 NOTE — TELEPHONE ENCOUNTER
Pt needs another Rx for her Hydrocodone. Her insurance will only cover 7 days at a time or get an PA. Pt  her 7 day supply on the 2/10. So now the pharmacy needs an RX for another 30 days so we can run a PA.

## 2020-02-25 ENCOUNTER — TELEPHONE (OUTPATIENT)
Dept: INTERNAL MEDICINE | Age: 65
End: 2020-02-25

## 2020-02-25 NOTE — TELEPHONE ENCOUNTER
PT CALLED IN REQUESTING A CALL BACK FROM DR. DOLL'S NURSE TO DISCUSS THE FOLIC ACID THAT SHE IS TAKING. PT ALSO STATES THAT THE PHARMACY TOLD HER THAT SHE NEEDS  A PRIOR AUTHORIZATION FOR RX  HYDROcodone-acetaminophen (NORCO) 5-325 MG per tablet. PLEASE ADVISE.      PT CALL [phone]  [facility]. CONFIRMED

## 2020-02-26 ENCOUNTER — TELEPHONE (OUTPATIENT)
Dept: INTERNAL MEDICINE | Age: 65
End: 2020-02-26

## 2020-02-26 NOTE — TELEPHONE ENCOUNTER
PATIENT  CALLED WANTING TO SPEAK TO MAGGI. HE STATES THAT ITS ABOUT SOME NUMBERS HE WANTED TO GIVE HER. HE CAN BE REACHED [phone] TO DISCUSS FURTHER.

## 2020-02-26 NOTE — TELEPHONE ENCOUNTER
Spoke c  and he expressed that pts pain med needed a prior auth for the amt that was ordered.  was advised that prior auth or started by the pharmacy r/t insurance coverage. The office can not start this process without notification by pharm.  also notified that we we sent the order as requested to specifically start the prior auth process on 2/20/2020.  stated that insurance just will not pay for that amt and he would have to pay out of pocket.  was advised that we do not deal with insurance or what the coverage is.   In regards to the folic acid he was advised that we have confirmation that folic acid refill request went thru. I will call pharm myself and find out what the problem is r/t this.    will contact insurance and find out the whole situation. ZIA

## 2020-02-26 NOTE — TELEPHONE ENCOUNTER
Spoke c Karen, Phar Tech:  In regards to Folic Acid refill; pharm did receive refill request, but med can not be just yet because it is/was 2 wks early. They can start this process again, but med will not be filled until insurance clears.  In regards to the Norco; yes they did receive both refill requests for the amt ordered, but this has been denied x2 by third party. Karen will fax the denial letter to this office for possible appeal.     Mr. Michelle was contacted again and was advised of the above. Mr. Michelle was instructed to call insurance again and find out specifics r/t med requirements. He also verbalized understanding as to why he had not gotten pts Folic Acid. MM

## 2020-02-27 NOTE — TELEPHONE ENCOUNTER
Spoke c  this am. He stated that he had a phone number to give to us for PA. At this time I advised him that the Pharm tech from local pharm had faxed us the denial letter and we would need to do an appeal.   On 2/26/2020 the appeal letter was filled out by PCP and faxed back to [facility].    verbalized understanding. No further questions at this time. MM

## 2020-02-28 ENCOUNTER — TELEPHONE (OUTPATIENT)
Dept: INTERNAL MEDICINE | Age: 65
End: 2020-02-28

## 2020-02-28 NOTE — TELEPHONE ENCOUNTER
Nikki with [facility] health called requesting orders for an extension on PT for 1 x a week for 3 weeks, effective march 3rd. Please advise. Call back # [phone].

## 2020-03-01 DIAGNOSIS — I77.9 PERIPHERAL ARTERIAL OCCLUSIVE DISEASE (HCC): ICD-10-CM

## 2020-03-02 RX ORDER — CLOPIDOGREL BISULFATE 75 MG/1
TABLET ORAL
Qty: 90 TABLET | Refills: 3 | Status: SHIPPED | OUTPATIENT
Start: 2020-03-02 | End: 2021-03-08 | Stop reason: SDUPTHER

## 2020-03-03 ENCOUNTER — TELEPHONE (OUTPATIENT)
Dept: NEUROLOGY | Facility: CLINIC | Age: 65
End: 2020-03-03

## 2020-03-03 ENCOUNTER — TELEPHONE (OUTPATIENT)
Dept: INTERNAL MEDICINE | Age: 65
End: 2020-03-03

## 2020-03-03 NOTE — TELEPHONE ENCOUNTER
Patients spouse called today. He said there is some confusion about the patients hydrocodone. She only received a 5 day supply from her pharmacy, per the spouse. He said he has called a couple of times requesting the RX get sent to [facility] for mail order, which he said they will allow.     Patient asked for Nguyen to call them back when she returns to the office.  Spouse said the refills keep getting denied, but I told them it could be due to just refilling it on 02.20.2020 for qty for 30 days at [facility]. He acted as if he didn't know about that and said to just have Nguyen call.

## 2020-03-06 DIAGNOSIS — G60.9 IDIOPATHIC PERIPHERAL NEUROPATHY: ICD-10-CM

## 2020-03-06 RX ORDER — BACLOFEN 10 MG/1
10 TABLET ORAL 2 TIMES DAILY PRN
Qty: 180 TABLET | Refills: 0 | Status: SHIPPED | OUTPATIENT
Start: 2020-03-06 | End: 2020-07-26

## 2020-03-11 ENCOUNTER — TELEPHONE (OUTPATIENT)
Dept: INTERNAL MEDICINE | Age: 65
End: 2020-03-11

## 2020-03-16 ENCOUNTER — TELEPHONE (OUTPATIENT)
Dept: INTERNAL MEDICINE | Age: 65
End: 2020-03-16

## 2020-03-16 NOTE — TELEPHONE ENCOUNTER
LUIS MIGUEL IS CALLING TO GET A VERBAL ORDER TO PUT HER ON HOLD FOR PT FOR THE WEEK OF MARCH 30TH     REASONING WAITING ON AUTH FOR KNEE INJECTIONS FROM INSURANCE     CAN BE REACHED [phone]

## 2020-04-15 DIAGNOSIS — G60.9 IDIOPATHIC PERIPHERAL NEUROPATHY: ICD-10-CM

## 2020-04-15 RX ORDER — HYDROCODONE BITARTRATE AND ACETAMINOPHEN 5; 325 MG/1; MG/1
1 TABLET ORAL EVERY 6 HOURS PRN
Qty: 120 TABLET | Refills: 0 | Status: SHIPPED | OUTPATIENT
Start: 2020-04-15 | End: 2020-06-23 | Stop reason: SDUPTHER

## 2020-04-15 RX ORDER — FOLIC ACID 1 MG/1
1000 TABLET ORAL DAILY
Qty: 30 TABLET | Refills: 5 | Status: SHIPPED | OUTPATIENT
Start: 2020-04-15 | End: 2020-10-06

## 2020-04-28 ENCOUNTER — TELEPHONE (OUTPATIENT)
Dept: INTERNAL MEDICINE | Age: 65
End: 2020-04-28

## 2020-04-28 NOTE — TELEPHONE ENCOUNTER
PHARMACY CALLED TO CHECK TO SHE IF PATIENT FILL ISTORY HAS BEEN CHECK USING A PRESCRIPTION MONITORING PROGRAM FOR HYDROcodone-acetaminophen (NORCO) 5-325 MG per tablet      CASE NUMBER IS  [id number]

## 2020-04-28 NOTE — TELEPHONE ENCOUNTER
This message does not make a whole lot of sense but from what I can tell they are wanting to know if she is compliant with her urine drug screens, Benjamin reporting and drug contract signatures.  Check and see if she is but if she is not she will have to update what ever she is deficient and

## 2020-06-08 ENCOUNTER — OFFICE VISIT (OUTPATIENT)
Dept: INTERNAL MEDICINE | Age: 65
End: 2020-06-08

## 2020-06-08 VITALS
WEIGHT: 231 LBS | OXYGEN SATURATION: 98 % | HEIGHT: 62 IN | SYSTOLIC BLOOD PRESSURE: 160 MMHG | TEMPERATURE: 98 F | DIASTOLIC BLOOD PRESSURE: 88 MMHG | BODY MASS INDEX: 42.51 KG/M2 | RESPIRATION RATE: 13 BRPM | HEART RATE: 83 BPM

## 2020-06-08 DIAGNOSIS — Z00.00 HEALTHCARE MAINTENANCE: ICD-10-CM

## 2020-06-08 DIAGNOSIS — M79.7 FIBROMYALGIA: ICD-10-CM

## 2020-06-08 DIAGNOSIS — E78.2 MIXED HYPERLIPIDEMIA: ICD-10-CM

## 2020-06-08 DIAGNOSIS — N30.90 CYSTITIS: Primary | ICD-10-CM

## 2020-06-08 DIAGNOSIS — Z51.81 ENCOUNTER FOR THERAPEUTIC DRUG MONITORING: ICD-10-CM

## 2020-06-08 DIAGNOSIS — G60.9 IDIOPATHIC PERIPHERAL NEUROPATHY: ICD-10-CM

## 2020-06-08 PROCEDURE — 99214 OFFICE O/P EST MOD 30 MIN: CPT | Performed by: INTERNAL MEDICINE

## 2020-06-08 NOTE — PROGRESS NOTES
"  Brenda Michelle is a 64 y.o. female who presents with   Chief Complaint   Patient presents with   • Urinary Tract Infection     Suspected.  Says she has had burning on urination with a foul-smelling urine \"for months\"   • Hyperlipidemia     Not on statin therapy any longer because of in the past weakness of her legs   • Peripheral Neuropathy     Chronic.  Sees Dr. Juvencio Hoyos-neurology   • Fibromyalgia     Chronic.  As above.   .    64-year-old female.  Presents with history as outlined above.    Urinary Tract Infection    This is a chronic problem. The current episode started more than 1 month ago. The problem occurs every urination. The problem has been waxing and waning. The quality of the pain is described as burning. There has been no fever. Pertinent negatives include no chills, discharge, flank pain, hematuria, nausea, sweats or urgency. She has tried nothing for the symptoms.   Hyperlipidemia   This is a chronic problem. The current episode started more than 1 year ago. The problem is controlled. Recent lipid tests were reviewed and are normal. She is currently on no antihyperlipidemic treatment.   Peripheral Neuropathy   This is a chronic problem. The current episode started more than 1 year ago. The problem has been unchanged. Pertinent negatives include no chills or nausea. Treatments tried: Lyrica; Norco. The treatment provided mild relief.   Fibromyalgia   This is a chronic problem. The current episode started more than 1 year ago. The problem has been waxing and waning. Pertinent negatives include no chills or nausea. Treatments tried: Lyrica; Norco.        /88   Pulse 83   Temp 98 °F (36.7 °C)   Resp 13   Ht 157.5 cm (62.01\")   Wt 105 kg (231 lb)   LMP  (LMP Unknown)   SpO2 98%   BMI 42.24 kg/m²     The following portions of the patient's history were reviewed and updated as appropriate: allergies, current medications, past medical history and problem list.    Review of Systems "   Constitutional: Negative.  Negative for chills.   HENT: Negative.    Eyes: Negative.    Respiratory: Negative.    Cardiovascular: Negative.    Gastrointestinal: Negative for nausea.   Genitourinary: Negative.  Negative for flank pain, hematuria and urgency.   Musculoskeletal: Negative.    Skin: Negative.    Neurological: Negative.    Psychiatric/Behavioral: Negative.        Objective   Physical Exam   Constitutional: She is oriented to person, place, and time. She appears well-developed and well-nourished. No distress.   HENT:   Head: Normocephalic and atraumatic.   Eyes: Pupils are equal, round, and reactive to light. Conjunctivae and EOM are normal.   Neck: Normal range of motion. Neck supple. No thyromegaly present.   Neck exam negative.  Carotid auscultation normal-no bruits heard.   Cardiovascular: Normal rate, regular rhythm, normal heart sounds and intact distal pulses. Exam reveals no gallop and no friction rub.   No murmur heard.  Pulmonary/Chest: Effort normal and breath sounds normal. No respiratory distress. She has no wheezes. She has no rales. She exhibits no tenderness.   Neurological: She is alert and oriented to person, place, and time.   Psychiatric: She has a normal mood and affect. Her behavior is normal. Judgment and thought content normal.   Nursing note and vitals reviewed.      Assessment/Plan   Brenda was seen today for urinary tract infection, hyperlipidemia, peripheral neuropathy and fibromyalgia.    Diagnoses and all orders for this visit:    Cystitis  -     Comprehensive Metabolic Panel  -     Urinalysis With Culture If Indicated -    Idiopathic peripheral neuropathy  -     Comprehensive Metabolic Panel  -     TSH+Free T4    Fibromyalgia  -     Comprehensive Metabolic Panel  -     TSH+Free T4    Mixed hyperlipidemia  -     Comprehensive Metabolic Panel  -     Lipid Panel  -     TSH+Free T4    Healthcare maintenance  -     Hepatitis C Antibody    Encounter for therapeutic drug  monitoring  -     Compliance Drug Analysis, Ur - Urine, Clean Catch      Plan: Labs as above for the issues as outlined.  Assuming today's labs are acceptable-6-month checkup/lab update.    Blood pressure on today's visit elevated at 160/88.  The patient was advised to monitor blood pressure twice daily at home trying to keep the blood pressure consistently at or below 130/80.  I have suggested a 3-week period of observation to assess consistency.  If blood pressure is consistently above this level I have suggested a follow-up visit in 3 weeks to reassess the blood pressure level.

## 2020-06-10 LAB
ALBUMIN SERPL-MCNC: 4.1 G/DL (ref 3.8–4.8)
ALBUMIN/GLOB SERPL: 1.6 {RATIO} (ref 1.2–2.2)
ALP SERPL-CCNC: 120 IU/L (ref 39–117)
ALT SERPL-CCNC: 8 IU/L (ref 0–32)
APPEARANCE UR: CLEAR
AST SERPL-CCNC: 22 IU/L (ref 0–40)
BACTERIA #/AREA URNS HPF: ABNORMAL /[HPF]
BACTERIA UR CULT: ABNORMAL
BACTERIA UR CULT: ABNORMAL
BILIRUB SERPL-MCNC: 0.3 MG/DL (ref 0–1.2)
BILIRUB UR QL STRIP: NEGATIVE
BUN SERPL-MCNC: 10 MG/DL (ref 8–27)
BUN/CREAT SERPL: 14 (ref 12–28)
CALCIUM SERPL-MCNC: 10.5 MG/DL (ref 8.7–10.3)
CHLORIDE SERPL-SCNC: 95 MMOL/L (ref 96–106)
CHOLEST SERPL-MCNC: 245 MG/DL (ref 100–199)
CO2 SERPL-SCNC: 29 MMOL/L (ref 20–29)
COLOR UR: YELLOW
CREAT SERPL-MCNC: 0.69 MG/DL (ref 0.57–1)
EPI CELLS #/AREA URNS HPF: ABNORMAL /HPF (ref 0–10)
GLOBULIN SER CALC-MCNC: 2.5 G/DL (ref 1.5–4.5)
GLUCOSE SERPL-MCNC: 74 MG/DL (ref 65–99)
GLUCOSE UR QL: NEGATIVE
HCV AB S/CO SERPL IA: <0.1 S/CO RATIO (ref 0–0.9)
HDLC SERPL-MCNC: 61 MG/DL
HGB UR QL STRIP: NEGATIVE
KETONES UR QL STRIP: NEGATIVE
LDLC SERPL CALC-MCNC: 157 MG/DL (ref 0–99)
LEUKOCYTE ESTERASE UR QL STRIP: ABNORMAL
MICRO URNS: ABNORMAL
NITRITE UR QL STRIP: POSITIVE
OTHER ANTIBIOTIC SUSC ISLT: ABNORMAL
PH UR STRIP: 7 [PH] (ref 5–7.5)
POTASSIUM SERPL-SCNC: 4.1 MMOL/L (ref 3.5–5.2)
PROT SERPL-MCNC: 6.6 G/DL (ref 6–8.5)
PROT UR QL STRIP: NEGATIVE
RBC #/AREA URNS HPF: ABNORMAL /HPF (ref 0–2)
SODIUM SERPL-SCNC: 141 MMOL/L (ref 134–144)
SP GR UR: 1.01 (ref 1–1.03)
T4 FREE SERPL-MCNC: 1.22 NG/DL (ref 0.82–1.77)
TRIGL SERPL-MCNC: 135 MG/DL (ref 0–149)
TSH SERPL DL<=0.005 MIU/L-ACNC: 1.18 UIU/ML (ref 0.45–4.5)
URINALYSIS REFLEX: ABNORMAL
UROBILINOGEN UR STRIP-MCNC: 0.2 MG/DL (ref 0.2–1)
VLDLC SERPL CALC-MCNC: 27 MG/DL (ref 5–40)
WBC #/AREA URNS HPF: ABNORMAL /HPF (ref 0–5)

## 2020-06-11 DIAGNOSIS — N30.00 ACUTE CYSTITIS WITHOUT HEMATURIA: Primary | ICD-10-CM

## 2020-06-11 RX ORDER — SULFAMETHOXAZOLE AND TRIMETHOPRIM 800; 160 MG/1; MG/1
1 TABLET ORAL 2 TIMES DAILY
Qty: 20 TABLET | Refills: 0 | Status: SHIPPED | OUTPATIENT
Start: 2020-06-11 | End: 2020-07-01

## 2020-06-13 LAB — DRUGS UR: NORMAL

## 2020-06-23 DIAGNOSIS — G60.9 IDIOPATHIC PERIPHERAL NEUROPATHY: ICD-10-CM

## 2020-06-23 RX ORDER — HYDROCODONE BITARTRATE AND ACETAMINOPHEN 5; 325 MG/1; MG/1
1 TABLET ORAL EVERY 6 HOURS PRN
Qty: 120 TABLET | Refills: 0 | Status: SHIPPED | OUTPATIENT
Start: 2020-06-23 | End: 2020-08-25 | Stop reason: SDUPTHER

## 2020-06-23 NOTE — TELEPHONE ENCOUNTER
PATIENT CALLED TO REQUEST REFILLS OF    HYDROcodone-acetaminophen (NORCO) 5-325 MG per tablet    [facility] - [place], [place] [address] [phone] [facility] [phone] FX

## 2020-06-24 ENCOUNTER — TELEPHONE (OUTPATIENT)
Dept: INTERNAL MEDICINE | Age: 65
End: 2020-06-24

## 2020-06-24 DIAGNOSIS — N76.0 ACUTE VAGINITIS: Primary | ICD-10-CM

## 2020-06-24 RX ORDER — FLUCONAZOLE 100 MG/1
100 TABLET ORAL DAILY
Qty: 3 TABLET | Refills: 0 | Status: SHIPPED | OUTPATIENT
Start: 2020-06-24 | End: 2020-07-01

## 2020-07-01 ENCOUNTER — OFFICE VISIT (OUTPATIENT)
Dept: INTERNAL MEDICINE | Age: 65
End: 2020-07-01

## 2020-07-01 VITALS
HEART RATE: 71 BPM | WEIGHT: 231 LBS | BODY MASS INDEX: 42.51 KG/M2 | DIASTOLIC BLOOD PRESSURE: 98 MMHG | OXYGEN SATURATION: 100 % | TEMPERATURE: 97.6 F | HEIGHT: 62 IN | RESPIRATION RATE: 13 BRPM | SYSTOLIC BLOOD PRESSURE: 156 MMHG

## 2020-07-01 DIAGNOSIS — N30.90 CYSTITIS: Primary | ICD-10-CM

## 2020-07-01 DIAGNOSIS — N95.1 MENOPAUSAL SYNDROME: ICD-10-CM

## 2020-07-01 PROCEDURE — 99214 OFFICE O/P EST MOD 30 MIN: CPT | Performed by: INTERNAL MEDICINE

## 2020-07-01 RX ORDER — SULFAMETHOXAZOLE AND TRIMETHOPRIM 800; 160 MG/1; MG/1
1 TABLET ORAL 2 TIMES DAILY
Qty: 10 TABLET | Refills: 0 | Status: SHIPPED | OUTPATIENT
Start: 2020-07-01 | End: 2021-01-25

## 2020-07-01 RX ORDER — FLUCONAZOLE 100 MG/1
TABLET ORAL
Qty: 3 TABLET | Refills: 1 | Status: SHIPPED | OUTPATIENT
Start: 2020-07-01 | End: 2021-01-25

## 2020-07-01 NOTE — PROGRESS NOTES
"  Brenda Michelle is a 64 y.o. female who presents with   Chief Complaint   Patient presents with   • Urinary Tract Infection     Recent E. coli urinary tract infection.  Treatment with Bactrim DS twice daily for 10 days.  Patient reports \"the smell of the urine is much better but there is still some burning with urination\".   • Wants estrogen levels checked     \"Growing chin whiskers\"   .    64-year-old female.  Recent cystitis with E. coli.  Treatment with Bactrim as noted.  Rest of history is as above.  Also requesting estrogen levels to be done for the reason as outlined above.  Finally she has continued problems with being off balance.  She was undergoing physical therapy when the coronavirus pandemic hip and that had to be placed on hold.  The  will try to obtain the name of the therapist that was doing the in-home physical therapy since it did seem to be helping and if necessary get that started again.  If we need to enter a referral he will let us know who to do that to.    Urinary Tract Infection    This is a new problem. The current episode started 1 to 4 weeks ago. The problem has been gradually improving. The quality of the pain is described as burning. There has been no fever. Treatments tried: Bactrim DS twice daily for 10 days. The treatment provided mild relief.        /98   Pulse 71   Temp 97.6 °F (36.4 °C)   Resp 13   Ht 157.5 cm (62.01\")   Wt 105 kg (231 lb)   LMP  (LMP Unknown)   SpO2 100%   BMI 42.24 kg/m²     The following portions of the patient's history were reviewed and updated as appropriate: allergies, current medications, past medical history and problem list.    Review of Systems   Constitutional: Negative.    HENT: Negative.    Eyes: Negative.    Respiratory: Negative.    Cardiovascular: Negative.    Genitourinary: Negative.    Musculoskeletal: Negative.    Skin: Negative.    Neurological: Negative.    Psychiatric/Behavioral: Negative.        Objective   Physical " Exam   Constitutional: She is oriented to person, place, and time. She appears well-developed and well-nourished. No distress.   HENT:   Head: Normocephalic and atraumatic.   Eyes: Pupils are equal, round, and reactive to light. Conjunctivae and EOM are normal.   Neck: Normal range of motion. Neck supple. No thyromegaly present.   Neck exam negative.  Carotid auscultation normal-no bruits heard.   Cardiovascular: Normal rate, regular rhythm, normal heart sounds and intact distal pulses. Exam reveals no gallop and no friction rub.   No murmur heard.  Pulmonary/Chest: Effort normal and breath sounds normal. No respiratory distress. She has no wheezes. She has no rales. She exhibits no tenderness.   Neurological: She is alert and oriented to person, place, and time.   Psychiatric: She has a normal mood and affect. Her behavior is normal. Judgment and thought content normal.   Nursing note and vitals reviewed.      Assessment/Plan   Brenda was seen today for urinary tract infection and wants estrogen levels checked.    Diagnoses and all orders for this visit:    Cystitis  -     Urinalysis With Culture If Indicated -  -     sulfamethoxazole-trimethoprim (BACTRIM DS,SEPTRA DS) 800-160 MG per tablet; Take 1 tablet by mouth 2 (Two) Times a Day.  -     fluconazole (Diflucan) 100 MG tablet; Take 1 daily until all are gone    Menopausal syndrome  -     Estrogens, Total      Plan: Urinalysis with culture if indicated.  Continue Bactrim DS twice daily for 5 days pending return of urine culture.  Diflucan 100 mg 3 times daily for prophylaxis against a yeast infection which she gets when she takes antibiotics.    Follow-up for today's issue dictated by findings.

## 2020-07-06 LAB
APPEARANCE UR: CLEAR
BACTERIA #/AREA URNS HPF: NORMAL /HPF
BILIRUB UR QL STRIP: NEGATIVE
COLOR UR: YELLOW
EPI CELLS #/AREA URNS HPF: NORMAL /HPF (ref 0–10)
ESTROGEN SERPL-MCNC: 85 PG/ML
GLUCOSE UR QL: NEGATIVE
HGB UR QL STRIP: NEGATIVE
KETONES UR QL STRIP: NEGATIVE
LEUKOCYTE ESTERASE UR QL STRIP: NEGATIVE
MICRO URNS: ABNORMAL
MICRO URNS: ABNORMAL
MUCOUS THREADS URNS QL MICRO: PRESENT /HPF
NITRITE UR QL STRIP: NEGATIVE
PH UR STRIP: 7.5 [PH] (ref 5–7.5)
PROT UR QL STRIP: NEGATIVE
RBC #/AREA URNS HPF: NORMAL /HPF (ref 0–2)
SP GR UR: <=1.005 (ref 1–1.03)
URINALYSIS REFLEX: ABNORMAL
UROBILINOGEN UR STRIP-MCNC: 0.2 MG/DL (ref 0.2–1)
WBC #/AREA URNS HPF: NORMAL /HPF (ref 0–5)

## 2020-07-26 DIAGNOSIS — G60.9 IDIOPATHIC PERIPHERAL NEUROPATHY: ICD-10-CM

## 2020-07-26 RX ORDER — BACLOFEN 10 MG/1
TABLET ORAL
Qty: 180 TABLET | Refills: 3 | Status: SHIPPED | OUTPATIENT
Start: 2020-07-26 | End: 2021-07-26 | Stop reason: SDUPTHER

## 2020-08-07 ENCOUNTER — DOCUMENTATION (OUTPATIENT)
Dept: INTERNAL MEDICINE | Age: 65
End: 2020-08-07

## 2020-08-07 NOTE — PROGRESS NOTES
August 7, 2020: Patient forwarded blood pressure readings from home since the beginning of July.  Averages around 140/70 consistently on the high end with a low end around 100/70.  She will continue all current treatment as prescribed.  Follow-up in December advised

## 2020-08-25 DIAGNOSIS — G60.9 IDIOPATHIC PERIPHERAL NEUROPATHY: ICD-10-CM

## 2020-08-25 RX ORDER — PREGABALIN 150 MG/1
150 CAPSULE ORAL 2 TIMES DAILY
Qty: 60 CAPSULE | Refills: 5 | Status: SHIPPED | OUTPATIENT
Start: 2020-08-25 | End: 2020-11-03 | Stop reason: SDUPTHER

## 2020-08-25 RX ORDER — HYDROCODONE BITARTRATE AND ACETAMINOPHEN 5; 325 MG/1; MG/1
1 TABLET ORAL EVERY 6 HOURS PRN
Qty: 120 TABLET | Refills: 0 | Status: SHIPPED | OUTPATIENT
Start: 2020-08-25 | End: 2020-10-30 | Stop reason: SDUPTHER

## 2020-08-25 NOTE — TELEPHONE ENCOUNTER
Caller: Shiva Michelle Sr    Relationship: Emergency Contact    Best call back number: [phone]  Medication needed:   Requested Prescriptions     Pending Prescriptions Disp Refills   • HYDROcodone-acetaminophen (NORCO) 5-325 MG per tablet 120 tablet 0     Sig: Take 1 tablet by mouth Every 6 (Six) Hours As Needed for Moderate Pain  or Severe Pain .   • pregabalin (LYRICA) 150 MG capsule 60 capsule 5     Sig: Take 1 capsule by mouth 2 (Two) Times a Day.       When do you need the refill by: ASAP   What details did the patient provide when requesting the medication:PATIENT REQUESTS 6 REFILLS ON THE PREGABALIN   Does the patient have less than a 3 day supply:  [] Yes  [x] No    What is the patient's preferred pharmacy: [facility] - [address] [phone] [facility] [phone]

## 2020-09-24 ENCOUNTER — TELEPHONE (OUTPATIENT)
Dept: INTERNAL MEDICINE | Age: 65
End: 2020-09-24

## 2020-09-24 NOTE — TELEPHONE ENCOUNTER
Patient  called and he is a pt as well of Dr. Contreras. He is having back issues and wife is in wheelchair. He is her  Caregiver and they want to know if PCP can order an electric wheelchair for pt to help both of them out since he is having back issues. Please call back and advise at [phone].

## 2020-10-06 DIAGNOSIS — E53.8 FOLIC ACID DEFICIENCY: Primary | ICD-10-CM

## 2020-10-06 RX ORDER — FOLIC ACID 1 MG/1
TABLET ORAL
Qty: 30 TABLET | Refills: 11 | Status: SHIPPED | OUTPATIENT
Start: 2020-10-06 | End: 2021-05-26 | Stop reason: SDUPTHER

## 2020-10-22 ENCOUNTER — TELEPHONE (OUTPATIENT)
Dept: INTERNAL MEDICINE | Age: 65
End: 2020-10-22

## 2020-10-22 NOTE — TELEPHONE ENCOUNTER
MAUDE pt and answered his questions in regards to get a flu injection for him and his wife. . Pt verbalized understanding.

## 2020-10-22 NOTE — TELEPHONE ENCOUNTER
SW pt  and let him know we have sent over paperwork twice now. But I printed paperwork and faxed it over again. Pt stated he would call [place] to see where the order stands.

## 2020-10-22 NOTE — TELEPHONE ENCOUNTER
PATIENT'S  TRINH CALLED REQUESTING INFORMATION ON FLU SHOTS THIS YEAR FOR HIMSELF AND LUPE.    PLEASE ADVISED: [phone]

## 2020-10-22 NOTE — TELEPHONE ENCOUNTER
PATIENT'S  TRINH IS REQUESTING MORE INFORMATION ABOUT GETTING AN ELECTRIC WHEELCHAIR FOR HIS WIFE.    PLEASE ADVISE: [phone]

## 2020-10-26 ENCOUNTER — TELEPHONE (OUTPATIENT)
Dept: INTERNAL MEDICINE | Age: 65
End: 2020-10-26

## 2020-10-26 DIAGNOSIS — G60.9 IDIOPATHIC PERIPHERAL NEUROPATHY: ICD-10-CM

## 2020-10-26 RX ORDER — HYDROCODONE BITARTRATE AND ACETAMINOPHEN 5; 325 MG/1; MG/1
1 TABLET ORAL EVERY 6 HOURS PRN
Qty: 120 TABLET | Refills: 0 | Status: CANCELLED | OUTPATIENT
Start: 2020-10-26

## 2020-10-26 NOTE — TELEPHONE ENCOUNTER
Patient called in and is requesting a prior authorization for HYDROcodone-acetaminophen (NORCO) 5-325 MG per tablet       Sent to [facility] - [place], [place] - [address] [place] [address] - [phone] [facility] [phone]   [phone]        Patient call back [phone]

## 2020-10-26 NOTE — TELEPHONE ENCOUNTER
Please advise. Dr. Contreras has sent both narcotic prescription patient is on to [facility] that last refill. They are asking for a refill to be sent to [facility]. Not sure what to do?     PLEASE ADVISE

## 2020-10-29 NOTE — TELEPHONE ENCOUNTER
Patients  called wanting to know status of refill for pain medication    Please advise  [phone]

## 2020-10-30 DIAGNOSIS — G60.9 IDIOPATHIC PERIPHERAL NEUROPATHY: ICD-10-CM

## 2020-10-30 RX ORDER — HYDROCODONE BITARTRATE AND ACETAMINOPHEN 5; 325 MG/1; MG/1
1 TABLET ORAL EVERY 6 HOURS PRN
Qty: 120 TABLET | Refills: 0 | Status: SHIPPED | OUTPATIENT
Start: 2020-10-30 | End: 2020-12-23 | Stop reason: SDUPTHER

## 2020-11-03 ENCOUNTER — TELEPHONE (OUTPATIENT)
Dept: INTERNAL MEDICINE | Age: 65
End: 2020-11-03

## 2020-11-03 DIAGNOSIS — G60.9 IDIOPATHIC PERIPHERAL NEUROPATHY: ICD-10-CM

## 2020-11-03 RX ORDER — PREGABALIN 150 MG/1
150 CAPSULE ORAL 2 TIMES DAILY
Qty: 12 CAPSULE | Refills: 0 | Status: SHIPPED | OUTPATIENT
Start: 2020-11-03 | End: 2021-01-25

## 2020-11-03 NOTE — TELEPHONE ENCOUNTER
Caller: Shiva Michelle Sr    Relationship: Spouse    Best call back number: [phone]    Medication needed:   Requested Prescriptions     Pending Prescriptions Disp Refills   • pregabalin (LYRICA) 150 MG capsule 60 capsule 5     Sig: Take 1 capsule by mouth 2 (Two) Times a Day.       When do you need the refill by: 11/8/20    What details did the patient provide when requesting the medication: PATIENT'S  STATED THAT THE PATIENT CURRENTLY HAS ENOUGH MEDICATION TO GET HER THROUGH ABOUT 5 DAYS.    Does the patient have less than a 3 day supply:  [] Yes  [x] No    What is the patient's preferred pharmacy: [address] [address] [place] - [phone] [facility] [phone] FX

## 2020-11-04 ENCOUNTER — TELEPHONE (OUTPATIENT)
Dept: INTERNAL MEDICINE | Age: 65
End: 2020-11-04

## 2020-11-04 NOTE — TELEPHONE ENCOUNTER
Shiva called in and stated they need a new prescription  pregabalin (LYRICA) 150 MG capsule with refills  Sent to [facility] - [place], [place] - [address] [place] [address] - [phone] [facility] [phone]   [phone] since its cheaper .       Shiva said the refill he received yesterday  Is just 12 pills .      Shiva also needs a call back wanting to know if its okay if patient gets a flu shot . Please call him and advise         Shiva call back   [phone]

## 2020-11-05 DIAGNOSIS — G60.9 IDIOPATHIC PERIPHERAL NEUROPATHY: ICD-10-CM

## 2020-11-05 RX ORDER — PREGABALIN 150 MG/1
150 CAPSULE ORAL 2 TIMES DAILY
Qty: 180 CAPSULE | Refills: 2 | Status: CANCELLED | OUTPATIENT
Start: 2020-11-05

## 2020-11-06 ENCOUNTER — TELEPHONE (OUTPATIENT)
Dept: INTERNAL MEDICINE | Age: 65
End: 2020-11-06

## 2020-11-06 NOTE — TELEPHONE ENCOUNTER
Tried to call patient at both numbers listed and was unable to reach or LM.     OKAY FOR HUB TO SHARE

## 2020-11-06 NOTE — TELEPHONE ENCOUNTER
PATIENT WOULD LIKE TO KNOW IF IT IS OKAY TO HAVE A FLU SHOT.  PATIENT REQUESTED ONE WITH THIS OFFICE HOWEVER [facility]UB ADVISED THAT AN EXISTING APPOINTMENT WAS NECESSARY.  PATIENT IS WILLING TO GO TO THE PHARMACY TO HAVE IT DONE.      PLEASE ADVISE.    PATIENT CAN BE REACHED AT  [phone]

## 2020-11-12 ENCOUNTER — TELEPHONE (OUTPATIENT)
Dept: INTERNAL MEDICINE | Age: 65
End: 2020-11-12

## 2020-11-12 NOTE — TELEPHONE ENCOUNTER
MAUDE pt and gave her # of [facility] in [facility].

## 2020-11-12 NOTE — TELEPHONE ENCOUNTER
Patients , Shiva, would like Dr. Contreras to advise on who he would recommend in the [facility] or near [facility] for a PCP. He states the patient got the letter with the three doctors on it, but would like something close to the areas listed above.     Shiva can be reached at [phone].

## 2020-11-13 ENCOUNTER — FLU SHOT (OUTPATIENT)
Dept: INTERNAL MEDICINE | Age: 65
End: 2020-11-13

## 2020-11-13 DIAGNOSIS — Z23 NEED FOR INFLUENZA VACCINATION: ICD-10-CM

## 2020-11-13 PROCEDURE — 90694 VACC AIIV4 NO PRSRV 0.5ML IM: CPT | Performed by: INTERNAL MEDICINE

## 2020-11-13 PROCEDURE — 90471 IMMUNIZATION ADMIN: CPT | Performed by: INTERNAL MEDICINE

## 2020-11-25 ENCOUNTER — OFFICE VISIT (OUTPATIENT)
Dept: FAMILY MEDICINE CLINIC | Facility: CLINIC | Age: 65
End: 2020-11-25

## 2020-11-25 DIAGNOSIS — N39.0 ACUTE UTI: Primary | ICD-10-CM

## 2020-11-25 PROCEDURE — 99442 PR PHYS/QHP TELEPHONE EVALUATION 11-20 MIN: CPT | Performed by: FAMILY MEDICINE

## 2020-11-25 RX ORDER — FLUCONAZOLE 150 MG/1
TABLET ORAL
Qty: 2 TABLET | Refills: 0 | Status: SHIPPED | OUTPATIENT
Start: 2020-11-25 | End: 2021-01-25

## 2020-11-25 RX ORDER — SULFAMETHOXAZOLE AND TRIMETHOPRIM 800; 160 MG/1; MG/1
1 TABLET ORAL 2 TIMES DAILY
Qty: 14 TABLET | Refills: 0 | Status: SHIPPED | OUTPATIENT
Start: 2020-11-25 | End: 2021-01-25

## 2020-12-09 NOTE — PROGRESS NOTES
Subjective   Brenda Michelle is a 65 y.o. female. Presents today for   Chief Complaint   Patient presents with   • Fibromyalgia       New patient here to establish care  This visit has been rescheduled as a phone visit to comply with patient safety concerns in accordance with CDC recommendations. Total time of discussion was 12 minutes.  Patient Telephone VISIT, patient gave consent to treat.      History of Present Illness  Patient new to practice on telephone to establish due to pandemic and challenges in getting here as WC bound.  She has hx of hld, pad, IPN, copd and fibromyalgia;   She has done well with lyrica for fibromyalgia;  We discussed occly use of opioids and if needs regularly will need to establish with pain mgmt, but could also consider non-schedule II medications or if infrequent use (which encouraged) refill for her.   Currently no cp/soa;  No abd pain;  Has widespread pain, but not current flare of fibro.  She does currently have burning with urination, frequency but no abd pain/n/v;  No f/c    Review of Systems   Constitutional: Negative for chills and fever.   HENT: Negative for congestion and sore throat.    Respiratory: Negative for cough and shortness of breath.    Cardiovascular: Negative for chest pain.   Gastrointestinal: Negative for abdominal pain, nausea and vomiting.   Musculoskeletal: Positive for arthralgias, back pain, gait problem and myalgias.       Patient Active Problem List   Diagnosis   • Acute deep vein thrombosis of distal leg (CMS/HCC)   • Adenomatous polyp of colon   • Depression   • Fibromyalgia   • Hyperlipidemia   • Low back pain   • Peripheral arterial occlusive disease (CMS/HCC)   • Vitamin D deficiency   • Gastroesophageal reflux disease   • Irritable bowel syndrome   • Disorder of intervertebral disc   • Peripheral neuropathy   • Fracture of multiple ribs of right side   • Fall   • Tobacco dependence   • Acute respiratory failure with hypoxia (CMS/HCC)   • COPD  (chronic obstructive pulmonary disease) (CMS/HCC)   • Weakness   • Hypopotassemia   • Dysuria   • Constipation     Past Medical History:   Diagnosis Date   • Anxiety    • Arthritis    • benign polyps of large intestine    • COPD (chronic obstructive pulmonary disease) (CMS/[facility])    • Depression    • Difficulty walking    • Disc degeneration, lumbar    • Fibromyositis    • Hyperlipidemia    • IBS (irritable bowel syndrome)    • Kidney stones    • Movement disorder    • Nephrolithiasis    • Peripheral neuropathy    • PVD (peripheral vascular disease) (CMS/[facility])    • Shingles    • Vitamin D deficiency      Past Surgical History:   Procedure Laterality Date   • COLONOSCOPY      Colon polyps; family history of colon cancer   • KNEE ARTHROPLASTY, PARTIAL REPLACEMENT     • KNEE ARTHROSCOPY Left     Meniscus   • POPLITEAL ARTERY ANGIOPLASTY Bilateral    • STEROID INJECTION     • TONSILLECTOMY     • TUBAL ABDOMINAL LIGATION       Family History   Problem Relation Age of Onset   • COPD Mother          at 67 yo    • Colon cancer Mother    • Heart disease Mother    • Stroke Mother    • Lung cancer Father         mesothelioma   • Heart disease Maternal Grandmother    • Heart disease Maternal Grandfather    • Heart disease Paternal Grandfather      Social History     Socioeconomic History   • Marital status:      Spouse name: Not on file   • Number of children: 2   • Years of education: 12    • Highest education level: Not on file   Occupational History   • Occupation: retired   Tobacco Use   • Smoking status: Current Every Day Smoker     Packs/day: 0.75     Years: 50.00     Pack years: 37.50     Types: Cigarettes   • Smokeless tobacco: Never Used   Substance and Sexual Activity   • Alcohol use: No   • Drug use: No       Allergies   Allergen Reactions   • Ambien [Zolpidem Tartrate] Other (See Comments)     Nightmares   • Bupropion Itching   • Codeine Sulfate Itching   • Adhesive Tape Rash   • Aripiprazole Unknown  - Low Severity   • Atorvastatin Other (See Comments)     MYALGIAS   • Cilostazol Other (See Comments)     HA   • Ferrous Sulfate Nausea Only   • Welchol [Colesevelam Hcl] Nausea Only       Current Outpatient Medications on File Prior to Visit   Medication Sig Dispense Refill   • albuterol (PROVENTIL HFA;VENTOLIN HFA) 108 (90 Base) MCG/ACT inhaler Inhale 2 puffs Every 4 (Four) Hours As Needed for Wheezing. 1 inhaler 0   • Aspirin (ASPIR-81 PO) Take 81 mg by mouth Daily.     • baclofen (LIORESAL) 10 MG tablet TAKE 1 TABLET TWICE A DAY AS NEEDED FOR MUSCLE SPASMS 180 tablet 3   • clopidogrel (PLAVIX) 75 MG tablet TAKE 1 TABLET DAILY 90 tablet 3   • diclofenac (VOLTAREN) 1 % gel gel Apply 4 g topically to the appropriate area as directed 2 (Two) Times a Day As Needed (idopathic neuropathy). 100 g 1   • docusate sodium (COLACE) 100 MG capsule Take 100 mg by mouth 2 (Two) Times a Day.     • DULoxetine (CYMBALTA) 60 MG capsule TAKE ONE CAPSULE BY MOUTH DAILY 30 capsule 3   • fluconazole (Diflucan) 100 MG tablet Take 1 daily until all are gone 3 tablet 1   • folic acid (FOLVITE) 1 MG tablet TAKE 1 TABLET DAILY 30 tablet 11   • HYDROcodone-acetaminophen (NORCO) 5-325 MG per tablet Take 1 tablet by mouth Every 6 (Six) Hours As Needed for Moderate Pain  or Severe Pain . 120 tablet 0   • pregabalin (LYRICA) 150 MG capsule Take 1 capsule by mouth 2 (Two) Times a Day. 12 capsule 0   • sulfamethoxazole-trimethoprim (BACTRIM DS,SEPTRA DS) 800-160 MG per tablet Take 1 tablet by mouth 2 (Two) Times a Day. 10 tablet 0     No current facility-administered medications on file prior to visit.        Objective   There were no vitals filed for this visit.  There is no height or weight on file to calculate BMI.  Physical Exam  Psychiatric:         Behavior: Behavior normal.         Thought Content: Thought content normal.         Judgment: Judgment normal.         Assessment/Plan   Diagnoses and all orders for this visit:    1. Acute UTI  (Primary)  -     sulfamethoxazole-trimethoprim (Bactrim DS) 800-160 MG per tablet; Take 1 tablet by mouth 2 (Two) Times a Day.  Dispense: 14 tablet; Refill: 0  -     fluconazole (Diflucan) 150 MG tablet; Po x1, then 1 po 3 days later  Dispense: 2 tablet; Refill: 0  -     Urine Culture - , Urine, Clean Catch    -will tx uti as classic symptoms, if can directed  pick-up urine for specimen         -Follow up: 3 months and Prn - RTC if worse or no improvement.

## 2020-12-14 ENCOUNTER — OFFICE VISIT (OUTPATIENT)
Dept: INTERNAL MEDICINE | Age: 65
End: 2020-12-14

## 2020-12-14 VITALS
OXYGEN SATURATION: 96 % | DIASTOLIC BLOOD PRESSURE: 70 MMHG | HEIGHT: 63 IN | WEIGHT: 207 LBS | HEART RATE: 74 BPM | BODY MASS INDEX: 36.68 KG/M2 | RESPIRATION RATE: 13 BRPM | SYSTOLIC BLOOD PRESSURE: 126 MMHG | TEMPERATURE: 98 F

## 2020-12-14 DIAGNOSIS — N30.90 CYSTITIS: Primary | ICD-10-CM

## 2020-12-14 DIAGNOSIS — J96.01 ACUTE RESPIRATORY FAILURE WITH HYPOXIA (HCC): Primary | ICD-10-CM

## 2020-12-14 DIAGNOSIS — E78.2 MIXED HYPERLIPIDEMIA: ICD-10-CM

## 2020-12-14 DIAGNOSIS — J43.9 PULMONARY EMPHYSEMA, UNSPECIFIED EMPHYSEMA TYPE (HCC): ICD-10-CM

## 2020-12-14 PROCEDURE — 99214 OFFICE O/P EST MOD 30 MIN: CPT | Performed by: INTERNAL MEDICINE

## 2020-12-14 RX ORDER — VARENICLINE TARTRATE 1 MG/1
1 TABLET, FILM COATED ORAL 2 TIMES DAILY
COMMUNITY
End: 2020-12-14 | Stop reason: SDUPTHER

## 2020-12-14 RX ORDER — VARENICLINE TARTRATE 1 MG/1
1 TABLET, FILM COATED ORAL DAILY
Qty: 90 TABLET | Refills: 1 | Status: SHIPPED | OUTPATIENT
Start: 2020-12-14 | End: 2021-01-25

## 2020-12-14 NOTE — PROGRESS NOTES
"  Brenda Michelle is a 65 y.o. female who presents with   Chief Complaint   Patient presents with   • Urinary Tract Infection     June 2020-E. coli UTI.  Patient currently asymptomatic but requesting urinalysis with culture if indicated for follow-up   • Hyperlipidemia     No prescription treatment   .    65-year-old female.  Follow-up checkup regarding recent UTI.  Hyperlipidemia-no prescription treatment; wants to try Chantix for smoking cessation    Urinary Tract Infection   The current episode started more than 1 month ago. Progression since onset: Currently asymptomatic. There has been no fever. She has tried antibiotics for the symptoms. The treatment provided significant relief.   Hyperlipidemia  This is a chronic problem. The current episode started more than 1 year ago. The problem is controlled. Recent lipid tests were reviewed and are normal. She is currently on no antihyperlipidemic treatment.        /70   Pulse 74   Temp 98 °F (36.7 °C) (Temporal)   Resp 13   Ht 160 cm (63\")   Wt 93.9 kg (207 lb)   LMP  (LMP Unknown)   SpO2 96%   BMI 36.67 kg/m²     The following portions of the patient's history were reviewed and updated as appropriate: allergies, current medications, past medical history and problem list.    Review of Systems   Constitutional: Negative.    HENT: Negative.    Eyes: Negative.    Respiratory: Negative.    Cardiovascular: Negative.    Genitourinary: Negative.    Musculoskeletal: Negative.    Skin: Negative.    Neurological: Negative.    Psychiatric/Behavioral: Negative.        Objective   Physical Exam  Vitals signs and nursing note reviewed.   Constitutional:       General: She is not in acute distress.     Appearance: She is well-developed. She is not diaphoretic.   HENT:      Head: Normocephalic and atraumatic.   Eyes:      Pupils: Pupils are equal, round, and reactive to light.   Neck:      Musculoskeletal: Normal range of motion and neck supple.      Thyroid: No " thyromegaly.      Comments: Neck exam negative.  Carotid auscultation normal-no bruits heard.    Cardiovascular:      Rate and Rhythm: Normal rate and regular rhythm.      Heart sounds: Normal heart sounds. No murmur. No friction rub. No gallop.    Pulmonary:      Effort: Pulmonary effort is normal. No respiratory distress.      Breath sounds: Normal breath sounds. No wheezing or rales.   Chest:      Chest wall: No tenderness.   Skin:     General: Skin is warm and dry.      Coloration: Skin is not pale.      Findings: No erythema.   Psychiatric:         Behavior: Behavior normal.         Thought Content: Thought content normal.         Judgment: Judgment normal.         Assessment/Plan   Diagnoses and all orders for this visit:    1. Cystitis (Primary)  -     Urinalysis With Culture If Indicated -    2. Mixed hyperlipidemia  -     Comprehensive Metabolic Panel  -     Lipid Panel      Plan: Labs as above.  Continue current treatment.    Chantix starter pack/continuing pack per request    Follow-up checkup/lab update-6 months-Dr. Kline to assume future medical care

## 2020-12-15 LAB
ALBUMIN SERPL-MCNC: 4 G/DL (ref 3.5–5.2)
ALBUMIN/GLOB SERPL: 1.4 G/DL
ALP SERPL-CCNC: 149 U/L (ref 39–117)
ALT SERPL-CCNC: 8 U/L (ref 1–33)
AST SERPL-CCNC: 28 U/L (ref 1–32)
BILIRUB SERPL-MCNC: 0.4 MG/DL (ref 0–1.2)
BUN SERPL-MCNC: 9 MG/DL (ref 8–23)
BUN/CREAT SERPL: 14.5 (ref 7–25)
CALCIUM SERPL-MCNC: 10.9 MG/DL (ref 8.6–10.5)
CHLORIDE SERPL-SCNC: 99 MMOL/L (ref 98–107)
CHOLEST SERPL-MCNC: 280 MG/DL (ref 0–200)
CO2 SERPL-SCNC: 36.6 MMOL/L (ref 22–29)
CREAT SERPL-MCNC: 0.62 MG/DL (ref 0.57–1)
GLOBULIN SER CALC-MCNC: 2.9 GM/DL
GLUCOSE SERPL-MCNC: 88 MG/DL (ref 65–99)
HDLC SERPL-MCNC: 58 MG/DL (ref 40–60)
LDLC SERPL CALC-MCNC: 183 MG/DL (ref 0–100)
POTASSIUM SERPL-SCNC: 5.5 MMOL/L (ref 3.5–5.2)
PROT SERPL-MCNC: 6.9 G/DL (ref 6–8.5)
SODIUM SERPL-SCNC: 143 MMOL/L (ref 136–145)
TRIGL SERPL-MCNC: 207 MG/DL (ref 0–150)
VLDLC SERPL CALC-MCNC: 39 MG/DL (ref 5–40)

## 2020-12-16 ENCOUNTER — TELEPHONE (OUTPATIENT)
Dept: INTERNAL MEDICINE | Age: 65
End: 2020-12-16

## 2020-12-16 LAB
APPEARANCE UR: CLEAR
BACTERIA #/AREA URNS HPF: NORMAL /HPF
BACTERIA UR CULT: NORMAL
BACTERIA UR CULT: NORMAL
BILIRUB UR QL STRIP: NEGATIVE
COLOR UR: YELLOW
EPI CELLS #/AREA URNS HPF: NORMAL /HPF (ref 0–10)
GLUCOSE UR QL: NEGATIVE
HGB UR QL STRIP: NEGATIVE
KETONES UR QL STRIP: NEGATIVE
LEUKOCYTE ESTERASE UR QL STRIP: ABNORMAL
MICRO URNS: ABNORMAL
MUCOUS THREADS URNS QL MICRO: PRESENT /HPF
NITRITE UR QL STRIP: NEGATIVE
PH UR STRIP: 7.5 [PH] (ref 5–7.5)
PROT UR QL STRIP: NEGATIVE
RBC #/AREA URNS HPF: NORMAL /HPF (ref 0–2)
SP GR UR: 1.01 (ref 1–1.03)
URINALYSIS REFLEX: ABNORMAL
UROBILINOGEN UR STRIP-MCNC: 0.2 MG/DL (ref 0.2–1)
WBC #/AREA URNS HPF: NORMAL /HPF (ref 0–5)

## 2020-12-16 NOTE — TELEPHONE ENCOUNTER
PATIENTS  CALLED REQUESTING INFORMATION ABOUT THE UPCOMING COVID VACCINE AND WOULD LIKE TO KNOW IF THERE WILL BE A WAIT LIST OF SORTS THAT THEY CAN BE PUT ON FOR THE VACCINE ONCE THEY DECIDE IF THEY WILL TAKE IT.    PLEASE ADVISE  [phone]

## 2020-12-18 ENCOUNTER — TELEPHONE (OUTPATIENT)
Dept: INTERNAL MEDICINE | Age: 65
End: 2020-12-18

## 2020-12-18 NOTE — TELEPHONE ENCOUNTER
I called Brenda and let her know that the Pharmacy faxed office for Clarification on Prescription. Dr. Contreras responded to fax and pharmacy was notified.  Pt was appreciative of the call.  CALVIN

## 2020-12-18 NOTE — TELEPHONE ENCOUNTER
PATIENT IS ASKING FOR A CALL BACK ABOUT HER CHANTIX RX. SHE STATES PHARMACY IS NOT FILLING AND SAYS THEY NEED TO TALK TO .    PLEASE ADVISE  [phone]

## 2020-12-21 ENCOUNTER — TELEPHONE (OUTPATIENT)
Dept: INTERNAL MEDICINE | Age: 65
End: 2020-12-21

## 2020-12-21 NOTE — TELEPHONE ENCOUNTER
PATIENT STATES: that she needs all oxygen tanks called in to  [phone] extension  [id number] Mick Sigala she would like a call back when its done   Please advise       PATIENT CAN BE REACHED ON:[phone]

## 2020-12-21 NOTE — TELEPHONE ENCOUNTER
LM ON KRISSY MERCEDES VOICEMAILFOR PT OXYGEN TANKS AND TO FAX DME ORDERS IF NEEDED PT INFORMED OF MESSAGE

## 2020-12-23 ENCOUNTER — TELEPHONE (OUTPATIENT)
Dept: INTERNAL MEDICINE | Age: 65
End: 2020-12-23

## 2020-12-23 DIAGNOSIS — G60.9 IDIOPATHIC PERIPHERAL NEUROPATHY: ICD-10-CM

## 2020-12-23 RX ORDER — HYDROCODONE BITARTRATE AND ACETAMINOPHEN 5; 325 MG/1; MG/1
1 TABLET ORAL EVERY 6 HOURS PRN
Qty: 120 TABLET | Refills: 0 | Status: SHIPPED | OUTPATIENT
Start: 2020-12-23 | End: 2021-02-22 | Stop reason: SDUPTHER

## 2020-12-23 NOTE — TELEPHONE ENCOUNTER
Caller: Brenda Michelle    Relationship: Self    Best call back number: [phone]     Medication needed:   Requested Prescriptions     Pending Prescriptions Disp Refills   • HYDROcodone-acetaminophen (NORCO) 5-325 MG per tablet 120 tablet 0     Sig: Take 1 tablet by mouth Every 6 (Six) Hours As Needed for Moderate Pain  or Severe Pain .       When do you need the refill by: asap        Does the patient have less than a 3 day supply:  [x] Yes  [] No    What is the patient's preferred pharmacy: [facility] [address] [address] [place] [facility] [phone] [facility] [phone] FX

## 2021-01-22 ENCOUNTER — TELEPHONE (OUTPATIENT)
Dept: FAMILY MEDICINE CLINIC | Facility: CLINIC | Age: 66
End: 2021-01-22

## 2021-01-22 NOTE — TELEPHONE ENCOUNTER
PT NEEDS AN APPT FOR NEUROPATHY FULL BODY PAIN BUT 3/8/2021 WAS FIRST AVAILABLE.    PLEASE CONTACT [facility] -[phone]

## 2021-01-25 ENCOUNTER — OFFICE VISIT (OUTPATIENT)
Dept: FAMILY MEDICINE CLINIC | Facility: CLINIC | Age: 66
End: 2021-01-25

## 2021-01-25 VITALS — DIASTOLIC BLOOD PRESSURE: 60 MMHG | OXYGEN SATURATION: 98 % | HEART RATE: 86 BPM | SYSTOLIC BLOOD PRESSURE: 120 MMHG

## 2021-01-25 DIAGNOSIS — M51.9 DISORDER OF INTERVERTEBRAL DISC: ICD-10-CM

## 2021-01-25 DIAGNOSIS — G60.9 IDIOPATHIC PERIPHERAL NEUROPATHY: ICD-10-CM

## 2021-01-25 DIAGNOSIS — Z00.00 MEDICARE ANNUAL WELLNESS VISIT, SUBSEQUENT: Primary | ICD-10-CM

## 2021-01-25 PROCEDURE — 96160 PT-FOCUSED HLTH RISK ASSMT: CPT | Performed by: FAMILY MEDICINE

## 2021-01-25 PROCEDURE — 1170F FXNL STATUS ASSESSED: CPT | Performed by: FAMILY MEDICINE

## 2021-01-25 PROCEDURE — G0439 PPPS, SUBSEQ VISIT: HCPCS | Performed by: FAMILY MEDICINE

## 2021-01-25 PROCEDURE — 99214 OFFICE O/P EST MOD 30 MIN: CPT | Performed by: FAMILY MEDICINE

## 2021-01-25 PROCEDURE — 1160F RVW MEDS BY RX/DR IN RCRD: CPT | Performed by: FAMILY MEDICINE

## 2021-01-25 RX ORDER — PREGABALIN 150 MG/1
150 CAPSULE ORAL 3 TIMES DAILY
Qty: 270 CAPSULE | Refills: 0
Start: 2021-01-25 | End: 2021-02-15 | Stop reason: SDUPTHER

## 2021-01-25 NOTE — PROGRESS NOTES
QUICK REFERENCE INFORMATION:  The ABCs of the Annual Wellness Visit    Subsequent Medicare Wellness Visit    HEALTH RISK ASSESSMENT    1955    Recent Hospitalizations:  No hospitalization(s) within the last year..        Current Medical Providers:  Patient Care Team:  Angelito Kline DO as PCP - General (Family Medicine)        Smoking Status:  Social History     Tobacco Use   Smoking Status Former Smoker   • Packs/day: 0.75   • Years: 50.00   • Pack years: 37.50   • Types: Cigarettes   • Start date: 1960   • Quit date: 2020   • Years since quittin.2   Smokeless Tobacco Never Used       Alcohol Consumption:  Social History     Substance and Sexual Activity   Alcohol Use No       Depression Screen:   PHQ-2/PHQ-9 Depression Screening 2021   Little interest or pleasure in doing things 0   Feeling down, depressed, or hopeless 0   Trouble falling or staying asleep, or sleeping too much 0   Feeling tired or having little energy 0   Poor appetite or overeating 0   Feeling bad about yourself - or that you are a failure or have let yourself or your family down 0   Trouble concentrating on things, such as reading the newspaper or watching television 0   Moving or speaking so slowly that other people could have noticed. Or the opposite - being so fidgety or restless that you have been moving around a lot more than usual 0   Thoughts that you would be better off dead, or of hurting yourself in some way 0   Total Score 0   If you checked off any problems, how difficult have these problems made it for you to do your work, take care of things at home, or get along with other people? Not difficult at all       Health Habits and Functional and Cognitive Screening:  Functional & Cognitive Status 2021   Do you have difficulty preparing food and eating? Yes   Do you have difficulty bathing yourself, getting dressed or grooming yourself? No   Do you have difficulty using the toilet? No   Do you have  difficulty moving around from place to place? No   Do you have trouble with steps or getting out of a bed or a chair? No   Current Diet Well Balanced Diet   Dental Exam Up to date   Eye Exam Up to date   Exercise (times per week) 0 times per week   Current Exercises Include No Regular Exercise   Do you need help using the phone?  No   Are you deaf or do you have serious difficulty hearing?  No   Do you need help with transportation? Yes   Do you need help shopping? Yes   Do you need help preparing meals?  Yes   Do you need help with housework?  Yes   Do you need help with laundry? Yes   Do you need help taking your medications? No   Do you need help managing money? No   Do you ever drive or ride in a car without wearing a seat belt? No   Have you felt unusual stress, anger or loneliness in the last month? No   Who do you live with? Spouse   If you need help, do you have trouble finding someone available to you? No   Have you been bothered in the last four weeks by sexual problems? No   Do you have difficulty concentrating, remembering or making decisions? No           Does the patient have evidence of cognitive impairment? No    Aspirin use counseling: Taking ASA appropriately as indicated      Recent Lab Results:  CMP:  Lab Results   Component Value Date    GLU 88 12/14/2020    BUN 9 12/14/2020    CREATININE 0.62 12/14/2020    EGFRIFNONA 97 12/14/2020    EGFRIFAFRI 117 12/14/2020    BCR 14.5 12/14/2020     12/14/2020    K 5.5 (H) 12/14/2020    CO2 36.6 (H) 12/14/2020    CALCIUM 10.9 (H) 12/14/2020    PROTENTOTREF 6.9 12/14/2020    ALBUMIN 4.00 12/14/2020    LABGLOBREF 2.9 12/14/2020    LABIL2 1.4 12/14/2020    BILITOT 0.4 12/14/2020    ALKPHOS 149 (H) 12/14/2020    AST 28 12/14/2020    ALT 8 12/14/2020     Lipid Panel:  Lab Results   Component Value Date    TRIG 207 (H) 12/14/2020    HDL 58 12/14/2020    VLDL 39 12/14/2020     HbA1c:  Lab Results   Component Value Date    HGBA1C 5.60 11/18/2019       Visual  Acuity:  No exam data present    Age-appropriate Screening Schedule:  Refer to the list below for future screening recommendations based on patient's age, sex and/or medical conditions. Orders for these recommended tests are listed in the plan section. The patient has been provided with a written plan.    Health Maintenance   Topic Date Due   • TDAP/TD VACCINES (2 - Td) 08/19/2021   • MAMMOGRAM  08/29/2021   • LIPID PANEL  12/14/2021   • PAP SMEAR  06/08/2023   • COLONOSCOPY  06/08/2030   • INFLUENZA VACCINE  Completed        Subjective   History of Present Illness    Brenda Michelle is a 65 y.o. female who presents for an Subsequent Wellness Visit.    The following portions of the patient's history were reviewed and updated as appropriate: allergies, current medications, past family history, past medical history, past social history, past surgical history and problem list.    Outpatient Medications Prior to Visit   Medication Sig Dispense Refill   • albuterol (PROVENTIL HFA;VENTOLIN HFA) 108 (90 Base) MCG/ACT inhaler Inhale 2 puffs Every 4 (Four) Hours As Needed for Wheezing. 1 inhaler 0   • Aspirin (ASPIR-81 PO) Take 81 mg by mouth Daily.     • baclofen (LIORESAL) 10 MG tablet TAKE 1 TABLET TWICE A DAY AS NEEDED FOR MUSCLE SPASMS 180 tablet 3   • clopidogrel (PLAVIX) 75 MG tablet TAKE 1 TABLET DAILY 90 tablet 3   • diclofenac (VOLTAREN) 1 % gel gel Apply 4 g topically to the appropriate area as directed 2 (Two) Times a Day As Needed (idopathic neuropathy). 100 g 1   • docusate sodium (COLACE) 100 MG capsule Take 100 mg by mouth 2 (Two) Times a Day.     • DULoxetine (CYMBALTA) 60 MG capsule TAKE ONE CAPSULE BY MOUTH DAILY 30 capsule 3   • folic acid (FOLVITE) 1 MG tablet TAKE 1 TABLET DAILY 30 tablet 11   • HYDROcodone-acetaminophen (NORCO) 5-325 MG per tablet Take 1 tablet by mouth Every 6 (Six) Hours As Needed for Moderate Pain  or Severe Pain . 120 tablet 0   • pregabalin (LYRICA) 150 MG capsule Take 1 capsule by  mouth 2 (Two) Times a Day. 12 capsule 0   • fluconazole (Diflucan) 100 MG tablet Take 1 daily until all are gone 3 tablet 1   • fluconazole (Diflucan) 150 MG tablet Po x1, then 1 po 3 days later 2 tablet 0   • sulfamethoxazole-trimethoprim (Bactrim DS) 800-160 MG per tablet Take 1 tablet by mouth 2 (Two) Times a Day. 14 tablet 0   • sulfamethoxazole-trimethoprim (BACTRIM DS,SEPTRA DS) 800-160 MG per tablet Take 1 tablet by mouth 2 (Two) Times a Day. 10 tablet 0   • varenicline (Chantix Starting Month Pak) 0.5 MG X 11 & 1 MG X 42 tablet Take 0.5 mg by mouth Daily. Take 0.5 mg one daily on days 1-3 and and 0.5 mg twice daily on days 4-7.Then 1 mg twice daily for a total of 12 weeks. 70 tablet 0   • varenicline (CHANTIX) 1 MG tablet Take 1 tablet by mouth Daily. 90 tablet 1     No facility-administered medications prior to visit.        Patient Active Problem List   Diagnosis   • Acute deep vein thrombosis of distal leg (CMS/HCC)   • Adenomatous polyp of colon   • Depression   • Fibromyalgia   • Hyperlipidemia   • Low back pain   • Peripheral arterial occlusive disease (CMS/HCC)   • Vitamin D deficiency   • Gastroesophageal reflux disease   • Irritable bowel syndrome   • Disorder of intervertebral disc   • Peripheral neuropathy   • Fracture of multiple ribs of right side   • Fall   • Tobacco dependence   • Acute respiratory failure with hypoxia (CMS/HCC)   • COPD (chronic obstructive pulmonary disease) (CMS/HCC)   • Weakness   • Hypopotassemia   • Dysuria   • Constipation       Advance Care Planning:  ACP discussion was held with the patient during this visit. Patient does not have an advance directive, information provided.    Identification of Risk Factors:  Risk factors include: Obesity/Overweight .    Review of Systems   Musculoskeletal: Positive for back pain.   Neurological: Positive for tingling and numbness.       Compared to one year ago, the patient feels her physical health is the same.  Compared to one year  ago, the patient feels her mental health is the same.    Objective     Physical Exam  Vitals signs and nursing note reviewed.   Constitutional:       Appearance: Normal appearance. She is not toxic-appearing or diaphoretic.   HENT:      Head: Normocephalic and atraumatic.   Neck:      Musculoskeletal: Neck supple.   Skin:     General: Skin is warm and dry.      Capillary Refill: Capillary refill takes less than 2 seconds.   Neurological:      Mental Status: She is alert.      Motor: Weakness and abnormal muscle tone present.      Comments: In WC today   Psychiatric:         Mood and Affect: Mood normal.         Behavior: Behavior normal.         Vitals:    01/25/21 1331   BP: 120/60   Pulse: 86   SpO2: 98%   Weight: Comment: pt unable to stand   Height: Comment: pt unable to stand   PainSc:   6   PainLoc: Leg       Patient's There is no height or weight on file to calculate BMI. BMI is above normal parameters. Recommendations include: educational material.      Assessment/Plan   Patient Self-Management and Personalized Health Advice  The patient has been provided with information about: diet and exercise and preventive services including:   · Annual Wellness Visit (AWV).    Visit Diagnoses:    ICD-10-CM ICD-9-CM   1. Medicare annual wellness visit, subsequent  Z00.00 V70.0   2. Idiopathic peripheral neuropathy  G60.9 356.9   3. Disorder of intervertebral disc  M51.9 722.90       Orders Placed This Encounter   Procedures   • Ambulatory Referral to Pain Management     Referral Priority:   Routine     Referral Type:   Pain Management     Referral Reason:   Specialty Services Required     Requested Specialty:   Pain Medicine     Number of Visits Requested:   1       Outpatient Encounter Medications as of 1/25/2021   Medication Sig Dispense Refill   • albuterol (PROVENTIL HFA;VENTOLIN HFA) 108 (90 Base) MCG/ACT inhaler Inhale 2 puffs Every 4 (Four) Hours As Needed for Wheezing. 1 inhaler 0   • Aspirin (ASPIR-81 PO) Take  81 mg by mouth Daily.     • baclofen (LIORESAL) 10 MG tablet TAKE 1 TABLET TWICE A DAY AS NEEDED FOR MUSCLE SPASMS 180 tablet 3   • clopidogrel (PLAVIX) 75 MG tablet TAKE 1 TABLET DAILY 90 tablet 3   • diclofenac (VOLTAREN) 1 % gel gel Apply 4 g topically to the appropriate area as directed 2 (Two) Times a Day As Needed (idopathic neuropathy). 100 g 1   • docusate sodium (COLACE) 100 MG capsule Take 100 mg by mouth 2 (Two) Times a Day.     • DULoxetine (CYMBALTA) 60 MG capsule TAKE ONE CAPSULE BY MOUTH DAILY 30 capsule 3   • folic acid (FOLVITE) 1 MG tablet TAKE 1 TABLET DAILY 30 tablet 11   • HYDROcodone-acetaminophen (NORCO) 5-325 MG per tablet Take 1 tablet by mouth Every 6 (Six) Hours As Needed for Moderate Pain  or Severe Pain . 120 tablet 0   • pregabalin (LYRICA) 150 MG capsule Take 1 capsule by mouth 3 (Three) Times a Day. 270 capsule 0   • [DISCONTINUED] pregabalin (LYRICA) 150 MG capsule Take 1 capsule by mouth 2 (Two) Times a Day. 12 capsule 0   • [DISCONTINUED] fluconazole (Diflucan) 100 MG tablet Take 1 daily until all are gone 3 tablet 1   • [DISCONTINUED] fluconazole (Diflucan) 150 MG tablet Po x1, then 1 po 3 days later 2 tablet 0   • [DISCONTINUED] sulfamethoxazole-trimethoprim (Bactrim DS) 800-160 MG per tablet Take 1 tablet by mouth 2 (Two) Times a Day. 14 tablet 0   • [DISCONTINUED] sulfamethoxazole-trimethoprim (BACTRIM DS,SEPTRA DS) 800-160 MG per tablet Take 1 tablet by mouth 2 (Two) Times a Day. 10 tablet 0   • [DISCONTINUED] varenicline (Chantix Starting Month Jason) 0.5 MG X 11 & 1 MG X 42 tablet Take 0.5 mg by mouth Daily. Take 0.5 mg one daily on days 1-3 and and 0.5 mg twice daily on days 4-7.Then 1 mg twice daily for a total of 12 weeks. 70 tablet 0   • [DISCONTINUED] varenicline (CHANTIX) 1 MG tablet Take 1 tablet by mouth Daily. 90 tablet 1     No facility-administered encounter medications on file as of 1/25/2021.        Reviewed use of high risk medication in the elderly:  yes  Reviewed for potential of harmful drug interactions in the elderly: yes    Follow Up:  Return if symptoms worsen or fail to improve.     An After Visit Summary and PPPS with all of these plans were given to the patient.           ++++++++++++++++++++++++++++++++++++++++++++++++++++++++++++++++++     Chief Complaint   Patient presents with   • Generalized Body Aches     Back Pain  This is a chronic problem. The current episode started more than 1 year ago. The problem occurs constantly. The problem is unchanged. The pain is present in the lumbar spine. The quality of the pain is described as aching. The pain does not radiate. The pain is severe. The symptoms are aggravated by bending and position. Associated symptoms include leg pain, numbness and tingling. She has tried analgesics, NSAIDs and muscle relaxant for the symptoms. The treatment provided no relief.     Patient with IPN progressive and odd presentation;  See neurology notes, see emg/ncv report as well.   Uncertain etiology;    Review of Systems   Musculoskeletal: Positive for back pain.   Neurological: Positive for numbness and tingling.       Social History     Tobacco Use   • Smoking status: Former Smoker     Packs/day: 0.75     Years: 50.00     Pack years: 37.50     Types: Cigarettes     Start date: 1960     Quit date: 2020     Years since quittin.2   • Smokeless tobacco: Never Used   Substance Use Topics   • Alcohol use: No     O:   Vitals:    21 1331   BP: 120/60   Pulse: 86   SpO2: 98%   Weight: Comment: pt unable to stand   Height: Comment: pt unable to stand   PainSc:   6   PainLoc: Leg     There is no height or weight on file to calculate BMI.  Vitals signs and nursing note reviewed.   Constitutional:       Appearance: Normal appearance. Not toxic-appearing or diaphoretic.   HENT:      Head: Normocephalic and atraumatic.   Neck:      Musculoskeletal: Neck supple.   Skin:     General: Skin is warm and dry.      Capillary  Refill: Capillary refill takes less than 2 seconds.   Neurological:      Mental Status: Alert.      Motor: Weakness present. Abnormal muscle tone.      Comments: In WC today   Psychiatric:         Mood and Affect: Mood normal.         Behavior: Behavior normal.         Diagnoses and all orders for this visit:    1. Medicare annual wellness visit, subsequent (Primary)    2. Idiopathic peripheral neuropathy  -     pregabalin (LYRICA) 150 MG capsule; Take 1 capsule by mouth 3 (Three) Times a Day.  Dispense: 270 capsule; Refill: 0  -     Ambulatory Referral to Pain Management    3. Disorder of intervertebral disc  -     Ambulatory Referral to Pain Management    -will titrate up lyrica;  D/w also increase duloxetine but will start wti lyrica as well  Has had extensive w/u for neuropathy, uncertain etiology;  Consider sending back to neurology again  Refer to pain mgmt    Return if symptoms worsen or fail to improve.

## 2021-01-30 NOTE — PATIENT INSTRUCTIONS
Advance Directive    Advance directives are legal documents that let you make choices ahead of time about your health care and medical treatment in case you become unable to communicate for yourself. Advance directives are a way for you to make known your wishes to family, friends, and health care providers. This can let others know about your end-of-life care if you become unable to communicate.  Discussing and writing advance directives should happen over time rather than all at once. Advance directives can be changed depending on your situation and what you want, even after you have signed the advance directives.  There are different types of advance directives, such as:  · Medical power of .  · Living will.  · Do not resuscitate (DNR) or do not attempt resuscitation (DNAR) order.  Health care proxy and medical power of   A health care proxy is also called a health care agent. This is a person who is appointed to make medical decisions for you in cases where you are unable to make the decisions yourself. Generally, people choose someone they know well and trust to represent their preferences. Make sure to ask this person for an agreement to act as your proxy. A proxy may have to exercise judgment in the event of a medical decision for which your wishes are not known.  A medical power of  is a legal document that names your health care proxy. Depending on the laws in your state, after the document is written, it may also need to be:  · Signed.  · Notarized.  · Dated.  · Copied.  · Witnessed.  · Incorporated into your medical record.  You may also want to appoint someone to manage your money in a situation in which you are unable to do so. This is called a durable power of  for finances. It is a separate legal document from the durable power of  for health care. You may choose the same person or someone different from your health care proxy to act as your agent in money  matters.  If you do not appoint a proxy, or if there is a concern that the proxy is not acting in your best interests, a court may appoint a guardian to act on your behalf.  Living will  A living will is a set of instructions that state your wishes about medical care when you cannot express them yourself. Health care providers should keep a copy of your living will in your medical record. You may want to give a copy to family members or friends. To alert caregivers in case of an emergency, you can place a card in your wallet to let them know that you have a living will and where they can find it. A living will is used if you become:  · Terminally ill.  · Disabled.  · Unable to communicate or make decisions.  Items to consider in your living will include:  · To use or not to use life-support equipment, such as dialysis machines and breathing machines (ventilators).  · A DNR or DNAR order. This tells health care providers not to use cardiopulmonary resuscitation (CPR) if breathing or heartbeat stops.  · To use or not to use tube feeding.  · To be given or not to be given food and fluids.  · Comfort (palliative) care when the goal becomes comfort rather than a cure.  · Donation of organs and tissues.  A living will does not give instructions for distributing your money and property if you should pass away.  DNR or DNAR  A DNR or DNAR order is a request not to have CPR in the event that your heart stops beating or you stop breathing. If a DNR or DNAR order has not been made and shared, a health care provider will try to help any patient whose heart has stopped or who has stopped breathing. If you plan to have surgery, talk with your health care provider about how your DNR or DNAR order will be followed if problems occur.  What if I do not have an advance directive?  If you do not have an advance directive, some states assign family decision makers to act on your behalf based on how closely you are related to them. Each  state has its own laws about advance directives. You may want to check with your health care provider, , or state representative about the laws in your state.  Summary  · Advance directives are the legal documents that allow you to make choices ahead of time about your health care and medical treatment in case you become unable to tell others about your care.  · The process of discussing and writing advance directives should happen over time. You can change the advance directives, even after you have signed them.  · Advance directives include DNR or DNAR orders, living han, and designating an agent as your medical power of .  This information is not intended to replace advice given to you by your health care provider. Make sure you discuss any questions you have with your health care provider.  Document Revised: 07/16/2020 Document Reviewed: 07/16/2020  Elsevier Patient Education © 2020 [employer]      Calorie Counting for Weight Loss  Calories are units of energy. Your body needs a certain amount of calories from food to keep you going throughout the day. When you eat more calories than your body needs, your body stores the extra calories as fat. When you eat fewer calories than your body needs, your body burns fat to get the energy it needs.  Calorie counting means keeping track of how many calories you eat and drink each day. Calorie counting can be helpful if you need to lose weight. If you make sure to eat fewer calories than your body needs, you should lose weight. Ask your health care provider what a healthy weight is for you.  For calorie counting to work, you will need to eat the right number of calories in a day in order to lose a healthy amount of weight per week. A dietitian can help you determine how many calories you need in a day and will give you suggestions on how to reach your calorie goal.  · A healthy amount of weight to lose per week is usually 1-2 lb (0.5-0.9 kg). This  usually means that your daily calorie intake should be reduced by 500-750 calories.  · Eating 1,200 - 1,500 calories per day can help most women lose weight.  · Eating 1,500 - 1,800 calories per day can help most men lose weight.  What is my plan?  My goal is to have __________ calories per day.  If I have this many calories per day, I should lose around __________ pounds per week.  What do I need to know about calorie counting?  In order to meet your daily calorie goal, you will need to:  · Find out how many calories are in each food you would like to eat. Try to do this before you eat.  · Decide how much of the food you plan to eat.  · Write down what you ate and how many calories it had. Doing this is called keeping a food log.  To successfully lose weight, it is important to balance calorie counting with a healthy lifestyle that includes regular activity. Aim for 150 minutes of moderate exercise (such as walking) or 75 minutes of vigorous exercise (such as running) each week.  Where do I find calorie information?    The number of calories in a food can be found on a Nutrition Facts label. If a food does not have a Nutrition Facts label, try to look up the calories online or ask your dietitian for help.  Remember that calories are listed per serving. If you choose to have more than one serving of a food, you will have to multiply the calories per serving by the amount of servings you plan to eat. For example, the label on a package of bread might say that a serving size is 1 slice and that there are 90 calories in a serving. If you eat 1 slice, you will have eaten 90 calories. If you eat 2 slices, you will have eaten 180 calories.  How do I keep a food log?  Immediately after each meal, record the following information in your food log:  · What you ate. Don't forget to include toppings, sauces, and other extras on the food.  · How much you ate. This can be measured in cups, ounces, or number of items.  · How many  "calories each food and drink had.  · The total number of calories in the meal.  Keep your food log near you, such as in a small notebook in your pocket, or use a mobile chris or website. Some programs will calculate calories for you and show you how many calories you have left for the day to meet your goal.  What are some calorie counting tips?    · Use your calories on foods and drinks that will fill you up and not leave you hungry:  ? Some examples of foods that fill you up are nuts and nut butters, vegetables, lean proteins, and high-fiber foods like whole grains. High-fiber foods are foods with more than 5 g fiber per serving.  ? Drinks such as sodas, specialty coffee drinks, alcohol, and juices have a lot of calories, yet do not fill you up.  · Eat nutritious foods and avoid empty calories. Empty calories are calories you get from foods or beverages that do not have many vitamins or protein, such as candy, sweets, and soda. It is better to have a nutritious high-calorie food (such as an avocado) than a food with few nutrients (such as a bag of chips).  · Know how many calories are in the foods you eat most often. This will help you calculate calorie counts faster.  · Pay attention to calories in drinks. Low-calorie drinks include water and unsweetened drinks.  · Pay attention to nutrition labels for \"low fat\" or \"fat free\" foods. These foods sometimes have the same amount of calories or more calories than the full fat versions. They also often have added sugar, starch, or salt, to make up for flavor that was removed with the fat.  · Find a way of tracking calories that works for you. Get creative. Try different apps or programs if writing down calories does not work for you.  What are some portion control tips?  · Know how many calories are in a serving. This will help you know how many servings of a certain food you can have.  · Use a measuring cup to measure serving sizes. You could also try weighing out " portions on a kitchen scale. With time, you will be able to estimate serving sizes for some foods.  · Take some time to put servings of different foods on your favorite plates, bowls, and cups so you know what a serving looks like.  · Try not to eat straight from a bag or box. Doing this can lead to overeating. Put the amount you would like to eat in a cup or on a plate to make sure you are eating the right portion.  · Use smaller plates, glasses, and bowls to prevent overeating.  · Try not to multitask (for example, watch TV or use your computer) while eating. If it is time to eat, sit down at a table and enjoy your food. This will help you to know when you are full. It will also help you to be aware of what you are eating and how much you are eating.  What are tips for following this plan?  Reading food labels  · Check the calorie count compared to the serving size. The serving size may be smaller than what you are used to eating.  · Check the source of the calories. Make sure the food you are eating is high in vitamins and protein and low in saturated and trans fats.  Shopping  · Read nutrition labels while you shop. This will help you make healthy decisions before you decide to purchase your food.  · Make a grocery list and stick to it.  Cooking  · Try to cook your favorite foods in a healthier way. For example, try baking instead of frying.  · Use low-fat dairy products.  Meal planning  · Use more fruits and vegetables. Half of your plate should be fruits and vegetables.  · Include lean proteins like poultry and fish.  How do I count calories when eating out?  · Ask for smaller portion sizes.  · Consider sharing an entree and sides instead of getting your own entree.  · If you get your own entree, eat only half. Ask for a box at the beginning of your meal and put the rest of your entree in it so you are not tempted to eat it.  · If calories are listed on the menu, choose the lower calorie options.  · Choose  "dishes that include vegetables, fruits, whole grains, low-fat dairy products, and lean protein.  · Choose items that are boiled, broiled, grilled, or steamed. Stay away from items that are buttered, battered, fried, or served with cream sauce. Items labeled \"crispy\" are usually fried, unless stated otherwise.  · Choose water, low-fat milk, unsweetened iced tea, or other drinks without added sugar. If you want an alcoholic beverage, choose a lower calorie option such as a glass of wine or light beer.  · Ask for dressings, sauces, and syrups on the side. These are usually high in calories, so you should limit the amount you eat.  · If you want a salad, choose a garden salad and ask for grilled meats. Avoid extra toppings like luna, cheese, or fried items. Ask for the dressing on the side, or ask for olive oil and vinegar or lemon to use as dressing.  · Estimate how many servings of a food you are given. For example, a serving of cooked rice is ½ cup or about the size of half a baseball. Knowing serving sizes will help you be aware of how much food you are eating at restaurants. The list below tells you how big or small some common portion sizes are based on everyday objects:  ? 1 oz--4 stacked dice.  ? 3 oz--1 deck of cards.  ? 1 tsp--1 die.  ? 1 Tbsp--½ a ping-pong ball.  ? 2 Tbsp--1 ping-pong ball.  ? ½ cup--½ baseball.  ? 1 cup--1 baseball.  Summary  · Calorie counting means keeping track of how many calories you eat and drink each day. If you eat fewer calories than your body needs, you should lose weight.  · A healthy amount of weight to lose per week is usually 1-2 lb (0.5-0.9 kg). This usually means reducing your daily calorie intake by 500-750 calories.  · The number of calories in a food can be found on a Nutrition Facts label. If a food does not have a Nutrition Facts label, try to look up the calories online or ask your dietitian for help.  · Use your calories on foods and drinks that will fill you up, and " not on foods and drinks that will leave you hungry.  · Use smaller plates, glasses, and bowls to prevent overeating.  This information is not intended to replace advice given to you by your health care provider. Make sure you discuss any questions you have with your health care provider.  Document Revised: 09/06/2019 Document Reviewed: 11/17/2017  [employer] Patient Education © 2020 [employer]      Fall Prevention in the Home, Adult  Falls can cause injuries and can affect people from all age groups. There are many simple things that you can do to make your home safe and to help prevent falls. Ask for help when making these changes, if needed.  What actions can I take to prevent falls?  General instructions  · Use good lighting in all rooms. Replace any light bulbs that burn out.  · Turn on lights if it is dark. Use night-lights.  · Place frequently used items in easy-to-reach places. Lower the shelves around your home if necessary.  · Set up furniture so that there are clear paths around it. Avoid moving your furniture around.  · Remove throw rugs and other tripping hazards from the floor.  · Avoid walking on wet floors.  · Fix any uneven floor surfaces.  · Add color or contrast paint or tape to grab bars and handrails in your home. Place contrasting color strips on the first and last steps of stairways.  · When you use a stepladder, make sure that it is completely opened and that the sides are firmly locked. Have someone hold the ladder while you are using it. Do not climb a closed stepladder.  · Be aware of any and all pets.  What can I do in the bathroom?         · Keep the floor dry. Immediately clean up any water that spills onto the floor.  · Remove soap buildup in the tub or shower on a regular basis.  · Use non-skid mats or decals on the floor of the tub or shower.  · Attach bath mats securely with double-sided, non-slip rug tape.  · If you need to sit down while you are in the shower, use a plastic,  non-slip stool.  · Install grab bars by the toilet and in the tub and shower. Do not use towel bars as grab bars.  What can I do in the bedroom?  · Make sure that a bedside light is easy to reach.  · Do not use oversized bedding that drapes onto the floor.  · Have a firm chair that has side arms to use for getting dressed.  What can I do in the kitchen?  · Clean up any spills right away.  · If you need to reach for something above you, use a sturdy step stool that has a grab bar.  · Keep electrical cables out of the way.  · Do not use floor polish or wax that makes floors slippery. If you must use wax, make sure that it is non-skid floor wax.  What can I do in the stairways?  · Do not leave any items on the stairs.  · Make sure that you have a light switch at the top of the stairs and the bottom of the stairs. Have them installed if you do not have them.  · Make sure that there are handrails on both sides of the stairs. Fix handrails that are broken or loose. Make sure that handrails are as long as the stairways.  · Install non-slip stair treads on all stairs in your home.  · Avoid having throw rugs at the top or bottom of stairways, or secure the rugs with carpet tape to prevent them from moving.  · Choose a carpet design that does not hide the edge of steps on the stairway.  · Check any carpeting to make sure that it is firmly attached to the stairs. Fix any carpet that is loose or worn.  What can I do on the outside of my home?  · Use bright outdoor lighting.  · Regularly repair the edges of walkways and driveways and fix any cracks.  · Remove high doorway thresholds.  · Trim any shrubbery on the main path into your home.  · Regularly check that handrails are securely fastened and in good repair. Both sides of any steps should have handrails.  · Install guardrails along the edges of any raised decks or porches.  · Clear walkways of debris and clutter, including tools and rocks.  · Have leaves, snow, and ice  cleared regularly.  · Use sand or salt on walkways during winter months.  · In the garage, clean up any spills right away, including grease or oil spills.  What other actions can I take?  · Wear closed-toe shoes that fit well and support your feet. Wear shoes that have rubber soles or low heels.  · Use mobility aids as needed, such as canes, walkers, scooters, and crutches.  · Review your medicines with your health care provider. Some medicines can cause dizziness or changes in blood pressure, which increase your risk of falling.  Talk with your health care provider about other ways that you can decrease your risk of falls. This may include working with a physical therapist or  to improve your strength, balance, and endurance.  Where to find more information  · Centers for Disease Control and Prevention, TIERRA: https://www.cdc.gov  · National [place] on Aging: https://eq1ooys.claribel.nih.gov  Contact a health care provider if:  · You are afraid of falling at home.  · You feel weak, drowsy, or dizzy at home.  · You fall at home.  Summary  · There are many simple things that you can do to make your home safe and to help prevent falls.  · Ways to make your home safe include removing tripping hazards and installing grab bars in the bathroom.  · Ask for help when making these changes in your home.  This information is not intended to replace advice given to you by your health care provider. Make sure you discuss any questions you have with your health care provider.  Document Revised: 11/30/2018 Document Reviewed: 08/02/2018  Elsevier Patient Education © 2020 [employer]

## 2021-02-15 ENCOUNTER — TELEPHONE (OUTPATIENT)
Dept: FAMILY MEDICINE CLINIC | Facility: CLINIC | Age: 66
End: 2021-02-15

## 2021-02-15 DIAGNOSIS — G60.9 IDIOPATHIC PERIPHERAL NEUROPATHY: ICD-10-CM

## 2021-02-15 RX ORDER — PREGABALIN 150 MG/1
150 CAPSULE ORAL 3 TIMES DAILY
Qty: 270 CAPSULE | Refills: 0
Start: 2021-02-15 | End: 2021-02-18 | Stop reason: SDUPTHER

## 2021-02-15 NOTE — TELEPHONE ENCOUNTER
Caller: Brenda Michelle    Relationship: Self    Best call back number: [phone]     Medication needed:   Requested Prescriptions     Pending Prescriptions Disp Refills   • pregabalin (LYRICA) 150 MG capsule 270 capsule 0     Sig: Take 1 capsule by mouth 3 (Three) Times a Day.       When do you need the refill by: ASAP    Patient is requesting a few refills on RX if possible        Does the patient have less than a 3 day supply:  [x] Yes  [] No    What is the patient's preferred pharmacy: [facility] - [address] [phone] [facility] [phone]

## 2021-02-18 DIAGNOSIS — G60.9 IDIOPATHIC PERIPHERAL NEUROPATHY: ICD-10-CM

## 2021-02-18 RX ORDER — PREGABALIN 150 MG/1
150 CAPSULE ORAL 3 TIMES DAILY
Qty: 270 CAPSULE | Refills: 1 | Status: SHIPPED | OUTPATIENT
Start: 2021-02-18 | End: 2021-08-16 | Stop reason: SDUPTHER

## 2021-02-18 NOTE — TELEPHONE ENCOUNTER
Patient called in wanting the status of the re-fill for     pregabalin (LYRICA) 150 MG capsule    Best call back # [phone]

## 2021-02-22 DIAGNOSIS — G60.9 IDIOPATHIC PERIPHERAL NEUROPATHY: ICD-10-CM

## 2021-02-22 NOTE — TELEPHONE ENCOUNTER
Caller: Brenda Michelle    Relationship: Self    Best call back number: [phone]    Medication needed:   Requested Prescriptions     Pending Prescriptions Disp Refills   • HYDROcodone-acetaminophen (NORCO) 5-325 MG per tablet 120 tablet 0     Sig: Take 1 tablet by mouth Every 6 (Six) Hours As Needed for Moderate Pain  or Severe Pain .       When do you need the refill by: 03/03/21    What details did the patient provide when requesting the medication: PATIENT HAS 10 DAY SUPPLY    Does the patient have less than a 3 day supply:  [] Yes  [x] No    [place] the [facility] [facility] [address] [address] [place] - [phone] [facility] [phone] FX

## 2021-02-25 RX ORDER — HYDROCODONE BITARTRATE AND ACETAMINOPHEN 5; 325 MG/1; MG/1
1 TABLET ORAL EVERY 6 HOURS PRN
Qty: 120 TABLET | Refills: 0 | Status: SHIPPED | OUTPATIENT
Start: 2021-02-25 | End: 2021-10-01 | Stop reason: DRUGHIGH

## 2021-03-08 ENCOUNTER — TELEPHONE (OUTPATIENT)
Dept: FAMILY MEDICINE CLINIC | Facility: CLINIC | Age: 66
End: 2021-03-08

## 2021-03-08 DIAGNOSIS — I77.9 PERIPHERAL ARTERIAL OCCLUSIVE DISEASE (HCC): ICD-10-CM

## 2021-03-08 NOTE — TELEPHONE ENCOUNTER
Caller: [facility] - [place] [phone] [facility] [phone] FX    Relationship: Pharmacy    Best call back number: [phone]    Medication needed:   Requested Prescriptions     Pending Prescriptions Disp Refills   • clopidogrel (PLAVIX) 75 MG tablet 90 tablet 3     Sig: Take 1 tablet by mouth Daily.       When do you need the refill by: ASAP    Does the patient have less than a 3 day supply:  [] Yes  [x] No    What is the patient's preferred pharmacy: [facility] - [place], [place] - [facility] [place] [phone] [facility] [phone] FX<br>JESUS [address] [address] [place] [facility] [phone] [facility] [phone] FX

## 2021-03-09 RX ORDER — CLOPIDOGREL BISULFATE 75 MG/1
75 TABLET ORAL DAILY
Qty: 90 TABLET | Refills: 3 | Status: SHIPPED | OUTPATIENT
Start: 2021-03-09 | End: 2022-02-09

## 2021-03-22 ENCOUNTER — BULK ORDERING (OUTPATIENT)
Dept: CASE MANAGEMENT | Facility: OTHER | Age: 66
End: 2021-03-22

## 2021-03-22 DIAGNOSIS — Z23 IMMUNIZATION DUE: ICD-10-CM

## 2021-05-26 DIAGNOSIS — M79.10 MYALGIA: ICD-10-CM

## 2021-05-26 DIAGNOSIS — E53.8 FOLIC ACID DEFICIENCY: ICD-10-CM

## 2021-05-28 RX ORDER — DULOXETIN HYDROCHLORIDE 60 MG/1
60 CAPSULE, DELAYED RELEASE ORAL DAILY
Qty: 90 CAPSULE | Refills: 1 | Status: SHIPPED | OUTPATIENT
Start: 2021-05-28 | End: 2021-11-24

## 2021-05-28 RX ORDER — FOLIC ACID 1 MG/1
1000 TABLET ORAL DAILY
Qty: 90 TABLET | Refills: 1 | Status: SHIPPED | OUTPATIENT
Start: 2021-05-28 | End: 2021-08-16 | Stop reason: SDUPTHER

## 2021-06-04 ENCOUNTER — HOSPITAL ENCOUNTER (EMERGENCY)
Facility: HOSPITAL | Age: 66
Discharge: HOME | End: 2021-06-04
Attending: EMERGENCY MEDICINE | Admitting: EMERGENCY MEDICINE

## 2021-06-04 ENCOUNTER — APPOINTMENT (OUTPATIENT)
Dept: GENERAL RADIOLOGY | Facility: HOSPITAL | Age: 66
End: 2021-06-04

## 2021-06-04 ENCOUNTER — TELEPHONE (OUTPATIENT)
Dept: FAMILY MEDICINE CLINIC | Facility: CLINIC | Age: 66
End: 2021-06-04

## 2021-06-04 ENCOUNTER — APPOINTMENT (OUTPATIENT)
Dept: CT IMAGING | Facility: HOSPITAL | Age: 66
End: 2021-06-04

## 2021-06-04 VITALS
HEART RATE: 88 BPM | OXYGEN SATURATION: 99 % | RESPIRATION RATE: 18 BRPM | SYSTOLIC BLOOD PRESSURE: 116 MMHG | TEMPERATURE: 97.3 F | DIASTOLIC BLOOD PRESSURE: 90 MMHG | HEIGHT: 63 IN | BODY MASS INDEX: 42.52 KG/M2 | WEIGHT: 240 LBS

## 2021-06-04 DIAGNOSIS — S09.90XA CLOSED HEAD INJURY, INITIAL ENCOUNTER: Primary | ICD-10-CM

## 2021-06-04 DIAGNOSIS — S42.018A CLOSED NONDISPLACED FRACTURE OF STERNAL END OF LEFT CLAVICLE, INITIAL ENCOUNTER: ICD-10-CM

## 2021-06-04 DIAGNOSIS — G44.319 ACUTE POST-TRAUMATIC HEADACHE, NOT INTRACTABLE: ICD-10-CM

## 2021-06-04 DIAGNOSIS — M25.512 ACUTE PAIN OF LEFT SHOULDER: ICD-10-CM

## 2021-06-04 DIAGNOSIS — M54.2 ACUTE NECK PAIN: ICD-10-CM

## 2021-06-04 PROCEDURE — 73000 X-RAY EXAM OF COLLAR BONE: CPT

## 2021-06-04 PROCEDURE — 73030 X-RAY EXAM OF SHOULDER: CPT

## 2021-06-04 PROCEDURE — 70450 CT HEAD/BRAIN W/O DYE: CPT

## 2021-06-04 PROCEDURE — 99283 EMERGENCY DEPT VISIT LOW MDM: CPT

## 2021-06-04 PROCEDURE — 72125 CT NECK SPINE W/O DYE: CPT

## 2021-06-04 RX ORDER — HYDROCODONE BITARTRATE AND ACETAMINOPHEN 5; 325 MG/1; MG/1
1 TABLET ORAL ONCE
Status: COMPLETED | OUTPATIENT
Start: 2021-06-04 | End: 2021-06-04

## 2021-06-04 RX ORDER — LIDOCAINE 50 MG/G
1 PATCH TOPICAL EVERY 24 HOURS
Qty: 30 PATCH | Refills: 0 | Status: SHIPPED | OUTPATIENT
Start: 2021-06-04 | End: 2022-02-21

## 2021-06-04 RX ORDER — HYDROCODONE BITARTRATE AND ACETAMINOPHEN 7.5; 325 MG/1; MG/1
1 TABLET ORAL EVERY 6 HOURS PRN
Qty: 12 TABLET | Refills: 0 | Status: SHIPPED | OUTPATIENT
Start: 2021-06-04 | End: 2022-09-01

## 2021-06-04 RX ADMIN — HYDROCODONE BITARTRATE AND ACETAMINOPHEN 1 TABLET: 5; 325 TABLET ORAL at 18:08

## 2021-06-04 NOTE — ED NOTES
This RN wearing all appropriate PPE during patient encounter. Hand hygiene performed before and during entering room.       Simona Greer, JAY  06/04/21 [id number]

## 2021-06-04 NOTE — ED NOTES
Pt presents to ED with complaints of a fall one week ago after she tripped. Pt did hit tomy head when she fell, and is on blood thinners. Pt denies any headache at this time. Pt reports she fell on her L shoulder, and has increased pain since. Pt is on 2.5 L N.C at all times. Pt is A&OX4, in personal wheelchair, and in a mask at this time.     Patient was placed in face mask during first look triage.  Patient was wearing a face mask throughout encounter.  I wore personal protective equipment throughout the encounter.  Hand hygiene was performed before and after patient encounter.             John Fraser, RN  06/04/21 [id number]

## 2021-06-04 NOTE — TELEPHONE ENCOUNTER
Caller: Brenda Michelle    Relationship to patient: Self    Best call back number: [phone]    Chief complaint: PATIENT STATES SHE FELL ABOUT A WEEK AGO AND HER SHOULDER STILL HURTS AND IS WORRIED SHE HAS EITHER A MUSCLE INJURY OR A BROKEN COLLAR BONE. DUE TO POSSIBLE BROKEN BONE, RECOMMENDED PATIENT GO TO THE ER. PATIENT STATED THATS WHAT SHE WOULD DO.    Patient directed to call 911 or go to their nearest emergency room.     Patient verbalized understanding: [x] Yes  [] No  If no, why?    Additional notes: PATIENT STATED SHE WOULD GO TO THE ER

## 2021-06-04 NOTE — ED PROVIDER NOTES
EMERGENCY DEPARTMENT ENCOUNTER  Patient was placed in face mask in first look and the following protective measures were taken unless additional measures were taken and documented below in the ED course. Patient was wearing facemask when I entered the room and throughout our encounter. I wore full protective equipment throughout this patient encounter including a face mask, and gloves. Hand hygiene was performed before donning protective equipment and after removal when leaving the room.    Room Number:  10/10  Date of encounter:  6/4/2021  PCP: Angelito Kline DO    HPI:  Context: Brenda Michelle is a 65 y.o. female who presents to the ED c/o chief complaint of fall with closed head injury, intermittent headaches, left shoulder pain.  Patient reports approximately a week ago she was walking with her walker when she turned to speak to her  lost her balance and fell.  Patient did strike her head, no loss of consciousness.  Patient reports that since that time she has had intermittent headache, coming and going.  Patient currently has mild frontal headache, dull in nature.  Patient denies any sensitivity to light or noise, no confusion, no visual disturbances, no nausea or vomiting.  Patient complains of dull achy pain in her neck, no radicular pain, no weakness or numbness.  Patient complains of dull pain in her anterior shoulder and along her clavicle, difficulty moving her arm secondary to pain.  Patient denies any skin tear.  Patient does report that she is on aspirin and Plavix.  Patient presented to the emergency department because her symptoms were not getting better.    MEDICAL HISTORY REVIEW  Reviewed in EPIC    PAST MEDICAL HISTORY  Active Ambulatory Problems     Diagnosis Date Noted   • Acute deep vein thrombosis of distal leg (CMS/HCC) 01/24/2016   • Adenomatous polyp of colon 01/24/2016   • Depression 01/24/2016   • Fibromyalgia 01/24/2016   • Hyperlipidemia 01/24/2016   • Low back pain  2016   • Peripheral arterial occlusive disease (CMS/[facility]) 2016   • Vitamin D deficiency 2016   • Gastroesophageal reflux disease 2012   • Irritable bowel syndrome 2012   • Disorder of intervertebral disc 2012   • Peripheral neuropathy 2012   • Fracture of multiple ribs of right side 12/10/2017   • Fall 2017   • Tobacco dependence 2017   • Acute respiratory failure with hypoxia (CMS/HCC) 2017   • COPD (chronic obstructive pulmonary disease) (CMS/HCC) 2017   • Weakness 11/15/2019   • Hypopotassemia 2019   • Dysuria 2019   • Constipation 2019     Resolved Ambulatory Problems     Diagnosis Date Noted   • No Resolved Ambulatory Problems     Past Medical History:   Diagnosis Date   • Anxiety    • Arthritis    • benign polyps of large intestine    • Difficulty walking    • Disc degeneration, lumbar    • Fibromyositis    • IBS (irritable bowel syndrome)    • Kidney stones    • Movement disorder    • Nephrolithiasis    • PVD (peripheral vascular disease) (CMS/HCC)    • Shingles        PAST SURGICAL HISTORY  Past Surgical History:   Procedure Laterality Date   • COLONOSCOPY  2004    Colon polyps; family history of colon cancer   • KNEE ARTHROPLASTY, PARTIAL REPLACEMENT     • KNEE ARTHROSCOPY Left     Meniscus   • POPLITEAL ARTERY ANGIOPLASTY Bilateral    • STEROID INJECTION     • TONSILLECTOMY     • TUBAL ABDOMINAL LIGATION         FAMILY HISTORY  Family History   Problem Relation Age of Onset   • COPD Mother          at 69 yo    • Colon cancer Mother    • Heart disease Mother    • Stroke Mother    • Lung cancer Father         mesothelioma   • Heart disease Maternal Grandmother    • Heart disease Maternal Grandfather    • Heart disease Paternal Grandfather        SOCIAL HISTORY  Social History     Socioeconomic History   • Marital status:      Spouse name: Not on file   • Number of children: 2   • Years of education: 12    •  Highest education level: Not on file   Tobacco Use   • Smoking status: Former Smoker     Packs/day: 0.75     Years: 50.00     Pack years: 37.50     Types: Cigarettes     Start date: 1960     Quit date: 2020     Years since quittin.5   • Smokeless tobacco: Never Used   Substance and Sexual Activity   • Alcohol use: No   • Drug use: No       ALLERGIES  Ambien [zolpidem tartrate], Bupropion, Codeine sulfate, Adhesive tape, Aripiprazole, Atorvastatin, Cilostazol, Ferrous sulfate, and Welchol [colesevelam hcl]    The patient's allergies have been reviewed    REVIEW OF SYSTEMS  All systems reviewed and negative except for those discussed in HPI.     PHYSICAL EXAM  I have reviewed the triage vital signs and nursing notes.  ED Triage Vitals [21 1602]   Temp Heart Rate Resp BP SpO2   97.3 °F (36.3 °C) 85 18 -- 96 %      Temp src Heart Rate Source Patient Position BP Location FiO2 (%)   Tympanic Monitor -- -- --       General: No acute distress.  HENT: NCAT, PERRL, Nares patent.  Eyes: no scleral icterus.  Neck: trachea midline, no ROM limitations.  Cervical spine: No step-offs or deformities, positive midline tenderness palpation  CV: regular rhythm, regular rate.  Respiratory: normal effort, CTAB.  Chest wall: Tenderness along the left clavicle, no crepitus or deformity, no swelling.  Abdomen: soft, nondistended, NTTP, no rebound tenderness, no guarding or rigidity  : deferred.  Musculoskeletal: no deformity.  Left shoulder: Normal in appearance, no crepitus or deformity, no significant swelling.  Patient has anterior tenderness to palpation.  Patient has limited shoulder flexion secondary to pain.  Axillary nerve sensation intact light touch. Positive thumbs up/okay sign/fingers abduct/fingers abduct, sensation intact light touch radial/medial/ulnar nerve distribution, 2+ radial and ulnar pulses, brisk cap refill all digits.  Neuro: Alert and oriented, no facial droop, speech clear, no dysarthria or  aphasia, moves all extremities well, sensation intact light touch all extremities, no focal deficits  Skin: warm, dry.    LAB RESULTS  No results found for this or any previous visit (from the past 24 hour(s)).    I ordered the above labs and reviewed the results.    RADIOLOGY  XR Shoulder 2+ View Left, XR Clavicle Left    Result Date: 6/4/2021  PROCEDURE:  XR SHOULDER 2+ VW LEFT-, XR CLAVICLE LEFT-  HISTORY: Fell a week ago, pain in left clavicle. Limited range of motion.  COMPARISON: Chest radiographs 07/06/2018.  FINDINGS:   2 views of the left shoulder and 2 views of the left clavicle were obtained.  No acute fracture is identified. There is mild superior elevation of the humeral head relative to the glenoid with narrowing of the acromiohumeral distance, which can be seen with age-indeterminate rotator cuff pathology. This would be better assessed with MRI, if clinically indicated. There is no dislocation.  This report was finalized on 6/4/2021 4:44 PM by Dr. Lawanda Dunn M.D.      CT Head Without Contrast, CT Cervical Spine Without Contrast    Result Date: 6/4/2021  CT HEAD WO CONTRAST-, CT CERVICAL SPINE WO CONTRAST-  INDICATIONS: Trauma  TECHNIQUE: Radiation dose reduction techniques were utilized, including automated exposure control and exposure modulation based on body size. Noncontrast head CT, cervical spine CT  COMPARISON: None available  FINDINGS:  Head CT:  No acute intracranial hemorrhage, midline shift or mass effect. No acute territorial infarct is identified.  Mild periventricular hypodensities suggest chronic small vessel ischemic change in a patient this age.    Ventricles, cisterns, cerebral sulci are unremarkable for patient age.  The visualized paranasal sinuses, orbits, mastoid air cells are unremarkable.   Cervical spine CT:  A fracture is seen at the medial left clavicle, for example axial image 138, with 3 mm cortical step-off.  No acute cervical fracture or malalignment is noted.   Calcifications are seen in the bilateral cervical carotid arteries.       1. A fracture at the medial end of the left clavicle.  2. No acute intracranial hemorrhage or hydrocephalus. No acute cervical fracture identified. If there is further clinical concern, MRI could be considered for further evaluation.  This report was finalized on 6/4/2021 6:22 PM by Dr. Víctor Cheng M.D.        I ordered the above noted radiological studies. I reviewed the images and results. I agree with the radiologist interpretation.    PROCEDURES  Procedures    MEDICATIONS GIVEN IN ER  Medications   HYDROcodone-acetaminophen (NORCO) 5-325 MG per tablet 1 tablet (1 tablet Oral Given 6/4/21 1808)       PROGRESS, DATA ANALYSIS, CONSULTS, AND MEDICAL DECISION MAKING  A complete history and physical exam have been performed.  All available laboratory and imaging results have been reviewed by myself prior to disposition.    MDM  After the initial H&P, I discussed pertinent information from history and physical exam with patient/family.  Discussed differential diagnosis.  Discussed plan for ED evaluation/work-up/treatment.  All questions answered.  Patient/family is agreeable with plan.  ED Course as of Jun 04 1917 Fri Jun 04, 2021   1739 Closed head injury with intermittent headaches since.  Patient is on aspirin and Plavix.  Given this, high risk of intracranial hemorrhage, obtaining CT head imaging.  Midline cervical tenderness, obtaining C-spine imaging.  Obtaining x-ray images of clavicle and left shoulder.    [JG]   1907 CT imaging of head and cervical spine is negative for acute pathology.  X-rays were unremarkable but on CT imaging of cervical spine patient has a clavicle fracture.    [JG]   1907 Patient reassessed, discussed ED work-up and results, discussed finding of clavicle fracture.  Discussed plan for sling and discharged with primary care and orthopedics follow-up.  Patient reports that she is currently in pain management,  or Norco fives twice a day.   Patient is requesting something stronger for pain.  Discussed possibility that right a prescription may violate her pain contract, patient understands, continues to desire stronger prescription.  Writing for short course of Norco 7.5's.    [JG]   1908 MANJU query complete. Treatment plan to include limited course of prescribed  controlled substance. Risks including addiction, benefits, and alternatives presented to patient.       [JG]   1916 Unable to E scribe medication secondary to sig on medication too long for escript    [JG]   1917 The patient was reexamined.  They have had symptomatic improvement during their ED stay.  I discussed today's findings with the patient, explaining the pertinent positives and negatives from today's visit, and the plan of care.  Discussed plan for discharge as there is no emergent indication for admission.  Discussed limitation of the ED work-up and that this is to rule out life-threatening emergencies but that they could require further testing as determined by their primary care and or any referred specialist patient is agreeable and understands need for follow-up and repeat exam/testing.  Patient is aware that discharge does not mean there is nothing wrong, indicates no emergency is present, and that they must continue their care with their primary care physician and/or any referred specialist.  They were given appropriate follow-up with their primary care physician and/or specialist.  I had an extensive discussion on the expected clinical course and return precautions.  Patient understands to return to the emergency department for continuation, worsening, or new symptoms.  I answered any of the patient's questions. Patient was discharged home in a stable condition.        [JG]      ED Course User Index  [JG] Thiago Black MD       AS OF 19:17 EDT VITALS:    BP - (!) 179/108  HR - 88  TEMP - 97.3 °F (36.3 °C) (Tympanic)  O2 SATS -  100%    DIAGNOSIS  Final diagnoses:   Closed head injury, initial encounter   Acute post-traumatic headache, not intractable   Acute neck pain   Closed nondisplaced fracture of sternal end of left clavicle, initial encounter   Acute pain of left shoulder         DISPOSITION  DISCHARGE    Patient discharged in stable condition.    Reviewed implications of results, diagnosis, meds, responsibility to follow up, warning signs and symptoms of possible worsening, potential complications and reasons to return to ER.    Patient/Family voiced understanding of above instructions.    Discussed plan for discharge, as there is no emergent indication for admission. Patient referred to primary care provider for BP management due to today's BP. Pt/family is agreeable and understands need for follow up and repeat testing.  Pt is aware that discharge does not mean that nothing is wrong but it indicates no emergency is present that requires admission and they must continue care with follow-up as given below or physician of their choice.     FOLLOW-UP  Angelito Kline DO  [address] [facility]  [address]  [facility] [id number] [phone]    Schedule an appointment as soon as possible for a visit in 2 days  even if well    Patricia Christine MD  [address] [facility] [address]  [facility] [address]  [phone]    Schedule an appointment as soon as possible for a visit in 2 days  for further evaluation/management of your clavicle fracture         Medication List      New Prescriptions    lidocaine 5 %  Commonly known as: Lidoderm  Place 1 patch on the skin as directed by provider Daily. Remove & Discard patch within 12 hours or as directed by MD        Changed    * HYDROcodone-acetaminophen 5-325 MG per tablet  Commonly known as: NORCO  Take 1 tablet by mouth Every 6 (Six) Hours As Needed for Moderate Pain  or Severe Pain .  What changed: Another medication with the same name was added. Make sure you understand how and when to take each.      * HYDROcodone-acetaminophen 7.5-325 MG per tablet  Commonly known as: NORCO  Take 1 tablet by mouth Every 6 (Six) Hours As Needed for Moderate Pain . Please take these instead of your hydrocodone 5/325.  Do not take both medications.  What changed: You were already taking a medication with the same name, and this prescription was added. Make sure you understand how and when to take each.         * This list has 2 medication(s) that are the same as other medications prescribed for you. Read the directions carefully, and ask your doctor or other care provider to review them with you.               Where to Get Your Medications      These medications were sent to [address] [address] [address] [facility] - [phone] [facility] [phone]   [address] [facility] [address]    Phone: [phone]   · lidocaine 5 %     You can get these medications from any pharmacy    Bring a paper prescription for each of these medications  · HYDROcodone-acetaminophen 7.5-325 MG per tablet          Thiago Black MD  06/04/21 1914       Thiago Black MD  06/04/21 1917

## 2021-06-07 DIAGNOSIS — M25.512 LEFT SHOULDER PAIN, UNSPECIFIED CHRONICITY: Primary | ICD-10-CM

## 2021-06-07 NOTE — TELEPHONE ENCOUNTER
Pt said her shoulder/clavicle is still hurting but she is okay. Pt wants a referral to [facility]. I entered referral.

## 2021-06-09 ENCOUNTER — TELEPHONE (OUTPATIENT)
Dept: FAMILY MEDICINE CLINIC | Facility: CLINIC | Age: 66
End: 2021-06-09

## 2021-06-09 DIAGNOSIS — M51.9 DISORDER OF INTERVERTEBRAL DISC: ICD-10-CM

## 2021-06-09 DIAGNOSIS — M25.512 LEFT SHOULDER PAIN, UNSPECIFIED CHRONICITY: Primary | ICD-10-CM

## 2021-06-09 NOTE — TELEPHONE ENCOUNTER
Caller: Brenda Michelle    Relationship to patient: Self    Best call back number: [phone]    Patient is needing: PATIENT CALLED IN AND WANTED TO KNOW THE STATUS OF THE ORTHOPAEDIC REFERRAL AS WELL AS HER MEDICATION. PLEASE CALL PATIENT AND ADVISE.

## 2021-06-09 NOTE — TELEPHONE ENCOUNTER
I sent patches again as would not get any PA here for medication prescribed elsewhere, just watch for.  Many physician offices are moving to self scheudling ot reduce no shows.  LIAM      I spoke with pt and told her that I put the ortho referral in on Monday and that someone would call her to schedule appt. Pt insisted I told her I would call her with the appt information. I explained to her again that I don't schedule appts. I spoke with Nikki and Nikki said she gave the patient Dr. Cobb office number for her to call and schedule her appt. I then talked to patent again and patient insisted I was the one that gave patient Dr. Cobb phone number. I told pt I didn't give her the number but I have it now to give her she said never mind she was just done with this and hung up the phone on me. I told pt I didn't know anything about a PA being needed on the pain patches since the hospital was the one that gave them to her. Pt was very rude on the phone.

## 2021-06-10 RX ORDER — DICLOFENAC EPOLAMINE 0.01 G/1
1 SYSTEM TOPICAL 2 TIMES DAILY
Qty: 60 PATCH | Refills: 2 | Status: SHIPPED | OUTPATIENT
Start: 2021-06-10 | End: 2022-02-21

## 2021-06-11 PROBLEM — K64.9 HEMORRHOIDS: Status: ACTIVE | Noted: 2021-06-11

## 2021-06-11 PROBLEM — K43.9 HERNIA OF ANTERIOR ABDOMINAL WALL: Status: ACTIVE | Noted: 2021-06-11

## 2021-06-11 PROBLEM — Z98.890 OTHER SPECIFIED POSTPROCEDURAL STATES: Status: ACTIVE | Noted: 2020-01-14

## 2021-06-11 PROBLEM — M17.9 OSTEOARTHRITIS OF KNEE: Status: ACTIVE | Noted: 2020-02-19

## 2021-06-11 PROBLEM — M48.061 SPINAL STENOSIS, LUMBAR REGION WITHOUT NEUROGENIC CLAUDICATION: Status: ACTIVE | Noted: 2019-02-20

## 2021-06-11 PROBLEM — M19.90 ARTHRITIS: Status: ACTIVE | Noted: 2021-06-11

## 2021-06-11 PROBLEM — M79.7 FIBROMYOSITIS: Status: ACTIVE | Noted: 2021-06-11

## 2021-07-26 DIAGNOSIS — G60.9 IDIOPATHIC PERIPHERAL NEUROPATHY: ICD-10-CM

## 2021-07-26 RX ORDER — BACLOFEN 10 MG/1
10 TABLET ORAL 2 TIMES DAILY PRN
Qty: 180 TABLET | Refills: 3 | Status: SHIPPED | OUTPATIENT
Start: 2021-07-26

## 2021-07-26 NOTE — TELEPHONE ENCOUNTER
Caller: Brenda Michelle    Relationship: Self    Best call back number: [phone] (H)    Medication needed:   Requested Prescriptions     Pending Prescriptions Disp Refills   • baclofen (LIORESAL) 10 MG tablet 180 tablet 3     Sig: Take 1 tablet by mouth 2 (Two) Times a Day As Needed for Muscle Spasms.       When do you need the refill by: ASAP    What additional details did the patient provide when requesting the medication: PATIENT STATES PHARMACY TOLD HER NO REFILLS LEFT, PLEASE ADVISE WHEN SENT TO PHARMACY    Does the patient have less than a 3 day supply:  [x] Yes  [] No    What is the patient's preferred pharmacy: [facility] - [facility], [address] [phone] [facility] [phone]

## 2021-07-29 ENCOUNTER — OFFICE VISIT (OUTPATIENT)
Dept: FAMILY MEDICINE CLINIC | Facility: CLINIC | Age: 66
End: 2021-07-29

## 2021-07-29 VITALS
HEART RATE: 92 BPM | DIASTOLIC BLOOD PRESSURE: 64 MMHG | OXYGEN SATURATION: 100 % | HEIGHT: 63 IN | BODY MASS INDEX: 42.52 KG/M2 | SYSTOLIC BLOOD PRESSURE: 128 MMHG

## 2021-07-29 DIAGNOSIS — J96.11 CHRONIC RESPIRATORY FAILURE WITH HYPOXIA (HCC): ICD-10-CM

## 2021-07-29 DIAGNOSIS — G60.9 IDIOPATHIC PERIPHERAL NEUROPATHY: ICD-10-CM

## 2021-07-29 DIAGNOSIS — J41.1 MUCOPURULENT CHRONIC BRONCHITIS (HCC): ICD-10-CM

## 2021-07-29 DIAGNOSIS — E78.5 DYSLIPIDEMIA: ICD-10-CM

## 2021-07-29 DIAGNOSIS — J44.9 COPD MIXED TYPE (HCC): Primary | ICD-10-CM

## 2021-07-29 PROCEDURE — 99214 OFFICE O/P EST MOD 30 MIN: CPT | Performed by: FAMILY MEDICINE

## 2021-07-29 RX ORDER — METHYLPREDNISOLONE 4 MG/1
TABLET ORAL
Qty: 21 TABLET | Refills: 0 | Status: SHIPPED | OUTPATIENT
Start: 2021-07-29 | End: 2021-10-01

## 2021-07-29 RX ORDER — BUDESONIDE AND FORMOTEROL FUMARATE DIHYDRATE 160; 4.5 UG/1; UG/1
2 AEROSOL RESPIRATORY (INHALATION)
Qty: 10.2 G | Refills: 12 | Status: SHIPPED | OUTPATIENT
Start: 2021-07-29 | End: 2021-12-02 | Stop reason: SDUPTHER

## 2021-07-29 RX ORDER — ALBUTEROL SULFATE 90 UG/1
2 AEROSOL, METERED RESPIRATORY (INHALATION) EVERY 4 HOURS PRN
Qty: 6.7 G | Refills: 12 | Status: SHIPPED | OUTPATIENT
Start: 2021-07-29 | End: 2021-08-10 | Stop reason: SDUPTHER

## 2021-07-29 RX ORDER — LANOLIN ALCOHOL/MO/W.PET/CERES
1000 CREAM (GRAM) TOPICAL DAILY
COMMUNITY

## 2021-07-29 RX ORDER — DOXYCYCLINE HYCLATE 100 MG/1
100 CAPSULE ORAL 2 TIMES DAILY
Qty: 20 CAPSULE | Refills: 0 | Status: SHIPPED | OUTPATIENT
Start: 2021-07-29 | End: 2021-10-01

## 2021-08-06 LAB
A1A RFX TO PHENOTYPE: NORMAL
A1AT SERPL-MCNC: 174 MG/DL (ref 101–187)
ALBUMIN SERPL-MCNC: 4.2 G/DL (ref 3.8–4.8)
ALBUMIN/GLOB SERPL: 1.5 {RATIO} (ref 1.2–2.2)
ALP SERPL-CCNC: 167 IU/L (ref 48–121)
ALT SERPL-CCNC: 10 IU/L (ref 0–32)
AST SERPL-CCNC: 22 IU/L (ref 0–40)
BASOPHILS # BLD AUTO: 0.1 X10E3/UL (ref 0–0.2)
BASOPHILS NFR BLD AUTO: 1 %
BILIRUB SERPL-MCNC: 0.3 MG/DL (ref 0–1.2)
BUN SERPL-MCNC: 12 MG/DL (ref 8–27)
BUN/CREAT SERPL: 15 (ref 12–28)
CALCIUM SERPL-MCNC: 10.1 MG/DL (ref 8.7–10.3)
CHLORIDE SERPL-SCNC: 97 MMOL/L (ref 96–106)
CHOLEST SERPL-MCNC: 294 MG/DL (ref 100–199)
CO2 SERPL-SCNC: 32 MMOL/L (ref 20–29)
CREAT SERPL-MCNC: 0.8 MG/DL (ref 0.57–1)
EOSINOPHIL # BLD AUTO: 0.2 X10E3/UL (ref 0–0.4)
EOSINOPHIL NFR BLD AUTO: 2 %
ERYTHROCYTE [DISTWIDTH] IN BLOOD BY AUTOMATED COUNT: 12.5 % (ref 11.7–15.4)
GLOBULIN SER CALC-MCNC: 2.8 G/DL (ref 1.5–4.5)
GLUCOSE SERPL-MCNC: 76 MG/DL (ref 65–99)
HCT VFR BLD AUTO: 42.6 % (ref 34–46.6)
HDLC SERPL-MCNC: 73 MG/DL
HGB BLD-MCNC: 13.5 G/DL (ref 11.1–15.9)
IMM GRANULOCYTES # BLD AUTO: 0 X10E3/UL (ref 0–0.1)
IMM GRANULOCYTES NFR BLD AUTO: 0 %
LAB DIRECTOR NAME PROVIDER: NORMAL
LDLC SERPL CALC-MCNC: 190 MG/DL (ref 0–99)
LYMPHOCYTES # BLD AUTO: 1.5 X10E3/UL (ref 0.7–3.1)
LYMPHOCYTES NFR BLD AUTO: 18 %
MCH RBC QN AUTO: 30.7 PG (ref 26.6–33)
MCHC RBC AUTO-ENTMCNC: 31.7 G/DL (ref 31.5–35.7)
MCV RBC AUTO: 97 FL (ref 79–97)
MONOCYTES # BLD AUTO: 0.8 X10E3/UL (ref 0.1–0.9)
MONOCYTES NFR BLD AUTO: 9 %
NEUTROPHILS # BLD AUTO: 5.8 X10E3/UL (ref 1.4–7)
NEUTROPHILS NFR BLD AUTO: 70 %
PLATELET # BLD AUTO: 295 X10E3/UL (ref 150–450)
POTASSIUM SERPL-SCNC: 4.5 MMOL/L (ref 3.5–5.2)
PROT SERPL-MCNC: 7 G/DL (ref 6–8.5)
RBC # BLD AUTO: 4.4 X10E6/UL (ref 3.77–5.28)
SERPINA1 GENE MUT ANL BLD/T: NORMAL
SERPINA1 GENE MUT TESTED BLD/T: NORMAL
SODIUM SERPL-SCNC: 141 MMOL/L (ref 134–144)
TRIGL SERPL-MCNC: 169 MG/DL (ref 0–149)
VLDLC SERPL CALC-MCNC: 31 MG/DL (ref 5–40)
WBC # BLD AUTO: 8.3 X10E3/UL (ref 3.4–10.8)

## 2021-08-07 NOTE — PROGRESS NOTES
Call results to patient.  Genetic check for cause of COPD negative (alpha 1 antitrypsin)  Blood counts, kidney and liver function normal.  Cholesterol very high, would start statin if can tolerate as very high.  Rosuvastatin 20mg po daily

## 2021-08-09 DIAGNOSIS — E78.5 DYSLIPIDEMIA: ICD-10-CM

## 2021-08-09 DIAGNOSIS — J44.9 COPD MIXED TYPE (HCC): Primary | ICD-10-CM

## 2021-08-09 RX ORDER — ROSUVASTATIN CALCIUM 20 MG/1
20 TABLET, COATED ORAL NIGHTLY
Qty: 30 TABLET | Refills: 11 | Status: SHIPPED | OUTPATIENT
Start: 2021-08-09 | End: 2021-08-10 | Stop reason: SDUPTHER

## 2021-08-10 DIAGNOSIS — E78.5 DYSLIPIDEMIA: ICD-10-CM

## 2021-08-10 DIAGNOSIS — J41.1 MUCOPURULENT CHRONIC BRONCHITIS (HCC): ICD-10-CM

## 2021-08-10 DIAGNOSIS — J44.9 COPD MIXED TYPE (HCC): ICD-10-CM

## 2021-08-10 RX ORDER — ALBUTEROL SULFATE 90 UG/1
2 AEROSOL, METERED RESPIRATORY (INHALATION) EVERY 4 HOURS PRN
Qty: 6.7 G | Refills: 12 | Status: SHIPPED | OUTPATIENT
Start: 2021-08-10 | End: 2021-12-02

## 2021-08-10 RX ORDER — ROSUVASTATIN CALCIUM 20 MG/1
20 TABLET, COATED ORAL NIGHTLY
Qty: 30 TABLET | Refills: 11 | Status: SHIPPED | OUTPATIENT
Start: 2021-08-10 | End: 2022-05-26

## 2021-08-10 NOTE — TELEPHONE ENCOUNTER
Caller: Brenda Michelle    Relationship: Self    Best call back number: [phone]    Medication needed:   Requested Prescriptions     Pending Prescriptions Disp Refills   • albuterol sulfate  (90 Base) MCG/ACT inhaler 6.7 g 12     Sig: Inhale 2 puffs Every 4 (Four) Hours As Needed for Wheezing or Shortness of Air.   • rosuvastatin (Crestor) 20 MG tablet 30 tablet 11     Sig: Take 1 tablet by mouth Every Night.       When do you need the refill by: ASAP    What additional details did the patient provide when requesting the medication: PATIENT REQUESTING REFILL TO BE SENT TO [employer] [facility]. PLEASE ADVISE, THANK YOU!    Does the patient have less than a 3 day supply:  [x] Yes  [] No    What is the patient's preferred pharmacy: [facility] - [address] [phone] [facility] [phone]

## 2021-08-16 DIAGNOSIS — E53.8 FOLIC ACID DEFICIENCY: ICD-10-CM

## 2021-08-16 DIAGNOSIS — G60.9 IDIOPATHIC PERIPHERAL NEUROPATHY: ICD-10-CM

## 2021-08-16 NOTE — TELEPHONE ENCOUNTER
Caller: Brenda Michelle    Relationship: Self    Best call back number: [phone]     Medication needed:   Requested Prescriptions     Pending Prescriptions Disp Refills   • pregabalin (LYRICA) 150 MG capsule 270 capsule 1     Sig: Take 1 capsule by mouth 3 (Three) Times a Day.   • folic acid (FOLVITE) 1 MG tablet 90 tablet 1     Sig: Take 1 tablet by mouth Daily.       When do you need the refill by: ASAP     Does the patient have less than a 3 day supply:  [x] Yes  [] No    What is the patient's preferred pharmacy: [facility] - [address] [phone] [facility] [phone]

## 2021-08-17 RX ORDER — PREGABALIN 150 MG/1
150 CAPSULE ORAL 3 TIMES DAILY
Qty: 270 CAPSULE | Refills: 1 | Status: SHIPPED | OUTPATIENT
Start: 2021-08-17

## 2021-08-17 RX ORDER — FOLIC ACID 1 MG/1
1000 TABLET ORAL DAILY
Qty: 90 TABLET | Refills: 1 | Status: SHIPPED | OUTPATIENT
Start: 2021-08-17 | End: 2021-11-02 | Stop reason: SDUPTHER

## 2021-10-01 ENCOUNTER — OFFICE VISIT (OUTPATIENT)
Dept: FAMILY MEDICINE CLINIC | Facility: CLINIC | Age: 66
End: 2021-10-01

## 2021-10-01 VITALS
DIASTOLIC BLOOD PRESSURE: 80 MMHG | WEIGHT: 256 LBS | HEIGHT: 62 IN | BODY MASS INDEX: 47.11 KG/M2 | SYSTOLIC BLOOD PRESSURE: 122 MMHG | HEART RATE: 79 BPM | OXYGEN SATURATION: 95 % | TEMPERATURE: 98 F

## 2021-10-01 DIAGNOSIS — J44.1 ACUTE EXACERBATION OF COPD WITH ASTHMA (HCC): Primary | ICD-10-CM

## 2021-10-01 DIAGNOSIS — J45.901 ACUTE EXACERBATION OF COPD WITH ASTHMA (HCC): Primary | ICD-10-CM

## 2021-10-01 DIAGNOSIS — H61.23 CERUMEN DEBRIS ON TYMPANIC MEMBRANE OF BOTH EARS: ICD-10-CM

## 2021-10-01 DIAGNOSIS — Z12.11 COLON CANCER SCREENING: ICD-10-CM

## 2021-10-01 PROCEDURE — 99214 OFFICE O/P EST MOD 30 MIN: CPT | Performed by: FAMILY MEDICINE

## 2021-10-01 RX ORDER — AZITHROMYCIN 250 MG/1
TABLET, FILM COATED ORAL
Qty: 6 TABLET | Refills: 0 | Status: SHIPPED | OUTPATIENT
Start: 2021-10-01 | End: 2021-12-02

## 2021-10-01 RX ORDER — METHYLPREDNISOLONE 4 MG/1
TABLET ORAL
Qty: 21 TABLET | Refills: 0 | Status: SHIPPED | OUTPATIENT
Start: 2021-10-01 | End: 2022-02-21

## 2021-10-01 NOTE — PROGRESS NOTES
Subjective   Brenda Michelle is a 65 y.o. female. Presents today for   Chief Complaint   Patient presents with   • Cough   • COPD       Cough  This is a recurrent problem. The problem has been unchanged. The problem occurs constantly. The cough is productive of sputum. Associated symptoms include shortness of breath and wheezing. Pertinent negatives include no fever, nasal congestion or postnasal drip. She has tried a beta-agonist inhaler and steroid inhaler for the symptoms. The treatment provided moderate relief. Her past medical history is significant for COPD and emphysema.   COPD  She complains of cough, shortness of breath, sputum production and wheezing. This is a recurrent problem. The current episode started 1 to 4 weeks ago. The problem occurs constantly. The problem has been unchanged. Pertinent negatives include no fever, nasal congestion or postnasal drip. Her symptoms are alleviated by steroid inhaler and beta-agonist. She reports moderate improvement on treatment. Her past medical history is significant for COPD and emphysema.   patient with chronic resp failure on long-term oxygen;  Has not seen pulmonary in sometime and needs to establish again.      Having drainage EAC at night, no hearing loss  Review of Systems   Constitutional: Negative for fever.   HENT: Negative for postnasal drip.    Respiratory: Positive for cough, sputum production, shortness of breath and wheezing.        Patient Active Problem List   Diagnosis   • Acute deep vein thrombosis of distal leg (HCC)   • Adenomatous polyp of colon   • Depression   • Fibromyalgia   • Dyslipidemia   • Low back pain   • Peripheral arterial occlusive disease (HCC)   • Vitamin D deficiency   • Gastroesophageal reflux disease   • Irritable bowel syndrome   • Disorder of intervertebral disc   • Peripheral neuropathy   • Fracture of multiple ribs of right side   • Fall   • Tobacco dependence   • Acute respiratory failure with hypoxia (HCC)   • COPD mixed  type (HCC)   • Weakness   • Hypopotassemia   • Dysuria   • Constipation   • Hemorrhoids   • Arthritis   • Fibromyositis   • Hernia of anterior abdominal wall   • 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HCC)   • Injury of right ankle   • Kidney stone   • Knee pain   • Moderate ankle sprain   • Osteoarthritis of knee   • Other specified postprocedural states   • Spinal stenosis, lumbar region without neurogenic claudication   • Chronic respiratory failure with hypoxia (HCC)       Social History     Socioeconomic History   • Marital status:      Spouse name: Not on file   • Number of children: 2   • Years of education: 12    • Highest education level: Not on file   Tobacco Use   • Smoking status: Former Smoker     Packs/day: 0.75     Years: 50.00     Pack years: 37.50     Types: Cigarettes     Start date: 1960     Quit date: 2020     Years since quittin.9   • Smokeless tobacco: Never Used   Substance and Sexual Activity   • Alcohol use: No   • Drug use: No       Allergies   Allergen Reactions   • Ambien [Zolpidem Tartrate] Other (See Comments)     Nightmares   • Bupropion Itching   • Codeine Sulfate Itching   • Zolpidem Hives     Nightmares   • Adhesive Tape Rash   • Aripiprazole Unknown - Low Severity   • Atorvastatin Other (See Comments)     MYALGIAS   • Cilostazol Other (See Comments)     HA   • Colesevelam Nausea Only   • Ferrous Sulfate Nausea Only   • Welchol [Colesevelam Hcl] Nausea Only   • Wound Dressing Adhesive Rash       Current Outpatient Medications on File Prior to Visit   Medication Sig Dispense Refill   • albuterol (PROVENTIL HFA;VENTOLIN HFA) 108 (90 Base) MCG/ACT inhaler Inhale 2 puffs Every 4 (Four) Hours As Needed for Wheezing. 1 inhaler 0   • albuterol sulfate  (90 Base) MCG/ACT inhaler Inhale 2 puffs Every 4 (Four) Hours As Needed for Wheezing or Shortness of Air. 6.7 g 12   • Aspirin (ASPIR-81 PO) Take 81 mg by mouth Daily.     • baclofen (LIORESAL) 10 MG tablet Take 1 tablet by mouth 2 (Two)  Times a Day As Needed for Muscle Spasms. 180 tablet 3   • budesonide-formoterol (Symbicort) 160-4.5 MCG/ACT inhaler Inhale 2 puffs 2 (Two) Times a Day. 10.2 g 12   • clopidogrel (PLAVIX) 75 MG tablet Take 1 tablet by mouth Daily. 90 tablet 3   • diclofenac (VOLTAREN) 1 % gel gel Apply 4 g topically to the appropriate area as directed 2 (Two) Times a Day As Needed (idopathic neuropathy). 100 g 1   • Diclofenac Epolamine (FLECTOR) 1.3 % patch patch Apply 1 patch topically to the appropriate area as directed 2 (Two) Times a Day. 60 patch 2   • docusate sodium (COLACE) 100 MG capsule Take 100 mg by mouth 2 (Two) Times a Day.     • DULoxetine (CYMBALTA) 60 MG capsule Take 1 capsule by mouth Daily. 90 capsule 1   • folic acid (FOLVITE) 1 MG tablet Take 1 tablet by mouth Daily. 90 tablet 1   • HYDROcodone-acetaminophen (NORCO) 7.5-325 MG per tablet Take 1 tablet by mouth Every 6 (Six) Hours As Needed for Moderate Pain . Please take these instead of your hydrocodone 5/325.  Do not take both medications. 12 tablet 0   • lidocaine (Lidoderm) 5 % Place 1 patch on the skin as directed by provider Daily. Remove & Discard patch within 12 hours or as directed by MD 30 patch 0   • pregabalin (LYRICA) 150 MG capsule Take 1 capsule by mouth 3 (Three) Times a Day. 270 capsule 1   • rosuvastatin (Crestor) 20 MG tablet Take 1 tablet by mouth Every Night. 30 tablet 11   • vitamin B-12 (CYANOCOBALAMIN) 1000 MCG tablet Take 1,000 mcg by mouth Daily.     • [DISCONTINUED] doxycycline (VIBRAMYCIN) 100 MG capsule Take 1 capsule by mouth 2 (Two) Times a Day. 20 capsule 0   • [DISCONTINUED] HYDROcodone-acetaminophen (NORCO) 5-325 MG per tablet Take 1 tablet by mouth Every 6 (Six) Hours As Needed for Moderate Pain  or Severe Pain . 120 tablet 0   • [DISCONTINUED] methylPREDNISolone (MEDROL) 4 MG dose pack Take as directed on package instructions. 21 tablet 0     No current facility-administered medications on file prior to visit.       Objective  "  Vitals:    10/01/21 1459   BP: 122/80   Pulse: 79   Temp: 98 °F (36.7 °C)   TempSrc: Oral   SpO2: 95%   Weight: 116 kg (256 lb)   Height: 157.5 cm (62\")     Body mass index is 46.82 kg/m².    Physical Exam  Vitals and nursing note reviewed.   Constitutional:       Appearance: She is well-developed.   HENT:      Head: Normocephalic and atraumatic.      Right Ear: Tympanic membrane normal.      Left Ear: Tympanic membrane normal.      Ears:      Comments: B/l cerumen  Neck:      Thyroid: No thyromegaly.      Vascular: No JVD.   Cardiovascular:      Rate and Rhythm: Normal rate and regular rhythm.      Heart sounds: Normal heart sounds. No murmur heard.   No friction rub. No gallop.    Pulmonary:      Effort: Pulmonary effort is normal. No respiratory distress.      Breath sounds: Decreased air movement present. Decreased breath sounds present. No wheezing or rales.   Abdominal:      General: Bowel sounds are normal. There is no distension.      Palpations: Abdomen is soft.      Tenderness: There is no abdominal tenderness. There is no guarding or rebound.   Musculoskeletal:      Cervical back: Neck supple.   Skin:     General: Skin is warm and dry.   Neurological:      Mental Status: She is alert.   Psychiatric:         Behavior: Behavior normal.         Assessment/Plan   Diagnoses and all orders for this visit:    1. Acute exacerbation of COPD with asthma (HCC) (Primary)  -     methylPREDNISolone (MEDROL) 4 MG dose pack; Take as directed on package instructions.  Dispense: 21 tablet; Refill: 0  -     azithromycin (ZITHROMAX) 250 MG tablet; Take first 2 tablets together, then 1 every day until finished.  Dispense: 6 tablet; Refill: 0  -     Ambulatory Referral to Pulmonology    2. Colon cancer screening  -     Ambulatory Referral to Gastroenterology    3. Cerumen debris on tympanic membrane of both ears  -     carbamide peroxide (DEBROX) 6.5 % otic solution; Administer 5 drops into both ears 2 (Two) Times a Day.  " Dispense: 22 mL; Refill: 0    refer to pulmonary as needs to re-establish given chronic lung disease;  Will give steroid pack and medrol pack;  Didn't toelrate doxy as n/v  Due c-scope, hx of poplyps per patient;   sees Dr. Lopez, would like see him.          -Follow up: 3 months and prn

## 2021-10-08 ENCOUNTER — TELEPHONE (OUTPATIENT)
Dept: FAMILY MEDICINE CLINIC | Facility: CLINIC | Age: 66
End: 2021-10-08

## 2021-10-08 NOTE — TELEPHONE ENCOUNTER
Caller: Shiva Michelle Sr    Relationship: Emergency Contact    Best call back number: [phone]    What is the best time to reach you: ANY TIME    Who are you requesting to speak with (clinical staff, provider,  specific staff member): CLINICAL STAFF    What was the call regarding: PATIENT STARTED ANTIBIOTICS AND STEROIDS AND DR. LAURENT ADVISED PATIENT TO WAIT 2 WEEKS TO GET HER FLU SHOT AFTER TAKING THE MEDICATION. PATIENT WANTS TO KNOW WHAT TIMEFRAME SHE SHOULD WAIT FOR TO GET HER BOOSTER VACCINE, AND WHAT IS THE EXACT DATE THAT SHE CAN GET HER FLU SHOT. SHE STARTED MEDS ON 10/1/21    Do you require a callback: YES

## 2021-10-08 NOTE — TELEPHONE ENCOUNTER
Flu 10/15/21 and covid 19 10/29/21.  2 weeks apart for each.  Could get both same time, but might make feel awful.  I would wait and spread apart by 2 weeks.  RRJ

## 2021-11-02 DIAGNOSIS — E53.8 FOLIC ACID DEFICIENCY: ICD-10-CM

## 2021-11-02 NOTE — TELEPHONE ENCOUNTER
Caller: Brenda Michelle    Relationship: Self          Requested Prescriptions:   Requested Prescriptions     Pending Prescriptions Disp Refills   • folic acid (FOLVITE) 1 MG tablet 90 tablet 1     Sig: Take 1 tablet by mouth Daily.        Pharmacy where request should be sent: [facility] - [address] - [phone]  - [phone] [phone]    Additional details provided by patient: PATIENT HAS REQUESTED A 90 DAY SUPPLY WITH REFILLS SO THAT THEY CAN GET IT CHEAPER.     Best call back number: [phone]    Does the patient have less than a 3 day supply:  [] Yes  [x] No    Avelina Pastor Rep   11/02/21 12:28 EDT

## 2021-11-03 RX ORDER — FOLIC ACID 1 MG/1
1000 TABLET ORAL DAILY
Qty: 90 TABLET | Refills: 1 | Status: SHIPPED | OUTPATIENT
Start: 2021-11-03 | End: 2022-05-02 | Stop reason: SDUPTHER

## 2021-11-24 DIAGNOSIS — M79.10 MYALGIA: ICD-10-CM

## 2021-11-24 RX ORDER — DULOXETIN HYDROCHLORIDE 60 MG/1
CAPSULE, DELAYED RELEASE ORAL
Qty: 90 CAPSULE | Refills: 3 | Status: SHIPPED | OUTPATIENT
Start: 2021-11-24 | End: 2022-11-21

## 2021-12-01 ENCOUNTER — TELEPHONE (OUTPATIENT)
Dept: FAMILY MEDICINE CLINIC | Facility: CLINIC | Age: 66
End: 2021-12-01

## 2021-12-01 DIAGNOSIS — R39.9 UTI SYMPTOMS: Primary | ICD-10-CM

## 2021-12-01 NOTE — TELEPHONE ENCOUNTER
PATIENT IS GOING TO DROP OFF A URINE SAMPLE AT THE OFFICE     SHE HAS APPT WITH DR LAURENT TOMORROW       Miguel Angel Vegas,Shiva () [phone] (M)

## 2021-12-02 ENCOUNTER — OFFICE VISIT (OUTPATIENT)
Dept: FAMILY MEDICINE CLINIC | Facility: CLINIC | Age: 66
End: 2021-12-02

## 2021-12-02 VITALS
SYSTOLIC BLOOD PRESSURE: 122 MMHG | DIASTOLIC BLOOD PRESSURE: 78 MMHG | HEIGHT: 62 IN | HEART RATE: 89 BPM | OXYGEN SATURATION: 99 % | BODY MASS INDEX: 47.48 KG/M2 | WEIGHT: 258 LBS

## 2021-12-02 DIAGNOSIS — N39.0 ACUTE UTI: ICD-10-CM

## 2021-12-02 DIAGNOSIS — J44.9 COPD MIXED TYPE (HCC): Primary | ICD-10-CM

## 2021-12-02 DIAGNOSIS — J41.1 MUCOPURULENT CHRONIC BRONCHITIS (HCC): ICD-10-CM

## 2021-12-02 LAB
BILIRUB BLD-MCNC: NEGATIVE MG/DL
CLARITY, POC: ABNORMAL
COLOR UR: YELLOW
GLUCOSE UR STRIP-MCNC: NEGATIVE MG/DL
KETONES UR QL: NEGATIVE
LEUKOCYTE EST, POC: ABNORMAL
NITRITE UR-MCNC: POSITIVE MG/ML
PH UR: 6 [PH] (ref 5–8)
PROT UR STRIP-MCNC: ABNORMAL MG/DL
RBC # UR STRIP: ABNORMAL /UL
SP GR UR: 1.02 (ref 1–1.03)
UROBILINOGEN UR QL: NORMAL

## 2021-12-02 PROCEDURE — 99214 OFFICE O/P EST MOD 30 MIN: CPT | Performed by: FAMILY MEDICINE

## 2021-12-02 PROCEDURE — 81002 URINALYSIS NONAUTO W/O SCOPE: CPT | Performed by: FAMILY MEDICINE

## 2021-12-02 RX ORDER — CEPHALEXIN 500 MG/1
500 CAPSULE ORAL 4 TIMES DAILY
Qty: 40 CAPSULE | Refills: 0 | Status: SHIPPED | OUTPATIENT
Start: 2021-12-02 | End: 2022-02-21

## 2021-12-02 RX ORDER — LEVALBUTEROL TARTRATE 45 UG/1
1-2 AEROSOL, METERED ORAL EVERY 4 HOURS PRN
Qty: 3 EACH | Refills: 3 | Status: SHIPPED | OUTPATIENT
Start: 2021-12-02 | End: 2022-02-21

## 2021-12-02 RX ORDER — BUDESONIDE AND FORMOTEROL FUMARATE DIHYDRATE 160; 4.5 UG/1; UG/1
2 AEROSOL RESPIRATORY (INHALATION)
Qty: 3 EACH | Refills: 3 | Status: SHIPPED | OUTPATIENT
Start: 2021-12-02 | End: 2022-02-21

## 2021-12-02 RX ORDER — PREDNISONE 10 MG/1
TABLET ORAL
Qty: 32 TABLET | Refills: 0 | Status: SHIPPED | OUTPATIENT
Start: 2021-12-02 | End: 2022-02-21

## 2021-12-02 NOTE — PROGRESS NOTES
Subjective   Brenda Michelle is a 66 y.o. female. Presents today for   Chief Complaint   Patient presents with   • Urinary Tract Infection   • Shortness of Breath       Urinary Tract Infection   This is a new problem. The current episode started in the past 7 days. The problem occurs every urination. The problem has been unchanged. The quality of the pain is described as burning. The pain is moderate. There has been no fever. Associated symptoms include frequency, nausea and urgency. She has tried antibiotics for the symptoms. Has pending referral to pulmonary end of January   Shortness of Breath  This is a chronic problem. The current episode started more than 1 month ago. The problem occurs constantly. The problem has been gradually worsening. She has tried beta agonist inhalers for the symptoms. The treatment provided moderate (chronic oxygen;  ? symbicort covered;  albuterol avoiding as causing shaking) relief. Her past medical history is significant for chronic lung disease and COPD. (Has pending referral to pulmonary end of January)       Review of Systems   Respiratory: Positive for shortness of breath.    Gastrointestinal: Positive for nausea.   Genitourinary: Positive for frequency and urgency.       Patient Active Problem List   Diagnosis   • Acute deep vein thrombosis of distal leg ([facility])   • Adenomatous polyp of colon   • Depression   • Fibromyalgia   • Dyslipidemia   • Low back pain   • Peripheral arterial occlusive disease ([facility])   • Vitamin D deficiency   • Gastroesophageal reflux disease   • Irritable bowel syndrome   • Disorder of intervertebral disc   • Peripheral neuropathy   • Fracture of multiple ribs of right side   • Fall   • Tobacco dependence   • Acute respiratory failure with hypoxia ([facility])   • COPD mixed type ([facility])   • Weakness   • Hypopotassemia   • Dysuria   • Constipation   • Hemorrhoids   • Arthritis   • Fibromyositis   • Hernia of anterior abdominal wall   • 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[facility])   •  Injury of right ankle   • Kidney stone   • Knee pain   • Moderate ankle sprain   • Osteoarthritis of knee   • Other specified postprocedural states   • Spinal stenosis, lumbar region without neurogenic claudication   • Chronic respiratory failure with hypoxia (HCC)       Social History     Socioeconomic History   • Marital status:    • Number of children: 2   • Years of education: 12    Tobacco Use   • Smoking status: Former Smoker     Packs/day: 0.75     Years: 50.00     Pack years: 37.50     Types: Cigarettes     Start date: 1960     Quit date: 2020     Years since quittin.0   • Smokeless tobacco: Never Used   Substance and Sexual Activity   • Alcohol use: No   • Drug use: No       Allergies   Allergen Reactions   • Ambien [Zolpidem Tartrate] Other (See Comments)     Nightmares   • Bupropion Itching   • Codeine Sulfate Itching   • Zolpidem Hives     Nightmares   • Adhesive Tape Rash   • Aripiprazole Unknown - Low Severity   • Atorvastatin Other (See Comments)     MYALGIAS   • Cilostazol Other (See Comments)     HA   • Colesevelam Nausea Only   • Ferrous Sulfate Nausea Only   • Welchol [Colesevelam Hcl] Nausea Only   • Wound Dressing Adhesive Rash       Current Outpatient Medications on File Prior to Visit   Medication Sig Dispense Refill   • albuterol (PROVENTIL HFA;VENTOLIN HFA) 108 (90 Base) MCG/ACT inhaler Inhale 2 puffs Every 4 (Four) Hours As Needed for Wheezing. 1 inhaler 0   • albuterol sulfate  (90 Base) MCG/ACT inhaler Inhale 2 puffs Every 4 (Four) Hours As Needed for Wheezing or Shortness of Air. 6.7 g 12   • Aspirin (ASPIR-81 PO) Take 81 mg by mouth Daily.     • azithromycin (ZITHROMAX) 250 MG tablet Take first 2 tablets together, then 1 every day until finished. 6 tablet 0   • baclofen (LIORESAL) 10 MG tablet Take 1 tablet by mouth 2 (Two) Times a Day As Needed for Muscle Spasms. 180 tablet 3   • budesonide-formoterol (Symbicort) 160-4.5 MCG/ACT inhaler Inhale 2 puffs 2 (Two)  "Times a Day. 10.2 g 12   • carbamide peroxide (DEBROX) 6.5 % otic solution Administer 5 drops into both ears 2 (Two) Times a Day. 22 mL 0   • clopidogrel (PLAVIX) 75 MG tablet Take 1 tablet by mouth Daily. 90 tablet 3   • diclofenac (VOLTAREN) 1 % gel gel Apply 4 g topically to the appropriate area as directed 2 (Two) Times a Day As Needed (idopathic neuropathy). 100 g 1   • Diclofenac Epolamine (FLECTOR) 1.3 % patch patch Apply 1 patch topically to the appropriate area as directed 2 (Two) Times a Day. 60 patch 2   • docusate sodium (COLACE) 100 MG capsule Take 100 mg by mouth 2 (Two) Times a Day.     • DULoxetine (CYMBALTA) 60 MG capsule TAKE 1 CAPSULE DAILY 90 capsule 3   • folic acid (FOLVITE) 1 MG tablet Take 1 tablet by mouth Daily. 90 tablet 1   • HYDROcodone-acetaminophen (NORCO) 7.5-325 MG per tablet Take 1 tablet by mouth Every 6 (Six) Hours As Needed for Moderate Pain . Please take these instead of your hydrocodone 5/325.  Do not take both medications. 12 tablet 0   • lidocaine (Lidoderm) 5 % Place 1 patch on the skin as directed by provider Daily. Remove & Discard patch within 12 hours or as directed by MD 30 patch 0   • methylPREDNISolone (MEDROL) 4 MG dose pack Take as directed on package instructions. 21 tablet 0   • pregabalin (LYRICA) 150 MG capsule Take 1 capsule by mouth 3 (Three) Times a Day. 270 capsule 1   • rosuvastatin (Crestor) 20 MG tablet Take 1 tablet by mouth Every Night. 30 tablet 11   • vitamin B-12 (CYANOCOBALAMIN) 1000 MCG tablet Take 1,000 mcg by mouth Daily.       No current facility-administered medications on file prior to visit.       Objective   Vitals:    12/02/21 1102   BP: 122/78   Pulse: 89   SpO2: 99%   Weight: 117 kg (258 lb)   Height: 157.5 cm (62\")     Body mass index is 47.19 kg/m².    Physical Exam  Vitals and nursing note reviewed.   Constitutional:       Appearance: She is well-developed.   HENT:      Head: Normocephalic and atraumatic.   Neck:      Thyroid: No " thyromegaly.      Vascular: No JVD.   Cardiovascular:      Rate and Rhythm: Normal rate and regular rhythm.      Heart sounds: Normal heart sounds. No murmur heard.  No friction rub. No gallop.    Pulmonary:      Effort: Pulmonary effort is normal. No respiratory distress.      Breath sounds: Decreased air movement present. Decreased breath sounds and wheezing present. No rales.   Abdominal:      General: Bowel sounds are normal. There is no distension.      Palpations: Abdomen is soft.      Tenderness: There is no abdominal tenderness. There is no guarding or rebound.   Musculoskeletal:      Cervical back: Neck supple.   Skin:     General: Skin is warm and dry.   Neurological:      Mental Status: She is alert.   Psychiatric:         Behavior: Behavior normal.       POC Urinalysis Dipstick  Order: [id number]   Status: Final result     Visible to patient: Malorie (scheduled for 12/3/2021  1:21 AM)     Next appt: 01/05/2022 at 01:30 PM in Family Medicine (Angelito Kline DO)     Dx: UTI symptoms    Specimen Information: Urine         0 Result Notes    Component   Ref Range & Units 11:20   (12/2/21) 11 mo ago   (12/14/20) 1 yr ago   (7/1/20) 1 yr ago   (6/8/20) 2 yr ago   (11/20/19) 3 yr ago   (11/6/18) 3 yr ago   (7/6/18)   Color   Yellow, Straw, Dark Yellow, Meghann Yellow  Yellow R  Yellow R  Yellow R  Yellow R  Yellow R  Yellow R    Clarity, UA   Clear Cloudy Abnormal   Clear  Clear  Clear  Clear  Clear  Clear    Glucose, UA   Negative, 1000 mg/dL (3+) mg/dL Negative  Negative R  Negative R  Negative R  Negative R  Negative R  Negative R    Bilirubin   Negative Negative  Negative  Negative  Negative  Negative  Negative  Negative    Ketones, UA   Negative Negative  Negative  Negative  Negative  Negative  Negative  Negative    Specific Gravity    1.005 - 1.030 1.020  1.015       <=1.005 Abnormal   1.006  1.006  1.015  1.011    Blood, UA   Negative Small Abnormal   Negative  Negative  Negative  Small (1+) Abnormal    Negative  Negative    pH, Urine   5.0 - 8.0 6.0  7.5 R  7.5 R  7.0 R  7.5  8.0 High  R  8.5 High     Protein, POC   Negative mg/dL 30 mg/dL Abnormal   Negative R  Negative R  Negative R  100 mg/dL (2+) Abnormal  R  Trace R  Negative R    Urobilinogen, UA   Normal Normal     0.2 E.U./dL R   0.2 E.U./dL R    Leukocytes   Negative Small (1+) Abnormal   Trace Abnormal   Negative  2+ Abnormal   Small (1+) Abnormal   3+ Abnormal   Moderate (2+) Abnormal     Nitrite, UA   Negative Positive Abnormal   Negative  Negative  Positive Abnormal   Negative  Positive Abnormal   Negative    Urobilinogen, UA  0.2 R  0.2 R  0.2 R   0.2 R     Microscopic Examination  See below: CM  Comment CM  See below: CM   See below: CM     Urinalysis Reflex  Comment CM  Comment CM  Comment CM   Comment CM     Microscopic Examination   See below: CM        Resulting Agency [facility]              Specimen Collected: 12/02/21 11:20 Last Resulted: 12/02/21 11:20        Lab Flowsheet     Order Details     View Encounter     Lab and Collection             Assessment/Plan   Diagnoses and all orders for this visit:    1. COPD mixed type (HCC) (Primary)  -     budesonide-formoterol (Symbicort) 160-4.5 MCG/ACT inhaler; Inhale 2 puffs 2 (Two) Times a Day. Rinse mouth after use  Dispense: 3 each; Refill: 3  -     levalbuterol (Xopenex HFA) 45 MCG/ACT inhaler; Inhale 1-2 puffs Every 4 (Four) Hours As Needed for Wheezing or Shortness of Air.  Dispense: 3 each; Refill: 3  -     cephalexin (KEFLEX) 500 MG capsule; Take 1 capsule by mouth 4 (Four) Times a Day.  Dispense: 40 capsule; Refill: 0  -     predniSONE (DELTASONE) 10 MG tablet; 6 tabs po qd x 2 days, 4 tabs po qd x 2 days, 3 tabs po qd x 2 days, 2 tabs po qd x 2 days, 1 tab po qd x 2 days  Dispense: 32 tablet; Refill: 0    2. Acute UTI  -     POC Urinalysis Dipstick  -     Urine Culture - Urine, Urine, Clean Catch  -      cephalexin (KEFLEX) 500 MG capsule; Take 1 capsule by mouth 4 (Four) Times a Day.  Dispense: 40 capsule; Refill: 0    3. Mucopurulent chronic bronchitis (HCC)  -     budesonide-formoterol (Symbicort) 160-4.5 MCG/ACT inhaler; Inhale 2 puffs 2 (Two) Times a Day. Rinse mouth after use  Dispense: 3 each; Refill: 3  -     levalbuterol (Xopenex HFA) 45 MCG/ACT inhaler; Inhale 1-2 puffs Every 4 (Four) Hours As Needed for Wheezing or Shortness of Air.  Dispense: 3 each; Refill: 3  -     cephalexin (KEFLEX) 500 MG capsule; Take 1 capsule by mouth 4 (Four) Times a Day.  Dispense: 40 capsule; Refill: 0  -     predniSONE (DELTASONE) 10 MG tablet; 6 tabs po qd x 2 days, 4 tabs po qd x 2 days, 3 tabs po qd x 2 days, 2 tabs po qd x 2 days, 1 tab po qd x 2 days  Dispense: 32 tablet; Refill: 0  -     CBC & Differential  -     Basic Metabolic Panel  -     Sedimentation Rate    doxycycline causes n/v; will try kelfex to cover uti as well;    Continue oxygen as doing;  ? Ever received symbicort, will send again and reports albuterol makes tremulous and will see if xopenex covered.  Will extend steroid course  See pulmonary as planned;           -Follow up: 2 to 3 weeks and prn

## 2021-12-03 LAB
BASOPHILS # BLD AUTO: 0 X10E3/UL (ref 0–0.2)
BASOPHILS NFR BLD AUTO: 0 %
BUN SERPL-MCNC: 16 MG/DL (ref 8–27)
BUN/CREAT SERPL: 20 (ref 12–28)
CALCIUM SERPL-MCNC: 9.6 MG/DL (ref 8.7–10.3)
CHLORIDE SERPL-SCNC: 97 MMOL/L (ref 96–106)
CO2 SERPL-SCNC: 27 MMOL/L (ref 20–29)
CREAT SERPL-MCNC: 0.8 MG/DL (ref 0.57–1)
EOSINOPHIL # BLD AUTO: 0.1 X10E3/UL (ref 0–0.4)
EOSINOPHIL NFR BLD AUTO: 1 %
ERYTHROCYTE [DISTWIDTH] IN BLOOD BY AUTOMATED COUNT: 14.1 % (ref 11.7–15.4)
ERYTHROCYTE [SEDIMENTATION RATE] IN BLOOD BY WESTERGREN METHOD: 50 MM/HR (ref 0–40)
GLUCOSE SERPL-MCNC: 99 MG/DL (ref 65–99)
HCT VFR BLD AUTO: 38.2 % (ref 34–46.6)
HGB BLD-MCNC: 12.2 G/DL (ref 11.1–15.9)
IMM GRANULOCYTES # BLD AUTO: 0 X10E3/UL (ref 0–0.1)
IMM GRANULOCYTES NFR BLD AUTO: 1 %
LYMPHOCYTES # BLD AUTO: 1.2 X10E3/UL (ref 0.7–3.1)
LYMPHOCYTES NFR BLD AUTO: 18 %
MCH RBC QN AUTO: 30.2 PG (ref 26.6–33)
MCHC RBC AUTO-ENTMCNC: 31.9 G/DL (ref 31.5–35.7)
MCV RBC AUTO: 95 FL (ref 79–97)
MONOCYTES # BLD AUTO: 0.9 X10E3/UL (ref 0.1–0.9)
MONOCYTES NFR BLD AUTO: 13 %
NEUTROPHILS # BLD AUTO: 4.6 X10E3/UL (ref 1.4–7)
NEUTROPHILS NFR BLD AUTO: 67 %
PLATELET # BLD AUTO: 210 X10E3/UL (ref 150–450)
POTASSIUM SERPL-SCNC: 4.2 MMOL/L (ref 3.5–5.2)
RBC # BLD AUTO: 4.04 X10E6/UL (ref 3.77–5.28)
SODIUM SERPL-SCNC: 139 MMOL/L (ref 134–144)
WBC # BLD AUTO: 6.7 X10E3/UL (ref 3.4–10.8)

## 2021-12-04 LAB
BACTERIA UR CULT: NORMAL
BACTERIA UR CULT: NORMAL

## 2021-12-04 NOTE — PROGRESS NOTES
Call results to patient.  Blood counts and kidney function normal  Urine culture mixed erlinda, but high amount.  Finish antibiotics

## 2021-12-20 ENCOUNTER — TELEPHONE (OUTPATIENT)
Dept: FAMILY MEDICINE CLINIC | Facility: CLINIC | Age: 66
End: 2021-12-20

## 2021-12-20 NOTE — TELEPHONE ENCOUNTER
Caller: Shiva Michelle Sr    Relationship: Emergency Contact    Best call back number: [phone] (H)    What orders are you requesting (i.e. lab or imaging): HUMIDIFIER BOTTLE FOR OXYGEN AS WELL AS TRAPS    In what timeframe would the patient need to come in: ASAP    Where will you receive your lab/imaging services: [facility]    Additional notes: PATIENTS INSURANCE IS NOW REQUESTING AN ORDER FOR THE ABOVE LISTED ITEMS. PLEASE CALL PATIENT TO ADVISE WHEN ORDER IS SENT.

## 2022-01-31 ENCOUNTER — TELEPHONE (OUTPATIENT)
Dept: FAMILY MEDICINE CLINIC | Facility: CLINIC | Age: 67
End: 2022-01-31

## 2022-01-31 NOTE — TELEPHONE ENCOUNTER
Caller: Shiva Michelle Sr    Relationship to patient: Emergency Contact    Best call back number: [phone] (M)    Patient is needing: PATIENT  STATES RECEIVED A MESSAGE FROM [facility] STATING THAT PATIENT IS NEEDING A PRIOR AUTHORIZATION FOR MEDICATION  folic acid (FOLVITE) 1 MG tablet      [facility] - [address] - [phone] [facility] [phone]   [phone]

## 2022-02-09 DIAGNOSIS — I77.9 PERIPHERAL ARTERIAL OCCLUSIVE DISEASE: ICD-10-CM

## 2022-02-09 RX ORDER — CLOPIDOGREL BISULFATE 75 MG/1
TABLET ORAL
Qty: 90 TABLET | Refills: 3 | Status: SHIPPED | OUTPATIENT
Start: 2022-02-09 | End: 2023-01-03 | Stop reason: SDUPTHER

## 2022-02-21 ENCOUNTER — OFFICE VISIT (OUTPATIENT)
Dept: FAMILY MEDICINE CLINIC | Facility: CLINIC | Age: 67
End: 2022-02-21

## 2022-02-21 VITALS
HEIGHT: 62 IN | HEART RATE: 86 BPM | WEIGHT: 261 LBS | BODY MASS INDEX: 48.03 KG/M2 | DIASTOLIC BLOOD PRESSURE: 62 MMHG | OXYGEN SATURATION: 94 % | SYSTOLIC BLOOD PRESSURE: 126 MMHG

## 2022-02-21 DIAGNOSIS — J96.11 CHRONIC RESPIRATORY FAILURE WITH HYPOXIA: ICD-10-CM

## 2022-02-21 DIAGNOSIS — E66.01 MORBID (SEVERE) OBESITY DUE TO EXCESS CALORIES: ICD-10-CM

## 2022-02-21 DIAGNOSIS — R35.0 URINARY FREQUENCY: ICD-10-CM

## 2022-02-21 DIAGNOSIS — J44.9 COPD MIXED TYPE: ICD-10-CM

## 2022-02-21 DIAGNOSIS — Z87.891 FORMER SMOKER: ICD-10-CM

## 2022-02-21 DIAGNOSIS — Z78.0 MENOPAUSE: ICD-10-CM

## 2022-02-21 DIAGNOSIS — E55.9 VITAMIN D DEFICIENCY: ICD-10-CM

## 2022-02-21 DIAGNOSIS — Z12.31 ENCOUNTER FOR SCREENING MAMMOGRAM FOR BREAST CANCER: ICD-10-CM

## 2022-02-21 DIAGNOSIS — Z00.00 MEDICARE ANNUAL WELLNESS VISIT, SUBSEQUENT: Primary | ICD-10-CM

## 2022-02-21 DIAGNOSIS — M48.061 SPINAL STENOSIS, LUMBAR REGION WITHOUT NEUROGENIC CLAUDICATION: ICD-10-CM

## 2022-02-21 DIAGNOSIS — F17.211 CIGARETTE NICOTINE DEPENDENCE IN REMISSION: ICD-10-CM

## 2022-02-21 DIAGNOSIS — Z12.2 ENCOUNTER FOR SCREENING FOR LUNG CANCER: ICD-10-CM

## 2022-02-21 DIAGNOSIS — E78.5 DYSLIPIDEMIA: ICD-10-CM

## 2022-02-21 PROBLEM — Z79.4 ENCOUNTER FOR LONG-TERM (CURRENT) USE OF INSULIN: Status: ACTIVE | Noted: 2022-01-25

## 2022-02-21 PROBLEM — G62.9 PERIPHERAL NERVE DISEASE: Status: ACTIVE | Noted: 2022-01-25

## 2022-02-21 PROBLEM — M54.50 LOW BACK PAIN: Status: ACTIVE | Noted: 2022-01-25

## 2022-02-21 PROBLEM — F17.200 TOBACCO DEPENDENCE: Status: RESOLVED | Noted: 2017-12-11 | Resolved: 2022-02-21

## 2022-02-21 PROBLEM — M54.59 LUMBAR FACET JOINT PAIN: Status: ACTIVE | Noted: 2022-01-25

## 2022-02-21 PROBLEM — M70.70 BURSITIS OF HIP: Status: ACTIVE | Noted: 2022-01-25

## 2022-02-21 PROCEDURE — 1125F AMNT PAIN NOTED PAIN PRSNT: CPT | Performed by: FAMILY MEDICINE

## 2022-02-21 PROCEDURE — 96160 PT-FOCUSED HLTH RISK ASSMT: CPT | Performed by: FAMILY MEDICINE

## 2022-02-21 PROCEDURE — 1159F MED LIST DOCD IN RCRD: CPT | Performed by: FAMILY MEDICINE

## 2022-02-21 PROCEDURE — 1170F FXNL STATUS ASSESSED: CPT | Performed by: FAMILY MEDICINE

## 2022-02-21 PROCEDURE — 99214 OFFICE O/P EST MOD 30 MIN: CPT | Performed by: FAMILY MEDICINE

## 2022-02-21 PROCEDURE — G0439 PPPS, SUBSEQ VISIT: HCPCS | Performed by: FAMILY MEDICINE

## 2022-02-21 RX ORDER — ROPINIROLE 1 MG/1
TABLET, FILM COATED ORAL
COMMUNITY
End: 2022-02-21

## 2022-02-21 RX ORDER — ALBUTEROL SULFATE 90 UG/1
AEROSOL, METERED RESPIRATORY (INHALATION)
COMMUNITY
End: 2022-02-21

## 2022-02-21 RX ORDER — FLUCONAZOLE 200 MG/1
200 TABLET ORAL 2 TIMES DAILY
COMMUNITY
End: 2022-09-11 | Stop reason: HOSPADM

## 2022-02-21 RX ORDER — LAMOTRIGINE 25 MG/1
TABLET ORAL
COMMUNITY
End: 2022-02-21

## 2022-02-21 NOTE — PROGRESS NOTES
QUICK REFERENCE INFORMATION:  The ABCs of the Annual Wellness Visit    Subsequent Medicare Wellness Visit    HEALTH RISK ASSESSMENT    1955    Recent Hospitalizations:  No hospitalization(s) within the last year..        Current Medical Providers:  Patient Care Team:  Angelito Kline DO as PCP - General (Family Medicine)        Smoking Status:  Social History     Tobacco Use   Smoking Status Former Smoker   • Packs/day: 0.75   • Years: 50.00   • Pack years: 37.50   • Types: Cigarettes   • Start date: 1960   • Quit date: 2020   • Years since quittin.3   Smokeless Tobacco Never Used       Alcohol Consumption:  Social History     Substance and Sexual Activity   Alcohol Use No       Depression Screen:   PHQ-2/PHQ-9 Depression Screening 2022   Little interest or pleasure in doing things 0   Feeling down, depressed, or hopeless 0   Trouble falling or staying asleep, or sleeping too much 1   Feeling tired or having little energy 3   Poor appetite or overeating 0   Feeling bad about yourself - or that you are a failure or have let yourself or your family down 0   Trouble concentrating on things, such as reading the newspaper or watching television 0   Moving or speaking so slowly that other people could have noticed. Or the opposite - being so fidgety or restless that you have been moving around a lot more than usual 0   Thoughts that you would be better off dead, or of hurting yourself in some way 0   Total Score 4   If you checked off any problems, how difficult have these problems made it for you to do your work, take care of things at home, or get along with other people? Somewhat difficult       Health Habits and Functional and Cognitive Screening:  Functional & Cognitive Status 2021   Do you have difficulty preparing food and eating? Yes   Do you have difficulty bathing yourself, getting dressed or grooming yourself? No   Do you have difficulty using the toilet? No   Do you have  difficulty moving around from place to place? No   Do you have trouble with steps or getting out of a bed or a chair? No   Current Diet Well Balanced Diet   Dental Exam Up to date   Eye Exam Up to date   Exercise (times per week) 0 times per week   Current Exercises Include No Regular Exercise   Do you need help using the phone?  No   Are you deaf or do you have serious difficulty hearing?  No   Do you need help with transportation? Yes   Do you need help shopping? Yes   Do you need help preparing meals?  Yes   Do you need help with housework?  Yes   Do you need help with laundry? Yes   Do you need help taking your medications? No   Do you need help managing money? No   Do you ever drive or ride in a car without wearing a seat belt? No   Have you felt unusual stress, anger or loneliness in the last month? No   Who do you live with? Spouse   If you need help, do you have trouble finding someone available to you? No   Have you been bothered in the last four weeks by sexual problems? No   Do you have difficulty concentrating, remembering or making decisions? No           Does the patient have evidence of cognitive impairment? No    Aspirin use counseling: Does not need ASA (and currently is not on it)      Recent Lab Results:  CMP:  Lab Results   Component Value Date    BUN 14 02/21/2022    CREATININE 0.61 02/21/2022    EGFRIFNONA 95 02/21/2022    EGFRIFAFRI 109 02/21/2022    BCR 23 02/21/2022     (H) 02/21/2022    K 4.9 02/21/2022    CO2 25 02/21/2022    CALCIUM 9.9 02/21/2022    PROTENTOTREF 6.9 02/21/2022    ALBUMIN 4.0 02/21/2022    LABGLOBREF 2.9 02/21/2022    LABIL2 1.4 02/21/2022    BILITOT 0.3 02/21/2022    ALKPHOS 144 (H) 02/21/2022    AST 16 02/21/2022    ALT 7 02/21/2022     Lipid Panel:  Lab Results   Component Value Date    TRIG 148 02/21/2022    HDL 61 02/21/2022    VLDL 25 02/21/2022     HbA1c:  Lab Results   Component Value Date    HGBA1C 5.60 11/18/2019       Visual Acuity:  No exam data  present    Age-appropriate Screening Schedule:  Refer to the list below for future screening recommendations based on patient's age, sex and/or medical conditions. Orders for these recommended tests are listed in the plan section. The patient has been provided with a written plan.    Health Maintenance   Topic Date Due   • DXA SCAN  09/18/2017   • TDAP/TD VACCINES (2 - Td or Tdap) 08/19/2021   • MAMMOGRAM  08/29/2021   • LIPID PANEL  02/21/2023   • PAP SMEAR  06/08/2023   • INFLUENZA VACCINE  Completed        Subjective   History of Present Illness    Brenda Michelle is a 66 y.o. female who presents for an Subsequent Wellness Visit.    The following portions of the patient's history were reviewed and updated as appropriate: allergies, current medications, past family history, past medical history, past social history, past surgical history and problem list.    Outpatient Medications Prior to Visit   Medication Sig Dispense Refill   • Aspirin (ASPIR-81 PO) Take 81 mg by mouth Daily.     • baclofen (LIORESAL) 10 MG tablet Take 1 tablet by mouth 2 (Two) Times a Day As Needed for Muscle Spasms. 180 tablet 3   • clopidogrel (PLAVIX) 75 MG tablet TAKE 1 TABLET DAILY 90 tablet 3   • diclofenac (VOLTAREN) 1 % gel gel Apply 4 g topically to the appropriate area as directed 2 (Two) Times a Day As Needed (idopathic neuropathy). 100 g 1   • docusate sodium (COLACE) 100 MG capsule Take 100 mg by mouth 2 (Two) Times a Day.     • DULoxetine (CYMBALTA) 60 MG capsule TAKE 1 CAPSULE DAILY 90 capsule 3   • fluconazole (DIFLUCAN) 200 MG tablet fluconazole 200 mg tablet     • folic acid (FOLVITE) 1 MG tablet Take 1 tablet by mouth Daily. 90 tablet 1   • HYDROcodone-acetaminophen (NORCO) 7.5-325 MG per tablet Take 1 tablet by mouth Every 6 (Six) Hours As Needed for Moderate Pain . Please take these instead of your hydrocodone 5/325.  Do not take both medications. 12 tablet 0   • pregabalin (LYRICA) 150 MG capsule Take 1 capsule by mouth 3  (Three) Times a Day. 270 capsule 1   • rosuvastatin (Crestor) 20 MG tablet Take 1 tablet by mouth Every Night. 30 tablet 11   • vitamin B-12 (CYANOCOBALAMIN) 1000 MCG tablet Take 1,000 mcg by mouth Daily.     • albuterol sulfate HFA (ProAir HFA) 108 (90 Base) MCG/ACT inhaler ProAir HFA 90 mcg/actuation aerosol inhaler     • budesonide-formoterol (Symbicort) 160-4.5 MCG/ACT inhaler Inhale 2 puffs 2 (Two) Times a Day. Rinse mouth after use 3 each 3   • carbamide peroxide (DEBROX) 6.5 % otic solution Administer 5 drops into both ears 2 (Two) Times a Day. 22 mL 0   • cephalexin (KEFLEX) 500 MG capsule Take 1 capsule by mouth 4 (Four) Times a Day. 40 capsule 0   • Diclofenac Epolamine (FLECTOR) 1.3 % patch patch Apply 1 patch topically to the appropriate area as directed 2 (Two) Times a Day. 60 patch 2   • lamoTRIgine (LaMICtal) 25 MG tablet lamotrigine 25 mg tablet     • levalbuterol (Xopenex HFA) 45 MCG/ACT inhaler Inhale 1-2 puffs Every 4 (Four) Hours As Needed for Wheezing or Shortness of Air. 3 each 3   • lidocaine (Lidoderm) 5 % Place 1 patch on the skin as directed by provider Daily. Remove & Discard patch within 12 hours or as directed by MD 30 patch 0   • methylPREDNISolone (MEDROL) 4 MG dose pack Take as directed on package instructions. 21 tablet 0   • predniSONE (DELTASONE) 10 MG tablet 6 tabs po qd x 2 days, 4 tabs po qd x 2 days, 3 tabs po qd x 2 days, 2 tabs po qd x 2 days, 1 tab po qd x 2 days 32 tablet 0   • rOPINIRole (REQUIP) 1 MG tablet ropinirole 1 mg tablet       No facility-administered medications prior to visit.       Patient Active Problem List   Diagnosis   • Acute deep vein thrombosis of distal leg (HCC)   • Adenomatous polyp of colon   • Depression   • Fibromyalgia   • Dyslipidemia   • Low back pain   • Peripheral arterial occlusive disease (HCC)   • Vitamin D deficiency   • Gastroesophageal reflux disease   • Irritable bowel syndrome   • Disorder of intervertebral disc   • Peripheral  neuropathy   • Fracture of multiple ribs of right side   • Fall   • Acute respiratory failure with hypoxia (HCC)   • COPD mixed type (HCC)   • Weakness   • Hypopotassemia   • Dysuria   • Constipation   • Hemorrhoids   • Arthritis   • Fibromyositis   • Hernia of anterior abdominal wall   • 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HCC)   • Injury of right ankle   • Kidney stone   • Knee pain   • Moderate ankle sprain   • Osteoarthritis of knee   • Other specified postprocedural states   • Spinal stenosis, lumbar region without neurogenic claudication   • Chronic respiratory failure with hypoxia (HCC)   • Bursitis of hip   • Encounter for long-term (current) use of insulin ([facility])   • Lumbar facet joint pain   • Low back pain   • Peripheral nerve disease       Advance Care Planning:  ACP discussion was held with the patient during this visit. Patient does not have an advance directive, information provided.    Identification of Risk Factors:  Risk factors include: Lung Cancer Risk.    Review of Systems   Constitutional: Positive for malaise/fatigue.   Respiratory: Positive for cough, sputum production and shortness of breath.    Cardiovascular: Negative for chest pain, dyspnea on exertion and PND.   Musculoskeletal: Positive for arthritis and back pain.   Neurological: Negative for focal weakness.       Compared to one year ago, the patient feels her physical health is the same.  Compared to one year ago, the patient feels her mental health is the same.    Objective     Physical Exam  Vitals and nursing note reviewed.   Constitutional:       Appearance: She is well-developed.   HENT:      Head: Normocephalic and atraumatic.   Neck:      Thyroid: No thyromegaly.      Vascular: No JVD.   Cardiovascular:      Rate and Rhythm: Normal rate and regular rhythm.      Heart sounds: Normal heart sounds. No murmur heard.  No friction rub. No gallop.    Pulmonary:      Effort: Pulmonary effort is normal. No respiratory distress.      Breath sounds:  "Decreased air movement present. Decreased breath sounds present. No wheezing or rales.   Abdominal:      General: Bowel sounds are normal. There is no distension.      Palpations: Abdomen is soft.      Tenderness: There is no abdominal tenderness. There is no guarding or rebound.   Musculoskeletal:      Cervical back: Neck supple.   Skin:     General: Skin is warm and dry.   Neurological:      Mental Status: She is alert.      Comments: In WC.   Psychiatric:         Behavior: Behavior normal.         Vitals:    02/21/22 1309   BP: 126/62   Pulse: 86   SpO2: 94%   Weight: 118 kg (261 lb)   Height: 157.5 cm (62\")   PainSc:   6   PainLoc: Knee       Patient's Body mass index is 47.74 kg/m². indicating that she is morbidly obese (BMI > 40 or > 35 with obesity - related health condition). Obesity-related health conditions include the following: hypertension and dyslipidemias. Obesity is unchanged. BMI is is above average; BMI management plan is completed. We discussed portion control and increasing exercise..      Assessment/Plan   Patient Self-Management and Personalized Health Advice  The patient has been provided with information about: diet and exercise and preventive services including:   · Annual Wellness Visit (AWV).    Visit Diagnoses:    ICD-10-CM ICD-9-CM   1. Medicare annual wellness visit, subsequent  Z00.00 V70.0   2. Chronic respiratory failure with hypoxia ([facility])  J96.11 518.83     799.02   3. COPD mixed type ([facility])  J44.9 496   4. Spinal stenosis, lumbar region without neurogenic claudication  M48.061 724.02   5. Dyslipidemia  E78.5 272.4   6. Vitamin D deficiency  E55.9 268.9   7. Morbid (severe) obesity due to excess calories ([facility])  E66.01 278.01   8. Urinary frequency  R35.0 788.41   9. Former smoker  Z87.891 V15.82   10. Encounter for screening mammogram for breast cancer  Z12.31 V76.12   11. Menopause  Z78.0 627.2   12. Cigarette nicotine dependence in remission  F17.211 V15.82   13. Encounter for " screening for lung cancer  Z12.2 V76.0       Orders Placed This Encounter   Procedures   • DEXA Bone Density Axial     Standing Status:   Future     Standing Expiration Date:   2/21/2023     Scheduling Instructions:      Prefers dxp same day as dexa     Order Specific Question:   Reason for Exam:     Answer:   osteoporosis screening, postmenopause     Order Specific Question:   Release to patient     Answer:   Immediate   • Mammo Screening Bilateral With CAD     Standing Status:   Future     Standing Expiration Date:   2/21/2023     Scheduling Instructions:      Prefers dxp, same day as dexa     Order Specific Question:   Reason for Exam:     Answer:   screening mammo     Order Specific Question:   Release to patient     Answer:   Immediate   • CT chest low dose wo     Standing Status:   Future     Standing Expiration Date:   2/21/2023     Scheduling Instructions:      Prefers DXP if dose there     Order Specific Question:   The patient is age 50-80:     Answer:   66     Order Specific Question:   The patient is a current smoker?     Answer:   Yes     Order Specific Question:   The patient has a smoking history of 20 pack-years or greater:     Answer:   Yes     Order Specific Question:   Actual pack - year smoking history (number):     Answer:   50     Order Specific Question:   Does the patient have any clinical signs/symptoms of lung cancer?     Answer:   No     Order Specific Question:   The patient was engaged in shared decision-making for this test:     Answer:   Yes     Order Specific Question:   Release to patient     Answer:   Immediate   • Comprehensive Metabolic Panel     Order Specific Question:   Release to patient     Answer:   Immediate     Order Specific Question:   [facility] Has the patient fasted?     Answer:   Yes   • Lipid Panel     Order Specific Question:   [facility] Has the patient fasted?     Answer:   Yes   • Vitamin D 25 Hydroxy     Order Specific Question:   Release to patient     Answer:    Immediate     Order Specific Question:   [facility] Has the patient fasted?     Answer:   Yes   • Microscopic Examination -   • Ambulatory Referral to Bariatric Surgery     Referral Priority:   Routine     Referral Type:   Consultation     Referral Reason:   Specialty Services Required     Referred to Provider:   Chirag Garcia Jr., MD     Requested Specialty:   Bariatrics     Number of Visits Requested:   1   • CBC & Differential     Order Specific Question:   Manual Differential     Answer:   No     Order Specific Question:   [facility] Has the patient fasted?     Answer:   Yes   • Urinalysis With Microscopic - Urine, Clean Catch     Order Specific Question:   Release to patient     Answer:   Immediate       Outpatient Encounter Medications as of 2/21/2022   Medication Sig Dispense Refill   • Aspirin (ASPIR-81 PO) Take 81 mg by mouth Daily.     • baclofen (LIORESAL) 10 MG tablet Take 1 tablet by mouth 2 (Two) Times a Day As Needed for Muscle Spasms. 180 tablet 3   • clopidogrel (PLAVIX) 75 MG tablet TAKE 1 TABLET DAILY 90 tablet 3   • diclofenac (VOLTAREN) 1 % gel gel Apply 4 g topically to the appropriate area as directed 2 (Two) Times a Day As Needed (idopathic neuropathy). 100 g 1   • docusate sodium (COLACE) 100 MG capsule Take 100 mg by mouth 2 (Two) Times a Day.     • DULoxetine (CYMBALTA) 60 MG capsule TAKE 1 CAPSULE DAILY 90 capsule 3   • fluconazole (DIFLUCAN) 200 MG tablet fluconazole 200 mg tablet     • folic acid (FOLVITE) 1 MG tablet Take 1 tablet by mouth Daily. 90 tablet 1   • HYDROcodone-acetaminophen (NORCO) 7.5-325 MG per tablet Take 1 tablet by mouth Every 6 (Six) Hours As Needed for Moderate Pain . Please take these instead of your hydrocodone 5/325.  Do not take both medications. 12 tablet 0   • pregabalin (LYRICA) 150 MG capsule Take 1 capsule by mouth 3 (Three) Times a Day. 270 capsule 1   • rosuvastatin (Crestor) 20 MG tablet Take 1 tablet by mouth Every Night. 30 tablet 11   • vitamin B-12  (CYANOCOBALAMIN) 1000 MCG tablet Take 1,000 mcg by mouth Daily.     • [DISCONTINUED] albuterol sulfate HFA (ProAir HFA) 108 (90 Base) MCG/ACT inhaler ProAir HFA 90 mcg/actuation aerosol inhaler     • [DISCONTINUED] budesonide-formoterol (Symbicort) 160-4.5 MCG/ACT inhaler Inhale 2 puffs 2 (Two) Times a Day. Rinse mouth after use 3 each 3   • [DISCONTINUED] carbamide peroxide (DEBROX) 6.5 % otic solution Administer 5 drops into both ears 2 (Two) Times a Day. 22 mL 0   • [DISCONTINUED] cephalexin (KEFLEX) 500 MG capsule Take 1 capsule by mouth 4 (Four) Times a Day. 40 capsule 0   • [DISCONTINUED] Diclofenac Epolamine (FLECTOR) 1.3 % patch patch Apply 1 patch topically to the appropriate area as directed 2 (Two) Times a Day. 60 patch 2   • [DISCONTINUED] lamoTRIgine (LaMICtal) 25 MG tablet lamotrigine 25 mg tablet     • [DISCONTINUED] levalbuterol (Xopenex HFA) 45 MCG/ACT inhaler Inhale 1-2 puffs Every 4 (Four) Hours As Needed for Wheezing or Shortness of Air. 3 each 3   • [DISCONTINUED] lidocaine (Lidoderm) 5 % Place 1 patch on the skin as directed by provider Daily. Remove & Discard patch within 12 hours or as directed by MD 30 patch 0   • [DISCONTINUED] methylPREDNISolone (MEDROL) 4 MG dose pack Take as directed on package instructions. 21 tablet 0   • [DISCONTINUED] predniSONE (DELTASONE) 10 MG tablet 6 tabs po qd x 2 days, 4 tabs po qd x 2 days, 3 tabs po qd x 2 days, 2 tabs po qd x 2 days, 1 tab po qd x 2 days 32 tablet 0   • [DISCONTINUED] rOPINIRole (REQUIP) 1 MG tablet ropinirole 1 mg tablet       No facility-administered encounter medications on file as of 2/21/2022.       Reviewed use of high risk medication in the elderly: yes  Reviewed for potential of harmful drug interactions in the elderly: yes    Follow Up:  Return in about 3 months (around 5/21/2022), or if symptoms worsen or fail to improve.     An After Visit Summary and PPPS with all of these plans were given to the patient.            ++++++++++++++++++++++++++++++++++++++++++++++++++++++++++++++++++     Chief Complaint   Patient presents with   • Annual Exam     medicare wellness   • Depression   • Back Pain   • Osteoarthritis   • Arthritis   • COPD   • Vitamin D Deficiency   • Hyperlipidemia     Depression  Visit Type: follow-up  Patient presents with the following symptoms: depressed mood and shortness of breath.  Patient is not experiencing: anhedonia and feelings of worthlessness.  Frequency of symptoms: occasionally   Severity: mild   Sleep quality: fair  Nighttime awakenings: occasional  Compliance with medications:  %        Back Pain  This is a chronic problem. The current episode started more than 1 month ago. The problem occurs constantly. The problem has been waxing and waning since onset. The pain is present in the lumbar spine. Pertinent negatives include no chest pain. She has tried analgesics for the symptoms. The treatment provided mild relief.   Osteoarthritis  Presents for follow-up visit. She complains of pain. Affected location: multiple joints. Her past medical history is significant for osteoarthritis.   Arthritis  She complains of pain. Her past medical history is significant for osteoarthritis.   COPD  She complains of cough, difficulty breathing, shortness of breath and sputum production. This is a chronic problem. The current episode started more than 1 year ago. The problem occurs constantly. The problem has been waxing and waning. Associated symptoms include malaise/fatigue. Pertinent negatives include no chest pain, dyspnea on exertion, orthopnea or PND. Her symptoms are alleviated by beta-agonist and steroid inhaler (on oxygen). She reports moderate improvement on treatment. Her past medical history is significant for COPD.   Hyperlipidemia  This is a chronic problem. The current episode started more than 1 year ago. The problem is controlled. Recent lipid tests were reviewed and are normal. Associated  "symptoms include shortness of breath. Pertinent negatives include no chest pain or focal weakness. Current antihyperlipidemic treatment includes statins. The current treatment provides moderate improvement of lipids. Risk factors for coronary artery disease include dyslipidemia and post-menopausal.   Notes having urianry frequency still;  No burning or dysureia;  No blood in urine.    Order Questions    Question Answer   The patient is age 50-80: 66   The patient is a current smoker? Yes   The patient has a smoking history of 20 pack-years or greater: Yes   Actual pack - year smoking history (number): 50   Does the patient have any clinical signs/symptoms of lung cancer? No   The patient was engaged in shared decision-making for this test: Yes   Release to patient Immediate       Review of Systems   Constitutional: Positive for malaise/fatigue.   Cardiovascular: Negative for chest pain, dyspnea on exertion and paroxysmal nocturnal dyspnea.   Respiratory: Positive for cough, shortness of breath and sputum production.    Musculoskeletal: Positive for arthritis and back pain.   Neurological: Negative for focal weakness.       Social History     Tobacco Use   • Smoking status: Former Smoker     Packs/day: 0.75     Years: 50.00     Pack years: 37.50     Types: Cigarettes     Start date: 1960     Quit date: 2020     Years since quittin.3   • Smokeless tobacco: Never Used   Substance Use Topics   • Alcohol use: No     O:   Vitals:    22 1309   BP: 126/62   Pulse: 86   SpO2: 94%   Weight: 118 kg (261 lb)   Height: 157.5 cm (62\")   PainSc:   6   PainLoc: Knee     Body mass index is 47.74 kg/m².  Vitals and nursing note reviewed.   Constitutional:       Appearance: Well-developed.   HENT:      Head: Normocephalic and atraumatic.   Neck:      Thyroid: No thyromegaly.      Vascular: No JVD.   Pulmonary:      Effort: Pulmonary effort is normal. No respiratory distress.      Breath sounds: Decreased air " movement present. Decreased breath sounds present. No wheezing. No rales.   Cardiovascular:      Normal rate. Regular rhythm. Normal heart sounds.      No gallop. No friction rub.   Abdominal:      General: Bowel sounds are normal. There is no distension.      Palpations: Abdomen is soft.      Tenderness: There is no abdominal tenderness. There is no guarding or rebound.   Musculoskeletal:      Cervical back: Neck supple. Skin:     General: Skin is warm and dry.   Neurological:      Mental Status: Alert.      Comments: In WC.   Psychiatric:         Behavior: Behavior normal.         Diagnoses and all orders for this visit:    1. Medicare annual wellness visit, subsequent (Primary)    2. Chronic respiratory failure with hypoxia (HCC)  -     CBC & Differential    3. COPD mixed type (HCC)    4. Spinal stenosis, lumbar region without neurogenic claudication    5. Dyslipidemia  -     Comprehensive Metabolic Panel  -     Lipid Panel    6. Vitamin D deficiency  -     Vitamin D 25 Hydroxy    7. Morbid (severe) obesity due to excess calories (HCC)  -     Ambulatory Referral to Bariatric Surgery    8. Urinary frequency  -     Urinalysis With Microscopic - Urine, Clean Catch    9. Former smoker  -     CT chest low dose wo; Future    10. Encounter for screening mammogram for breast cancer  -     Mammo Screening Bilateral With CAD; Future    11. Menopause  -     DEXA Bone Density Axial; Future    12. Cigarette nicotine dependence in remission  -     CT chest low dose wo; Future    13. Encounter for screening for lung cancer  -     CT chest low dose wo; Future    Other orders  -     Microscopic Examination -    -check urine with micro, hold abx for now  -HLD - continue statin, recheck lipids.  -lung cancer screening - counseled;  Check vitamin D as due;  Due dexa and mammo  -counseled on weight loss;  Struggling to do so due to back pain and arthritis;  Would liek see bariatric surgeon to see of candidate;   -continue inhalers  and oxygen as doing  -julieta hall, continue see pain mgmt      Return in about 3 months (around 5/21/2022), or if symptoms worsen or fail to improve.

## 2022-02-22 LAB
25(OH)D3+25(OH)D2 SERPL-MCNC: 24 NG/ML (ref 30–100)
ALBUMIN SERPL-MCNC: 4 G/DL (ref 3.8–4.8)
ALBUMIN/GLOB SERPL: 1.4 {RATIO} (ref 1.2–2.2)
ALP SERPL-CCNC: 144 IU/L (ref 44–121)
ALT SERPL-CCNC: 7 IU/L (ref 0–32)
APPEARANCE UR: CLEAR
AST SERPL-CCNC: 16 IU/L (ref 0–40)
BACTERIA #/AREA URNS HPF: ABNORMAL /[HPF]
BASOPHILS # BLD AUTO: 0 X10E3/UL (ref 0–0.2)
BASOPHILS NFR BLD AUTO: 0 %
BILIRUB SERPL-MCNC: 0.3 MG/DL (ref 0–1.2)
BILIRUB UR QL STRIP: NEGATIVE
BUN SERPL-MCNC: 14 MG/DL (ref 8–27)
BUN/CREAT SERPL: 23 (ref 12–28)
CALCIUM SERPL-MCNC: 9.9 MG/DL (ref 8.7–10.3)
CASTS URNS QL MICRO: ABNORMAL /LPF
CHLORIDE SERPL-SCNC: 100 MMOL/L (ref 96–106)
CHOLEST SERPL-MCNC: 173 MG/DL (ref 100–199)
CO2 SERPL-SCNC: 25 MMOL/L (ref 20–29)
COLOR UR: YELLOW
CREAT SERPL-MCNC: 0.61 MG/DL (ref 0.57–1)
EOSINOPHIL # BLD AUTO: 0.2 X10E3/UL (ref 0–0.4)
EOSINOPHIL NFR BLD AUTO: 2 %
EPI CELLS #/AREA URNS HPF: >10 /HPF (ref 0–10)
ERYTHROCYTE [DISTWIDTH] IN BLOOD BY AUTOMATED COUNT: 14 % (ref 11.7–15.4)
GLOBULIN SER CALC-MCNC: 2.9 G/DL (ref 1.5–4.5)
GLUCOSE SERPL-MCNC: 104 MG/DL (ref 65–99)
GLUCOSE UR QL STRIP: NEGATIVE
HCT VFR BLD AUTO: 40.4 % (ref 34–46.6)
HDLC SERPL-MCNC: 61 MG/DL
HGB BLD-MCNC: 12.4 G/DL (ref 11.1–15.9)
HGB UR QL STRIP: NEGATIVE
IMM GRANULOCYTES # BLD AUTO: 0 X10E3/UL (ref 0–0.1)
IMM GRANULOCYTES NFR BLD AUTO: 0 %
KETONES UR QL STRIP: NEGATIVE
LDLC SERPL CALC-MCNC: 87 MG/DL (ref 0–99)
LEUKOCYTE ESTERASE UR QL STRIP: ABNORMAL
LYMPHOCYTES # BLD AUTO: 1.4 X10E3/UL (ref 0.7–3.1)
LYMPHOCYTES NFR BLD AUTO: 19 %
MCH RBC QN AUTO: 28.9 PG (ref 26.6–33)
MCHC RBC AUTO-ENTMCNC: 30.7 G/DL (ref 31.5–35.7)
MCV RBC AUTO: 94 FL (ref 79–97)
MICRO URNS: ABNORMAL
MONOCYTES # BLD AUTO: 0.6 X10E3/UL (ref 0.1–0.9)
MONOCYTES NFR BLD AUTO: 8 %
NEUTROPHILS # BLD AUTO: 5.4 X10E3/UL (ref 1.4–7)
NEUTROPHILS NFR BLD AUTO: 71 %
NITRITE UR QL STRIP: NEGATIVE
PH UR STRIP: 7 [PH] (ref 5–7.5)
PLATELET # BLD AUTO: 232 X10E3/UL (ref 150–450)
POTASSIUM SERPL-SCNC: 4.9 MMOL/L (ref 3.5–5.2)
PROT SERPL-MCNC: 6.9 G/DL (ref 6–8.5)
PROT UR QL STRIP: ABNORMAL
RBC # BLD AUTO: 4.29 X10E6/UL (ref 3.77–5.28)
RBC #/AREA URNS HPF: ABNORMAL /HPF (ref 0–2)
SODIUM SERPL-SCNC: 145 MMOL/L (ref 134–144)
SP GR UR STRIP: 1.02 (ref 1–1.03)
TRIGL SERPL-MCNC: 148 MG/DL (ref 0–149)
UROBILINOGEN UR STRIP-MCNC: 0.2 MG/DL (ref 0.2–1)
VLDLC SERPL CALC-MCNC: 25 MG/DL (ref 5–40)
WBC # BLD AUTO: 7.6 X10E3/UL (ref 3.4–10.8)
WBC #/AREA URNS HPF: ABNORMAL /HPF (ref 0–5)

## 2022-02-23 ENCOUNTER — TELEPHONE (OUTPATIENT)
Dept: FAMILY MEDICINE CLINIC | Facility: CLINIC | Age: 67
End: 2022-02-23

## 2022-02-23 NOTE — TELEPHONE ENCOUNTER
[facility]AT[facility]IN[facility] WILL BE REACHING OUT REGARDING RE-CERTIFICATION OF OXYGEN. PULMONARY VISIT IS NOT UNTIL MID April.

## 2022-02-27 NOTE — PROGRESS NOTES
Call results to patient.  Urine dip +, if still having uti symptoms, take cephalexin 500mg 1 po bid x 7d  Choelsterol controlled  Vitamin D low - start D3 5000 iu po daily

## 2022-02-28 RX ORDER — CEPHALEXIN 500 MG/1
500 CAPSULE ORAL 2 TIMES DAILY
Qty: 14 CAPSULE | Refills: 0 | Status: SHIPPED | OUTPATIENT
Start: 2022-02-28 | End: 2022-05-26

## 2022-03-15 DIAGNOSIS — F17.211 CIGARETTE NICOTINE DEPENDENCE IN REMISSION: ICD-10-CM

## 2022-03-15 DIAGNOSIS — Z87.891 FORMER SMOKER: ICD-10-CM

## 2022-03-15 DIAGNOSIS — Z12.31 ENCOUNTER FOR SCREENING MAMMOGRAM FOR BREAST CANCER: ICD-10-CM

## 2022-03-15 DIAGNOSIS — Z12.2 ENCOUNTER FOR SCREENING FOR LUNG CANCER: ICD-10-CM

## 2022-03-15 NOTE — PROGRESS NOTES
Dear Brenda Michelle:    Your mammogram was normal.  We have enclosed a copy.    Sincerely,  YSABEL Kline DO, MS

## 2022-03-22 DIAGNOSIS — Z78.0 MENOPAUSE: ICD-10-CM

## 2022-03-28 RX ORDER — ALENDRONATE SODIUM 70 MG/1
70 TABLET ORAL
Qty: 12 TABLET | Refills: 3 | Status: SHIPPED | OUTPATIENT
Start: 2022-03-28 | End: 2022-05-26

## 2022-03-28 NOTE — PROGRESS NOTES
Call results to patient.  DEXA scan notes osteoporosis with high risk for fractures  Recommend start alendronate 70mg 1 po weekly

## 2022-05-02 ENCOUNTER — TELEPHONE (OUTPATIENT)
Dept: FAMILY MEDICINE CLINIC | Facility: CLINIC | Age: 67
End: 2022-05-02

## 2022-05-02 DIAGNOSIS — E53.8 FOLIC ACID DEFICIENCY: ICD-10-CM

## 2022-05-02 RX ORDER — FOLIC ACID 1 MG/1
1000 TABLET ORAL DAILY
Qty: 90 TABLET | Refills: 1 | Status: SHIPPED | OUTPATIENT
Start: 2022-05-02 | End: 2022-08-28

## 2022-05-02 NOTE — TELEPHONE ENCOUNTER
Caller: Shiva Michelle Sr    Relationship: Emergency Contact    Best call back number: [phone]    What was the call regarding: PATIENT IS NEEDING THE FOLIC ACID TO BE PRIOR AUTHORIZED FOR 3 MONTHS SUPPLY.    Do you require a callback: YES    [facility] - [place], [place] - [address] [place] [address] - [phone]  - [phone] [phone]

## 2022-05-02 NOTE — TELEPHONE ENCOUNTER
Caller: Shiva Michelle Sr    Relationship: Emergency Contact    Best call back number: [phone]     CALLING TO SAY THEY DID APPROVE THIS MEDICATION AND IT IS BEING FILLED, JUST NEEDED IT TO HAVE A COUPLE REFILLS ON IT.

## 2022-05-26 ENCOUNTER — OFFICE VISIT (OUTPATIENT)
Dept: FAMILY MEDICINE CLINIC | Facility: CLINIC | Age: 67
End: 2022-05-26

## 2022-05-26 VITALS
TEMPERATURE: 98.3 F | BODY MASS INDEX: 49.32 KG/M2 | RESPIRATION RATE: 20 BRPM | HEART RATE: 74 BPM | SYSTOLIC BLOOD PRESSURE: 128 MMHG | HEIGHT: 62 IN | OXYGEN SATURATION: 100 % | WEIGHT: 268 LBS | DIASTOLIC BLOOD PRESSURE: 60 MMHG

## 2022-05-26 DIAGNOSIS — M81.0 AGE-RELATED OSTEOPOROSIS WITHOUT CURRENT PATHOLOGICAL FRACTURE: ICD-10-CM

## 2022-05-26 DIAGNOSIS — J44.9 COPD MIXED TYPE: Primary | ICD-10-CM

## 2022-05-26 DIAGNOSIS — J96.11 CHRONIC RESPIRATORY FAILURE WITH HYPOXIA: ICD-10-CM

## 2022-05-26 DIAGNOSIS — E78.5 DYSLIPIDEMIA: ICD-10-CM

## 2022-05-26 PROCEDURE — 99214 OFFICE O/P EST MOD 30 MIN: CPT | Performed by: FAMILY MEDICINE

## 2022-05-26 RX ORDER — RISEDRONATE SODIUM 150 MG/1
150 TABLET, FILM COATED ORAL
Qty: 3 TABLET | Refills: 3 | Status: SHIPPED | OUTPATIENT
Start: 2022-05-26 | End: 2022-11-07 | Stop reason: SDUPTHER

## 2022-05-26 RX ORDER — RISEDRONATE SODIUM 150 MG/1
150 TABLET, FILM COATED ORAL
Qty: 3 TABLET | Refills: 3 | Status: SHIPPED | OUTPATIENT
Start: 2022-05-26 | End: 2022-05-26 | Stop reason: SDUPTHER

## 2022-05-26 RX ORDER — PRAVASTATIN SODIUM 80 MG/1
80 TABLET ORAL DAILY
Qty: 90 TABLET | Refills: 51 | Status: SHIPPED | OUTPATIENT
Start: 2022-05-26 | End: 2022-05-26 | Stop reason: SDUPTHER

## 2022-05-26 RX ORDER — PRAVASTATIN SODIUM 80 MG/1
80 TABLET ORAL DAILY
Qty: 90 TABLET | Refills: 51 | Status: SHIPPED | OUTPATIENT
Start: 2022-05-26

## 2022-05-26 NOTE — PROGRESS NOTES
Subjective   Brenda Michelle is a 66 y.o. female. Presents today for   Chief Complaint   Patient presents with   • Asthma   • Shortness of Breath   • COPD   • Follow-up     Dexa mammogram        History of Present Illness  Patient here for f/u of COPD and osteoporsis;  Doesn't want fosamax as read side effects;  Breathing stable, given stiolto to try, almost out.   Helps but coughing a lot.   Has albuterol prn;    Back on statin and tolerating ok, excpet neuropaty in legs painful;      Review of Systems   Respiratory: Positive for cough and shortness of breath.    Cardiovascular: Negative for chest pain.       Patient Active Problem List   Diagnosis   • Acute deep vein thrombosis of distal leg (HCC)   • Adenomatous polyp of colon   • Depression   • Fibromyalgia   • Dyslipidemia   • Low back pain   • Peripheral arterial occlusive disease (HCC)   • Vitamin D deficiency   • Gastroesophageal reflux disease   • Irritable bowel syndrome   • Disorder of intervertebral disc   • Peripheral neuropathy   • Fracture of multiple ribs of right side   • Fall   • Acute respiratory failure with hypoxia (HCC)   • COPD mixed type (HCC)   • Weakness   • Hypopotassemia   • Dysuria   • Constipation   • Hemorrhoids   • Arthritis   • Fibromyositis   • Hernia of anterior abdominal wall   • 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HCC)   • Injury of right ankle   • Kidney stone   • Knee pain   • Moderate ankle sprain   • Osteoarthritis of knee   • Other specified postprocedural states   • Spinal stenosis, lumbar region without neurogenic claudication   • Chronic respiratory failure with hypoxia (HCC)   • Bursitis of hip   • Encounter for long-term (current) use of insulin (HCC)   • Lumbar facet joint pain   • Low back pain   • Peripheral nerve disease       Social History     Socioeconomic History   • Marital status:    • Number of children: 2   • Years of education: 12    Tobacco Use   • Smoking status: Former Smoker     Packs/day: 0.75     Years: 50.00      Pack years: 37.50     Types: Cigarettes     Start date: 1960     Quit date: 2020     Years since quittin.5   • Smokeless tobacco: Never Used   Vaping Use   • Vaping Use: Never used   Substance and Sexual Activity   • Alcohol use: No   • Drug use: No   • Sexual activity: Not Currently     Partners: Male       Allergies   Allergen Reactions   • Ambien [Zolpidem Tartrate] Other (See Comments)     Nightmares   • Bupropion Itching   • Codeine Sulfate Itching   • Zolpidem Hives     Nightmares   • Adhesive Tape Rash   • Aripiprazole Unknown - Low Severity   • Atorvastatin Other (See Comments)     MYALGIAS   • Cilostazol Other (See Comments)     HA   • Colesevelam Nausea Only   • Ferrous Sulfate Nausea Only   • Welchol [Colesevelam Hcl] Nausea Only   • Wound Dressing Adhesive Rash       Current Outpatient Medications on File Prior to Visit   Medication Sig Dispense Refill   • Aspirin (ASPIR-81 PO) Take 81 mg by mouth Daily.     • baclofen (LIORESAL) 10 MG tablet Take 1 tablet by mouth 2 (Two) Times a Day As Needed for Muscle Spasms. 180 tablet 3   • clopidogrel (PLAVIX) 75 MG tablet TAKE 1 TABLET DAILY 90 tablet 3   • diclofenac (VOLTAREN) 1 % gel gel Apply 4 g topically to the appropriate area as directed 2 (Two) Times a Day As Needed (idopathic neuropathy). 100 g 1   • docusate sodium (COLACE) 100 MG capsule Take 100 mg by mouth 2 (Two) Times a Day.     • DULoxetine (CYMBALTA) 60 MG capsule TAKE 1 CAPSULE DAILY 90 capsule 3   • fluconazole (DIFLUCAN) 200 MG tablet fluconazole 200 mg tablet     • folic acid (FOLVITE) 1 MG tablet Take 1 tablet by mouth Daily. 90 tablet 1   • HYDROcodone-acetaminophen (NORCO) 7.5-325 MG per tablet Take 1 tablet by mouth Every 6 (Six) Hours As Needed for Moderate Pain . Please take these instead of your hydrocodone 5/325.  Do not take both medications. 12 tablet 0   • pregabalin (LYRICA) 150 MG capsule Take 1 capsule by mouth 3 (Three) Times a Day. 270 capsule 1   • rosuvastatin  "(Crestor) 20 MG tablet Take 1 tablet by mouth Every Night. 30 tablet 11   • Tiotropium Bromide-Olodaterol (STIOLTO RESPIMAT IN) Inhale.     • vitamin B-12 (CYANOCOBALAMIN) 1000 MCG tablet Take 1,000 mcg by mouth Daily.     • vitamin D3 125 MCG (5000 UT) capsule capsule Take 5,000 Units by mouth Daily.     • [DISCONTINUED] alendronate (Fosamax) 70 MG tablet Take 1 tablet by mouth Every 7 (Seven) Days. 12 tablet 3   • [DISCONTINUED] cephalexin (Keflex) 500 MG capsule Take 1 capsule by mouth 2 (Two) Times a Day. 14 capsule 0     No current facility-administered medications on file prior to visit.       Objective   Vitals:    05/26/22 1402   BP: 128/60   BP Location: Left arm   Patient Position: Sitting   Cuff Size: Large Adult   Pulse: 74   Resp: 20   Temp: 98.3 °F (36.8 °C)   TempSrc: Temporal   SpO2: 100%  Comment: 3lpm nc   Weight: 122 kg (268 lb)   Height: 157.5 cm (62\")   PainSc: 0-No pain     Body mass index is 49.02 kg/m².    Physical Exam  Vitals and nursing note reviewed.   Constitutional:       Appearance: She is well-developed.   HENT:      Head: Normocephalic and atraumatic.   Neck:      Thyroid: No thyromegaly.      Vascular: No JVD.   Cardiovascular:      Rate and Rhythm: Normal rate and regular rhythm.      Heart sounds: Normal heart sounds. No murmur heard.    No friction rub. No gallop.   Pulmonary:      Effort: Pulmonary effort is normal. No respiratory distress.      Breath sounds: Normal breath sounds. No wheezing or rales.   Abdominal:      General: Bowel sounds are normal. There is no distension.      Palpations: Abdomen is soft.      Tenderness: There is no abdominal tenderness. There is no guarding or rebound.   Musculoskeletal:      Cervical back: Neck supple.   Skin:     General: Skin is warm and dry.   Neurological:      Mental Status: She is alert.   Psychiatric:         Behavior: Behavior normal.       Component      Latest Ref Rng & Units 2/21/2022   WBC      3.4 - 10.8 x10E3/uL 7.6 "   RBC      3.77 - 5.28 x10E6/uL 4.29   Hemoglobin      11.1 - 15.9 g/dL 12.4   Hematocrit      34.0 - 46.6 % 40.4   MCV      79 - 97 fL 94   MCH      26.6 - 33.0 pg 28.9   MCHC      31.5 - 35.7 g/dL 30.7 (L)   RDW      11.7 - 15.4 % 14.0   Platelets      150 - 450 x10E3/uL 232   Neutrophil Rel %      Not Estab. % 71   Lymphocyte Rel %      Not Estab. % 19   Monocyte Rel %      Not Estab. % 8   Eosinophil Rel %      Not Estab. % 2   Basophil Rel %      Not Estab. % 0   Neutrophils Absolute      1.4 - 7.0 x10E3/uL 5.4   Lymphocytes Absolute      0.7 - 3.1 x10E3/uL 1.4   Monocytes Absolute      0.1 - 0.9 x10E3/uL 0.6   Eosinophils Absolute      0.0 - 0.4 x10E3/uL 0.2   Basophils Absolute      0.0 - 0.2 x10E3/uL 0.0   Immature Granulocyte Rel %      Not Estab. % 0   Immature Grans, Absolute      0.0 - 0.1 x10E3/uL 0.0   Glucose      65 - 99 mg/dL 104 (H)   BUN      8 - 27 mg/dL 14   Creatinine      0.57 - 1.00 mg/dL 0.61   eGFR Non African Am      >59 mL/min/1.73 95   eGFR African Am      >59 mL/min/1.73 109   BUN/Creatinine Ratio      12 - 28 23   Sodium      134 - 144 mmol/L 145 (H)   Potassium      3.5 - 5.2 mmol/L 4.9   Chloride      96 - 106 mmol/L 100   CO2      20 - 29 mmol/L 25   Calcium      8.7 - 10.3 mg/dL 9.9   Total Protein      6.0 - 8.5 g/dL 6.9   Albumin      3.8 - 4.8 g/dL 4.0   Globulin      1.5 - 4.5 g/dL 2.9   A/G Ratio      1.2 - 2.2 1.4   Total Bilirubin      0.0 - 1.2 mg/dL 0.3   Alkaline Phosphatase      44 - 121 IU/L 144 (H)   AST (SGOT)      0 - 40 IU/L 16   ALT (SGPT)      0 - 32 IU/L 7   Total Cholesterol      100 - 199 mg/dL 173   Triglycerides      0 - 149 mg/dL 148   HDL Cholesterol      >39 mg/dL 61   VLDL Cholesterol Dileep      5 - 40 mg/dL 25   LDL Cholesterol       0 - 99 mg/dL 87       Assessment & Plan   Diagnoses and all orders for this visit:    1. COPD mixed type (HCC) (Primary)    2. Chronic respiratory failure with hypoxia (HCC)    3. Age-related osteoporosis without current  pathological fracture  -     risedronate (Actonel) 150 MG tablet; Take 1 tablet by mouth Every 30 (Thirty) Days. with water on empty stomach, nothing by mouth or lie down for next 30 minutes.  Dispense: 3 tablet; Refill: 3    contineu stiloto  Change statin to pravasatin and stop rosuvastatin see if helps leg pain  Off alendronate and not started, will try actonel and willing try this one  Continue oxygen  Sleep study pending, if andre then candidate for bariatric  F/u with pulmonary           -Follow up: 3 months and rpn

## 2022-06-06 ENCOUNTER — TRANSCRIBE ORDERS (OUTPATIENT)
Dept: ADMINISTRATIVE | Facility: HOSPITAL | Age: 67
End: 2022-06-06

## 2022-06-06 DIAGNOSIS — Z01.818 OTHER SPECIFIED PRE-OPERATIVE EXAMINATION: Primary | ICD-10-CM

## 2022-06-09 ENCOUNTER — TRANSCRIBE ORDERS (OUTPATIENT)
Dept: SLEEP MEDICINE | Facility: HOSPITAL | Age: 67
End: 2022-06-09

## 2022-06-09 DIAGNOSIS — G47.33 OSA (OBSTRUCTIVE SLEEP APNEA): Primary | ICD-10-CM

## 2022-06-14 ENCOUNTER — LAB (OUTPATIENT)
Dept: LAB | Facility: HOSPITAL | Age: 67
End: 2022-06-14

## 2022-06-14 DIAGNOSIS — Z01.818 OTHER SPECIFIED PRE-OPERATIVE EXAMINATION: ICD-10-CM

## 2022-06-14 LAB — SARS-COV-2 ORF1AB RESP QL NAA+PROBE: NOT DETECTED

## 2022-06-14 PROCEDURE — C9803 HOPD COVID-19 SPEC COLLECT: HCPCS

## 2022-06-14 PROCEDURE — U0004 COV-19 TEST NON-CDC HGH THRU: HCPCS

## 2022-06-16 ENCOUNTER — HOSPITAL ENCOUNTER (OUTPATIENT)
Dept: SLEEP MEDICINE | Facility: HOSPITAL | Age: 67
Discharge: HOME | End: 2022-06-16
Admitting: INTERNAL MEDICINE

## 2022-06-16 VITALS — HEIGHT: 62 IN | WEIGHT: 268 LBS | BODY MASS INDEX: 49.32 KG/M2

## 2022-06-16 DIAGNOSIS — G47.33 OSA (OBSTRUCTIVE SLEEP APNEA): ICD-10-CM

## 2022-06-16 PROCEDURE — 95811 POLYSOM 6/>YRS CPAP 4/> PARM: CPT

## 2022-06-29 ENCOUNTER — HOSPITAL ENCOUNTER (OUTPATIENT)
Dept: RESPIRATORY THERAPY | Facility: HOSPITAL | Age: 67
Discharge: HOME | End: 2022-06-29
Admitting: FAMILY MEDICINE

## 2022-06-29 ENCOUNTER — TELEPHONE (OUTPATIENT)
Dept: SLEEP MEDICINE | Facility: HOSPITAL | Age: 67
End: 2022-06-29

## 2022-06-29 LAB
ARTERIAL PATENCY WRIST A: ABNORMAL
ATMOSPHERIC PRESS: 759.5 MMHG
BASE EXCESS BLDA CALC-SCNC: 3.7 MMOL/L (ref 0–2)
BDY SITE: ABNORMAL
GAS FLOW AIRWAY: 3 LPM
HCO3 BLDA-SCNC: 31.4 MMOL/L (ref 22–28)
MODALITY: ABNORMAL
PCO2 BLDA: 58.1 MM HG (ref 35–45)
PH BLDA: 7.34 PH UNITS (ref 7.35–7.45)
PO2 BLDA: 87.3 MM HG (ref 80–100)
SAO2 % BLDCOA: 95.8 % (ref 92–99)
TOTAL RATE: 18 BREATHS/MINUTE

## 2022-06-29 PROCEDURE — 36600 WITHDRAWAL OF ARTERIAL BLOOD: CPT

## 2022-06-29 PROCEDURE — 82803 BLOOD GASES ANY COMBINATION: CPT

## 2022-06-29 NOTE — TELEPHONE ENCOUNTER
Spoke with patient , she will reach out to [facility] to schedule follow up to discuss sleep study results

## 2022-08-04 DIAGNOSIS — R39.9 URINARY TRACT INFECTION SYMPTOMS: Primary | ICD-10-CM

## 2022-08-08 DIAGNOSIS — N30.00 ACUTE CYSTITIS WITHOUT HEMATURIA: Primary | ICD-10-CM

## 2022-08-08 LAB
APPEARANCE UR: CLEAR
BACTERIA #/AREA URNS HPF: ABNORMAL /[HPF]
BACTERIA UR CULT: ABNORMAL
BILIRUB UR QL STRIP: NEGATIVE
CASTS URNS QL MICRO: ABNORMAL /LPF
COLOR UR: YELLOW
EPI CELLS #/AREA URNS HPF: ABNORMAL /HPF (ref 0–10)
GLUCOSE UR QL STRIP: NEGATIVE
HGB UR QL STRIP: NEGATIVE
KETONES UR QL STRIP: NEGATIVE
LEUKOCYTE ESTERASE UR QL STRIP: ABNORMAL
MICRO URNS: ABNORMAL
NITRITE UR QL STRIP: NEGATIVE
OTHER ANTIBIOTIC SUSC ISLT: ABNORMAL
PH UR STRIP: 7 [PH] (ref 5–7.5)
PROT UR QL STRIP: NEGATIVE
RBC #/AREA URNS HPF: ABNORMAL /HPF (ref 0–2)
SP GR UR STRIP: 1.02 (ref 1–1.03)
URINALYSIS REFLEX: ABNORMAL
UROBILINOGEN UR STRIP-MCNC: 0.2 MG/DL (ref 0.2–1)
WBC #/AREA URNS HPF: ABNORMAL /HPF (ref 0–5)

## 2022-08-08 RX ORDER — NITROFURANTOIN 25; 75 MG/1; MG/1
100 CAPSULE ORAL 2 TIMES DAILY
Qty: 10 CAPSULE | Refills: 0 | Status: SHIPPED | OUTPATIENT
Start: 2022-08-08 | End: 2022-08-13

## 2022-08-08 NOTE — PROGRESS NOTES
Please CALL and inform pt of abnormal UA    Urine positive for infection. I have sent in nitrofurantoin to take twice a day for 5 days. Make sure to drink plenty of water.     Call if issues

## 2022-08-14 NOTE — TELEPHONE ENCOUNTER
Clinic Note    Reason for visit:  The primary encounter diagnosis was Abdominal pain, epigastric. Diagnoses of Constipation, unspecified constipation type, Screening for colon cancer, Gastroesophageal reflux disease, unspecified whether esophagitis present, and Personal history of colonic polyps were also pertinent to this visit.    PCP: Jacques Bush       HPI:  This is a 62 y.o. female who is here to establish care. Patient reports bloating, epigastric discomfort, constipation x 2 months. Patient had previous EGD/Colonoscopy over 10 years ago, was told had hiatal hernia, and had colon polyps removed. She has h/o GERD and takes omeprazole 20 daily, and states it helps when eating spicy foods but does not help epigastric tenderness. Patient reports BMs every 1-2 days, no blood in stool or black stool. She states after having a BM, the epigastric discomfort and bloating improves. She is taking metamucil every other day for constipation and has increased fruit intake.         Review of Systems   Constitutional: Negative for chills, diaphoresis, fatigue, fever and unexpected weight change.   HENT: Positive for nosebleeds. Negative for mouth sores, postnasal drip, sore throat, trouble swallowing and voice change.    Eyes: Negative for pain, discharge and eye dryness.   Respiratory: Negative for apnea, cough, choking, chest tightness, shortness of breath and wheezing.    Cardiovascular: Negative for chest pain, palpitations, leg swelling and claudication.   Gastrointestinal: Positive for abdominal pain and constipation. Negative for abdominal distention, anal bleeding, blood in stool, change in bowel habit, diarrhea, nausea, rectal pain, vomiting, reflux, fecal incontinence and change in bowel habit.   Genitourinary: Negative for bladder incontinence, difficulty urinating, dysuria, flank pain, frequency and hematuria.   Musculoskeletal: Negative for arthralgias, back pain, joint swelling and joint deformity.  Prior authorization request for pregabalin was sent by [email or web address] at the request of the patient's plan sponsor.  The patient currently takes pregabalin for painful idiopathic peripheral neuropathy and fibromyositis (fibromyalgia) she failed gabapentin and is also taking Cymbalta.    TREVOR     Integumentary:  Negative for color change, rash and wound.   Allergic/Immunologic: Negative for environmental allergies and food allergies.   Neurological: Negative for seizures, facial asymmetry, speech difficulty, weakness, headaches and memory loss.   Hematological: Negative for adenopathy. Does not bruise/bleed easily.   Psychiatric/Behavioral: Positive for behavioral problems and sleep disturbance. Negative for agitation, confusion and hallucinations.      Past Medical History:   Diagnosis Date    Asthma     Hypertension     Thyroid disorder      Past Surgical History:   Procedure Laterality Date     SECTION      x3    TRIGGER FINGER RELEASE Bilateral     TUBAL LIGATION       History reviewed. No pertinent family history.  Social History     Tobacco Use    Smoking status: Former Smoker     Start date:     Smokeless tobacco: Never Used   Substance Use Topics    Alcohol use: Yes     Comment: 2 glasses of wine 3-4 times weekly    Drug use: Not Currently     Review of patient's allergies indicates:  No Known Allergies     Medication List with Changes/Refills   New Medications    OMEPRAZOLE (PRILOSEC) 40 MG CAPSULE    Take 1 capsule (40 mg total) by mouth once daily.    SOD SULF-POT CHLORIDE-MAG SULF (SUTAB) 1.479-0.188- 0.225 GRAM TABLET    Take 12 tablets by mouth once daily. Take according to package instructions with indicated amount of water. No breakfast day before test. May substitute with Suprep, Clenpiq, Plenvu, Moviprep or GoLytely based on Rx plan and patient preference.   Current Medications    ADVAIR DISKUS 250-50 MCG/DOSE DISKUS INHALER        ASPIRIN 81 MG CHEW        CALCIUM CARB AND CITRATE-VITD3 600 MG-12.5 MCG (500 UNIT) TBSR        HYDROXYZINE HCL (ATARAX) 25 MG TABLET        HYOSCYAMINE 0.125 MG SUBL    4 (four) times daily as needed.    LOSARTAN-HYDROCHLOROTHIAZIDE 100-25 MG (HYZAAR) 100-25 MG PER TABLET        METHOCARBAMOL (ROBAXIN) 500 MG TAB        MONTELUKAST  (SINGULAIR) 10 MG TABLET        OMEPRAZOLE (PRILOSEC) 20 MG CAPSULE        PROAIR HFA 90 MCG/ACTUATION INHALER        SYNTHROID 75 MCG TABLET    once daily.    TOPIRAMATE (TOPAMAX) 25 MG TABLET       Discontinued Medications    HYDROCHLOROTHIAZIDE (HYDRODIURIL) 25 MG TABLET        HYOSCYAMINE (LEVSIN/SL) 0.125 MG SUBL             Vital Signs:  BP (!) 164/81   Pulse 62   Ht 5' (1.524 m)   Wt 60.8 kg (134 lb)   SpO2 96%   BMI 26.17 kg/m²         Physical Exam  Vitals reviewed.   Constitutional:       General: She is awake. She is not in acute distress.     Appearance: Normal appearance. She is well-developed. She is not ill-appearing, toxic-appearing or diaphoretic.   HENT:      Head: Normocephalic and atraumatic.      Nose: Nose normal.      Mouth/Throat:      Mouth: Mucous membranes are moist.      Pharynx: Oropharynx is clear. No oropharyngeal exudate or posterior oropharyngeal erythema.   Eyes:      General: Lids are normal. Gaze aligned appropriately. No scleral icterus.        Right eye: No discharge.         Left eye: No discharge.      Extraocular Movements: Extraocular movements intact.      Conjunctiva/sclera: Conjunctivae normal.   Neck:      Trachea: Trachea normal.   Cardiovascular:      Rate and Rhythm: Normal rate and regular rhythm.      Pulses:           Radial pulses are 2+ on the right side and 2+ on the left side.   Pulmonary:      Effort: Pulmonary effort is normal. No respiratory distress.      Breath sounds: Normal breath sounds. No stridor. No wheezing or rhonchi.   Chest:      Chest wall: No tenderness.   Abdominal:      General: Bowel sounds are normal. There is no distension.      Palpations: Abdomen is soft. There is no fluid wave, hepatomegaly or mass.      Tenderness: There is abdominal tenderness in the epigastric area. There is no guarding or rebound.      Comments: mild   Musculoskeletal:         General: No tenderness or deformity.      Cervical back: Full passive range of motion  without pain and neck supple. No tenderness.      Right lower leg: No edema.      Left lower leg: No edema.   Lymphadenopathy:      Cervical: No cervical adenopathy.   Skin:     General: Skin is warm and dry.      Capillary Refill: Capillary refill takes less than 2 seconds.      Coloration: Skin is not cyanotic, jaundiced or pale.      Findings: No rash.   Neurological:      General: No focal deficit present.      Mental Status: She is alert and oriented to person, place, and time.      Cranial Nerves: No facial asymmetry.      Motor: No tremor.   Psychiatric:         Attention and Perception: Attention normal.         Mood and Affect: Mood and affect normal.         Speech: Speech normal.         Behavior: Behavior normal. Behavior is cooperative.            All of the data above and below has been reviewed by myself and any further interpretations will be reflected in the assessment and plan.   The data includes review of external notes, and independent interpretation of lab results, procedures, x-rays, and imaging reports.      Assessment:  Abdominal pain, epigastric  -     Ambulatory Referral to External Surgery    Constipation, unspecified constipation type    Screening for colon cancer  -     Ambulatory Referral to External Surgery    Gastroesophageal reflux disease, unspecified whether esophagitis present  -     Ambulatory Referral to External Surgery  -     omeprazole (PRILOSEC) 40 MG capsule; Take 1 capsule (40 mg total) by mouth once daily.  Dispense: 90 capsule; Refill: 1    Personal history of colonic polyps    Other orders  -     sod sulf-pot chloride-mag sulf (SUTAB) 1.479-0.188- 0.225 gram tablet; Take 12 tablets by mouth once daily. Take according to package instructions with indicated amount of water. No breakfast day before test. May substitute with Suprep, Clenpiq, Plenvu, Moviprep or GoLytely based on Rx plan and patient preference.  Dispense: 24 tablet; Refill: 0    Trial of Trulance for  constipation. Samples given.   Plan for EGD with G/D biopsies to rule out H. Pylori/Celiac.   Increase ome 20 to omeprazole 40 daily.   Due for screening colonoscopy.      Recommendations:  Schedule upper and lower endoscopies.   Begin taking omeprazole 40 mg daily.   Trial of Trulance 3 mg, 1 tablet daily for constipation. Notify our office if this works for you so we can send out prescription.     Risks, benefits, and alternatives of medical management, any associated procedures, and/or treatment discussed with the patient. Patient given opportunity to ask questions and voices understanding. Patient has elected to proceed with the recommended care modalities as discussed.    Follow up in about 6 months (around 2/15/2023).    Order summary:  Orders Placed This Encounter   Procedures    Ambulatory Referral to External Surgery        Instructed patient to notify my office if they have not been contacted within two weeks after any procedures, submitting any samples (biopsies, blood, stool, urine, etc.) or after any imaging (X-ray, CT, MRI, etc.).     Ashley Phan MD    This document may have been created using a voice recognition transcribing system. Incorrect words or phrases may have been missed during proofreading. Please interpret accordingly or contact me for clarification.

## 2022-08-27 DIAGNOSIS — E53.8 FOLIC ACID DEFICIENCY: ICD-10-CM

## 2022-08-28 RX ORDER — FOLIC ACID 1 MG/1
TABLET ORAL
Qty: 90 TABLET | Refills: 3 | Status: SHIPPED | OUTPATIENT
Start: 2022-08-28

## 2022-09-01 ENCOUNTER — OFFICE VISIT (OUTPATIENT)
Dept: FAMILY MEDICINE CLINIC | Facility: CLINIC | Age: 67
End: 2022-09-01

## 2022-09-01 VITALS
HEART RATE: 82 BPM | HEIGHT: 62 IN | BODY MASS INDEX: 48.58 KG/M2 | WEIGHT: 264 LBS | RESPIRATION RATE: 20 BRPM | OXYGEN SATURATION: 94 % | SYSTOLIC BLOOD PRESSURE: 130 MMHG | DIASTOLIC BLOOD PRESSURE: 62 MMHG | TEMPERATURE: 96.6 F

## 2022-09-01 DIAGNOSIS — J44.9 CHRONIC OBSTRUCTIVE PULMONARY DISEASE, UNSPECIFIED COPD TYPE: ICD-10-CM

## 2022-09-01 DIAGNOSIS — R06.89 HYPERCAPNIA: ICD-10-CM

## 2022-09-01 DIAGNOSIS — R30.0 DYSURIA: ICD-10-CM

## 2022-09-01 DIAGNOSIS — S93.412A SPRAIN OF CALCANEOFIBULAR LIGAMENT OF LEFT ANKLE, INITIAL ENCOUNTER: ICD-10-CM

## 2022-09-01 DIAGNOSIS — D64.9 NORMOCYTIC ANEMIA: ICD-10-CM

## 2022-09-01 DIAGNOSIS — R53.83 OTHER FATIGUE: ICD-10-CM

## 2022-09-01 DIAGNOSIS — J96.11 CHRONIC RESPIRATORY FAILURE WITH HYPOXIA: ICD-10-CM

## 2022-09-01 DIAGNOSIS — N39.0 ACUTE UTI: Primary | ICD-10-CM

## 2022-09-01 DIAGNOSIS — E78.5 DYSLIPIDEMIA: ICD-10-CM

## 2022-09-01 DIAGNOSIS — G47.33 OSA (OBSTRUCTIVE SLEEP APNEA): ICD-10-CM

## 2022-09-01 LAB
BILIRUB BLD-MCNC: NEGATIVE MG/DL
CLARITY, POC: ABNORMAL
COLOR UR: YELLOW
EXPIRATION DATE: ABNORMAL
GLUCOSE UR STRIP-MCNC: NEGATIVE MG/DL
KETONES UR QL: NEGATIVE
LEUKOCYTE EST, POC: ABNORMAL
Lab: ABNORMAL
NITRITE UR-MCNC: NEGATIVE MG/ML
PH UR: 7.5 [PH] (ref 5–8)
PROT UR STRIP-MCNC: NEGATIVE MG/DL
RBC # UR STRIP: NEGATIVE /UL
SP GR UR: 1.03 (ref 1–1.03)
UROBILINOGEN UR QL: ABNORMAL

## 2022-09-01 PROCEDURE — 99214 OFFICE O/P EST MOD 30 MIN: CPT | Performed by: FAMILY MEDICINE

## 2022-09-01 PROCEDURE — 81003 URINALYSIS AUTO W/O SCOPE: CPT | Performed by: FAMILY MEDICINE

## 2022-09-01 RX ORDER — METHYLPREDNISOLONE 4 MG/1
TABLET ORAL
Qty: 21 TABLET | Refills: 0 | Status: ON HOLD | OUTPATIENT
Start: 2022-09-01 | End: 2022-09-09

## 2022-09-01 RX ORDER — CIPROFLOXACIN 250 MG/1
250 TABLET, FILM COATED ORAL 2 TIMES DAILY
Qty: 14 TABLET | Refills: 0 | Status: ON HOLD | OUTPATIENT
Start: 2022-09-01 | End: 2022-09-09

## 2022-09-01 RX ORDER — HYDROCODONE BITARTRATE AND ACETAMINOPHEN 10; 325 MG/1; MG/1
1 TABLET ORAL EVERY 6 HOURS PRN
COMMUNITY

## 2022-09-01 NOTE — PROGRESS NOTES
Subjective   Brenda Michelle is a 66 y.o. female. Presents today for   Chief Complaint   Patient presents with   • COPD   • Urinary Tract Infection   • Ankle Injury     Lt ankle pain fell x1 month ago        History of Present Illness  Patient here for f/u;  Had sleep study noted STEVEN had significant hypoxia and did have elevated CO2 level.  Was to start Trilogy if elevated per notes;  Patient relates cannot afford as $200 a month, so not started.  She has f/u with sleep specialist next week to discuss;  Reports feels restless, but prefers discuss requip or mirapex with sleep specialist as well;  Will check iron levels today as could impact;  On oxygen 24/7 still and using inhalers for COPD with relief;  Has still severe fatigue.   She desires bariatric procedure, needed sleep apnea dx for approval.  Has appt with bariatrics 10/4/22 to proceed.   Has also hld changed statins and more comfortable, due recheck lipids;  Also left ankle rolled and still painful after 1 month;  Ice no relief.    She also has dysuria and frequency, doesn't feel like cleared from 3 to 4 weeks ago.  She was given macrobid at that time and grew out E. Coli and entero faelcalis  Review of Systems   Constitutional: Positive for fatigue.   Respiratory: Positive for shortness of breath.    Cardiovascular: Negative for chest pain.   Musculoskeletal: Positive for arthralgias and joint swelling.       Patient Active Problem List   Diagnosis   • Acute deep vein thrombosis of distal leg (HCC)   • Adenomatous polyp of colon   • Depression   • Fibromyalgia   • Dyslipidemia   • Low back pain   • Peripheral arterial occlusive disease (HCC)   • Vitamin D deficiency   • Gastroesophageal reflux disease   • Irritable bowel syndrome   • Disorder of intervertebral disc   • Peripheral neuropathy   • Fracture of multiple ribs of right side   • Fall   • Acute respiratory failure with hypoxia (HCC)   • COPD mixed type (HCC)   • Weakness   • Hypopotassemia   •  Dysuria   • Constipation   • Hemorrhoids   • Arthritis   • Fibromyositis   • Hernia of anterior abdominal wall   • 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HCC)   • Injury of right ankle   • Kidney stone   • Knee pain   • Moderate ankle sprain   • Osteoarthritis of knee   • Other specified postprocedural states   • Spinal stenosis, lumbar region without neurogenic claudication   • Chronic respiratory failure with hypoxia (HCC)   • Bursitis of hip   • Encounter for long-term (current) use of insulin (HCC)   • Lumbar facet joint pain   • Low back pain   • Peripheral nerve disease       Social History     Socioeconomic History   • Marital status:    • Number of children: 2   • Years of education: 12    Tobacco Use   • Smoking status: Former Smoker     Packs/day: 0.75     Years: 50.00     Pack years: 37.50     Types: Cigarettes     Start date: 1960     Quit date: 2020     Years since quittin.8   • Smokeless tobacco: Never Used   Vaping Use   • Vaping Use: Never used   Substance and Sexual Activity   • Alcohol use: No   • Drug use: No   • Sexual activity: Not Currently     Partners: Male       Allergies   Allergen Reactions   • Ambien [Zolpidem Tartrate] Other (See Comments)     Nightmares   • Bupropion Itching   • Codeine Sulfate Itching   • Zolpidem Hives     Nightmares   • Adhesive Tape Rash   • Aripiprazole Unknown - Low Severity   • Atorvastatin Other (See Comments)     MYALGIAS   • Cilostazol Other (See Comments)     HA   • Colesevelam Nausea Only   • Ferrous Sulfate Nausea Only   • Welchol [Colesevelam Hcl] Nausea Only   • Wound Dressing Adhesive Rash       Current Outpatient Medications on File Prior to Visit   Medication Sig Dispense Refill   • Aspirin (ASPIR-81 PO) Take 81 mg by mouth Daily.     • baclofen (LIORESAL) 10 MG tablet Take 1 tablet by mouth 2 (Two) Times a Day As Needed for Muscle Spasms. 180 tablet 3   • clopidogrel (PLAVIX) 75 MG tablet TAKE 1 TABLET DAILY 90 tablet 3   • docusate sodium (COLACE) 100  "MG capsule Take 100 mg by mouth 2 (Two) Times a Day.     • DULoxetine (CYMBALTA) 60 MG capsule TAKE 1 CAPSULE DAILY 90 capsule 3   • fluconazole (DIFLUCAN) 200 MG tablet      • folic acid (FOLVITE) 1 MG tablet TAKE 1 TABLET DAILY 90 tablet 3   • HYDROcodone-acetaminophen (NORCO)  MG per tablet Take 1 tablet by mouth Every 6 (Six) Hours As Needed for Moderate Pain.     • pravastatin (PRAVACHOL) 80 MG tablet Take 1 tablet by mouth Daily. 90 tablet 51   • pregabalin (LYRICA) 150 MG capsule Take 1 capsule by mouth 3 (Three) Times a Day. (Patient taking differently: Take 150 mg by mouth 4 (Four) Times a Day.) 270 capsule 1   • risedronate (Actonel) 150 MG tablet Take 1 tablet by mouth Every 30 (Thirty) Days. with water on empty stomach, nothing by mouth or lie down for next 30 minutes. 3 tablet 3   • vitamin B-12 (CYANOCOBALAMIN) 1000 MCG tablet Take 1,000 mcg by mouth Daily.     • vitamin D3 125 MCG (5000 UT) capsule capsule Take 5,000 Units by mouth Daily.     • [DISCONTINUED] diclofenac (VOLTAREN) 1 % gel gel Apply 4 g topically to the appropriate area as directed 2 (Two) Times a Day As Needed (idopathic neuropathy). 100 g 1   • [DISCONTINUED] HYDROcodone-acetaminophen (NORCO) 7.5-325 MG per tablet Take 1 tablet by mouth Every 6 (Six) Hours As Needed for Moderate Pain . Please take these instead of your hydrocodone 5/325.  Do not take both medications. 12 tablet 0   • [DISCONTINUED] Tiotropium Bromide-Olodaterol (STIOLTO RESPIMAT IN) Inhale.       No current facility-administered medications on file prior to visit.       Objective   Vitals:    09/01/22 1316   BP: 130/62   Pulse: 82   Resp: 20   Temp: 96.6 °F (35.9 °C)   TempSrc: Temporal   SpO2: 94%   Weight: 120 kg (264 lb)   Height: 157.5 cm (62\")   PainSc: 0-No pain     Body mass index is 48.29 kg/m².    Physical Exam  Vitals and nursing note reviewed.   Constitutional:       Appearance: She is well-developed.   HENT:      Head: Normocephalic and atraumatic. "   Neck:      Thyroid: No thyromegaly.      Vascular: No JVD.   Cardiovascular:      Rate and Rhythm: Normal rate and regular rhythm.      Heart sounds: Normal heart sounds. No murmur heard.    No friction rub. No gallop.   Pulmonary:      Effort: Pulmonary effort is normal. No respiratory distress.      Breath sounds: Normal breath sounds. No wheezing or rales.   Abdominal:      General: Bowel sounds are normal. There is no distension.      Palpations: Abdomen is soft.      Tenderness: There is no abdominal tenderness. There is no guarding or rebound.   Musculoskeletal:      Cervical back: Neck supple.      Left ankle: Swelling present. Tenderness present over the lateral malleolus and CF ligament. No ATF ligament, AITF ligament, posterior TF ligament, base of 5th metatarsal or proximal fibula tenderness. Normal pulse.   Skin:     General: Skin is warm and dry.   Neurological:      Mental Status: She is alert.   Psychiatric:         Behavior: Behavior normal.        6/17/22 Sleep study:  Impression:   This study shows at least mild obstructive sleep apnea.  This is likely underestimated on the diagnostic study.  There are indications that patient has more severe sleep apnea in supine position and in rem stage sleep.     Titration study does not allow for correction of sleep disordered breathing even with the highest level of bilevel pressures tried.  There is also associated profound hypoxia.  Supplemental oxygen was added during the study and titrated up to 4 L with BiPAP device without correction of sleep hypoxia.     Recommend further evaluation with an arterial blood gas to rule out hypercapnia.  If hypercapnia is present patient should be considered for a trilogy device.     If no hypercapnia on arterial blood gas then recommend set up with bilevel device with minimum EPAP 15 and pressure support 4.  Supplemental oxygen is required at up to 4 L/min.  Combination therapy with oral appliance may be  needed.     PLM index remains high and may require treatment with Mirapex or Requip.  Consider evaluation with iron profile as well.     Plan:   Follow-up [facility] office with Dr. Tom Mathias for above.  Component      Latest Ref Rng & Units 9/1/2022   Color, UA      Yellow, Straw, Dark Yellow, Meghann Yellow   Appearance, UA      Clear Slightly Cloudy (A)   Specific Gravity, UA      1.005 - 1.030 1.030   pH, UA      5.0 - 8.0 7.5   Leukocytes, UA      Negative Large (3+) (A)   Nitrite, UA      Negative Negative   Protein, UA      Negative mg/dL Negative   Glucose      Negative mg/dL Negative   Ketones, UA      Negative Negative   Urobilinogen, UA      Normal, 0.2 E.U./dL 0.2 E.U./dL   Bilirubin, UA      Negative Negative   Blood, UA      Negative Negative   Lot Number       [id number]   Expiration Date       12,212,023     Blood Gas, Arterial -  Order: [id number]   Status: Final result     Visible to patient: No (not released)     Next appt: 10/04/2022 at 03:30 PM in Bariatrics (Julia Hoyos, APRN)    Specimen Information: Arterial Blood         0 Result Notes    Component   Ref Range & Units 2 mo ago    Site  Arterial: right brachial    Kiran's Test  N/A    pH, Arterial   7.350 - 7.450 pH units 7.340 Low     pCO2, Arterial   35.0 - 45.0 mm Hg 58.1 High Critical     Comment: spo2 98% Meter: [id number] : [id number] Cruz CandaCritical:Notify Dr NEEL CHAUDHARY (29-Jun-22 14:29:59)Read back ok   pO2, Arterial   80.0 - 100.0 mm Hg 87.3    HCO3, Arterial   22.0 - 28.0 mmol/L 31.4 High     Base Excess, Arterial   0.0 - 2.0 mmol/L 3.7 High     O2 Saturation Calculated   92.0 - 99.0 % 95.8    Barometric Pressure for Blood Gas   mmHg 759.5    Modality  Cannula    Flow Rate   lpm 3    Rate   Breaths/minute 18    Resulting Agency [facility]              Specimen Collected: 06/29/22 14:27 Last Resulted: 06/29/22 14:31             Assessment & Plan   Diagnoses and all orders for this visit:    1. Acute UTI (Primary)  -      POCT urinalysis dipstick, automated  -     Urine Culture - Urine, Urine, Clean Catch  -     ciprofloxacin (Cipro) 250 MG tablet; Take 1 tablet by mouth 2 (Two) Times a Day.  Dispense: 14 tablet; Refill: 0    2. Dysuria  -     Urine Culture - Urine, Urine, Clean Catch    3. Other fatigue  -     CBC & Differential  -     Comprehensive Metabolic Panel  -     Ferritin  -     Iron Profile  -     Vitamin B12    4. STEVEN (obstructive sleep apnea)    5. Chronic obstructive pulmonary disease, unspecified COPD type (HCC)    6. Chronic respiratory failure with hypoxia (HCC)    7. Hypercapnia    8. Dyslipidemia  -     Comprehensive Metabolic Panel  -     Lipid Panel    9. Normocytic anemia  -     CBC & Differential  -     Comprehensive Metabolic Panel  -     Ferritin  -     Iron Profile  -     Vitamin B12    will try cipro as covered both organisms last time since macrobid did not clear, though tendon rupture risk  Declines xray, d/w bony pain possible distal fibular fx though suspect more sprain CF ligament;  Try medorl pack  Check iron levels   -HLD - continue statin, recheck lipids.  F/u with sleep specialist as steven and hypercapnia;  unble to afford trilogy         -Follow up: 3 months nd prn

## 2022-09-02 LAB
ALBUMIN SERPL-MCNC: 4.1 G/DL (ref 3.8–4.8)
ALBUMIN/GLOB SERPL: 1.6 {RATIO} (ref 1.2–2.2)
ALP SERPL-CCNC: 115 IU/L (ref 44–121)
ALT SERPL-CCNC: 7 IU/L (ref 0–32)
AST SERPL-CCNC: 15 IU/L (ref 0–40)
BASOPHILS # BLD AUTO: 0 X10E3/UL (ref 0–0.2)
BASOPHILS NFR BLD AUTO: 1 %
BILIRUB SERPL-MCNC: 0.4 MG/DL (ref 0–1.2)
BUN SERPL-MCNC: 14 MG/DL (ref 8–27)
BUN/CREAT SERPL: 19 (ref 12–28)
CALCIUM SERPL-MCNC: 9.9 MG/DL (ref 8.7–10.3)
CHLORIDE SERPL-SCNC: 99 MMOL/L (ref 96–106)
CHOLEST SERPL-MCNC: 189 MG/DL (ref 100–199)
CO2 SERPL-SCNC: 30 MMOL/L (ref 20–29)
CREAT SERPL-MCNC: 0.72 MG/DL (ref 0.57–1)
EGFRCR-CYS SERPLBLD CKD-EPI 2021: 92 ML/MIN/1.73
EOSINOPHIL # BLD AUTO: 0.3 X10E3/UL (ref 0–0.4)
EOSINOPHIL NFR BLD AUTO: 4 %
ERYTHROCYTE [DISTWIDTH] IN BLOOD BY AUTOMATED COUNT: 13.7 % (ref 11.7–15.4)
FERRITIN SERPL-MCNC: 35 NG/ML (ref 15–150)
GLOBULIN SER CALC-MCNC: 2.6 G/DL (ref 1.5–4.5)
GLUCOSE SERPL-MCNC: 102 MG/DL (ref 65–99)
HCT VFR BLD AUTO: 44.1 % (ref 34–46.6)
HDLC SERPL-MCNC: 56 MG/DL
HGB BLD-MCNC: 13.5 G/DL (ref 11.1–15.9)
IMM GRANULOCYTES # BLD AUTO: 0 X10E3/UL (ref 0–0.1)
IMM GRANULOCYTES NFR BLD AUTO: 0 %
IRON SATN MFR SERPL: 23 % (ref 15–55)
IRON SERPL-MCNC: 88 UG/DL (ref 27–139)
LDLC SERPL CALC-MCNC: 111 MG/DL (ref 0–99)
LYMPHOCYTES # BLD AUTO: 1.4 X10E3/UL (ref 0.7–3.1)
LYMPHOCYTES NFR BLD AUTO: 18 %
MCH RBC QN AUTO: 29.7 PG (ref 26.6–33)
MCHC RBC AUTO-ENTMCNC: 30.6 G/DL (ref 31.5–35.7)
MCV RBC AUTO: 97 FL (ref 79–97)
MONOCYTES # BLD AUTO: 0.6 X10E3/UL (ref 0.1–0.9)
MONOCYTES NFR BLD AUTO: 8 %
NEUTROPHILS # BLD AUTO: 5.2 X10E3/UL (ref 1.4–7)
NEUTROPHILS NFR BLD AUTO: 69 %
PLATELET # BLD AUTO: 256 X10E3/UL (ref 150–450)
POTASSIUM SERPL-SCNC: 4.7 MMOL/L (ref 3.5–5.2)
PROT SERPL-MCNC: 6.7 G/DL (ref 6–8.5)
RBC # BLD AUTO: 4.54 X10E6/UL (ref 3.77–5.28)
SODIUM SERPL-SCNC: 142 MMOL/L (ref 134–144)
TIBC SERPL-MCNC: 375 UG/DL (ref 250–450)
TRIGL SERPL-MCNC: 124 MG/DL (ref 0–149)
UIBC SERPL-MCNC: 287 UG/DL (ref 118–369)
VIT B12 SERPL-MCNC: 1794 PG/ML (ref 232–1245)
VLDLC SERPL CALC-MCNC: 22 MG/DL (ref 5–40)
WBC # BLD AUTO: 7.5 X10E3/UL (ref 3.4–10.8)

## 2022-09-03 LAB
BACTERIA UR CULT: NORMAL
BACTERIA UR CULT: NORMAL

## 2022-09-07 ENCOUNTER — TELEPHONE (OUTPATIENT)
Dept: FAMILY MEDICINE CLINIC | Facility: CLINIC | Age: 67
End: 2022-09-07

## 2022-09-07 RX ORDER — NITROFURANTOIN 25; 75 MG/1; MG/1
100 CAPSULE ORAL 2 TIMES DAILY
Qty: 14 CAPSULE | Refills: 0 | Status: SHIPPED | OUTPATIENT
Start: 2022-09-07 | End: 2022-09-11 | Stop reason: HOSPADM

## 2022-09-07 NOTE — TELEPHONE ENCOUNTER
I sent in nitrofurantoin, last culture sensitive to this and she had this one time before last.  RRJ

## 2022-09-08 ENCOUNTER — HOSPITAL ENCOUNTER (OUTPATIENT)
Facility: HOSPITAL | Age: 67
LOS: 2 days | Discharge: HOME | End: 2022-09-11
Attending: EMERGENCY MEDICINE | Admitting: STUDENT IN AN ORGANIZED HEALTH CARE EDUCATION/TRAINING PROGRAM

## 2022-09-08 DIAGNOSIS — R30.0 DYSURIA: ICD-10-CM

## 2022-09-08 DIAGNOSIS — A41.9 SEPSIS WITHOUT ACUTE ORGAN DYSFUNCTION, DUE TO UNSPECIFIED ORGANISM: Primary | ICD-10-CM

## 2022-09-08 DIAGNOSIS — N39.0 RECURRENT UTI: ICD-10-CM

## 2022-09-08 DIAGNOSIS — R10.9 BILATERAL FLANK PAIN: ICD-10-CM

## 2022-09-08 PROCEDURE — 36415 COLL VENOUS BLD VENIPUNCTURE: CPT

## 2022-09-08 PROCEDURE — 80053 COMPREHEN METABOLIC PANEL: CPT | Performed by: EMERGENCY MEDICINE

## 2022-09-08 PROCEDURE — 84145 PROCALCITONIN (PCT): CPT | Performed by: EMERGENCY MEDICINE

## 2022-09-08 PROCEDURE — 99285 EMERGENCY DEPT VISIT HI MDM: CPT

## 2022-09-08 PROCEDURE — 85025 COMPLETE CBC W/AUTO DIFF WBC: CPT | Performed by: EMERGENCY MEDICINE

## 2022-09-08 PROCEDURE — 83605 ASSAY OF LACTIC ACID: CPT | Performed by: EMERGENCY MEDICINE

## 2022-09-08 RX ORDER — SODIUM CHLORIDE 0.9 % (FLUSH) 0.9 %
10 SYRINGE (ML) INJECTION AS NEEDED
Status: DISCONTINUED | OUTPATIENT
Start: 2022-09-08 | End: 2022-09-11 | Stop reason: HOSPADM

## 2022-09-08 RX ORDER — ACETAMINOPHEN 500 MG
1000 TABLET ORAL ONCE
Status: COMPLETED | OUTPATIENT
Start: 2022-09-08 | End: 2022-09-08

## 2022-09-08 RX ADMIN — ACETAMINOPHEN 1000 MG: 500 TABLET, FILM COATED ORAL at 23:59

## 2022-09-08 RX ADMIN — SODIUM CHLORIDE, POTASSIUM CHLORIDE, SODIUM LACTATE AND CALCIUM CHLORIDE 1000 ML: 600; 310; 30; 20 INJECTION, SOLUTION INTRAVENOUS at 23:38

## 2022-09-09 ENCOUNTER — APPOINTMENT (OUTPATIENT)
Dept: GENERAL RADIOLOGY | Facility: HOSPITAL | Age: 67
End: 2022-09-09

## 2022-09-09 ENCOUNTER — APPOINTMENT (OUTPATIENT)
Dept: CT IMAGING | Facility: HOSPITAL | Age: 67
End: 2022-09-09

## 2022-09-09 PROBLEM — N30.00 ACUTE CYSTITIS: Status: ACTIVE | Noted: 2022-09-09

## 2022-09-09 PROBLEM — R93.3 ABNORMAL CT SCAN, GASTROINTESTINAL TRACT: Status: ACTIVE | Noted: 2022-09-09

## 2022-09-09 PROBLEM — E66.01 OBESITY, CLASS III, BMI 40-49.9 (MORBID OBESITY): Status: ACTIVE | Noted: 2022-09-09

## 2022-09-09 PROBLEM — E66.813 OBESITY, CLASS III, BMI 40-49.9 (MORBID OBESITY): Status: ACTIVE | Noted: 2022-09-09

## 2022-09-09 PROBLEM — A41.9 SEPSIS WITHOUT ACUTE ORGAN DYSFUNCTION (HCC): Status: ACTIVE | Noted: 2022-09-09

## 2022-09-09 PROBLEM — R50.9 FEVER: Status: ACTIVE | Noted: 2022-09-09

## 2022-09-09 LAB
ALBUMIN SERPL-MCNC: 3.5 G/DL (ref 3.5–5.2)
ALBUMIN/GLOB SERPL: 1.1 G/DL
ALP SERPL-CCNC: 108 U/L (ref 39–117)
ALT SERPL W P-5'-P-CCNC: 15 U/L (ref 1–33)
ANION GAP SERPL CALCULATED.3IONS-SCNC: 8.1 MMOL/L (ref 5–15)
AST SERPL-CCNC: 30 U/L (ref 1–32)
BASOPHILS # BLD AUTO: 0.02 10*3/MM3 (ref 0–0.2)
BASOPHILS NFR BLD AUTO: 0.2 % (ref 0–1.5)
BILIRUB SERPL-MCNC: 0.5 MG/DL (ref 0–1.2)
BILIRUB UR QL STRIP: NEGATIVE
BUN SERPL-MCNC: 16 MG/DL (ref 8–23)
BUN/CREAT SERPL: 15.7 (ref 7–25)
CALCIUM SPEC-SCNC: 10.1 MG/DL (ref 8.6–10.5)
CHLORIDE SERPL-SCNC: 99 MMOL/L (ref 98–107)
CLARITY UR: CLEAR
CO2 SERPL-SCNC: 30.9 MMOL/L (ref 22–29)
COLOR UR: YELLOW
CREAT SERPL-MCNC: 1.02 MG/DL (ref 0.57–1)
D-LACTATE SERPL-SCNC: 1.6 MMOL/L (ref 0.5–2)
DEPRECATED RDW RBC AUTO: 46.1 FL (ref 37–54)
EGFRCR SERPLBLD CKD-EPI 2021: 60.8 ML/MIN/1.73
EOSINOPHIL # BLD AUTO: 0.11 10*3/MM3 (ref 0–0.4)
EOSINOPHIL NFR BLD AUTO: 1 % (ref 0.3–6.2)
ERYTHROCYTE [DISTWIDTH] IN BLOOD BY AUTOMATED COUNT: 13.6 % (ref 12.3–15.4)
GLOBULIN UR ELPH-MCNC: 3.3 GM/DL
GLUCOSE SERPL-MCNC: 117 MG/DL (ref 65–99)
GLUCOSE UR STRIP-MCNC: NEGATIVE MG/DL
HCT VFR BLD AUTO: 37.9 % (ref 34–46.6)
HGB BLD-MCNC: 12.3 G/DL (ref 12–15.9)
HGB UR QL STRIP.AUTO: NEGATIVE
IMM GRANULOCYTES # BLD AUTO: 0.07 10*3/MM3 (ref 0–0.05)
IMM GRANULOCYTES NFR BLD AUTO: 0.6 % (ref 0–0.5)
KETONES UR QL STRIP: NEGATIVE
LEUKOCYTE ESTERASE UR QL STRIP.AUTO: NEGATIVE
LYMPHOCYTES # BLD AUTO: 0.24 10*3/MM3 (ref 0.7–3.1)
LYMPHOCYTES NFR BLD AUTO: 2.1 % (ref 19.6–45.3)
MCH RBC QN AUTO: 29.6 PG (ref 26.6–33)
MCHC RBC AUTO-ENTMCNC: 32.5 G/DL (ref 31.5–35.7)
MCV RBC AUTO: 91.3 FL (ref 79–97)
MONOCYTES # BLD AUTO: 0.5 10*3/MM3 (ref 0.1–0.9)
MONOCYTES NFR BLD AUTO: 4.4 % (ref 5–12)
NEUTROPHILS NFR BLD AUTO: 10.36 10*3/MM3 (ref 1.7–7)
NEUTROPHILS NFR BLD AUTO: 91.7 % (ref 42.7–76)
NITRITE UR QL STRIP: NEGATIVE
NRBC BLD AUTO-RTO: 0 /100 WBC (ref 0–0.2)
PH UR STRIP.AUTO: 6 [PH] (ref 5–8)
PLATELET # BLD AUTO: 201 10*3/MM3 (ref 140–450)
PMV BLD AUTO: 10.9 FL (ref 6–12)
POTASSIUM SERPL-SCNC: 4.6 MMOL/L (ref 3.5–5.2)
PROCALCITONIN SERPL-MCNC: 4.54 NG/ML (ref 0–0.25)
PROT SERPL-MCNC: 6.8 G/DL (ref 6–8.5)
PROT UR QL STRIP: NEGATIVE
RBC # BLD AUTO: 4.15 10*6/MM3 (ref 3.77–5.28)
SODIUM SERPL-SCNC: 138 MMOL/L (ref 136–145)
SP GR UR STRIP: 1.01 (ref 1–1.03)
UROBILINOGEN UR QL STRIP: NORMAL
WBC NRBC COR # BLD: 11.3 10*3/MM3 (ref 3.4–10.8)

## 2022-09-09 PROCEDURE — 94799 UNLISTED PULMONARY SVC/PX: CPT

## 2022-09-09 PROCEDURE — 96361 HYDRATE IV INFUSION ADD-ON: CPT

## 2022-09-09 PROCEDURE — 81003 URINALYSIS AUTO W/O SCOPE: CPT | Performed by: EMERGENCY MEDICINE

## 2022-09-09 PROCEDURE — 97161 PT EVAL LOW COMPLEX 20 MIN: CPT

## 2022-09-09 PROCEDURE — 96365 THER/PROPH/DIAG IV INF INIT: CPT

## 2022-09-09 PROCEDURE — 74176 CT ABD & PELVIS W/O CONTRAST: CPT

## 2022-09-09 PROCEDURE — 25010000002 PIPERACILLIN SOD-TAZOBACTAM PER 1 G: Performed by: NURSE PRACTITIONER

## 2022-09-09 PROCEDURE — 94640 AIRWAY INHALATION TREATMENT: CPT

## 2022-09-09 PROCEDURE — 87040 BLOOD CULTURE FOR BACTERIA: CPT | Performed by: EMERGENCY MEDICINE

## 2022-09-09 PROCEDURE — 96372 THER/PROPH/DIAG INJ SC/IM: CPT

## 2022-09-09 PROCEDURE — 25010000002 PIPERACILLIN SOD-TAZOBACTAM PER 1 G: Performed by: EMERGENCY MEDICINE

## 2022-09-09 PROCEDURE — 71045 X-RAY EXAM CHEST 1 VIEW: CPT

## 2022-09-09 PROCEDURE — 25010000002 ENOXAPARIN PER 10 MG: Performed by: NURSE PRACTITIONER

## 2022-09-09 RX ORDER — ACETAMINOPHEN 650 MG/1
650 SUPPOSITORY RECTAL EVERY 4 HOURS PRN
Status: DISCONTINUED | OUTPATIENT
Start: 2022-09-09 | End: 2022-09-11 | Stop reason: HOSPADM

## 2022-09-09 RX ORDER — CLOPIDOGREL BISULFATE 75 MG/1
75 TABLET ORAL DAILY
Status: DISCONTINUED | OUTPATIENT
Start: 2022-09-09 | End: 2022-09-11 | Stop reason: HOSPADM

## 2022-09-09 RX ORDER — SODIUM CHLORIDE 0.9 % (FLUSH) 0.9 %
10 SYRINGE (ML) INJECTION EVERY 12 HOURS SCHEDULED
Status: DISCONTINUED | OUTPATIENT
Start: 2022-09-09 | End: 2022-09-11 | Stop reason: HOSPADM

## 2022-09-09 RX ORDER — SODIUM CHLORIDE 9 MG/ML
100 INJECTION, SOLUTION INTRAVENOUS CONTINUOUS
Status: DISCONTINUED | OUTPATIENT
Start: 2022-09-09 | End: 2022-09-11 | Stop reason: HOSPADM

## 2022-09-09 RX ORDER — PRAVASTATIN SODIUM 40 MG
80 TABLET ORAL DAILY
Status: DISCONTINUED | OUTPATIENT
Start: 2022-09-09 | End: 2022-09-11 | Stop reason: HOSPADM

## 2022-09-09 RX ORDER — DULOXETIN HYDROCHLORIDE 60 MG/1
60 CAPSULE, DELAYED RELEASE ORAL DAILY
Status: DISCONTINUED | OUTPATIENT
Start: 2022-09-09 | End: 2022-09-11 | Stop reason: HOSPADM

## 2022-09-09 RX ORDER — ENOXAPARIN SODIUM 100 MG/ML
40 INJECTION SUBCUTANEOUS EVERY 12 HOURS
Status: DISCONTINUED | OUTPATIENT
Start: 2022-09-09 | End: 2022-09-11 | Stop reason: HOSPADM

## 2022-09-09 RX ORDER — ASPIRIN 81 MG/1
81 TABLET ORAL DAILY
Status: DISCONTINUED | OUTPATIENT
Start: 2022-09-09 | End: 2022-09-11 | Stop reason: HOSPADM

## 2022-09-09 RX ORDER — SODIUM CHLORIDE 0.9 % (FLUSH) 0.9 %
10 SYRINGE (ML) INJECTION AS NEEDED
Status: DISCONTINUED | OUTPATIENT
Start: 2022-09-09 | End: 2022-09-11 | Stop reason: HOSPADM

## 2022-09-09 RX ORDER — IPRATROPIUM BROMIDE AND ALBUTEROL SULFATE 2.5; .5 MG/3ML; MG/3ML
3 SOLUTION RESPIRATORY (INHALATION) ONCE
Status: COMPLETED | OUTPATIENT
Start: 2022-09-09 | End: 2022-09-09

## 2022-09-09 RX ORDER — MELATONIN
2000 DAILY
Status: DISCONTINUED | OUTPATIENT
Start: 2022-09-09 | End: 2022-09-11 | Stop reason: HOSPADM

## 2022-09-09 RX ORDER — HYDROCODONE BITARTRATE AND ACETAMINOPHEN 5; 325 MG/1; MG/1
2 TABLET ORAL ONCE
Status: COMPLETED | OUTPATIENT
Start: 2022-09-09 | End: 2022-09-09

## 2022-09-09 RX ORDER — PREGABALIN 75 MG/1
150 CAPSULE ORAL 2 TIMES DAILY
Status: DISCONTINUED | OUTPATIENT
Start: 2022-09-09 | End: 2022-09-10

## 2022-09-09 RX ORDER — ONDANSETRON 2 MG/ML
4 INJECTION INTRAMUSCULAR; INTRAVENOUS EVERY 6 HOURS PRN
Status: DISCONTINUED | OUTPATIENT
Start: 2022-09-09 | End: 2022-09-11 | Stop reason: HOSPADM

## 2022-09-09 RX ORDER — BACLOFEN 10 MG/1
10 TABLET ORAL 2 TIMES DAILY PRN
Status: DISCONTINUED | OUTPATIENT
Start: 2022-09-09 | End: 2022-09-11 | Stop reason: HOSPADM

## 2022-09-09 RX ORDER — ACETAMINOPHEN 325 MG/1
650 TABLET ORAL EVERY 4 HOURS PRN
Status: DISCONTINUED | OUTPATIENT
Start: 2022-09-09 | End: 2022-09-11 | Stop reason: HOSPADM

## 2022-09-09 RX ORDER — NITROGLYCERIN 0.4 MG/1
0.4 TABLET SUBLINGUAL
Status: DISCONTINUED | OUTPATIENT
Start: 2022-09-09 | End: 2022-09-11 | Stop reason: HOSPADM

## 2022-09-09 RX ORDER — HYDROCODONE BITARTRATE AND ACETAMINOPHEN 10; 325 MG/1; MG/1
1 TABLET ORAL EVERY 6 HOURS PRN
Status: DISCONTINUED | OUTPATIENT
Start: 2022-09-09 | End: 2022-09-11 | Stop reason: HOSPADM

## 2022-09-09 RX ORDER — FOLIC ACID 1 MG/1
1000 TABLET ORAL DAILY
Status: DISCONTINUED | OUTPATIENT
Start: 2022-09-09 | End: 2022-09-11 | Stop reason: HOSPADM

## 2022-09-09 RX ORDER — ACETAMINOPHEN 160 MG/5ML
650 SOLUTION ORAL EVERY 4 HOURS PRN
Status: DISCONTINUED | OUTPATIENT
Start: 2022-09-09 | End: 2022-09-11 | Stop reason: HOSPADM

## 2022-09-09 RX ORDER — CHOLECALCIFEROL (VITAMIN D3) 125 MCG
1000 CAPSULE ORAL DAILY
Status: DISCONTINUED | OUTPATIENT
Start: 2022-09-09 | End: 2022-09-11 | Stop reason: HOSPADM

## 2022-09-09 RX ORDER — DOCUSATE SODIUM 100 MG/1
100 CAPSULE, LIQUID FILLED ORAL 2 TIMES DAILY
Status: DISCONTINUED | OUTPATIENT
Start: 2022-09-09 | End: 2022-09-11 | Stop reason: HOSPADM

## 2022-09-09 RX ADMIN — IPRATROPIUM BROMIDE AND ALBUTEROL SULFATE 3 ML: .5; 3 SOLUTION RESPIRATORY (INHALATION) at 01:43

## 2022-09-09 RX ADMIN — TAZOBACTAM SODIUM AND PIPERACILLIN SODIUM 4.5 G: 500; 4 INJECTION, SOLUTION INTRAVENOUS at 22:26

## 2022-09-09 RX ADMIN — DOCUSATE SODIUM 100 MG: 100 CAPSULE, LIQUID FILLED ORAL at 20:40

## 2022-09-09 RX ADMIN — Medication 1000 MCG: at 11:46

## 2022-09-09 RX ADMIN — DULOXETINE HYDROCHLORIDE 60 MG: 60 CAPSULE, DELAYED RELEASE ORAL at 11:46

## 2022-09-09 RX ADMIN — TAZOBACTAM SODIUM AND PIPERACILLIN SODIUM 4.5 G: 500; 4 INJECTION, SOLUTION INTRAVENOUS at 06:33

## 2022-09-09 RX ADMIN — Medication 2000 UNITS: at 11:46

## 2022-09-09 RX ADMIN — ASPIRIN 81 MG: 81 TABLET, COATED ORAL at 11:46

## 2022-09-09 RX ADMIN — DOCUSATE SODIUM 100 MG: 100 CAPSULE, LIQUID FILLED ORAL at 11:47

## 2022-09-09 RX ADMIN — HYDROCODONE BITARTRATE AND ACETAMINOPHEN 1 TABLET: 10; 325 TABLET ORAL at 16:55

## 2022-09-09 RX ADMIN — Medication 10 ML: at 20:55

## 2022-09-09 RX ADMIN — ENOXAPARIN SODIUM 40 MG: 100 INJECTION SUBCUTANEOUS at 16:48

## 2022-09-09 RX ADMIN — Medication 10 ML: at 03:12

## 2022-09-09 RX ADMIN — SODIUM CHLORIDE 100 ML/HR: 9 INJECTION, SOLUTION INTRAVENOUS at 03:08

## 2022-09-09 RX ADMIN — PREGABALIN 150 MG: 75 CAPSULE ORAL at 20:40

## 2022-09-09 RX ADMIN — TAZOBACTAM SODIUM AND PIPERACILLIN SODIUM 4.5 G: 500; 4 INJECTION, SOLUTION INTRAVENOUS at 00:08

## 2022-09-09 RX ADMIN — HYDROCODONE BITARTRATE AND ACETAMINOPHEN 1 TABLET: 10; 325 TABLET ORAL at 22:59

## 2022-09-09 RX ADMIN — HYDROCODONE BITARTRATE AND ACETAMINOPHEN 2 TABLET: 5; 325 TABLET ORAL at 02:40

## 2022-09-09 RX ADMIN — TAZOBACTAM SODIUM AND PIPERACILLIN SODIUM 4.5 G: 500; 4 INJECTION, SOLUTION INTRAVENOUS at 14:03

## 2022-09-09 RX ADMIN — SODIUM CHLORIDE 500 ML: 9 INJECTION, SOLUTION INTRAVENOUS at 04:18

## 2022-09-09 RX ADMIN — CLOPIDOGREL 75 MG: 75 TABLET, FILM COATED ORAL at 11:46

## 2022-09-09 RX ADMIN — PRAVASTATIN SODIUM 80 MG: 40 TABLET ORAL at 14:03

## 2022-09-09 RX ADMIN — Medication 10 ML: at 08:03

## 2022-09-09 RX ADMIN — PREGABALIN 150 MG: 75 CAPSULE ORAL at 11:46

## 2022-09-09 NOTE — PLAN OF CARE
Goal Outcome Evaluation:  Plan of Care Reviewed With: patient           Outcome Evaluation: Patient is a 66 y.o female who presents to [facility] with dysuria. Patient AOx4 today. Patient reports she lives at home with her  with a ramp to enter. Patient is independent with ADL at baseline. Patient ambulates household distances with rwx and uses a w.c when out in community. Patient sat up to EOB with SBA today. Patient performed STS with CGA. Patient ambulated 10ft with rwx and CGA. Distance limited by fatigue. Patient would continue to benefit from skilled PT intervention to address deficits in functional mobility below baseline. PT recommends home with assist and HHPT at d/c. Will continue to monitor.

## 2022-09-09 NOTE — THERAPY EVALUATION
Patient Name: Brenda Michelle  : 1955    MRN: [id number]                              Today's Date: 2022       Admit Date: 2022    Visit Dx:     ICD-10-CM ICD-9-CM   1. Sepsis without acute organ dysfunction, due to unspecified organism ([facility])  A41.9 038.9     995.91   2. Dysuria  R30.0 788.1   3. Bilateral flank pain  R10.9 789.09     Patient Active Problem List   Diagnosis   • Acute deep vein thrombosis of distal leg ([facility])   • Adenomatous polyp of colon   • Depression   • Fibromyalgia   • Dyslipidemia   • Low back pain   • Peripheral arterial occlusive disease ([facility])   • Vitamin D deficiency   • Gastroesophageal reflux disease   • Irritable bowel syndrome   • Disorder of intervertebral disc   • Peripheral nerve disease   • Fracture of multiple ribs of right side   • Fall   • Acute respiratory failure with hypoxia ([facility])   • COPD mixed type ([facility])   • Weakness   • Hypopotassemia   • Dysuria   • Constipation   • Hemorrhoids   • Arthritis   • Fibromyositis   • Hernia of anterior abdominal wall   • 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[facility])   • Injury of right ankle   • Kidney stone   • Knee pain   • Moderate ankle sprain   • Osteoarthritis of knee   • Other specified postprocedural states   • Spinal stenosis, lumbar region without neurogenic claudication   • Chronic respiratory failure with hypoxia ([facility])   • Bursitis of hip   • Encounter for long-term (current) use of insulin ([facility])   • Lumbar facet joint pain   • Low back pain   • Peripheral nerve disease   • Sepsis without acute organ dysfunction ([facility])   • Fever     Past Medical History:   Diagnosis Date   • Anxiety    • Arthritis    • benign polyps of large intestine    • COPD (chronic obstructive pulmonary disease) ([facility])    • Depression    • Difficulty walking    • Disc degeneration, lumbar    • Fibromyositis    • Hyperlipidemia    • IBS (irritable bowel syndrome)    • Kidney stones    • Movement disorder    • Nephrolithiasis    • Peripheral neuropathy    • PVD (peripheral  vascular disease) ([facility])    • Shingles    • Vitamin D deficiency      Past Surgical History:   Procedure Laterality Date   • COLONOSCOPY  2004    Colon polyps; family history of colon cancer   • KNEE ARTHROPLASTY, PARTIAL REPLACEMENT  2015   • KNEE ARTHROSCOPY Left     Meniscus   • POPLITEAL ARTERY ANGIOPLASTY Bilateral    • STEROID INJECTION     • TONSILLECTOMY     • TUBAL ABDOMINAL LIGATION        General Information     [facility] Name 09/09/22 1041          Physical Therapy Time and Intention    Document Type evaluation  -     Mode of Treatment individual therapy;physical therapy  -[facility] Name 09/09/22 1041          General Information    Patient Profile Reviewed yes  -     Prior Level of Function independent:  -     Existing Precautions/Restrictions fall  -SM     Row Name 09/09/22 1041          Living Environment    People in Home spouse  -SM     Row Name 09/09/22 1041          Home Main Entrance    Number of Stairs, Main Entrance none  Ramp to enter  -SM     Row Name 09/09/22 1041          Cognition    Orientation Status (Cognition) oriented x 4  -SM     Row Name 09/09/22 1041          Safety Issues, Functional Mobility    Impairments Affecting Function (Mobility) endurance/activity tolerance;strength  -           User Key  (r) = Recorded By, (t) = Taken By, (c) = Cosigned By    Initials Name Provider Type     Sophia Caldwell, PT Physical Therapist               Mobility     [facility] Name 09/09/22 1041          Bed Mobility    Bed Mobility supine-sit  -     Supine-Sit [place] (Bed Mobility) standby assist  -     Comment, (Bed Mobility) Sitting EOB at end of session  -SM     Row Name 09/09/22 1041          Bed-Chair Transfer    Bed-Chair [place] (Transfers) not tested  -SM     Row Name 09/09/22 1041          Sit-Stand Transfer    Sit-Stand [place] (Transfers) contact guard  -     Assistive Device (Sit-Stand Transfers) walker, front-wheeled  -[facility] Name 09/09/22 1041           Gait/Stairs (Locomotion)    [place] Level (Gait) contact guard  -     Assistive Device (Gait) walker, front-wheeled  -     Distance in Feet (Gait) 10ft  -     Deviations/Abnormal Patterns (Gait) marta decreased;gait speed decreased  -     Bilateral Gait Deviations forward flexed posture;heel strike decreased  -     [place] Level (Stairs) not tested  -     Comment, (Gait/Stairs) Gait slow with distance limited by fatigue  -           User Key  (r) = Recorded By, (t) = Taken By, (c) = Cosigned By    Initials Name Provider Type     Sophia Caldwell PT Physical Therapist               Obj/Interventions     Row Name 09/09/22 1042          Range of Motion Comprehensive    General Range of Motion bilateral lower extremity ROM WFL  -     Row Name 09/09/22 1042          Strength Comprehensive (MMT)    General Manual Muscle Testing (MMT) Assessment lower extremity strength deficits identified  -     Comment, General Manual Muscle Testing (MMT) Assessment Generalized weakness  -     Row Name 09/09/22 1042          Motor Skills    Motor Skills functional endurance  -     Row Name 09/09/22 1042          Balance    Balance Assessment sitting static balance;sitting dynamic balance;sit to stand dynamic balance;standing static balance;standing dynamic balance  -     Static Sitting Balance modified independence  -     Dynamic Sitting Balance standby assist  -     Position, Sitting Balance sitting edge of bed  -     Sit to Stand Dynamic Balance contact guard  -     Static Standing Balance standby assist  -     Dynamic Standing Balance contact guard  -     Position/Device Used, Standing Balance supported;walker, front-wheeled  -     Balance Interventions sitting;standing;sit to stand;supported;static;dynamic  -           User Key  (r) = Recorded By, (t) = Taken By, (c) = Cosigned By    Initials Name Provider Type     Sophia Caldwell PT Physical Therapist                Goals/Plan     Row Name 09/09/22 1049          Bed Mobility Goal 1 (PT)    Activity/Assistive Device (Bed Mobility Goal 1, PT) bed mobility activities, all  -SM     [place] Level/Cues Needed (Bed Mobility Goal 1, PT) supervision required  -SM     Time Frame (Bed Mobility Goal 1, PT) 1 week  -SM     Row Name 09/09/22 1049          Transfer Goal 1 (PT)    Activity/Assistive Device (Transfer Goal 1, PT) transfers, all  -SM     [place] Level/Cues Needed (Transfer Goal 1, PT) supervision required  -SM     Time Frame (Transfer Goal 1, PT) 1 week  -SM     Row Name 09/09/22 1049          Gait Training Goal 1 (PT)    Activity/Assistive Device (Gait Training Goal 1, PT) gait (walking locomotion);walker, rolling  -SM     [place] Level (Gait Training Goal 1, PT) standby assist  -SM     Distance (Gait Training Goal 1, PT) 50ft  -SM     Time Frame (Gait Training Goal 1, PT) 1 week  -SM           User Key  (r) = Recorded By, (t) = Taken By, (c) = Cosigned By    Initials Name Provider Type    SM Sophia Caldwell, PT Physical Therapist               Clinical Impression     Row Name 09/09/22 1043          Pain    Pretreatment Pain Rating 0/10 - no pain  -SM     Posttreatment Pain Rating 0/10 - no pain  -SM     Row Name 09/09/22 1043          Plan of Care Review    Plan of Care Reviewed With patient  -     Outcome Evaluation Patient is a 66 y.o female who presents to [facility] with dysuria. Patient AOx4 today. Patient reports she lives at home with her  with a ramp to enter. Patient is independent with ADL at baseline. Patient ambulates household distances with rwx and uses a w.c when out in community. Patient sat up to EOB with SBA today. Patient performed STS with CGA. Patient ambulated 10ft with rwx and CGA. Distance limited by fatigue. Patient would continue to benefit from skilled PT intervention to address deficits in functional mobility below baseline. PT recommends home with assist and HHPT at d/c. Will  continue to monitor.  -     Row Name 09/09/22 1043          Therapy Assessment/Plan (PT)    Rehab Potential (PT) good, to achieve stated therapy goals  -     Criteria for Skilled Interventions Met (PT) yes  -     Therapy Frequency (PT) 5 times/wk  -     Row Name 09/09/22 1043          Vital Signs    O2 Delivery Pre Treatment supplemental O2  -SM     O2 Delivery Intra Treatment supplemental O2  -SM     O2 Delivery Post Treatment supplemental O2  -SM     Pre Patient Position Supine  -SM     Intra Patient Position Standing  -SM     Post Patient Position Sitting  -SM     Row Name 09/09/22 1043          Positioning and Restraints    Pre-Treatment Position in bed  -SM     Post Treatment Position bed  -SM     In Bed sitting EOB;encouraged to call for assist;call light within reach;notified ns  -           User Key  (r) = Recorded By, (t) = Taken By, (c) = Cosigned By    Initials Name Provider Type     Sophia Caldwell, PT Physical Therapist               Outcome Measures     Row Name 09/09/22 1049 09/09/22 0803       How much help from another person do you currently need...    Turning from your back to your side while in flat bed without using bedrails? 4  -SM 3  -MS    Moving from lying on back to sitting on the side of a flat bed without bedrails? 3  -SM 2  -MS    Moving to and from a bed to a chair (including a wheelchair)? 3  -SM 1  -MS    Standing up from a chair using your arms (e.g., wheelchair, bedside chair)? 3  -SM 2  -MS    Climbing 3-5 steps with a railing? 2  -SM 1  -MS    To walk in hospital room? 2  -SM 1  -MS    AM-PAC 6 Clicks Score (PT) 17  -SM 10  -MS    Highest level of mobility 5 --> Static standing  -SM 4 --> Transferred to chair/commode  -MS    Row Name 09/09/22 0301          How much help from another person do you currently need...    Turning from your back to your side while in flat bed without using bedrails? 3  -CW     Moving from lying on back to sitting on the side of a flat bed  without bedrails? 2  -CW     Moving to and from a bed to a chair (including a wheelchair)? 1  -CW     Standing up from a chair using your arms (e.g., wheelchair, bedside chair)? 2  -CW     Climbing 3-5 steps with a railing? 1  -CW     To walk in hospital room? 1  -CW     AM-PAC 6 Clicks Score (PT) 10  -CW     Highest level of mobility 4 --> Transferred to chair/commode  -CW     Row Name 09/09/22 1049          Functional Assessment    Outcome Measure Options AM-PAC 6 Clicks Basic Mobility (PT)  -           User Key  (r) = Recorded By, (t) = Taken By, (c) = Cosigned By    Initials Name Provider Type    Abisai Crooks, RN Registered Nurse     Shakila Zuñiga RN Registered Nurse    Sophia Torres, PT Physical Therapist                             Physical Therapy Education                 Title: PT OT SLP Therapies (Done)     Topic: Physical Therapy (Done)     Point: Mobility training (Done)     Learning Progress Summary           Patient Acceptance, E, VU by  at 9/9/2022 1050                   Point: Home exercise program (Done)     Learning Progress Summary           Patient Acceptance, E, VU by  at 9/9/2022 1050                   Point: Body mechanics (Done)     Learning Progress Summary           Patient Acceptance, E, VU by  at 9/9/2022 1050                   Point: Precautions (Done)     Learning Progress Summary           Patient Acceptance, E, VU by  at 9/9/2022 1050                               User Key     Initials Effective Dates Name Provider Type Discipline     05/02/22 -  Sophia Caldwell, AYLA Physical Therapist PT              PT Recommendation and Plan     Plan of Care Reviewed With: patient  Outcome Evaluation: Patient is a 66 y.o female who presents to [facility] with dysuria. Patient AOx4 today. Patient reports she lives at home with her  with a ramp to enter. Patient is independent with ADL at baseline. Patient ambulates household distances with rwx and uses a w.c when out  in community. Patient sat up to EOB with SBA today. Patient performed STS with CGA. Patient ambulated 10ft with rwx and CGA. Distance limited by fatigue. Patient would continue to benefit from skilled PT intervention to address deficits in functional mobility below baseline. PT recommends home with assist and HHPT at d/c. Will continue to monitor.     Time Calculation:    PT Charges     Row Name 09/09/22 1050             Time Calculation    Start Time 1021  -SM      Stop Time 1032  -      Time Calculation (min) 11 min  -SM      PT Received On 09/09/22  -      PT - Next Appointment 09/10/22  -      PT Goal Re-Cert Due Date 09/16/22  -            User Key  (r) = Recorded By, (t) = Taken By, (c) = Cosigned By    Initials Name Provider Type     Sophia Caldwell PT Physical Therapist              Therapy Charges for Today     Code Description Service Date Service Provider Modifiers Qty    [id number]  PT EVAL LOW COMPLEXITY 3 9/9/2022 Sophia Caldwell PT GP 1          PT G-Codes  Outcome Measure Options: AM-PAC 6 Clicks Basic Mobility (PT)  AM-PAC 6 Clicks Score (PT): 17  Patient was not wearing a face mask during this therapy encounter. Therapist used appropriate personal protective equipment including mask and gloves.  Mask used was standard procedure mask. Appropriate PPE was worn during the entire therapy session. Hand hygiene was completed before and after therapy session. Patient is not in enhanced droplet precautions.     Sophia Caldwell PT  9/9/2022

## 2022-09-09 NOTE — CASE MANAGEMENT/SOCIAL WORK
Continued Stay Note  [facility]     Patient Name: Brenda Michelle  MRN: [id number]  Today's Date: 9/9/2022    Admit Date: 9/8/2022     Discharge Plan     Row Name 09/09/22 1539       Plan    Plan Return home with spouse    Plan Comments Per Divya/Fabio - there is an order for [employer] and it should be delivered to hospital this afternoon.  Given list of  agencies - again does not feel she will need it.  Abdifatah THOMPSON                       Expected Discharge Date and Time     Expected Discharge Date Expected Discharge Time    Sep 11, 2022             Becky S. [place], RN

## 2022-09-09 NOTE — ED PROVIDER NOTES
04/23/21 0612   Sleep Analysis   Sleep Report Good   Sleep/Wake Cycle Unremarkable   Hours Slept 8.75+  (Asleep at 2115)     Pt appeared to sleep soundly throughout the night. Unable to complete C-SSRS at this time, maintain current plan of care.      EMERGENCY DEPARTMENT ENCOUNTER    Room Number:  24/24  Date seen:  9/9/2022  PCP: Angelito Kline DO  Historian: Patient      HPI:  Chief Complaint: Pain with urination, fever  A complete HPI/ROS/PMH/PSH/SH/FH are unobtainable due to: Nothing  Context: Brenda iMchelle is a 66 y.o. female who presents to the ED c/o pain with urination for the last couple of days and then fever and chills onset today at 5 PM.  Patient reports that she has been on antibiotics twice recently for urinary tract infection.  She had completed a course of Macrobid about a week and a half ago.  Her symptoms have then recurred again earlier this week and her PCP was going to prescribe her Cipro but she did not like the side effect profile and the risk of tendon rupture so alternatively she was prescribed another course of Macrobid.  She is only had a couple of doses of this and then her symptoms worsened today.  She has had some nausea and vomiting today as well.  She reports that the pain is localized to her lower abdomen and also bilateral flanks.  She has a history of COPD and is on nasal cannula oxygen at baseline.            PAST MEDICAL HISTORY  Active Ambulatory Problems     Diagnosis Date Noted   • Acute deep vein thrombosis of distal leg ([facility]) 01/24/2016   • Adenomatous polyp of colon 01/24/2016   • Depression 01/24/2016   • Fibromyalgia 01/24/2016   • Dyslipidemia 01/24/2016   • Low back pain 01/24/2016   • Peripheral arterial occlusive disease (HCC) 01/24/2016   • Vitamin D deficiency 01/24/2016   • Gastroesophageal reflux disease 02/13/2012   • Irritable bowel syndrome 02/13/2012   • Disorder of intervertebral disc 02/13/2012   • Peripheral nerve disease 02/13/2012   • Fracture of multiple ribs of right side 12/10/2017   • Fall 12/11/2017   • Acute respiratory failure with hypoxia ([facility]) 12/11/2017   • COPD mixed type ([facility]) 12/11/2017   • Weakness 11/15/2019   • Hypopotassemia 11/16/2019   • Dysuria 11/20/2019   • Constipation  2019   • Hemorrhoids 2021   • Arthritis 2021   • Fibromyositis 2021   • Hernia of anterior abdominal wall 2021   • 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   • Injury of right ankle 2016   • Kidney stone 10/24/2012   • Knee pain 2015   • Moderate ankle sprain 2016   • Osteoarthritis of knee 2020   • Other specified postprocedural states 2020   • Spinal stenosis, lumbar region without neurogenic claudication 2019   • Chronic respiratory failure with hypoxia ([facility]) 2021   • Bursitis of hip 2022   • Encounter for long-term (current) use of insulin ([facility]) 2022   • Lumbar facet joint pain 2022   • Low back pain 2022   • Peripheral nerve disease 2022     Resolved Ambulatory Problems     Diagnosis Date Noted   • Tobacco dependence 2017     Past Medical History:   Diagnosis Date   • Anxiety    • benign polyps of large intestine    • COPD (chronic obstructive pulmonary disease) ([facility])    • Difficulty walking    • Disc degeneration, lumbar    • Hyperlipidemia    • IBS (irritable bowel syndrome)    • Kidney stones    • Movement disorder    • Nephrolithiasis    • Peripheral neuropathy    • PVD (peripheral vascular disease) ([facility])    • Shingles          PAST SURGICAL HISTORY  Past Surgical History:   Procedure Laterality Date   • COLONOSCOPY  2004    Colon polyps; family history of colon cancer   • KNEE ARTHROPLASTY, PARTIAL REPLACEMENT     • KNEE ARTHROSCOPY Left     Meniscus   • POPLITEAL ARTERY ANGIOPLASTY Bilateral    • STEROID INJECTION     • TONSILLECTOMY     • TUBAL ABDOMINAL LIGATION           FAMILY HISTORY  Family History   Problem Relation Age of Onset   • COPD Mother          at 67 yo    • Colon cancer Mother    • Heart disease Mother    • Stroke Mother    • Lung cancer Father         mesothelioma   • Heart disease Maternal Grandmother    • Heart disease Maternal Grandfather    • Heart disease Paternal Grandfather           SOCIAL HISTORY  Social History     Socioeconomic History   • Marital status:    • Number of children: 2   • Years of education: 12    Tobacco Use   • Smoking status: Former Smoker     Packs/day: 0.75     Years: 50.00     Pack years: 37.50     Types: Cigarettes     Start date: 1960     Quit date: 2020     Years since quittin.8   • Smokeless tobacco: Never Used   Vaping Use   • Vaping Use: Never used   Substance and Sexual Activity   • Alcohol use: No   • Drug use: No   • Sexual activity: Not Currently     Partners: Male         ALLERGIES  Ambien [zolpidem tartrate], Bupropion, Codeine sulfate, Zolpidem, Adhesive tape, Aripiprazole, Atorvastatin, Cilostazol, Colesevelam, Ferrous sulfate, Welchol [colesevelam hcl], and Wound dressing adhesive        REVIEW OF SYSTEMS  Review of Systems   Review of all 14 systems is negative other than stated in the HPI above.      PHYSICAL EXAM  ED Triage Vitals [22 2253]   Temp Heart Rate Resp BP SpO2   (!) 101.7 °F (38.7 °C) (!) 122 18 119/78 96 %      Temp src Heart Rate Source Patient Position BP Location FiO2 (%)   Oral -- -- -- --         GENERAL: Awake and alert, no acute distress  HENT: nares patent  EYES: no scleral icterus, EOMI  CV: regular rhythm, mildly tachycardic  RESPIRATORY: normal effort, nasal cannula oxygen in place at 3 L  ABDOMEN: soft, mild suprapubic tenderness without rebound or guarding  MUSCULOSKELETAL: no deformity  NEURO: alert, moves all extremities, follows commands, cranial nerves II through XII grossly intact, speech fluent and clear  PSYCH:  calm, cooperative  SKIN: warm, dry    Vital signs and nursing notes reviewed.          LAB RESULTS  Recent Results (from the past 24 hour(s))   Comprehensive Metabolic Panel    Collection Time: 22 11:34 PM    Specimen: Blood   Result Value Ref Range    Glucose 117 (H) 65 - 99 mg/dL    BUN 16 8 - 23 mg/dL    Creatinine 1.02 (H) 0.57 - 1.00 mg/dL    Sodium 138 136 - 145 mmol/L     Potassium 4.6 3.5 - 5.2 mmol/L    Chloride 99 98 - 107 mmol/L    CO2 30.9 (H) 22.0 - 29.0 mmol/L    Calcium 10.1 8.6 - 10.5 mg/dL    Total Protein 6.8 6.0 - 8.5 g/dL    Albumin 3.50 3.50 - 5.20 g/dL    ALT (SGPT) 15 1 - 33 U/L    AST (SGOT) 30 1 - 32 U/L    Alkaline Phosphatase 108 39 - 117 U/L    Total Bilirubin 0.5 0.0 - 1.2 mg/dL    Globulin 3.3 gm/dL    A/G Ratio 1.1 g/dL    BUN/Creatinine Ratio 15.7 7.0 - 25.0    Anion Gap 8.1 5.0 - 15.0 mmol/L    eGFR 60.8 >60.0 mL/min/1.73   Lactic Acid, Plasma    Collection Time: 09/08/22 11:34 PM    Specimen: Blood   Result Value Ref Range    Lactate 1.6 0.5 - 2.0 mmol/L   Procalcitonin    Collection Time: 09/08/22 11:34 PM    Specimen: Blood   Result Value Ref Range    Procalcitonin 4.54 (H) 0.00 - 0.25 ng/mL   CBC Auto Differential    Collection Time: 09/08/22 11:34 PM    Specimen: Blood   Result Value Ref Range    WBC 11.30 (H) 3.40 - 10.80 10*3/mm3    RBC 4.15 3.77 - 5.28 10*6/mm3    Hemoglobin 12.3 12.0 - 15.9 g/dL    Hematocrit 37.9 34.0 - 46.6 %    MCV 91.3 79.0 - 97.0 fL    MCH 29.6 26.6 - 33.0 pg    MCHC 32.5 31.5 - 35.7 g/dL    RDW 13.6 12.3 - 15.4 %    RDW-SD 46.1 37.0 - 54.0 fl    MPV 10.9 6.0 - 12.0 fL    Platelets 201 140 - 450 10*3/mm3    Neutrophil % 91.7 (H) 42.7 - 76.0 %    Lymphocyte % 2.1 (L) 19.6 - 45.3 %    Monocyte % 4.4 (L) 5.0 - 12.0 %    Eosinophil % 1.0 0.3 - 6.2 %    Basophil % 0.2 0.0 - 1.5 %    Immature Grans % 0.6 (H) 0.0 - 0.5 %    Neutrophils, Absolute 10.36 (H) 1.70 - 7.00 10*3/mm3    Lymphocytes, Absolute 0.24 (L) 0.70 - 3.10 10*3/mm3    Monocytes, Absolute 0.50 0.10 - 0.90 10*3/mm3    Eosinophils, Absolute 0.11 0.00 - 0.40 10*3/mm3    Basophils, Absolute 0.02 0.00 - 0.20 10*3/mm3    Immature Grans, Absolute 0.07 (H) 0.00 - 0.05 10*3/mm3    nRBC 0.0 0.0 - 0.2 /100 WBC   Urinalysis With Culture If Indicated - Urine, Catheter    Collection Time: 09/09/22 12:02 AM    Specimen: Urine, Catheter   Result Value Ref Range    Color, UA Yellow  Yellow, Straw    Appearance, UA Clear Clear    pH, UA 6.0 5.0 - 8.0    Specific Gravity, UA 1.012 1.005 - 1.030    Glucose, UA Negative Negative    Ketones, UA Negative Negative    Bilirubin, UA Negative Negative    Blood, UA Negative Negative    Protein, UA Negative Negative    Leuk Esterase, UA Negative Negative    Nitrite, UA Negative Negative    Urobilinogen, UA 0.2 E.U./dL 0.2 - 1.0 E.U./dL       Ordered the above labs and reviewed the results.        RADIOLOGY  CT Abdomen Pelvis Without Contrast    Result Date: 9/9/2022  CT OF THE ABDOMEN AND PELVIS WITHOUT CONTRAST  HISTORY: Fever  COMPARISON: 05/04/2015  TECHNIQUE: Axial CT imaging was obtained through the abdomen and pelvis. No IV contrast was administered.  FINDINGS: Images through the lung bases demonstrate atelectasis and scarring. Images are significantly degraded by motion artifact. No suspicious hepatic lesions are seen. Gallbladder, adrenal glands, stomach, duodenum, spleen, and pancreas all appear unremarkable. No hydronephrosis is seen on either side. There is a 6 mm nonobstructing stone within the right kidney. There is potentially an additional punctate stone there is calcification of the aorta. No distal ureteral or bladder stones are seen. Urinary bladder is thick walled, with perivesical soft tissue stranding, suggesting cystitis. Uterus is within normal limits. There is no bowel obstruction. The patient has a prominent appearance at the ileocecal junction. Appearance has a masslike configuration. The appendix is normal. The patient has bilateral common iliac artery stents. No acute osseous abnormalities are seen. Immediately adjacent to it. No stones are seen on the left.       1. Thick-walled appearance to the urinary bladder, with adjacent perivesical stranding, suggesting cystitis. 2. Fullness of the ileocecal junction may be related to stool, but underlying mass lesion is not excluded. Consideration for colonoscopy versus repeat CT is  recommended.  Radiation dose reduction techniques were utilized, including automated exposure control and exposure modulation based on body size.  This report was finalized on 9/9/2022 1:14 AM by Dr. Linda Guillermo M.D.      XR Chest 1 View    Result Date: 9/9/2022  SINGLE VIEW OF THE CHEST  HISTORY: Fever  COMPARISON: 11/20/2019  FINDINGS: Cardiomegaly is present. There are background changes of COPD. There is no vascular congestion. Chronic scarring is seen at the lung bases. No definite pneumothorax, pleural effusion, or acute infiltrate is seen.      No acute findings.  This report was finalized on 9/9/2022 1:22 AM by Dr. Linda Guillermo M.D.        Ordered the above noted radiological studies. Reviewed by me in PACS.            PROCEDURES  Critical Care  Performed by: Emmanuel Goddard MD  Authorized by: Emmanuel Goddard MD     Critical care provider statement:     Critical care time (minutes):  30    Critical care time was exclusive of:  Separately billable procedures and treating other patients    Critical care was necessary to treat or prevent imminent or life-threatening deterioration of the following conditions:  Sepsis    Critical care was time spent personally by me on the following activities:  Discussions with consultants, evaluation of patient's response to treatment, ordering and review of laboratory studies, ordering and review of radiographic studies, ordering and performing treatments and interventions, pulse oximetry, re-evaluation of patient's condition, review of old charts and examination of patient                  MEDICATIONS GIVEN IN ER  Medications   sodium chloride 0.9 % flush 10 mL (has no administration in time range)   sodium chloride 0.9 % flush 10 mL (has no administration in time range)   sodium chloride 0.9 % flush 10 mL (has no administration in time range)   nitroglycerin (NITROSTAT) SL tablet 0.4 mg (has no administration in time range)   sodium chloride 0.9 %  infusion (has no administration in time range)   acetaminophen (TYLENOL) tablet 650 mg (has no administration in time range)     Or   acetaminophen (TYLENOL) 160 MG/5ML solution 650 mg (has no administration in time range)     Or   acetaminophen (TYLENOL) suppository 650 mg (has no administration in time range)   ondansetron (ZOFRAN) injection 4 mg (has no administration in time range)   piperacillin-tazobactam (ZOSYN) 4.5 g in iso-osmotic dextrose 100 mL IVPB (premix) (0 g Intravenous Stopped 9/9/22 0104)   acetaminophen (TYLENOL) tablet 1,000 mg (1,000 mg Oral Given 9/8/22 5699)   lactated ringers bolus 1,000 mL ( Intravenous Currently Infusing 9/9/22 0104)   ipratropium-albuterol (DUO-NEB) nebulizer solution 3 mL (3 mL Nebulization Given 9/9/22 0143)                   MEDICAL DECISION MAKING, PROGRESS, and CONSULTS    All labs have been independently reviewed by me.  All radiology studies have been reviewed by me and discussed with radiologist dictating the report.   EKG's independently viewed and interpreted by me.  Discussion below represents my analysis of pertinent findings related to patient's condition, differential diagnosis, treatment plan and final disposition.      Differential diagnosis includes but is not limited to:  Pyelonephritis  Ureteral calculus  Cystitis  Sepsis  Bacteremia  Renal abscess      ED Course as of 09/09/22 0207   u Sep 08, 2022   2330 Medical record review: I reviewed prior urine cultures with the pharmacist.  She has grown Enterococcus previously which appears that it would be sensitive to Zosyn.  Most recent urine culture demonstrated mixed erlinda. [JR]   Fri Sep 09, 2022   0028 WBC(!): 11.30 [JR]   0028 Lactate: 1.6 [JR]   0028 CT abdomen independently interpreted in PACS.  I do not see evidence of right ureteral calculus however there may be some mild right perinephric and periureteral stranding. [JR]   0045 Procalcitonin(!): 4.54 [JR]   0046 Discussed with Tita Alvarado,  APRN for [facility], who agrees to admit on behalf of Dr. Azevedo. [JR]   0047 Patient's urinalysis appears clear today, however this could be confounded due to her being on Macrobid which does concentrate well in the urine but does not penetrate the renal parenchyma.  Given her clinical symptoms of urinary frequency and suprapubic pain, bilateral flank pain, still think that pyelonephritis is likely.  She does not have any current acute respiratory complaints.  Blood cultures are pending as well. [JR]   0114 Chest x-ray appears generally clear with some blunting of the left costophrenic angle, atelectasis versus infiltrate of the right lung base. [JR]      ED Course User Index  [JR] Emmanuel Goddard MD              I wore an N95 mask, face shield, and gloves during this patient encounter.  Patient also wearing a surgical mask.  Hand hygeine performed before and after seeing the patient.    DIAGNOSIS  Final diagnoses:   Sepsis without acute organ dysfunction, due to unspecified organism (HCC)   Dysuria   Bilateral flank pain         DISPOSITION  ADMIT            Latest Documented Vital Signs:  As of 02:07 EDT  BP- 105/60 HR- 105 Temp- (!) 101.7 °F (38.7 °C) (Oral) O2 sat- 93%        --    Please note that portions of this were completed with a voice recognition program.          Emmanuel Goddard MD  09/09/22 0202       Emmanuel Goddard MD  09/09/22 0239

## 2022-09-09 NOTE — CASE MANAGEMENT/SOCIAL WORK
Discharge Planning Assessment  [facility]     Patient Name: Brenda Michelle  MRN: [id number]  Today's Date: 9/9/2022    Admit Date: 9/8/2022     Discharge Needs Assessment     Row Name 09/09/22 1141       Living Environment    People in Home spouse    Name(s) of People in Home Andreas Shannon    Current Living Arrangements home    Primary Care Provided by self    Provides Primary Care For no one    Family Caregiver if Needed spouse    Family Caregiver Names  Shiva [phone]    Quality of Family Relationships helpful    Able to Return to Prior Arrangements yes       Resource/Environmental Concerns    Resource/Environmental Concerns none    Transportation Concerns none       Transition Planning    Patient/Family Anticipates Transition to home with family    Patient/Family Anticipated Services at Transition none    Transportation Anticipated family or friend will provide       Discharge Needs Assessment    Readmission Within the Last 30 Days no previous admission in last 30 days    Equipment Currently Used at Home wheelchair;walker, rolling;oxygen;hospital bed;shower chair    Concerns to be Addressed basic needs;discharge planning    Anticipated Changes Related to Illness none    Equipment Needed After Discharge noninvasive ventilator               Discharge Plan     Row Name 09/09/22 1148       Plan    Plan Return home with spouse    Patient/Family in Agreement with Plan yes    Plan Comments Spoke with patient at bedside.  She lives with  Shiva [phone], uses a walker, W/C, shower chair, hospital bed, has O2 from [place].  She states a Trilogy has been ordered but she does not know when it will be delivered - call to [facility] and she does not find order or needed chart note.  .  Patient has used HH in the past but does not remember what agency and has been to SNF.  PCP is Dr. Angelito Kline and pharmacy is [facility].   drives and assists her with cooking and needs.  She does  not think HH will be needed.  She plans to return home at [place].  CCP will follow. Abdifatah THOMPSON              Continued Care and Services - Admitted Since 9/8/2022     Durable Medical Equipment     Service Provider Request Status Selected Services Address Phone Fax Patient Preferred    [facility]  Pending - Request Sent N/A [id number], [facility] [id number] [phone] --              Expected Discharge Date and Time     Expected Discharge Date Expected Discharge Time    Sep 11, 2022          Demographic Summary     Row Name 09/09/22 1140       General Information    Admission Type inpatient    Arrived From home    Referral Source admission list    Reason for Consult discharge planning    Preferred Language English               Functional Status     Row Name 09/09/22 1141       Functional Status    Usual Activity Tolerance moderate    Current Activity Tolerance fair       Functional Status, IADL    Medications independent    Meal Preparation assistive person      Housekeeping assistive person    Laundry assistive person    Shopping assistive person       Mental Status    General Appearance WDL WDL       Mental Status Summary    Recent Changes in Mental Status/Cognitive Functioning no changes                      Becky S. [place], JAY

## 2022-09-09 NOTE — PLAN OF CARE
Problem: Fall Injury Risk  Goal: Absence of Fall and Fall-Related Injury  Outcome: Ongoing, Progressing  Intervention: Promote Injury-Free Environment  Recent Flowsheet Documentation  Taken 9/9/2022 1345 by Abisai Banda RN  Safety Promotion/Fall Prevention:   assistive device/personal items within reach   fall prevention program maintained   nonskid shoes/slippers when out of bed   safety round/check completed  Taken 9/9/2022 1152 by Abisai Banda RN  Safety Promotion/Fall Prevention:   assistive device/personal items within reach   fall prevention program maintained   nonskid shoes/slippers when out of bed   safety round/check completed  Taken 9/9/2022 0954 by Abisai Banda RN  Safety Promotion/Fall Prevention:   assistive device/personal items within reach   fall prevention program maintained   nonskid shoes/slippers when out of bed   safety round/check completed  Taken 9/9/2022 0803 by Abisai Banda RN  Safety Promotion/Fall Prevention:   assistive device/personal items within reach   fall prevention program maintained   nonskid shoes/slippers when out of bed   safety round/check completed     Problem: Adult Inpatient Plan of Care  Goal: Plan of Care Review  Outcome: Ongoing, Progressing  Flowsheets (Taken 9/9/2022 1500)  Progress: improving  Plan of Care Reviewed With: patient  Outcome Evaluation: Patient has been pleasant and cooperative during shift. No complaints of pain, nausea or SOA. AOx4, assist x1, 4LO2. PT ambulated 10 feet with patient. Return home at D/C with spouse. IVFsw infusing and antibiotics. Spouse at bedside. Will continue to monitor and assist patient as needed.  Goal: Patient-Specific Goal (Individualized)  Outcome: Ongoing, Progressing  Goal: Absence of Hospital-Acquired Illness or Injury  Outcome: Ongoing, Progressing  Intervention: Identify and Manage Fall Risk  Recent Flowsheet Documentation  Taken 9/9/2022 1345 by Abisai Banda RN  Safety Promotion/Fall  Prevention:   assistive device/personal items within [facility]   fall prevention program maintained   nonskid shoes/slippers when out of bed   safety round/check completed  Taken 9/9/2022 1152 by Abisai Banda RN  Safety Promotion/Fall Prevention:   assistive device/personal items within [facility]   fall prevention program maintained   nonskid shoes/slippers when out of bed   safety round/check completed  Taken 9/9/2022 0954 by Abisai Banda RN  Safety Promotion/Fall Prevention:   assistive device/personal items within [facility]   fall prevention program maintained   nonskid shoes/slippers when out of bed   safety round/check completed  Taken 9/9/2022 0803 by Abisai Banda RN  Safety Promotion/Fall Prevention:   assistive device/personal items within [facility]   fall prevention program maintained   nonskid shoes/slippers when out of bed   safety round/check completed  Intervention: Prevent Skin Injury  Recent Flowsheet Documentation  Taken 9/9/2022 1345 by Abisai Banda RN  Body Position: supine  Skin Protection:   tubing/devices free from skin contact   incontinence pads utilized  Taken 9/9/2022 1152 by Abisai Banda RN  Body Position: supine  Taken 9/9/2022 0954 by Abisai Banda RN  Body Position: supine  Taken 9/9/2022 0803 by Abisai Banda RN  Body Position: supine  Skin Protection:   tubing/devices free from skin contact   incontinence pads utilized  Intervention: Prevent and Manage VTE (Venous Thromboembolism) Risk  Recent Flowsheet Documentation  Taken 9/9/2022 1345 by Abisai Banda RN  Activity Management: activity adjusted per tolerance  Taken 9/9/2022 1152 by Abisai Banda RN  Activity Management: activity adjusted per tolerance  Taken 9/9/2022 0954 by Abisai Banda RN  Activity Management: activity adjusted per tolerance  Taken 9/9/2022 0803 by Abisai Banda RN  Activity Management: activity adjusted per tolerance  Goal: Optimal Comfort and Wellbeing  Outcome: Ongoing,  Progressing  Goal: Readiness for Transition of Care  Outcome: Ongoing, Progressing     Problem: Skin Injury Risk Increased  Goal: Skin Health and Integrity  Outcome: Ongoing, Progressing  Intervention: Optimize Skin Protection  Recent Flowsheet Documentation  Taken 9/9/2022 1345 by Abisai Banda RN  Pressure Reduction Techniques:   frequent weight shift encouraged   weight shift assistance provided  Head of Bed (HOB) Positioning: HOB at 30 degrees  Pressure Reduction Devices: pressure-redistributing mattress utilized  Skin Protection:   tubing/devices free from skin contact   incontinence pads utilized  Taken 9/9/2022 1152 by Abisai Banda RN  Head of Bed (HOB) Positioning: HOB at 30 degrees  Taken 9/9/2022 0954 by Abisai Banda RN  Head of Bed (HOB) Positioning: HOB at 30-45 degrees  Taken 9/9/2022 0803 by Abisai Banda RN  Pressure Reduction Techniques:   frequent weight shift encouraged   weight shift assistance provided  Head of Bed (HOB) Positioning: HOB at 60 degrees  Pressure Reduction Devices: pressure-redistributing mattress utilized  Skin Protection:   tubing/devices free from skin contact   incontinence pads utilized     Problem: COPD (Chronic Obstructive Pulmonary Disease) Comorbidity  Goal: Maintenance of COPD Symptom Control  Outcome: Ongoing, Progressing     Problem: Obstructive Sleep Apnea Risk or Actual Comorbidity Management  Goal: Unobstructed Breathing During Sleep  Outcome: Ongoing, Progressing     Problem: Pain Chronic (Persistent) (Comorbidity Management)  Goal: Acceptable Pain Control and Functional Ability  Outcome: Ongoing, Progressing   Goal Outcome Evaluation:  Plan of Care Reviewed With: patient        Progress: improving  Outcome Evaluation: Patient has been pleasant and cooperative during shift. No complaints of pain, nausea or SOA. AOx4, assist x1, 4LO2. PT ambulated 10 feet with patient. Return home at D/C with spouse. IVFsw infusing and antibiotics. Spouse at  bedside. Will continue to monitor and assist patient as needed.

## 2022-09-09 NOTE — ED NOTES
"Nursing report ED to floor  Brenda Michelle  66 y.o.  female    HPI :   Chief Complaint   Patient presents with   • Fever   • Dysuria       Admitting doctor:   Didier Azevedo MD    Admitting diagnosis:   The primary encounter diagnosis was Sepsis without acute organ dysfunction, due to unspecified organism (HCC). Diagnoses of Dysuria and Bilateral flank pain were also pertinent to this visit.    Code status:   Current Code Status     Date Active Code Status Order ID Comments User Context       Prior    Advance Care Planning Activity          Allergies:   Ambien [zolpidem tartrate], Bupropion, Codeine sulfate, Zolpidem, Adhesive tape, Aripiprazole, Atorvastatin, Cilostazol, Colesevelam, Ferrous sulfate, Welchol [colesevelam hcl], and Wound dressing adhesive    Intake and Output    Intake/Output Summary (Last 24 hours) at 9/9/2022 0104  Last data filed at 9/8/2022 2251  Gross per 24 hour   Intake 75 ml   Output --   Net 75 ml       Weight:       09/08/22 2344   Weight: 120 kg (264 lb 8.8 oz)       Most recent vitals:   Vitals:    09/08/22 2253 09/08/22 2344 09/09/22 0056 09/09/22 0101   BP: 119/78 127/70 105/60    BP Location:  Right arm     Patient Position:  Lying     Pulse: (!) 122 108 105    Resp: 18 20     Temp: (!) 101.7 °F (38.7 °C)      TempSrc: Oral      SpO2: 96% 91%  90%   Weight:  120 kg (264 lb 8.8 oz)     Height:  160 cm (63\")         Active LDAs/IV Access:   Lines, Drains & Airways     Active LDAs     Name Placement date Placement time Site Days    Peripheral IV 09/08/22 2251 Anterior;Proximal;Right Antecubital 09/08/22 2251  Antecubital  less than 1    Peripheral IV 09/08/22 2330 Left Antecubital 09/08/22 2330  Antecubital  less than 1                Labs (abnormal labs have a star):   Labs Reviewed   COMPREHENSIVE METABOLIC PANEL - Abnormal; Notable for the following components:       Result Value    Glucose 117 (*)     Creatinine 1.02 (*)     CO2 30.9 (*)     All other components within normal " "limits    Narrative:     GFR Normal >60  Chronic Kidney Disease <60  Kidney Failure <15     PROCALCITONIN - Abnormal; Notable for the following components:    Procalcitonin 4.54 (*)     All other components within normal limits    Narrative:     As a Marker for Sepsis (Non-Neonates):    1. <0.5 ng/mL represents a low risk of severe sepsis and/or septic shock.  2. >2 ng/mL represents a high risk of severe sepsis and/or septic shock.    As a Marker for Lower Respiratory Tract Infections that require antibiotic therapy:    PCT on Admission    Antibiotic Therapy       6-12 Hrs later    >0.5                Strongly Recommended  >0.25 - <0.5        Recommended   0.1 - 0.25          Discouraged              Remeasure/reassess PCT  <0.1                Strongly Discouraged     Remeasure/reassess PCT    As 28 day mortality risk marker: \"Change in Procalcitonin Result\" (>80% or <=80%) if Day 0 (or Day 1) and Day 4 values are available. Refer to [email or web address]    Change in PCT <=80%  A decrease of PCT levels below or equal to 80% defines a positive change in PCT test result representing a higher risk for 28-day all-cause mortality of patients diagnosed with severe sepsis for septic shock.    Change in PCT >80%  A decrease of PCT levels of more than 80% defines a negative change in PCT result representing a lower risk for 28-day all-cause mortality of patients diagnosed with severe sepsis or septic shock.      CBC WITH AUTO DIFFERENTIAL - Abnormal; Notable for the following components:    WBC 11.30 (*)     Neutrophil % 91.7 (*)     Lymphocyte % 2.1 (*)     Monocyte % 4.4 (*)     Immature Grans % 0.6 (*)     Neutrophils, Absolute 10.36 (*)     Lymphocytes, Absolute 0.24 (*)     Immature Grans, Absolute 0.07 (*)     All other components within normal limits   URINALYSIS W/ CULTURE IF INDICATED - Normal    Narrative:     In absence of clinical symptoms, the presence of pyuria, bacteria, and/or nitrites on the " urinalysis result does not correlate with infection.  Urine microscopic not indicated.   LACTIC ACID, PLASMA - Normal   BLOOD CULTURE   BLOOD CULTURE   CBC AND DIFFERENTIAL    Narrative:     The following orders were created for panel order CBC & Differential.  Procedure                               Abnormality         Status                     ---------                               -----------         ------                     CBC Auto Differential[[id number]]        Abnormal            Final result                 Please view results for these tests on the individual orders.       EKG:   No orders to display       Meds given in ED:   Medications   sodium chloride 0.9 % flush 10 mL (has no administration in time range)   piperacillin-tazobactam (ZOSYN) 4.5 g in iso-osmotic dextrose 100 mL IVPB (premix) (0 g Intravenous Stopped 22)   acetaminophen (TYLENOL) tablet 1,000 mg (1,000 mg Oral Given 22 000)   lactated ringers bolus 1,000 mL ( Intravenous Currently Infusing 22)       Imaging results:  No radiology results for the last day    Ambulatory status:   - bedrest    Social issues:   Social History     Socioeconomic History   • Marital status:    • Number of children: 2   • Years of education: 12    Tobacco Use   • Smoking status: Former Smoker     Packs/day: 0.75     Years: 50.00     Pack years: 37.50     Types: Cigarettes     Start date: 1960     Quit date: 2020     Years since quittin.8   • Smokeless tobacco: Never Used   Vaping Use   • Vaping Use: Never used   Substance and Sexual Activity   • Alcohol use: No   • Drug use: No   • Sexual activity: Not Currently     Partners: Male       NIH Stroke Scale:        Nursing report ED to floor:

## 2022-09-09 NOTE — NURSING NOTE
STAT paged Tita BENITEZ to report pt's B/P declining - was 93/51 at 0301 and is now 84/44 manually checked at 0406.  1L bolus received downstairs and IV Zosyn.  Christiano BENITEZ will input orders for addtl bolus.

## 2022-09-09 NOTE — ED NOTES
Pt to ED from home via EMS. Pt hx of chronic uti. Pt on second round antibiotics and not having any relief. Pt c/o dysuria, cold chills, and fever. Pt always on 2L of O2

## 2022-09-09 NOTE — H&P
Patient Name:  Brenda Michelle  YOB: 1955  MRN:  [id number]  Admit Date:  9/8/2022  Patient Care Team:  Angelito Kline DO as PCP - General (Family Medicine)      Subjective   History Present Illness     Chief Complaint   Patient presents with   • Fever   • Dysuria       Ms. Michelle is a 66 y.o. female former smoker with a history of COPD w/O2 dependency, obesity, PVD, depression/anxiety, renal stones that presents to [facility] complaining of dysuria, chills. She was treated outpatient for UTI with antibiotics twice. Completed macrobid about 1-2 weeks ago with recurrence of symptoms. PCP wanted to prescribe Cipro but pt was reluctant due to side effects so another course of macrobid was prescribed. Yesterday she started having chills, fever, and was disoriented. She has had continued dysuria and flank pain. She had an episode of n/v as well. Denies chest pain, increased shortness of breath or worsening cough, diarrhea.     TMax 101.7.  on arrival, now 80. BP was down to 80s systolic. Sats stable on O2 4L NC. WBC 11.30, Hgb 12.3. Procal 4.54. Lactate 1.6. Gluc 117, Cr 1.02, CO2 30.9; remaining chem panel wnl. UA neg. Blood cultures collected. CXR no acute findings. CT A/P: cystitis, nonobstructing stone rt kidney; fullness ileocecal junction may be related to stool vs mass lesion; recommend repeat CT vs C scope    Review of Systems   Constitutional: Positive for chills and fever.   HENT: Negative for congestion.    Respiratory: Negative for shortness of breath.    Cardiovascular: Negative for chest pain.   Gastrointestinal: Positive for abdominal pain, nausea and vomiting. Negative for diarrhea.   Genitourinary: Positive for dysuria and flank pain.   Skin: Negative for rash.   Neurological: Negative for headaches.   Psychiatric/Behavioral: Negative for sleep disturbance.        Personal History     Past Medical History:   Diagnosis Date   • Anxiety    • Arthritis    •  benign polyps of large intestine    • COPD (chronic obstructive pulmonary disease) ([facility])    • Depression    • Difficulty walking    • Disc degeneration, lumbar    • Fibromyositis    • Hyperlipidemia    • IBS (irritable bowel syndrome)    • Kidney stones    • Movement disorder    • Nephrolithiasis    • Peripheral neuropathy    • PVD (peripheral vascular disease) ([facility])    • Shingles    • Vitamin D deficiency      Past Surgical History:   Procedure Laterality Date   • COLONOSCOPY  2004    Colon polyps; family history of colon cancer   • KNEE ARTHROPLASTY, PARTIAL REPLACEMENT     • KNEE ARTHROSCOPY Left     Meniscus   • POPLITEAL ARTERY ANGIOPLASTY Bilateral    • STEROID INJECTION     • TONSILLECTOMY     • TUBAL ABDOMINAL LIGATION       Family History   Problem Relation Age of Onset   • COPD Mother          at 69 yo    • Colon cancer Mother    • Heart disease Mother    • Stroke Mother    • Lung cancer Father         mesothelioma   • Heart disease Maternal Grandmother    • Heart disease Maternal Grandfather    • Heart disease Paternal Grandfather      Social History     Tobacco Use   • Smoking status: Former Smoker     Packs/day: 0.75     Years: 50.00     Pack years: 37.50     Types: Cigarettes     Start date: 1960     Quit date: 2020     Years since quittin.8   • Smokeless tobacco: Never Used   Vaping Use   • Vaping Use: Never used   Substance Use Topics   • Alcohol use: No   • Drug use: No     No current facility-administered medications on file prior to encounter.     Current Outpatient Medications on File Prior to Encounter   Medication Sig Dispense Refill   • Aspirin (ASPIR-81 PO) Take 81 mg by mouth Daily.     • baclofen (LIORESAL) 10 MG tablet Take 1 tablet by mouth 2 (Two) Times a Day As Needed for Muscle Spasms. 180 tablet 3   • clopidogrel (PLAVIX) 75 MG tablet TAKE 1 TABLET DAILY 90 tablet 3   • docusate sodium (COLACE) 100 MG capsule Take 100 mg by mouth 2 (Two) Times a Day.     •  DULoxetine (CYMBALTA) 60 MG capsule TAKE 1 CAPSULE DAILY 90 capsule 3   • fluconazole (DIFLUCAN) 200 MG tablet 200 mg 2 (Two) Times a Day.     • folic acid (FOLVITE) 1 MG tablet TAKE 1 TABLET DAILY 90 tablet 3   • HYDROcodone-acetaminophen (NORCO)  MG per tablet Take 1 tablet by mouth Every 6 (Six) Hours As Needed for Moderate Pain.     • nitrofurantoin, macrocrystal-monohydrate, (Macrobid) 100 MG capsule Take 1 capsule by mouth 2 (Two) Times a Day. 14 capsule 0   • pravastatin (PRAVACHOL) 80 MG tablet Take 1 tablet by mouth Daily. 90 tablet 51   • pregabalin (LYRICA) 150 MG capsule Take 1 capsule by mouth 3 (Three) Times a Day. (Patient taking differently: Take 150 mg by mouth 2 (Two) Times a Day.) 270 capsule 1   • vitamin B-12 (CYANOCOBALAMIN) 1000 MCG tablet Take 1,000 mcg by mouth Daily.     • vitamin D3 125 MCG (5000 UT) capsule capsule Take 5,000 Units by mouth Daily.     • risedronate (Actonel) 150 MG tablet Take 1 tablet by mouth Every 30 (Thirty) Days. with water on empty stomach, nothing by mouth or lie down for next 30 minutes. 3 tablet 3   • [DISCONTINUED] ciprofloxacin (Cipro) 250 MG tablet Take 1 tablet by mouth 2 (Two) Times a Day. 14 tablet 0   • [DISCONTINUED] methylPREDNISolone (MEDROL) 4 MG dose pack Take as directed on package instructions. 21 tablet 0     Allergies   Allergen Reactions   • Ambien [Zolpidem Tartrate] Other (See Comments)     Nightmares   • Bupropion Itching   • Codeine Sulfate Itching   • Zolpidem Hives     Nightmares   • Adhesive Tape Rash   • Aripiprazole Unknown - Low Severity   • Atorvastatin Other (See Comments)     MYALGIAS   • Cilostazol Other (See Comments)     HA   • Colesevelam Nausea Only   • Ferrous Sulfate Nausea Only   • Welchol [Colesevelam Hcl] Nausea Only   • Wound Dressing Adhesive Rash       Objective    Objective     Vital Signs  Temp:  [98.3 °F (36.8 °C)-101.7 °F (38.7 °C)] 98.4 °F (36.9 °C)  Heart Rate:  [] 80  Resp:  [18-22] 20  BP:  ()/(41-78) 115/43  SpO2:  [88 %-96 %] 96 %  on  Flow (L/min):  [2-4] 4;   Device (Oxygen Therapy): nasal cannula  Body mass index is 48.39 kg/m².    Physical Exam  Vitals and nursing note reviewed.   Constitutional:       General: She is not in acute distress.     Appearance: She is obese. She is ill-appearing.   HENT:      Head: Normocephalic.      Mouth/Throat:      Mouth: Mucous membranes are moist.   Eyes:      Conjunctiva/sclera: Conjunctivae normal.   Cardiovascular:      Rate and Rhythm: Normal rate and regular rhythm.   Pulmonary:      Effort: Pulmonary effort is normal. No respiratory distress.      Breath sounds: No wheezing or rales.   Abdominal:      General: Bowel sounds are normal. There is no distension.      Palpations: Abdomen is soft.   Musculoskeletal:      Cervical back: Neck supple.      Right lower leg: No edema.      Left lower leg: No edema.   Skin:     General: Skin is warm and dry.   Neurological:      Mental Status: She is alert and oriented to person, place, and time.   Psychiatric:         Mood and Affect: Mood normal.         Behavior: Behavior normal.         Results Review:  I reviewed the patient's new clinical results.  I reviewed the patient's new imaging results and agree with the interpretation.  I reviewed the patient's other test results and agree with the interpretation  I personally viewed and interpreted the patient's EKG/Telemetry data    Lab Results (last 24 hours)     Procedure Component Value Units Date/Time    CBC & Differential [[id number]]  (Abnormal) Collected: 09/08/22 2334    Specimen: Blood Updated: 09/09/22 0000    Narrative:      The following orders were created for panel order CBC & Differential.  Procedure                               Abnormality         Status                     ---------                               -----------         ------                     CBC Auto Differential[[id number]]        Abnormal            Final result                  Please view results for these tests on the individual orders.    Comprehensive Metabolic Panel [[id number]]  (Abnormal) Collected: 09/08/22 2334    Specimen: Blood Updated: 09/09/22 0026     Glucose 117 mg/dL      BUN 16 mg/dL      Creatinine 1.02 mg/dL      Sodium 138 mmol/L      Potassium 4.6 mmol/L      Comment: Slight hemolysis detected by analyzer. Results may be affected.        Chloride 99 mmol/L      CO2 30.9 mmol/L      Calcium 10.1 mg/dL      Total Protein 6.8 g/dL      Albumin 3.50 g/dL      ALT (SGPT) 15 U/L      AST (SGOT) 30 U/L      Comment: Slight hemolysis detected by analyzer. Results may be affected.        Alkaline Phosphatase 108 U/L      Total Bilirubin 0.5 mg/dL      Globulin 3.3 gm/dL      A/G Ratio 1.1 g/dL      BUN/Creatinine Ratio 15.7     Anion Gap 8.1 mmol/L      eGFR 60.8 mL/min/1.73      Comment: National Kidney Foundation and American Society of Nephrology (ASN) Task Force recommended calculation based on the Chronic Kidney Disease Epidemiology Collaboration (CKD-EPI) equation refit without adjustment for race.       Narrative:      GFR Normal >60  Chronic Kidney Disease <60  Kidney Failure <15      Lactic Acid, Plasma [[id number]]  (Normal) Collected: 09/08/22 2334    Specimen: Blood Updated: 09/09/22 0003     Lactate 1.6 mmol/L     Procalcitonin [[id number]]  (Abnormal) Collected: 09/08/22 2334    Specimen: Blood Updated: 09/09/22 0029     Procalcitonin 4.54 ng/mL     Narrative:      As a Marker for Sepsis (Non-Neonates):    1. <0.5 ng/mL represents a low risk of severe sepsis and/or septic shock.  2. >2 ng/mL represents a high risk of severe sepsis and/or septic shock.    As a Marker for Lower Respiratory Tract Infections that require antibiotic therapy:    PCT on Admission    Antibiotic Therapy       6-12 Hrs later    >0.5                Strongly Recommended  >0.25 - <0.5        Recommended   0.1 - 0.25          Discouraged              Remeasure/reassess PCT  <0.1                 "Strongly Discouraged     Remeasure/reassess PCT    As 28 day mortality risk marker: \"Change in Procalcitonin Result\" (>80% or <=80%) if Day 0 (or Day 1) and Day 4 values are available. Refer to http://www.[facility]-pct-calculator.com    Change in PCT <=80%  A decrease of PCT levels below or equal to 80% defines a positive change in PCT test result representing a higher risk for 28-day all-cause mortality of patients diagnosed with severe sepsis for septic shock.    Change in PCT >80%  A decrease of PCT levels of more than 80% defines a negative change in PCT result representing a lower risk for 28-day all-cause mortality of patients diagnosed with severe sepsis or septic shock.       CBC Auto Differential [[id number]]  (Abnormal) Collected: 09/08/22 2334    Specimen: Blood Updated: 09/09/22 0000     WBC 11.30 10*3/mm3      RBC 4.15 10*6/mm3      Hemoglobin 12.3 g/dL      Hematocrit 37.9 %      MCV 91.3 fL      MCH 29.6 pg      MCHC 32.5 g/dL      RDW 13.6 %      RDW-SD 46.1 fl      MPV 10.9 fL      Platelets 201 10*3/mm3      Neutrophil % 91.7 %      Lymphocyte % 2.1 %      Monocyte % 4.4 %      Eosinophil % 1.0 %      Basophil % 0.2 %      Immature Grans % 0.6 %      Neutrophils, Absolute 10.36 10*3/mm3      Lymphocytes, Absolute 0.24 10*3/mm3      Monocytes, Absolute 0.50 10*3/mm3      Eosinophils, Absolute 0.11 10*3/mm3      Basophils, Absolute 0.02 10*3/mm3      Immature Grans, Absolute 0.07 10*3/mm3      nRBC 0.0 /100 WBC     Blood Culture - Blood, Arm, Left [[id number]] Collected: 09/09/22 0000    Specimen: Blood from Arm, Left Updated: 09/09/22 0018    Blood Culture - Blood, Arm, Right [[id number]] Collected: 09/09/22 0000    Specimen: Blood from Arm, Right Updated: 09/09/22 0018    Urinalysis With Culture If Indicated - Urine, Catheter [[id number]]  (Normal) Collected: 09/09/22 0002    Specimen: Urine, Catheter Updated: 09/09/22 0034     Color, UA Yellow     Appearance, UA Clear     pH, UA 6.0     Specific Gravity, " UA 1.012     Glucose, UA Negative     Ketones, UA Negative     Bilirubin, UA Negative     Blood, UA Negative     Protein, UA Negative     Leuk Esterase, UA Negative     Nitrite, UA Negative     Urobilinogen, UA 0.2 E.U./dL    Narrative:      In absence of clinical symptoms, the presence of pyuria, bacteria, and/or nitrites on the urinalysis result does not correlate with infection.  Urine microscopic not indicated.          Imaging Results (Last 24 Hours)     Procedure Component Value Units Date/Time    XR Chest 1 View [[id number]] Collected: 09/09/22 0121     Updated: 09/09/22 0126    Narrative:      SINGLE VIEW OF THE CHEST     HISTORY: Fever     COMPARISON: 11/20/2019     FINDINGS:  Cardiomegaly is present. There are background changes of COPD. There is  no vascular congestion. Chronic scarring is seen at the lung bases. No  definite pneumothorax, pleural effusion, or acute infiltrate is seen.       Impression:      No acute findings.     This report was finalized on 9/9/2022 1:22 AM by Dr. Linda Guillermo M.D.       CT Abdomen Pelvis Without Contrast [[id number]] Collected: 09/09/22 0107     Updated: 09/09/22 0117    Narrative:      CT OF THE ABDOMEN AND PELVIS WITHOUT CONTRAST     HISTORY: Fever     COMPARISON: 05/04/2015     TECHNIQUE: Axial CT imaging was obtained through the abdomen and pelvis.  No IV contrast was administered.     FINDINGS:  Images through the lung bases demonstrate atelectasis and scarring.  Images are significantly degraded by motion artifact. No suspicious  hepatic lesions are seen. Gallbladder, adrenal glands, stomach,  duodenum, spleen, and pancreas all appear unremarkable. No  hydronephrosis is seen on either side. There is a 6 mm nonobstructing  stone within the right kidney. There is potentially an additional  punctate stone there is calcification of the aorta. No distal ureteral  or bladder stones are seen. Urinary bladder is thick walled, with  perivesical soft tissue stranding,  suggesting cystitis. Uterus is within  normal limits. There is no bowel obstruction. The patient has a  prominent appearance at the ileocecal junction. Appearance has a  masslike configuration. The appendix is normal. The patient has  bilateral common iliac artery stents. No acute osseous abnormalities are  seen. Immediately adjacent to it. No stones are seen on the left.       Impression:         1. Thick-walled appearance to the urinary bladder, with adjacent  perivesical stranding, suggesting cystitis.  2. Fullness of the ileocecal junction may be related to stool, but  underlying mass lesion is not excluded. Consideration for colonoscopy  versus repeat CT is recommended.     Radiation dose reduction techniques were utilized, including automated  exposure control and exposure modulation based on body size.     This report was finalized on 9/9/2022 1:14 AM by Dr. Linda Guillermo M.D.                 No orders to display        Assessment/Plan     Active Hospital Problems    Diagnosis  POA   • **Fever [R50.9]  Yes   • Sepsis without acute organ dysfunction (HCC) [A41.9]  Yes   • Acute cystitis [N30.00]  Yes   • Obesity, Class III, BMI 40-49.9 (morbid obesity) (HCC) [E66.01]  Yes   • Chronic respiratory failure with hypoxia (HCC) [J96.11]  Yes   • COPD mixed type (HCC) [J44.9]  Yes   • Fibromyalgia [M79.7]  Yes   • Peripheral arterial occlusive disease (HCC) [I77.9]  Yes      Resolved Hospital Problems   No resolved problems to display.       Ms. Michelle is a 66 y.o. female former smoker with a history of COPD w/O2 dependency, obesity, PVD, depression/anxiety, renal stones who is admitted with sepsis due to UTI/pyelonephritis    -Fever/Sepsis/acute cystitis vs pyelonephritis: Fever curve, HR, BP improving. Continue IVF's. UA negative likely due to recent antibiotics outpatient. Follow blood cultures. Per ED, concern for rt perinephric and periureteral stranding on review of CT with flank pain making pyelonephritis  likely. Last positive urine culture in 2019 grew klebsiella. Continue zosyn  -COPD/Chronic respiratory failure w/hypoxia: No wheezing on exam. Baseline O2 3L, currently requiring 4L. Wean as tolerated. Encourage IS  -Obesity: BMI 48  -Further recommendations based on hospital course    · I discussed the patient's findings and my recommendations with patient.    VTE Prophylaxis - Pharmacy to dose Lovenox.  Code Status - Full code.       ELI Wade  [facility]  09/09/22  13:57 EDT

## 2022-09-09 NOTE — PROGRESS NOTES
[facility] Clinical Pharmacy Services: Piperacillin-Tazobactam Consult    Pt Name: Brenda Michelle   : 1955    Indication: Sepsis    Relevant clinical data and objective history reviewed:    Past Medical History:   Diagnosis Date    Anxiety     Arthritis     benign polyps of large intestine     COPD (chronic obstructive pulmonary disease) ([facility])     Depression     Difficulty walking     Disc degeneration, lumbar     Fibromyositis     Hyperlipidemia     IBS (irritable bowel syndrome)     Kidney stones     Movement disorder     Nephrolithiasis     Peripheral neuropathy     PVD (peripheral vascular disease) ([facility])     Shingles     Vitamin D deficiency      Creatinine   Date Value Ref Range Status   2022 1.02 (H) 0.57 - 1.00 mg/dL Final   2022 0.72 0.57 - 1.00 mg/dL Final   2022 0.61 0.57 - 1.00 mg/dL Final     Comment:                    **Effective 2022 [facility] will begin**                   reporting the  CKD-EPI creatinine equation that                   estimates kidney function without a race variable.     2021 0.80 0.57 - 1.00 mg/dL Final   2019 0.76 0.57 - 1.00 mg/dL Final   2019 0.65 0.57 - 1.00 mg/dL Final   2019 0.80 0.60 - 1.30 mg/dL Final     Comment:     Serial Number: [id number]:  [id number]     BUN   Date Value Ref Range Status   2022 16 8 - 23 mg/dL Final     Estimated Creatinine Clearance: 69.4 mL/min (A) (by C-G formula based on SCr of 1.02 mg/dL (H)).    Lab Results   Component Value Date    WBC 11.30 (H) 2022     Temp Readings from Last 3 Encounters:   22 99 °F (37.2 °C) (Oral)   22 96.6 °F (35.9 °C) (Temporal)   22 98.3 °F (36.8 °C) (Temporal)      Assessment/Plan  Estimated CrCl >20 mL/min at this time; BMI 48.39 kg/m2  Will start piperacillin-tazobactam 4.5 g IV every 8 hours     Pharmacy will continue to follow daily while on piperacillin-tazobactam and adjust as needed. Thank you for this  consult.    Angelo Gauthier, [facility]  Clinical Pharmacist

## 2022-09-09 NOTE — PROGRESS NOTES
"[facility] Clinical Pharmacy Services: Enoxaparin Consult    Brenda Michelle has a pharmacy consult to dose prophylactic enoxaparin per Angela Munoz's request.     Indication: VTE Prophylaxis       Relevant clinical data and objective history reviewed:  66 y.o. female 160 cm (63\") 124 kg (273 lb 2.4 oz)   Body mass index is 48.39 kg/m².   Results from last 7 days   Lab Units 09/08/22  2334   PLATELETS 10*3/mm3 201     Estimated Creatinine Clearance: 69.4 mL/min (A) (by C-G formula based on SCr of 1.02 mg/dL (H)).    Assessment/Plan    Will start patient on 40mg subcutaneous every 12 hours, for BMI >40    Consult order will be discontinued but pharmacy will continue to follow.     Leela Lemos, [facility]  Clinical Pharmacist    "

## 2022-09-09 NOTE — DISCHARGE PLACEMENT REQUEST
"Brenda Michelle (66 y.o. Female)             Date of Birth   1955    Social Security Number       Address   [address] [place], [place] [address]    Home Phone   [phone]    MRN   [id number]       [employer]   Patient Refused    Marital Status                               Admission Date   9/8/22    Admission Type   Emergency    Admitting Provider   Ervin Camargo MD    Attending Provider   Ervin Camargo MD    Department, Room/Bed   [address], [address]       Discharge Date       Discharge Disposition       Discharge Destination                               Attending Provider: Ervin Camargo MD    Allergies: Ambien [Zolpidem Tartrate], Bupropion, Codeine Sulfate, Zolpidem, Adhesive Tape, Aripiprazole, Atorvastatin, Cilostazol, Colesevelam, Ferrous Sulfate, Welchol [Colesevelam Hcl], Wound Dressing Adhesive    Isolation: None   Infection: None   Code Status: CPR   Advance Care Planning Activity    Ht: 160 cm (63\")   Wt: 124 kg (273 lb 2.4 oz)    Admission Cmt: None   Principal Problem: Fever [R50.9]                 Active Insurance as of 9/8/2022     Primary Coverage     Payor Plan Insurance Group Employer/Plan Group    HUMANA MEDICARE REPLACEMENT HU[employer] MEDICARE REPLACEMENT [id number]     Payor Plan Address Payor Plan Phone Number Payor Plan Fax Number Effective Dates    [address] [address] [phone]  10/1/2020 - None Entered    Former[facility] [address]       Subscriber Name Subscriber Birth Date Member ID       BRENDA MICHELLE 1955 [id number]                 Emergency Contacts      (Rel.) Home Phone Work Phone Mobile Phone    Shiva Michelle Sr (Spouse) [phone] -- [phone]    Shiva Michelle Jr (Son) [phone] -- [phone]    Nikunj Michelle (Son) [phone] -- [phone]              "

## 2022-09-10 LAB
ANION GAP SERPL CALCULATED.3IONS-SCNC: 4 MMOL/L (ref 5–15)
BUN SERPL-MCNC: 11 MG/DL (ref 8–23)
BUN/CREAT SERPL: 13.1 (ref 7–25)
CALCIUM SPEC-SCNC: 9 MG/DL (ref 8.6–10.5)
CHLORIDE SERPL-SCNC: 102 MMOL/L (ref 98–107)
CO2 SERPL-SCNC: 32 MMOL/L (ref 22–29)
CREAT SERPL-MCNC: 0.84 MG/DL (ref 0.57–1)
DEPRECATED RDW RBC AUTO: 48.4 FL (ref 37–54)
EGFRCR SERPLBLD CKD-EPI 2021: 76.8 ML/MIN/1.73
ERYTHROCYTE [DISTWIDTH] IN BLOOD BY AUTOMATED COUNT: 13.9 % (ref 12.3–15.4)
GLUCOSE SERPL-MCNC: 116 MG/DL (ref 65–99)
HCT VFR BLD AUTO: 35.6 % (ref 34–46.6)
HGB BLD-MCNC: 11.2 G/DL (ref 12–15.9)
MCH RBC QN AUTO: 30 PG (ref 26.6–33)
MCHC RBC AUTO-ENTMCNC: 31.5 G/DL (ref 31.5–35.7)
MCV RBC AUTO: 95.4 FL (ref 79–97)
PLATELET # BLD AUTO: 145 10*3/MM3 (ref 140–450)
PMV BLD AUTO: 10.7 FL (ref 6–12)
POTASSIUM SERPL-SCNC: 4.8 MMOL/L (ref 3.5–5.2)
RBC # BLD AUTO: 3.73 10*6/MM3 (ref 3.77–5.28)
SODIUM SERPL-SCNC: 138 MMOL/L (ref 136–145)
WBC NRBC COR # BLD: 5.09 10*3/MM3 (ref 3.4–10.8)

## 2022-09-10 PROCEDURE — 80048 BASIC METABOLIC PNL TOTAL CA: CPT | Performed by: NURSE PRACTITIONER

## 2022-09-10 PROCEDURE — 99222 1ST HOSP IP/OBS MODERATE 55: CPT | Performed by: INTERNAL MEDICINE

## 2022-09-10 PROCEDURE — 85027 COMPLETE CBC AUTOMATED: CPT | Performed by: NURSE PRACTITIONER

## 2022-09-10 PROCEDURE — 96372 THER/PROPH/DIAG INJ SC/IM: CPT

## 2022-09-10 PROCEDURE — 25010000002 PIPERACILLIN SOD-TAZOBACTAM PER 1 G: Performed by: NURSE PRACTITIONER

## 2022-09-10 PROCEDURE — 96361 HYDRATE IV INFUSION ADD-ON: CPT

## 2022-09-10 PROCEDURE — 25010000002 ENOXAPARIN PER 10 MG: Performed by: NURSE PRACTITIONER

## 2022-09-10 RX ORDER — PREGABALIN 75 MG/1
150 CAPSULE ORAL 2 TIMES DAILY
Status: DISCONTINUED | OUTPATIENT
Start: 2022-09-10 | End: 2022-09-11 | Stop reason: HOSPADM

## 2022-09-10 RX ORDER — PREGABALIN 100 MG/1
300 CAPSULE ORAL NIGHTLY
Status: DISCONTINUED | OUTPATIENT
Start: 2022-09-10 | End: 2022-09-11 | Stop reason: HOSPADM

## 2022-09-10 RX ORDER — PREGABALIN 100 MG/1
300 CAPSULE ORAL NIGHTLY
COMMUNITY
End: 2022-09-21

## 2022-09-10 RX ADMIN — HYDROCODONE BITARTRATE AND ACETAMINOPHEN 1 TABLET: 10; 325 TABLET ORAL at 13:02

## 2022-09-10 RX ADMIN — PREGABALIN 150 MG: 75 CAPSULE ORAL at 15:01

## 2022-09-10 RX ADMIN — TAZOBACTAM SODIUM AND PIPERACILLIN SODIUM 4.5 G: 500; 4 INJECTION, SOLUTION INTRAVENOUS at 06:28

## 2022-09-10 RX ADMIN — HYDROCODONE BITARTRATE AND ACETAMINOPHEN 1 TABLET: 10; 325 TABLET ORAL at 05:04

## 2022-09-10 RX ADMIN — Medication 1000 MCG: at 09:08

## 2022-09-10 RX ADMIN — DULOXETINE HYDROCHLORIDE 60 MG: 60 CAPSULE, DELAYED RELEASE ORAL at 09:08

## 2022-09-10 RX ADMIN — ENOXAPARIN SODIUM 40 MG: 100 INJECTION SUBCUTANEOUS at 03:32

## 2022-09-10 RX ADMIN — ENOXAPARIN SODIUM 40 MG: 100 INJECTION SUBCUTANEOUS at 15:02

## 2022-09-10 RX ADMIN — ASPIRIN 81 MG: 81 TABLET, COATED ORAL at 09:08

## 2022-09-10 RX ADMIN — Medication 10 ML: at 21:00

## 2022-09-10 RX ADMIN — DOCUSATE SODIUM 100 MG: 100 CAPSULE, LIQUID FILLED ORAL at 20:23

## 2022-09-10 RX ADMIN — HYDROCODONE BITARTRATE AND ACETAMINOPHEN 1 TABLET: 10; 325 TABLET ORAL at 20:30

## 2022-09-10 RX ADMIN — TAZOBACTAM SODIUM AND PIPERACILLIN SODIUM 4.5 G: 500; 4 INJECTION, SOLUTION INTRAVENOUS at 15:02

## 2022-09-10 RX ADMIN — SODIUM CHLORIDE 100 ML/HR: 9 INJECTION, SOLUTION INTRAVENOUS at 03:32

## 2022-09-10 RX ADMIN — PREGABALIN 150 MG: 75 CAPSULE ORAL at 09:08

## 2022-09-10 RX ADMIN — Medication 10 ML: at 09:08

## 2022-09-10 RX ADMIN — TAZOBACTAM SODIUM AND PIPERACILLIN SODIUM 4.5 G: 500; 4 INJECTION, SOLUTION INTRAVENOUS at 23:27

## 2022-09-10 RX ADMIN — PRAVASTATIN SODIUM 80 MG: 40 TABLET ORAL at 09:08

## 2022-09-10 RX ADMIN — CLOPIDOGREL 75 MG: 75 TABLET, FILM COATED ORAL at 09:08

## 2022-09-10 RX ADMIN — DOCUSATE SODIUM 100 MG: 100 CAPSULE, LIQUID FILLED ORAL at 09:08

## 2022-09-10 RX ADMIN — PREGABALIN 300 MG: 100 CAPSULE ORAL at 20:23

## 2022-09-10 RX ADMIN — Medication 2000 UNITS: at 09:08

## 2022-09-10 NOTE — PROGRESS NOTES
Name: Brenda Michelle ADMIT: 2022   : 1955  PCP: Angelito Kline DO    MRN: [id number] LOS: 1 days   AGE/SEX: 66 y.o. female  ROOM: [facility]/     Subjective   Subjective   The patient is resting in bed, she reports worsening leg pain.  Discussed with RN.  Patient reported to her and myself that her Lyrica dosing was incorrect at the time of admission and she is on more than what we currently have her on.    Review of Systems   Constitutional: Positive for chills. Negative for fatigue and fever.   Respiratory: Negative for cough and shortness of breath.    Cardiovascular: Negative for chest pain, palpitations and leg swelling.   Gastrointestinal: Positive for abdominal pain. Negative for nausea and vomiting.   Genitourinary: Positive for flank pain.   Musculoskeletal:        Left knee pain   Neurological: Negative for weakness.        Objective   Objective   Vital Signs  Temp:  [97.7 °F (36.5 °C)-98 °F (36.7 °C)] 98 °F (36.7 °C)  Heart Rate:  [72-77] 77  Resp:  [18] 18  BP: (121-154)/(55-77) 154/76  SpO2:  [94 %-96 %] 94 %  on  Flow (L/min):  [3-4] 3;   Device (Oxygen Therapy): nasal cannula  Body mass index is 48.39 kg/m².  Physical Exam  Constitutional:       General: She is not in acute distress.     Appearance: Normal appearance. She is not ill-appearing.   Cardiovascular:      Rate and Rhythm: Normal rate and regular rhythm.      Pulses: Normal pulses.      Heart sounds: No murmur heard.  Pulmonary:      Effort: Pulmonary effort is normal. No respiratory distress.      Breath sounds: Normal breath sounds. No wheezing.   Abdominal:      General: Abdomen is flat. Bowel sounds are normal. There is no distension.      Palpations: Abdomen is soft.      Tenderness: There is right CVA tenderness.   Musculoskeletal:         General: No swelling or tenderness.      Right lower leg: No edema.      Left lower leg: No edema.   Skin:     General: Skin is warm and dry.   Neurological:      General: No focal  deficit present.      Mental Status: She is alert and oriented to person, place, and time. Mental status is at baseline.         Results Review     I reviewed the patient's new clinical results.  Results from last 7 days   Lab Units 09/10/22  0907 09/08/22  2334   WBC 10*3/mm3 5.09 11.30*   HEMOGLOBIN g/dL 11.2* 12.3   PLATELETS 10*3/mm3 145 201     Results from last 7 days   Lab Units 09/10/22  0907 09/08/22  2334   SODIUM mmol/L 138 138   POTASSIUM mmol/L 4.8 4.6   CHLORIDE mmol/L 102 99   CO2 mmol/L 32.0* 30.9*   BUN mg/dL 11 16   CREATININE mg/dL 0.84 1.02*   GLUCOSE mg/dL 116* 117*   Estimated Creatinine Clearance: 84.2 mL/min (by C-G formula based on SCr of 0.84 mg/dL).  Results from last 7 days   Lab Units 09/08/22  2334   ALBUMIN g/dL 3.50   BILIRUBIN mg/dL 0.5   ALK PHOS U/L 108   AST (SGOT) U/L 30   ALT (SGPT) U/L 15     Results from last 7 days   Lab Units 09/10/22  0907 09/08/22  2334   CALCIUM mg/dL 9.0 10.1   ALBUMIN g/dL  --  3.50     Results from last 7 days   Lab Units 09/08/22  2334   PROCALCITONIN ng/mL 4.54*   LACTATE mmol/L 1.6     COVID19   Date Value Ref Range Status   06/14/2022 Not Detected Not Detected - Ref. Range Final     No results found for: HGBA1C, POCGLU    XR Chest 1 View  Narrative: SINGLE VIEW OF THE CHEST     HISTORY: Fever     COMPARISON: 11/20/2019     FINDINGS:  Cardiomegaly is present. There are background changes of COPD. There is  no vascular congestion. Chronic scarring is seen at the lung bases. No  definite pneumothorax, pleural effusion, or acute infiltrate is seen.     Impression: No acute findings.     This report was finalized on 9/9/2022 1:22 AM by Dr. Linda Guillermo M.D.     CT Abdomen Pelvis Without Contrast  Narrative: CT OF THE ABDOMEN AND PELVIS WITHOUT CONTRAST     HISTORY: Fever     COMPARISON: 05/04/2015     TECHNIQUE: Axial CT imaging was obtained through the abdomen and pelvis.  No IV contrast was administered.     FINDINGS:  Images through the lung  bases demonstrate atelectasis and scarring.  Images are significantly degraded by motion artifact. No suspicious  hepatic lesions are seen. Gallbladder, adrenal glands, stomach,  duodenum, spleen, and pancreas all appear unremarkable. No  hydronephrosis is seen on either side. There is a 6 mm nonobstructing  stone within the right kidney. There is potentially an additional  punctate stone there is calcification of the aorta. No distal ureteral  or bladder stones are seen. Urinary bladder is thick walled, with  perivesical soft tissue stranding, suggesting cystitis. Uterus is within  normal limits. There is no bowel obstruction. The patient has a  prominent appearance at the ileocecal junction. Appearance has a  masslike configuration. The appendix is normal. The patient has  bilateral common iliac artery stents. No acute osseous abnormalities are  seen. Immediately adjacent to it. No stones are seen on the left.     Impression:    1. Thick-walled appearance to the urinary bladder, with adjacent  perivesical stranding, suggesting cystitis.  2. Fullness of the ileocecal junction may be related to stool, but  underlying mass lesion is not excluded. Consideration for colonoscopy  versus repeat CT is recommended.     Radiation dose reduction techniques were utilized, including automated  exposure control and exposure modulation based on body size.     This report was finalized on 9/9/2022 1:14 AM by Dr. Linda Guillermo M.D.       Scheduled Medications  aspirin, 81 mg, Oral, Daily  cholecalciferol, 2,000 Units, Oral, Daily  clopidogrel, 75 mg, Oral, Daily  docusate sodium, 100 mg, Oral, BID  DULoxetine, 60 mg, Oral, Daily  enoxaparin, 40 mg, Subcutaneous, Q12H  folic acid, 1,000 mcg, Oral, Daily  piperacillin-tazobactam, 4.5 g, Intravenous, Q8H  pravastatin, 80 mg, Oral, Daily  pregabalin, 150 mg, Oral, BID  pregabalin, 300 mg, Oral, Nightly  sodium chloride, 10 mL, Intravenous, Q12H  vitamin B-12, 1,000 mcg, Oral,  Daily    Infusions  sodium chloride, 100 mL/hr, Last Rate: 100 mL/hr (09/10/22 0332)    Diet  Diet Regular; Cardiac         I have personally reviewed:  [x]  Laboratory   [x]  Microbiology   [x]  Radiology   []  EKG/Telemetry   []  Cardiology/Vascular   []  Pathology   []  Records     Assessment/Plan     Active Hospital Problems    Diagnosis  POA   • **Fever [R50.9]  Yes   • Sepsis without acute organ dysfunction (HCC) [A41.9]  Yes   • Acute cystitis [N30.00]  Yes   • Obesity, Class III, BMI 40-49.9 (morbid obesity) (HCC) [E66.01]  Yes   • Abnormal CT scan, gastrointestinal tract [R93.3]  Unknown   • Chronic respiratory failure with hypoxia (HCC) [J96.11]  Yes   • COPD mixed type (HCC) [J44.9]  Yes   • Fibromyalgia [M79.7]  Yes   • Peripheral arterial occlusive disease (HCC) [I77.9]  Yes      Resolved Hospital Problems   No resolved problems to display.       66 y.o. female admitted with Fever.    66-year-old presented with right flank pain dysuria and chills. She has had outpatient treatment for UTI. CT scan showing cystitis but also fullness ileocecal junction could not exclude mass     UTI  -Patient symptoms have improved  -Continue antibiotics awaiting cultures here but not sure if will show much since she has been on antibiotics  -Urine culture 9/1/2022 from PCP mixed    Abnormal CT scan (fullness of ileocecal junction could not exclude mass)  -Patient states it has been a while since she has had a colonoscopy maybe even 20 years and she has a history of polyps  - GI /w patient who will pursue outpatient c-scope next month    COPD, chronic hypoxic respiratory failure  - baseline is 3L via NC; start prn duonebs    PAD  - continue plavix/statin  - resume home lyrica dosing    Body mass index is 48.39 kg/m².     · Lovenox 40 mg SC daily for DVT prophylaxis.  · Full code.  · Discussed with patient and nursing staff.  · Anticipate discharge home in 2-3 days.      Rachel Bagley MD  [facility]  09/10/22  15:19 EDT    I wore protective equipment throughout this patient encounter including a face mask, gloves and protective eyewear.  Hand hygiene was performed before donning protective equipment and after removal when leaving the room.

## 2022-09-10 NOTE — PLAN OF CARE
Goal Outcome Evaluation:   Pt. Alert and oriented x 4, o2 4L on nasal canula, pt wear triology vent for 3 hrs tonight. Good urine output, q2 hrs turn.Iv antibiotic, managed pain with with narco.           Problem: Fall Injury Risk  Goal: Absence of Fall and Fall-Related Injury  Outcome: Ongoing, Progressing  Intervention: Identify and Manage Contributors  Recent Flowsheet Documentation  Taken 9/10/2022 0448 by Amanda Plata RN  Medication Review/Management: medications reviewed  Taken 9/10/2022 0334 by Amanda Plata RN  Medication Review/Management: medications reviewed  Taken 9/10/2022 0133 by Amanda Plata RN  Medication Review/Management: medications reviewed  Taken 9/10/2022 0020 by Amanda Plata RN  Medication Review/Management: medications reviewed  Self-Care Promotion: independence encouraged  Taken 9/9/2022 2215 by Amanda Plata RN  Medication Review/Management: medications reviewed  Taken 9/9/2022 2030 by Amanda Plata RN  Medication Review/Management: medications reviewed  Self-Care Promotion: independence encouraged  Intervention: Promote Injury-Free Environment  Recent Flowsheet Documentation  Taken 9/10/2022 0334 by Amanda Plata RN  Safety Promotion/Fall Prevention:   lighting adjusted   activity supervised   assistive device/personal items within reach   safety round/check completed  Taken 9/10/2022 0020 by Amanda Plata RN  Safety Promotion/Fall Prevention:   lighting adjusted   activity supervised   assistive device/personal items within reach   clutter free environment maintained   mobility aid in reach   safety round/check completed  Taken 9/9/2022 2215 by Amanda Plata RN  Safety Promotion/Fall Prevention:   clutter free environment maintained   activity supervised   mobility aid in reach   safety round/check completed  Taken 9/9/2022 2030 by Amanda Plata RN  Safety Promotion/Fall Prevention:   clutter free environment maintained   assistive  device/personal items within reach   activity supervised   lighting adjusted   mobility aid in reach   safety round/check completed     Problem: Adult Inpatient Plan of Care  Goal: Plan of Care Review  Outcome: Ongoing, Progressing  Goal: Patient-Specific Goal (Individualized)  Outcome: Ongoing, Progressing  Goal: Absence of Hospital-Acquired Illness or Injury  Outcome: Ongoing, Progressing  Intervention: Identify and Manage Fall Risk  Recent Flowsheet Documentation  Taken 9/10/2022 0334 by Amanda Plata RN  Safety Promotion/Fall Prevention:   lighting adjusted   activity supervised   assistive device/personal items within reach   safety round/check completed  Taken 9/10/2022 0020 by Amanda Plata RN  Safety Promotion/Fall Prevention:   lighting adjusted   activity supervised   assistive device/personal items within reach   clutter free environment maintained   mobility aid in reach   safety round/check completed  Taken 9/9/2022 2215 by Amanda Plata RN  Safety Promotion/Fall Prevention:   clutter free environment maintained   activity supervised   mobility aid in reach   safety round/check completed  Taken 9/9/2022 2030 by Amanda Plata RN  Safety Promotion/Fall Prevention:   clutter free environment maintained   assistive device/personal items within reach   activity supervised   lighting adjusted   mobility aid in reach   safety round/check completed  Intervention: Prevent Skin Injury  Recent Flowsheet Documentation  Taken 9/10/2022 0334 by Amanda Plata RN  Body Position:   position changed independently   supine  Taken 9/10/2022 0020 by Amanda Plata RN  Body Position: position changed independently  Skin Protection: adhesive use limited  Taken 9/9/2022 2215 by Amanda Plata RN  Body Position:   supine   position changed independently  Taken 9/9/2022 2030 by Amanda Plata RN  Body Position:   position changed independently   supine  Skin Protection:   adhesive use  limited   tubing/devices free from skin contact  Intervention: Prevent and Manage VTE (Venous Thromboembolism) Risk  Recent Flowsheet Documentation  Taken 9/10/2022 0020 by Amanda Plata RN  Activity Management: activity adjusted per tolerance  Range of Motion: active ROM (range of motion) encouraged  Taken 9/9/2022 2030 by Amanda Plata RN  Activity Management: activity adjusted per tolerance  Range of Motion: active ROM (range of motion) encouraged  Intervention: Prevent Infection  Recent Flowsheet Documentation  Taken 9/10/2022 0448 by Amanda Plata RN  Infection Prevention:   hand hygiene promoted   personal protective equipment utilized   rest/sleep promoted   single patient room provided  Taken 9/10/2022 0133 by Amanda Plata RN  Infection Prevention:   rest/sleep promoted   hand hygiene promoted  Taken 9/10/2022 0020 by Amanda Plata RN  Infection Prevention:   hand hygiene promoted   single patient room provided   rest/sleep promoted   personal protective equipment utilized  Taken 9/9/2022 2215 by Amanda Plata RN  Infection Prevention:   hand hygiene promoted   personal protective equipment utilized   rest/sleep promoted  Taken 9/9/2022 2030 by Amanda Plata RN  Infection Prevention:   hand hygiene promoted   personal protective equipment utilized   rest/sleep promoted   single patient room provided  Goal: Optimal Comfort and Wellbeing  Outcome: Ongoing, Progressing  Intervention: Monitor Pain and Promote Comfort  Recent Flowsheet Documentation  Taken 9/10/2022 0020 by Amanda Plata RN  Pain Management Interventions:   around-the-clock dosing utilized   breathing exercises  Intervention: Provide Person-Centered Care  Recent Flowsheet Documentation  Taken 9/10/2022 0020 by Amanda Plata RN  Trust Relationship/Rapport:   care explained   questions answered   questions encouraged   thoughts/feelings acknowledged  Taken 9/9/2022 2030 by Amanda Plata RN  Trust  Relationship/Rapport:   care explained   questions answered   questions encouraged   emotional support provided  Goal: Readiness for Transition of Care  Outcome: Ongoing, Progressing     Problem: Skin Injury Risk Increased  Goal: Skin Health and Integrity  Outcome: Ongoing, Progressing  Intervention: Promote and Optimize Oral Intake  Recent Flowsheet Documentation  Taken 9/10/2022 0020 by Amnada Plata RN  Oral Nutrition Promotion: rest periods promoted  Taken 9/9/2022 2030 by Amanda Plata RN  Oral Nutrition Promotion: rest periods promoted  Intervention: Optimize Skin Protection  Recent Flowsheet Documentation  Taken 9/10/2022 0334 by Amanda Plata RN  Head of Bed (HOB) Positioning: HOB elevated  Taken 9/10/2022 0020 by Amanda Plata RN  Pressure Reduction Techniques:   frequent weight shift encouraged   weight shift assistance provided  Head of Bed (HOB) Positioning: HOB elevated  Pressure Reduction Devices: pressure-redistributing mattress utilized  Skin Protection: adhesive use limited  Taken 9/9/2022 2215 by Amanda Plata RN  Head of Bed (HOB) Positioning: HOB elevated  Taken 9/9/2022 2030 by Amanda Plata RN  Pressure Reduction Techniques:   frequent weight shift encouraged   weight shift assistance provided  Head of Bed (HOB) Positioning: HOB elevated  Pressure Reduction Devices: pressure-redistributing mattress utilized  Skin Protection:   adhesive use limited   tubing/devices free from skin contact     Problem: COPD (Chronic Obstructive Pulmonary Disease) Comorbidity  Goal: Maintenance of COPD Symptom Control  Outcome: Ongoing, Progressing  Intervention: Maintain COPD-Symptom Control  Recent Flowsheet Documentation  Taken 9/10/2022 0448 by Amanda Plata RN  Medication Review/Management: medications reviewed  Taken 9/10/2022 0334 by Amanda Plata RN  Medication Review/Management: medications reviewed  Taken 9/10/2022 0133 by Amanda Plata RN  Medication  Review/Management: medications reviewed  Taken 9/10/2022 0020 by Amanda Plata RN  Supportive Measures:   verbalization of feelings encouraged   active listening utilized  Medication Review/Management: medications reviewed  Taken 9/9/2022 2215 by Amanda Plata RN  Medication Review/Management: medications reviewed  Taken 9/9/2022 2030 by Amanda Plata RN  Supportive Measures:   verbalization of feelings encouraged   active listening utilized  Medication Review/Management: medications reviewed     Problem: Obstructive Sleep Apnea Risk or Actual Comorbidity Management  Goal: Unobstructed Breathing During Sleep  Outcome: Ongoing, Progressing  Intervention: Monitor and Manage Obstructive Sleep Apnea  Recent Flowsheet Documentation  Taken 9/10/2022 0020 by Amanda Plata RN  NPPV/CPAP Maintenance: tubes secured  Taken 9/9/2022 2030 by Amanda Plata RN  NPPV/CPAP Maintenance: tubes secured     Problem: Pain Chronic (Persistent) (Comorbidity Management)  Goal: Acceptable Pain Control and Functional Ability  Outcome: Ongoing, Progressing  Intervention: Manage Persistent Pain  Recent Flowsheet Documentation  Taken 9/10/2022 0448 by Amanda Plata RN  Medication Review/Management: medications reviewed  Taken 9/10/2022 0334 by Amanda Plata RN  Medication Review/Management: medications reviewed  Taken 9/10/2022 0133 by Amanda Plata RN  Medication Review/Management: medications reviewed  Taken 9/10/2022 0020 by Amanda Plata RN  Sleep/Rest Enhancement:   awakenings minimized   room darkened  Medication Review/Management: medications reviewed  Taken 9/9/2022 2215 by Amanda Plata RN  Medication Review/Management: medications reviewed  Taken 9/9/2022 2030 by Amanda Plata RN  Sleep/Rest Enhancement:   awakenings minimized   consistent schedule promoted   room darkened  Medication Review/Management: medications reviewed  Intervention: Develop Pain Management Plan  Recent  Flowsheet Documentation  Taken 9/10/2022 0020 by mAanda Plata RN  Pain Management Interventions:   around-the-clock dosing utilized   breathing exercises  Intervention: Optimize Psychosocial Wellbeing  Recent Flowsheet Documentation  Taken 9/10/2022 0020 by Amanda Plata RN  Supportive Measures:   verbalization of feelings encouraged   active listening utilized  Diversional Activities: television  Taken 9/9/2022 2030 by Amanda Plata RN  Supportive Measures:   verbalization of feelings encouraged   active listening utilized  Diversional Activities: television  Family/Support System Care:   support provided   self-care encouraged   involvement promoted

## 2022-09-10 NOTE — PLAN OF CARE
Pt AAOX3, NAD vs stable c/o chronic LBP cont 4L NC      Problem: Fall Injury Risk  Goal: Absence of Fall and Fall-Related Injury  Outcome: Ongoing, Progressing  Intervention: Identify and Manage Contributors  Recent Flowsheet Documentation  Taken 9/10/2022 1400 by Nicole Menjivar RN  Medication Review/Management: medications reviewed  Self-Care Promotion:   independence encouraged   BADL personal objects within reach   BADL personal routines maintained  Taken 9/10/2022 0905 by Nicole Menjivar RN  Medication Review/Management: medications reviewed  Self-Care Promotion:   independence encouraged   BADL personal objects within reach   BADL personal routines maintained  Intervention: Promote Injury-Free Environment  Recent Flowsheet Documentation  Taken 9/10/2022 1400 by Nicole Menjivar RN  Safety Promotion/Fall Prevention:   activity supervised   assistive device/personal items within reach   clutter free environment maintained   fall prevention program maintained   nonskid shoes/slippers when out of bed   room organization consistent   safety round/check completed  Taken 9/10/2022 0905 by Nicole Menjivar RN  Safety Promotion/Fall Prevention:   activity supervised   assistive device/personal items within reach   clutter free environment maintained   fall prevention program maintained   nonskid shoes/slippers when out of bed   room organization consistent   safety round/check completed     Problem: Adult Inpatient Plan of Care  Goal: Plan of Care Review  Outcome: Ongoing, Progressing  Goal: Patient-Specific Goal (Individualized)  Outcome: Ongoing, Progressing  Goal: Absence of Hospital-Acquired Illness or Injury  Outcome: Ongoing, Progressing  Intervention: Identify and Manage Fall Risk  Recent Flowsheet Documentation  Taken 9/10/2022 1400 by Nicole Menjivar RN  Safety Promotion/Fall Prevention:   activity supervised   assistive device/personal items within reach   clutter free environment maintained   fall  prevention program maintained   nonskid shoes/slippers when out of bed   room organization consistent   safety round/check completed  Taken 9/10/2022 0905 by Nicole Menjivar RN  Safety Promotion/Fall Prevention:   activity supervised   assistive device/personal items within reach   clutter free environment maintained   fall prevention program maintained   nonskid shoes/slippers when out of bed   room organization consistent   safety round/check completed  Intervention: Prevent Skin Injury  Recent Flowsheet Documentation  Taken 9/10/2022 1600 by Nicole Menjivar RN  Body Position: position changed independently  Taken 9/10/2022 1400 by Nicole Menjivar RN  Body Position: position changed independently  Taken 9/10/2022 1200 by Nicole Menjivar RN  Body Position: position changed independently  Taken 9/10/2022 1000 by Nicole Menjivar RN  Body Position: position changed independently  Taken 9/10/2022 0905 by Nicole Menjivar RN  Body Position: position changed independently  Skin Protection: adhesive use limited  Intervention: Prevent and Manage VTE (Venous Thromboembolism) Risk  Recent Flowsheet Documentation  Taken 9/10/2022 1400 by Nicole Menjivar RN  Activity Management: activity adjusted per tolerance  Taken 9/10/2022 0905 by Nicole Menjivar RN  Activity Management: activity adjusted per tolerance  Intervention: Prevent Infection  Recent Flowsheet Documentation  Taken 9/10/2022 1600 by Nicole Menjivar RN  Infection Prevention:   rest/sleep promoted   single patient room provided  Taken 9/10/2022 1400 by Nicole Menjivar RN  Infection Prevention:   rest/sleep promoted   single patient room provided  Taken 9/10/2022 1000 by Nicole Menjivar RN  Infection Prevention:   rest/sleep promoted   single patient room provided  Taken 9/10/2022 0905 by Nicole Menjivar RN  Infection Prevention:   rest/sleep promoted   single patient room provided  Goal: Optimal Comfort and Wellbeing  Outcome: Ongoing,  Progressing  Intervention: Monitor Pain and Promote Comfort  Recent Flowsheet Documentation  Taken 9/10/2022 1305 by Nicole Menjivar RN  Pain Management Interventions: see MAR  Intervention: Provide Person-Centered Care  Recent Flowsheet Documentation  Taken 9/10/2022 1400 by Nicole Menjivar RN  Trust Relationship/Rapport: care explained  Taken 9/10/2022 0905 by Nicole Menjivar RN  Trust Relationship/Rapport:   care explained   thoughts/feelings acknowledged   reassurance provided   questions encouraged   questions answered   emotional support provided  Goal: Readiness for Transition of Care  Outcome: Ongoing, Progressing     Problem: Skin Injury Risk Increased  Goal: Skin Health and Integrity  Outcome: Ongoing, Progressing  Intervention: Optimize Skin Protection  Recent Flowsheet Documentation  Taken 9/10/2022 1600 by Nicole Menjivar RN  Head of Bed (HOB) Positioning: HOB elevated  Taken 9/10/2022 1400 by Nicole Menjivar RN  Head of Bed (HOB) Positioning: HOB elevated  Taken 9/10/2022 1200 by Nicole Menjivar RN  Head of Bed (HOB) Positioning: HOB elevated  Taken 9/10/2022 1000 by Nicole Menjivar RN  Head of Bed (HOB) Positioning: HOB elevated  Taken 9/10/2022 0905 by Nicole Menjivar RN  Pressure Reduction Techniques: frequent weight shift encouraged  Head of Bed (HOB) Positioning: HOB elevated  Pressure Reduction Devices: pressure-redistributing mattress utilized  Skin Protection: adhesive use limited     Problem: COPD (Chronic Obstructive Pulmonary Disease) Comorbidity  Goal: Maintenance of COPD Symptom Control  Outcome: Ongoing, Progressing  Intervention: Maintain COPD-Symptom Control  Recent Flowsheet Documentation  Taken 9/10/2022 1400 by Nicole Menjivar RN  Medication Review/Management: medications reviewed  Taken 9/10/2022 0905 by Nicole Menjivar RN  Supportive Measures: active listening utilized  Medication Review/Management: medications reviewed     Problem: Obstructive Sleep Apnea Risk or Actual  Comorbidity Management  Goal: Unobstructed Breathing During Sleep  Outcome: Ongoing, Progressing     Problem: Pain Chronic (Persistent) (Comorbidity Management)  Goal: Acceptable Pain Control and Functional Ability  Outcome: Ongoing, Progressing  Intervention: Manage Persistent Pain  Recent Flowsheet Documentation  Taken 9/10/2022 1400 by Nicole Menjivar RN  Medication Review/Management: medications reviewed  Taken 9/10/2022 0905 by Nicole Menjivar RN  Medication Review/Management: medications reviewed  Intervention: Develop Pain Management Plan  Recent Flowsheet Documentation  Taken 9/10/2022 1305 by Nicole Menjivar RN  Pain Management Interventions: see MAR  Intervention: Optimize Psychosocial Wellbeing  Recent Flowsheet Documentation  Taken 9/10/2022 1400 by Nicole Menjivar RN  Diversional Activities: television  Family/Support System Care:   self-care encouraged   presence promoted  Taken 9/10/2022 0905 by Nicole Menjivar RN  Supportive Measures: active listening utilized  Diversional Activities: television  Family/Support System Care:   self-care encouraged   presence promoted   Goal Outcome Evaluation:

## 2022-09-10 NOTE — CONSULTS
"[facility]  Initial Inpatient Consult Note    Referring Provider: [facility]    Reason for Consultation: Abnormal CT imaging ileocecal region of colon    Subjective     History of present illness:      Thank you for requesting my opinion.    This is a 66-year-old woman who was admitted through the ER yesterday for fever, chills, and confusion.  She was found to have a urinary tract infection.  Gastroenterology is consulted for abnormal CT imaging of the ileocecal valve and the colon.    CT imaging, in addition to abnormal appearance to the bladder, notable for \"fullness of the ileocecal junction,\" possibly related to stool but radiologist says an underlying mass lesion cannot be excluded.    She reports that her last colonoscopy was likely over 20 years ago and that she has had a history of polyps.  She does have a family history of colon cancer in her mother who was diagnosed in her 50s.  She does have chronic issues with constipation that she manages with over-the-counter stool softeners.    She was interested in having her colonoscopy done here however she has been on a cardiac diet and is on Plavix that has been maintained this admission.  Dr. De Jesus prescribes Plavix.    Past Medical History:  Past Medical History:   Diagnosis Date   • Anxiety    • Arthritis    • benign polyps of large intestine    • COPD (chronic obstructive pulmonary disease) ([facility])    • Depression    • Difficulty walking    • Disc degeneration, lumbar    • Fibromyositis    • Hyperlipidemia    • IBS (irritable bowel syndrome)    • Kidney stones    • Movement disorder    • Nephrolithiasis    • Peripheral neuropathy    • PVD (peripheral vascular disease) ([facility])    • Shingles    • Vitamin D deficiency        Past Surgical History:  Past Surgical History:   Procedure Laterality Date   • COLONOSCOPY  2004    Colon polyps; family history of colon cancer   • KNEE ARTHROPLASTY, PARTIAL REPLACEMENT  2015   • KNEE ARTHROSCOPY Left  "    Meniscus   • POPLITEAL ARTERY ANGIOPLASTY Bilateral    • STEROID INJECTION     • TONSILLECTOMY     • TUBAL ABDOMINAL LIGATION          Social History:   Social History     Tobacco Use   • Smoking status: Former Smoker     Packs/day: 0.75     Years: 50.00     Pack years: 37.50     Types: Cigarettes     Start date: 1960     Quit date: 2020     Years since quittin.8   • Smokeless tobacco: Never Used   Substance Use Topics   • Alcohol use: No        Family History:  Family History   Problem Relation Age of Onset   • COPD Mother          at 69 yo    • Colon cancer Mother    • Heart disease Mother    • Stroke Mother    • Lung cancer Father         mesothelioma   • Heart disease Maternal Grandmother    • Heart disease Maternal Grandfather    • Heart disease Paternal Grandfather        Home Meds:  Medications Prior to Admission   Medication Sig Dispense Refill Last Dose   • Aspirin (ASPIR-81 PO) Take 81 mg by mouth Daily.   2022 at Unknown time   • baclofen (LIORESAL) 10 MG tablet Take 1 tablet by mouth 2 (Two) Times a Day As Needed for Muscle Spasms. 180 tablet 3 2022 at Unknown time   • clopidogrel (PLAVIX) 75 MG tablet TAKE 1 TABLET DAILY 90 tablet 3 2022 at Unknown time   • docusate sodium (COLACE) 100 MG capsule Take 100 mg by mouth 2 (Two) Times a Day.   2022 at Unknown time   • DULoxetine (CYMBALTA) 60 MG capsule TAKE 1 CAPSULE DAILY 90 capsule 3 2022 at Unknown time   • fluconazole (DIFLUCAN) 200 MG tablet 200 mg 2 (Two) Times a Day.   2022 at Unknown time   • folic acid (FOLVITE) 1 MG tablet TAKE 1 TABLET DAILY 90 tablet 3 2022 at Unknown time   • HYDROcodone-acetaminophen (NORCO)  MG per tablet Take 1 tablet by mouth Every 6 (Six) Hours As Needed for Moderate Pain.   2022 at Unknown time   • nitrofurantoin, macrocrystal-monohydrate, (Macrobid) 100 MG capsule Take 1 capsule by mouth 2 (Two) Times a Day. 14 capsule 0 2022 at Unknown time   • pravastatin  (PRAVACHOL) 80 MG tablet Take 1 tablet by mouth Daily. 90 tablet 51 9/8/2022 at Unknown time   • pregabalin (LYRICA) 150 MG capsule Take 1 capsule by mouth 3 (Three) Times a Day. (Patient taking differently: Take 150 mg by mouth 2 (Two) Times a Day.) 270 capsule 1 9/8/2022 at Unknown time   • vitamin B-12 (CYANOCOBALAMIN) 1000 MCG tablet Take 1,000 mcg by mouth Daily.   9/8/2022 at Unknown time   • vitamin D3 125 MCG (5000 UT) capsule capsule Take 5,000 Units by mouth Daily.   9/8/2022 at Unknown time   • pregabalin (LYRICA) 100 MG capsule Take 300 mg by mouth Every Night.      • risedronate (Actonel) 150 MG tablet Take 1 tablet by mouth Every 30 (Thirty) Days. with water on empty stomach, nothing by mouth or lie down for next 30 minutes. 3 tablet 3 9/4/2022       Current Meds:   aspirin, 81 mg, Oral, Daily  cholecalciferol, 2,000 Units, Oral, Daily  clopidogrel, 75 mg, Oral, Daily  docusate sodium, 100 mg, Oral, BID  DULoxetine, 60 mg, Oral, Daily  enoxaparin, 40 mg, Subcutaneous, Q12H  folic acid, 1,000 mcg, Oral, Daily  piperacillin-tazobactam, 4.5 g, Intravenous, Q8H  pravastatin, 80 mg, Oral, Daily  pregabalin, 150 mg, Oral, BID  sodium chloride, 10 mL, Intravenous, Q12H  vitamin B-12, 1,000 mcg, Oral, Daily        Allergies:  Allergies   Allergen Reactions   • Ambien [Zolpidem Tartrate] Other (See Comments)     Nightmares   • Bupropion Itching   • Codeine Sulfate Itching   • Zolpidem Hives     Nightmares   • Adhesive Tape Rash   • Aripiprazole Unknown - Low Severity   • Atorvastatin Other (See Comments)     MYALGIAS   • Cilostazol Other (See Comments)     HA   • Colesevelam Nausea Only   • Ferrous Sulfate Nausea Only   • Welchol [Colesevelam Hcl] Nausea Only   • Wound Dressing Adhesive Rash       Review of Systems  All systems were reviewed and negative except for:  Musculoskeletal: positive for  back pain     Objective     Vital Signs  Temp:  [97.7 °F (36.5 °C)-98.4 °F (36.9 °C)] 97.7 °F (36.5 °C)  Heart  Rate:  [72-80] 72  Resp:  [8-20] 8  BP: (115-136)/(43-77) 136/77    Physical Exam:  Constitutional:   Alert, cooperative, in no acute distress, appears stated age   Eyes:           Lids and lashes normal, conjunctivae and sclerae normal,   no icterus   Ears, nose, mouth and throat:  Normal appearance of external ears and nose, no oral l  lesions, no thrush, oral mucosa moist   Respiratory:    Clear to auscultation, respirations regular, even and             unlabored    Cardiovascular:   Regular rhythm and normal rate, normal S1 and S2, no        murmur, no gallop, palpable distal pulses, no lower extremity edema   Gastrointestinal:    Soft, nondistended, nontender to palpation, no guarding, no rebound tenderness, normal bowel sounds, no palpable masses or organomegaly  Rectal exam: deferred   Musculoskeletal:  Normal station, no atrophy, no tenderness to palpation, normal digits and nails   Skin:  Normal color, no bleeding, bruising, rashes or lesions   Lymphatics:  No palpable cervical or supraclavicular adenopathy   Psychiatric:  Judgement and insight: normal   Orientation to person, place and time: normal   Mood and affect: normal       Results Review:   I reviewed the patient's new clinical results.    Results from last 7 days   Lab Units 09/08/22  2334   WBC 10*3/mm3 11.30*   HEMOGLOBIN g/dL 12.3   HEMATOCRIT % 37.9   PLATELETS 10*3/mm3 201       Results from last 7 days   Lab Units 09/08/22  2334   SODIUM mmol/L 138   POTASSIUM mmol/L 4.6   CHLORIDE mmol/L 99   CO2 mmol/L 30.9*   BUN mg/dL 16   CREATININE mg/dL 1.02*   CALCIUM mg/dL 10.1   BILIRUBIN mg/dL 0.5   ALK PHOS U/L 108   ALT (SGPT) U/L 15   AST (SGOT) U/L 30   GLUCOSE mg/dL 117*             Lab Results   Lab Value Date/Time    LIPASE 27 05/04/2015 1100       Radiology:  Imaging Results (Last 72 Hours)     Procedure Component Value Units Date/Time    XR Chest 1 View [[id number]] Collected: 09/09/22 0121     Updated: 09/09/22 0126    Narrative:       SINGLE VIEW OF THE CHEST     HISTORY: Fever     COMPARISON: 11/20/2019     FINDINGS:  Cardiomegaly is present. There are background changes of COPD. There is  no vascular congestion. Chronic scarring is seen at the lung bases. No  definite pneumothorax, pleural effusion, or acute infiltrate is seen.       Impression:      No acute findings.     This report was finalized on 9/9/2022 1:22 AM by Dr. Linda Guillermo M.D.       CT Abdomen Pelvis Without Contrast [[id number]] Collected: 09/09/22 0107     Updated: 09/09/22 0117    Narrative:      CT OF THE ABDOMEN AND PELVIS WITHOUT CONTRAST     HISTORY: Fever     COMPARISON: 05/04/2015     TECHNIQUE: Axial CT imaging was obtained through the abdomen and pelvis.  No IV contrast was administered.     FINDINGS:  Images through the lung bases demonstrate atelectasis and scarring.  Images are significantly degraded by motion artifact. No suspicious  hepatic lesions are seen. Gallbladder, adrenal glands, stomach,  duodenum, spleen, and pancreas all appear unremarkable. No  hydronephrosis is seen on either side. There is a 6 mm nonobstructing  stone within the right kidney. There is potentially an additional  punctate stone there is calcification of the aorta. No distal ureteral  or bladder stones are seen. Urinary bladder is thick walled, with  perivesical soft tissue stranding, suggesting cystitis. Uterus is within  normal limits. There is no bowel obstruction. The patient has a  prominent appearance at the ileocecal junction. Appearance has a  masslike configuration. The appendix is normal. The patient has  bilateral common iliac artery stents. No acute osseous abnormalities are  seen. Immediately adjacent to it. No stones are seen on the left.       Impression:         1. Thick-walled appearance to the urinary bladder, with adjacent  perivesical stranding, suggesting cystitis.  2. Fullness of the ileocecal junction may be related to stool, but  underlying mass lesion is  not excluded. Consideration for colonoscopy  versus repeat CT is recommended.     Radiation dose reduction techniques were utilized, including automated  exposure control and exposure modulation based on body size.     This report was finalized on 9/9/2022 1:14 AM by Dr. Linda Guillermo M.D.             Assessment & Plan       Fever    Fibromyalgia    Peripheral arterial occlusive disease (HCC)    COPD mixed type (HCC)    Chronic respiratory failure with hypoxia (HCC)    Sepsis without acute organ dysfunction (HCC)    Acute cystitis    Obesity, Class III, BMI 40-49.9 (morbid obesity) (HCC)    Abnormal CT scan, gastrointestinal tract      Impression  1.  Abnormal CT imaging of the colon: Thickening at the ileocecal junction    2.  Family history of colon cancer, personal history of colon polyps: She has not had a colonoscopy in a number of years    3.  On Plavix therapy: She has been receiving this while an inpatient    4.  Morbid obesity: BMI pushing 50    5.  Urinary tract infection: On antibiotics    Plan  I discussed the need for her to have a colonoscopy based on the CT findings as well as her significant family history and personal history of polyps.  She seems understand this but is very concerned about the bowel prep which she thinks she will not be able to tolerate.  She was interested in having procedure done here, however I explained that with the Plavix, only biopsies could be taken and that if she had a large polyp requiring snare polypectomy or cautery, she would have to come back and have the procedure done again.  She does not want to have to repeat the colonoscopy twice and as such would like to have an outpatient procedure.  We can figure out the bowel prep at that time but I explained that it would be very important for her to do her best to complete the bowel prep and have this procedure done.  We discussed the risk that this area may be a tumor or advanced polyp and the sooner she has a  colonoscopy the sooner she could get treatment for that    Continue treatment for urinary tract infection    I will arrange for an outpatient colonoscopy.  I discussed that this should be done within the next month.  She is aware that my office will give her a call to arrange this.    GI will sign off but remain available as needed in this patient's care.  Thank you.          I discussed the patients findings and my recommendations with patient and family    All necessary PPE, including face mask and eye protection, were worn during this encounter.  Hand sanitization was performed both before and after the patient interaction.    Julia Perez MD  [facility]      Dictated utilizing Dragon dictation

## 2022-09-11 ENCOUNTER — READMISSION MANAGEMENT (OUTPATIENT)
Dept: CALL CENTER | Facility: HOSPITAL | Age: 67
End: 2022-09-11

## 2022-09-11 VITALS
HEIGHT: 63 IN | DIASTOLIC BLOOD PRESSURE: 71 MMHG | TEMPERATURE: 98.5 F | SYSTOLIC BLOOD PRESSURE: 147 MMHG | WEIGHT: 273.15 LBS | RESPIRATION RATE: 18 BRPM | HEART RATE: 77 BPM | OXYGEN SATURATION: 94 % | BODY MASS INDEX: 48.4 KG/M2

## 2022-09-11 PROBLEM — A41.9 SEPSIS WITHOUT ACUTE ORGAN DYSFUNCTION, DUE TO UNSPECIFIED ORGANISM: Status: ACTIVE | Noted: 2022-09-11

## 2022-09-11 PROCEDURE — 25010000002 PIPERACILLIN SOD-TAZOBACTAM PER 1 G: Performed by: NURSE PRACTITIONER

## 2022-09-11 PROCEDURE — G0378 HOSPITAL OBSERVATION PER HR: HCPCS

## 2022-09-11 PROCEDURE — 25010000002 ENOXAPARIN PER 10 MG: Performed by: NURSE PRACTITIONER

## 2022-09-11 PROCEDURE — 96372 THER/PROPH/DIAG INJ SC/IM: CPT

## 2022-09-11 RX ORDER — LEVOFLOXACIN 750 MG/1
750 TABLET ORAL DAILY
Qty: 4 TABLET | Refills: 0 | Status: SHIPPED | OUTPATIENT
Start: 2022-09-12 | End: 2022-09-21

## 2022-09-11 RX ADMIN — ENOXAPARIN SODIUM 40 MG: 100 INJECTION SUBCUTANEOUS at 02:27

## 2022-09-11 RX ADMIN — DULOXETINE HYDROCHLORIDE 60 MG: 60 CAPSULE, DELAYED RELEASE ORAL at 08:54

## 2022-09-11 RX ADMIN — PRAVASTATIN SODIUM 80 MG: 40 TABLET ORAL at 08:55

## 2022-09-11 RX ADMIN — SODIUM CHLORIDE 100 ML/HR: 9 INJECTION, SOLUTION INTRAVENOUS at 02:24

## 2022-09-11 RX ADMIN — ASPIRIN 81 MG: 81 TABLET, COATED ORAL at 08:54

## 2022-09-11 RX ADMIN — Medication 10 ML: at 08:58

## 2022-09-11 RX ADMIN — HYDROCODONE BITARTRATE AND ACETAMINOPHEN 1 TABLET: 10; 325 TABLET ORAL at 06:33

## 2022-09-11 RX ADMIN — Medication 2000 UNITS: at 08:54

## 2022-09-11 RX ADMIN — Medication 1000 MCG: at 08:54

## 2022-09-11 RX ADMIN — CLOPIDOGREL 75 MG: 75 TABLET, FILM COATED ORAL at 08:54

## 2022-09-11 RX ADMIN — DOCUSATE SODIUM 100 MG: 100 CAPSULE, LIQUID FILLED ORAL at 08:54

## 2022-09-11 RX ADMIN — PREGABALIN 150 MG: 75 CAPSULE ORAL at 08:54

## 2022-09-11 RX ADMIN — TAZOBACTAM SODIUM AND PIPERACILLIN SODIUM 4.5 G: 500; 4 INJECTION, SOLUTION INTRAVENOUS at 06:28

## 2022-09-11 NOTE — OUTREACH NOTE
Prep Survey    Flowsheet Row Responses   [facility] facility patient discharged from? [facility]   Is LACE score < 7 ? No   Emergency Room discharge w/ pulse ox? No   Eligibility [facility]   Date of Admission 09/08/22   Date of Discharge 09/11/22   Discharge Disposition Home or Self Care   Discharge diagnosis fever, sepsis, Acute cystitis, chronic hypoxic resp failure, COPD   Does the patient have one of the following disease processes/diagnoses(primary or secondary)? Sepsis   Does the patient have Home health ordered? No   Is there a DME ordered? No   Prep survey completed? Yes          DWAYNE QUIÑONEZ - Registered Nurse

## 2022-09-11 NOTE — CASE MANAGEMENT/SOCIAL WORK
Case Management Discharge Note      Final Note: Pt discharged home .    CRISTINA Roblero RN         Selected Continued Care - Discharged on 9/11/2022 Admission date: 9/8/2022 - Discharge disposition: Home or Self Care    Destination    No services have been selected for the patient.              Durable Medical Equipment    No services have been selected for the patient.              Dialysis/Infusion    No services have been selected for the patient.              Home Medical Care    No services have been selected for the patient.              Therapy    No services have been selected for the patient.              Community Resources    No services have been selected for the patient.              Community & DME    No services have been selected for the patient.                  Transportation Services  Private: Car    Final Discharge Disposition Code: 01 - home or self-care

## 2022-09-11 NOTE — PLAN OF CARE
Pt AAOx3, c/o dysuria L flank pain and chronic UTI. VS stable cont 4L Trilogy at bedside NAD. Discussed prior urology visits in the past.       Problem: Fall Injury Risk  Goal: Absence of Fall and Fall-Related Injury  9/11/2022 1009 by Nicole Menjivar RN  Outcome: Met  9/11/2022 1008 by Nicole Menjivar RN  Outcome: Ongoing, Progressing  Intervention: Identify and Manage Contributors  Recent Flowsheet Documentation  Taken 9/11/2022 0850 by Nicole Menjivar RN  Medication Review/Management: medications reviewed  Self-Care Promotion:   independence encouraged   BADL personal objects within reach   BADL personal routines maintained  Intervention: Promote Injury-Free Environment  Recent Flowsheet Documentation  Taken 9/11/2022 0850 by Nicole Menjivar RN  Safety Promotion/Fall Prevention:   activity supervised   assistive device/personal items within reach     Problem: Adult Inpatient Plan of Care  Goal: Plan of Care Review  Outcome: Met  Goal: Patient-Specific Goal (Individualized)  Outcome: Met  Goal: Absence of Hospital-Acquired Illness or Injury  Outcome: Met  Intervention: Identify and Manage Fall Risk  Recent Flowsheet Documentation  Taken 9/11/2022 0850 by Nicole Menjivar RN  Safety Promotion/Fall Prevention:   activity supervised   assistive device/personal items within reach  Intervention: Prevent Skin Injury  Recent Flowsheet Documentation  Taken 9/11/2022 1000 by Nicole Menjivar RN  Body Position: position changed independently  Taken 9/11/2022 0850 by Nicole Menjivar RN  Body Position: position changed independently  Skin Protection: adhesive use limited  Intervention: Prevent and Manage VTE (Venous Thromboembolism) Risk  Recent Flowsheet Documentation  Taken 9/11/2022 0850 by Nicole Menjivar RN  Activity Management: activity adjusted per tolerance  VTE Prevention/Management: (Lovenox) other (see comments)  Intervention: Prevent Infection  Recent Flowsheet Documentation  Taken 9/11/2022 0850 by Otto  Nicole RODRIGUEZ RN  Infection Prevention:   rest/sleep promoted   single patient room provided  Goal: Optimal Comfort and Wellbeing  Outcome: Met  Intervention: Provide Person-Centered Care  Recent Flowsheet Documentation  Taken 9/11/2022 0850 by Nicole Menjivar RN  Trust Relationship/Rapport:   care explained   thoughts/feelings acknowledged   reassurance provided   questions encouraged   questions answered   emotional support provided  Goal: Readiness for Transition of Care  Outcome: Met     Problem: Skin Injury Risk Increased  Goal: Skin Health and Integrity  Outcome: Met  Intervention: Optimize Skin Protection  Recent Flowsheet Documentation  Taken 9/11/2022 1000 by Nicole Menjivar RN  Head of Bed (HOB) Positioning: HOB elevated  Taken 9/11/2022 0850 by Nicole Menjivar RN  Pressure Reduction Techniques: frequent weight shift encouraged  Head of Bed (HOB) Positioning: HOB elevated  Pressure Reduction Devices: pressure-redistributing mattress utilized  Skin Protection: adhesive use limited     Problem: COPD (Chronic Obstructive Pulmonary Disease) Comorbidity  Goal: Maintenance of COPD Symptom Control  Outcome: Met  Intervention: Maintain COPD-Symptom Control  Recent Flowsheet Documentation  Taken 9/11/2022 0850 by Nicole Menjivar RN  Supportive Measures: active listening utilized  Medication Review/Management: medications reviewed     Problem: Obstructive Sleep Apnea Risk or Actual Comorbidity Management  Goal: Unobstructed Breathing During Sleep  Outcome: Met     Problem: Pain Chronic (Persistent) (Comorbidity Management)  Goal: Acceptable Pain Control and Functional Ability  Outcome: Met  Intervention: Manage Persistent Pain  Recent Flowsheet Documentation  Taken 9/11/2022 0850 by Nicole Menjivar RN  Medication Review/Management: medications reviewed  Intervention: Optimize Psychosocial Wellbeing  Recent Flowsheet Documentation  Taken 9/11/2022 0850 by Nicole Menjivar RN  Supportive Measures: active listening  utilized  Diversional Activities:   smartphone   television  Family/Support System Care:   self-care encouraged   presence promoted

## 2022-09-11 NOTE — DISCHARGE SUMMARY
Patient Name: Brenda Michelle  : 1955  MRN: [id number]    Date of Admission: 2022  Date of Discharge:  2022  Primary Care Physician: Angelito Kline DO      Chief Complaint:   Fever and Dysuria      Discharge Diagnoses     Active Hospital Problems    Diagnosis  POA   • **Fever [R50.9]  Yes   • Sepsis without acute organ dysfunction, due to unspecified organism (HCC) [A41.9]  Yes   • Sepsis without acute organ dysfunction (HCC) [A41.9]  Yes   • Acute cystitis [N30.00]  Yes   • Obesity, Class III, BMI 40-49.9 (morbid obesity) (HCC) [E66.01]  Yes   • Abnormal CT scan, gastrointestinal tract [R93.3]  Unknown   • Chronic respiratory failure with hypoxia (HCC) [J96.11]  Yes   • COPD mixed type (HCC) [J44.9]  Yes   • Fibromyalgia [M79.7]  Yes   • Peripheral arterial occlusive disease (HCC) [I77.9]  Yes      Resolved Hospital Problems   No resolved problems to display.        Hospital Course     Ms. Michelle is a 66 y.o. female with a history of chronic hypoxic respiratory failure, COPD, peripheral vascular disease, obesity who presented to  initially complaining of fevers and chills.  Please see the admitting history and physical for further details.  She was found to have acute cystitis and was admitted to the hospital for further evaluation and treatment.  Outpatient urine culture from  with mixed erlinda, no pathogen identified.  Urinalysis from  with 3+ leukocytes, UA on admission on  has cleared.  Broad-spectrum antibiotics were initiated on admission.  The patient reported she never started the ciprofloxacin to have been prescribed to her as an outpatient.  We will discharge to complete a 5-day course of antibiotics with Levaquin.  I have recommended she follow-up with her PCP this week for follow-up on her lower urinary tract symptoms.  She has also been instructed to follow-up with urology for recurrent UTIs, and ambulatory referral has been placed.  The patient  was also noted to have ileocecal fullness on a CT scan, GI consultation was obtained and recommended a colonoscopy.  The patient declined inpatient colonoscopy and is instead opting for outpatient colonoscopy hopefully as early as next month.  Patient was otherwise stable while she was admitted, she is instructed follow-up with her PCP and specialist as above.    Day of Discharge     Subjective:  Patient is resting in bed, she reports abdominal pain is still present but improving.  She wants to go home.  We have discussed her following up with urology as an outpatient and she is agreeable.    Review of Systems   Constitutional: Negative for fatigue and fever.   Respiratory: Negative for cough and shortness of breath.    Cardiovascular: Negative for chest pain, palpitations and leg swelling.   Gastrointestinal: Negative for abdominal pain, nausea and vomiting.        Still having some intermittent right sided abdominal pain, improving   Neurological: Negative for weakness.       Physical Exam:  Temp:  [97.7 °F (36.5 °C)-98.5 °F (36.9 °C)] 98.5 °F (36.9 °C)  Heart Rate:  [77-87] 77  Resp:  [18] 18  BP: (147-162)/(64-82) 147/71  Body mass index is 48.39 kg/m².  Physical Exam  Constitutional:       General: She is not in acute distress.     Appearance: Normal appearance. She is not ill-appearing.   Cardiovascular:      Rate and Rhythm: Normal rate and regular rhythm.      Pulses: Normal pulses.      Heart sounds: No murmur heard.  Pulmonary:      Effort: Pulmonary effort is normal. No respiratory distress.      Breath sounds: Normal breath sounds. No wheezing.   Abdominal:      General: Abdomen is flat. Bowel sounds are normal. There is no distension.      Palpations: Abdomen is soft.      Tenderness: There is no abdominal tenderness.   Musculoskeletal:         General: No swelling or tenderness.      Right lower leg: No edema.      Left lower leg: No edema.   Skin:     General: Skin is warm and dry.   Neurological:       General: No focal deficit present.      Mental Status: She is alert and oriented to person, place, and time. Mental status is at baseline.         Consultants     Consult Orders (all) (From admission, onward)     Start     Ordered    09/09/22 1600  Inpatient Gastroenterology Consult  Once        Specialty:  Gastroenterology  Provider:  Julia Perez MD    09/09/22 1600    09/09/22 0324  Inpatient Case Management  Consult  Once        Provider:  (Not yet assigned)    09/09/22 0323    09/09/22 0031  [facility] (on-call MD unless specified) Details  Once,   Status:  Canceled        Specialty:  Hospitalist  Provider:  (Not yet assigned)    09/09/22 0030              Procedures     Imaging Results (All)     Procedure Component Value Units Date/Time    XR Chest 1 View [[id number]] Collected: 09/09/22 0121     Updated: 09/09/22 0126    Narrative:      SINGLE VIEW OF THE CHEST     HISTORY: Fever     COMPARISON: 11/20/2019     FINDINGS:  Cardiomegaly is present. There are background changes of COPD. There is  no vascular congestion. Chronic scarring is seen at the lung bases. No  definite pneumothorax, pleural effusion, or acute infiltrate is seen.       Impression:      No acute findings.     This report was finalized on 9/9/2022 1:22 AM by Dr. Linda Guillermo M.D.       CT Abdomen Pelvis Without Contrast [[id number]] Collected: 09/09/22 0107     Updated: 09/09/22 0117    Narrative:      CT OF THE ABDOMEN AND PELVIS WITHOUT CONTRAST     HISTORY: Fever     COMPARISON: 05/04/2015     TECHNIQUE: Axial CT imaging was obtained through the abdomen and pelvis.  No IV contrast was administered.     FINDINGS:  Images through the lung bases demonstrate atelectasis and scarring.  Images are significantly degraded by motion artifact. No suspicious  hepatic lesions are seen. Gallbladder, adrenal glands, stomach,  duodenum, spleen, and pancreas all appear unremarkable. No  hydronephrosis is seen on either side. There is a 6  mm nonobstructing  stone within the right kidney. There is potentially an additional  punctate stone there is calcification of the aorta. No distal ureteral  or bladder stones are seen. Urinary bladder is thick walled, with  perivesical soft tissue stranding, suggesting cystitis. Uterus is within  normal limits. There is no bowel obstruction. The patient has a  prominent appearance at the ileocecal junction. Appearance has a  masslike configuration. The appendix is normal. The patient has  bilateral common iliac artery stents. No acute osseous abnormalities are  seen. Immediately adjacent to it. No stones are seen on the left.       Impression:         1. Thick-walled appearance to the urinary bladder, with adjacent  perivesical stranding, suggesting cystitis.  2. Fullness of the ileocecal junction may be related to stool, but  underlying mass lesion is not excluded. Consideration for colonoscopy  versus repeat CT is recommended.     Radiation dose reduction techniques were utilized, including automated  exposure control and exposure modulation based on body size.     This report was finalized on 9/9/2022 1:14 AM by Dr. Linda Guillermo M.D.             Pertinent Labs     Results from last 7 days   Lab Units 09/10/22  0907 09/08/22  2334   WBC 10*3/mm3 5.09 11.30*   HEMOGLOBIN g/dL 11.2* 12.3   PLATELETS 10*3/mm3 145 201     Results from last 7 days   Lab Units 09/10/22  0907 09/08/22  2334   SODIUM mmol/L 138 138   POTASSIUM mmol/L 4.8 4.6   CHLORIDE mmol/L 102 99   CO2 mmol/L 32.0* 30.9*   BUN mg/dL 11 16   CREATININE mg/dL 0.84 1.02*   GLUCOSE mg/dL 116* 117*   Estimated Creatinine Clearance: 84.2 mL/min (by C-G formula based on SCr of 0.84 mg/dL).  Results from last 7 days   Lab Units 09/08/22  2334   ALBUMIN g/dL 3.50   BILIRUBIN mg/dL 0.5   ALK PHOS U/L 108   AST (SGOT) U/L 30   ALT (SGPT) U/L 15     Results from last 7 days   Lab Units 09/10/22  0907 09/08/22  2334   CALCIUM mg/dL 9.0 10.1   ALBUMIN g/dL   --  3.50               Invalid input(s): LDLCALC  Results from last 7 days   Lab Units 09/09/22  0000   BLOODCX  No growth at 2 days  No growth at 2 days       Test Results Pending at Discharge     Pending Labs     Order Current Status    Blood Culture - Blood, Arm, Left Preliminary result    Blood Culture - Blood, Arm, Right Preliminary result          Discharge Details        Discharge Medications      New Medications      Instructions Start Date   levoFLOXacin 750 MG tablet  Commonly known as: Levaquin   750 mg, Oral, Daily, START 9/12/22   Start Date: September 12, 2022        Changes to Medications      Instructions Start Date   pregabalin 100 MG capsule  Commonly known as: LYRICA  What changed: Another medication with the same name was changed. Make sure you understand how and when to take each.   300 mg, Oral, Nightly      pregabalin 150 MG capsule  Commonly known as: LYRICA  What changed: when to take this   150 mg, Oral, 3 Times Daily         Continue These Medications      Instructions Start Date   ASPIR-81 PO   81 mg, Oral, Daily      baclofen 10 MG tablet  Commonly known as: LIORESAL   10 mg, Oral, 2 Times Daily PRN      clopidogrel 75 MG tablet  Commonly known as: PLAVIX   TAKE 1 TABLET DAILY      docusate sodium 100 MG capsule  Commonly known as: COLACE   100 mg, Oral, 2 Times Daily      DULoxetine 60 MG capsule  Commonly known as: CYMBALTA   TAKE 1 CAPSULE DAILY      folic acid 1 MG tablet  Commonly known as: FOLVITE   TAKE 1 TABLET DAILY      HYDROcodone-acetaminophen  MG per tablet  Commonly known as: NORCO   1 tablet, Oral, Every 6 Hours PRN      pravastatin 80 MG tablet  Commonly known as: PRAVACHOL   80 mg, Oral, Daily      risedronate 150 MG tablet  Commonly known as: Actonel   150 mg, Oral, Every 30 Days, with water on empty stomach, nothing by mouth or lie down for next 30 minutes.      vitamin B-12 1000 MCG tablet  Commonly known as: CYANOCOBALAMIN   1,000 mcg, Oral, Daily      vitamin  D3 125 MCG (5000 UT) capsule capsule   5,000 Units, Oral, Daily         Stop These Medications    fluconazole 200 MG tablet  Commonly known as: DIFLUCAN     nitrofurantoin (macrocrystal-monohydrate) 100 MG capsule  Commonly known as: Macrobid            Allergies   Allergen Reactions   • Ambien [Zolpidem Tartrate] Other (See Comments)     Nightmares   • Bupropion Itching   • Codeine Sulfate Itching   • Zolpidem Hives     Nightmares   • Adhesive Tape Rash   • Aripiprazole Unknown - Low Severity   • Atorvastatin Other (See Comments)     MYALGIAS   • Cilostazol Other (See Comments)     HA   • Colesevelam Nausea Only   • Ferrous Sulfate Nausea Only   • Welchol [Colesevelam Hcl] Nausea Only   • Wound Dressing Adhesive Rash         Discharge Disposition:  Home or Self Care    Discharge Diet:  No active diet order      Discharge Activity:   Activity Instructions     Activity as Tolerated            CODE STATUS:    Code Status and Medical Interventions:   Ordered at: 09/09/22 0123     Code Status (Patient has no pulse and is not breathing):    CPR (Attempt to Resuscitate)     Medical Interventions (Patient has pulse or is breathing):    Full Support       Future Appointments   Date Time Provider Department Center   10/4/2022  3:30 PM Julia Hoyos APRN [facility] None   12/2/2022  1:00 PM Angelito Kline DO [facility]     Additional Instructions for the Follow-ups that You Need to Schedule     Discharge Follow-up with PCP   As directed       Currently Documented PCP:    Angelito Kline DO    PCP Phone Number:    [phone]     Follow Up Details: 1 week for post-hospital f/u.         Discharge Follow-up with Specified Provider: with urology whenever you can make an appointment.   As directed      To: with urology whenever you can make an appointment.            Follow-up Information     Angelito Kline DO .    Specialties: Family Medicine, Urgent Care, Emergency Medicine  Why: 1 week for post-hospital  f/u.  Contact information:  [address]  [facility] [address]  [phone]                         Additional Instructions for the Follow-ups that You Need to Schedule     Discharge Follow-up with PCP   As directed       Currently Documented PCP:    Angelito Kline DO    PCP Phone Number:    [phone]     Follow Up Details: 1 week for post-hospital f/u.         Discharge Follow-up with Specified Provider: with urology whenever you can make an appointment.   As directed      To: with urology whenever you can make an appointment.           Time Spent on Discharge:  I spent greater than 30 minutes on this discharge activity which included: face-to-face encounter with the patient, reviewing the data in the system, coordination of the care with the nursing staff as well as consultants, documentation, and entering orders.       Rachel Bagley MD  [facility]  09/11/22  18:29 EDT

## 2022-09-11 NOTE — PLAN OF CARE
Goal Outcome Evaluation:   Pt. Alert and oriented x 4, o2 4L Nasal canula. NSR, pt. Did not use triology  vent tonight.   Good urine output. Pain controlled wit narco once this shift and resolved.      Problem: Fall Injury Risk  Goal: Absence of Fall and Fall-Related Injury  Outcome: Ongoing, Progressing  Intervention: Identify and Manage Contributors  Recent Flowsheet Documentation  Taken 9/11/2022 0412 by Amanda Plata RN  Medication Review/Management: medications reviewed  Taken 9/11/2022 0224 by Amanda Plata RN  Medication Review/Management: medications reviewed  Taken 9/11/2022 0003 by Amanda Plata RN  Medication Review/Management: medications reviewed  Taken 9/10/2022 2205 by Amanda Plata RN  Medication Review/Management: medications reviewed  Taken 9/10/2022 2030 by Amanda Plata RN  Medication Review/Management: medications reviewed  Self-Care Promotion:   independence encouraged   safe use of adaptive equipment encouraged  Intervention: Promote Injury-Free Environment  Recent Flowsheet Documentation  Taken 9/11/2022 0412 by Amanda Plata RN  Safety Promotion/Fall Prevention:   safety round/check completed   lighting adjusted   assistive device/personal items within reach  Taken 9/11/2022 0224 by Amanda Plata RN  Safety Promotion/Fall Prevention:   safety round/check completed   lighting adjusted   assistive device/personal items within reach   clutter free environment maintained  Taken 9/11/2022 0003 by Amanda Plata RN  Safety Promotion/Fall Prevention:   activity supervised   lighting adjusted   assistive device/personal items within reach   mobility aid in reach   safety round/check completed  Taken 9/10/2022 2205 by Amanda Plata RN  Safety Promotion/Fall Prevention:   activity supervised   lighting adjusted   safety round/check completed  Taken 9/10/2022 2030 by Amanda Plata RN  Safety Promotion/Fall Prevention:   activity supervised   lighting  adjusted   mobility aid in reach   assistive device/personal items within reach   safety round/check completed     Problem: Adult Inpatient Plan of Care  Goal: Plan of Care Review  Outcome: Ongoing, Progressing  Goal: Patient-Specific Goal (Individualized)  Outcome: Ongoing, Progressing  Goal: Absence of Hospital-Acquired Illness or Injury  Outcome: Ongoing, Progressing  Intervention: Identify and Manage Fall Risk  Recent Flowsheet Documentation  Taken 9/11/2022 0412 by Amanda Plata RN  Safety Promotion/Fall Prevention:   safety round/check completed   lighting adjusted   assistive device/personal items within reach  Taken 9/11/2022 0224 by Amanda Plata RN  Safety Promotion/Fall Prevention:   safety round/check completed   lighting adjusted   assistive device/personal items within reach   clutter free environment maintained  Taken 9/11/2022 0003 by Amanda Plata RN  Safety Promotion/Fall Prevention:   activity supervised   lighting adjusted   assistive device/personal items within reach   mobility aid in reach   safety round/check completed  Taken 9/10/2022 2205 by Amanda Plata RN  Safety Promotion/Fall Prevention:   activity supervised   lighting adjusted   safety round/check completed  Taken 9/10/2022 2030 by Amanda Plata RN  Safety Promotion/Fall Prevention:   activity supervised   lighting adjusted   mobility aid in reach   assistive device/personal items within reach   safety round/check completed  Intervention: Prevent Skin Injury  Recent Flowsheet Documentation  Taken 9/11/2022 0412 by Amanda Plata RN  Body Position: position changed independently  Taken 9/11/2022 0224 by Amanda Plata RN  Body Position: position changed independently  Taken 9/11/2022 0003 by Amanda Plata RN  Body Position:   supine   position changed independently  Skin Protection: adhesive use limited  Taken 9/10/2022 2205 by Amanda Plata RN  Body Position:   supine   position changed  independently  Taken 9/10/2022 2030 by Amanda Plata RN  Body Position:   supine   position changed independently  Skin Protection: adhesive use limited  Intervention: Prevent and Manage VTE (Venous Thromboembolism) Risk  Recent Flowsheet Documentation  Taken 9/11/2022 0003 by Amanda Plata RN  VTE Prevention/Management: (lovenox) other (see comments)  Range of Motion: active ROM (range of motion) encouraged  Taken 9/10/2022 2030 by Amanda Plata RN  Activity Management: activity adjusted per tolerance  VTE Prevention/Management: (lovenox) other (see comments)  Range of Motion: active ROM (range of motion) encouraged  Intervention: Prevent Infection  Recent Flowsheet Documentation  Taken 9/11/2022 0412 by Amanda Plata RN  Infection Prevention:   rest/sleep promoted   personal protective equipment utilized   hand hygiene promoted  Taken 9/11/2022 0224 by Amanda Plata RN  Infection Prevention:   single patient room provided   rest/sleep promoted   personal protective equipment utilized   hand hygiene promoted  Taken 9/11/2022 0003 by Amanda Plata RN  Infection Prevention:   single patient room provided   rest/sleep promoted   personal protective equipment utilized   hand hygiene promoted  Taken 9/10/2022 2205 by Amanda Plata RN  Infection Prevention:   hand hygiene promoted   rest/sleep promoted   single patient room provided  Taken 9/10/2022 2030 by Amanda Plata RN  Infection Prevention:   hand hygiene promoted   personal protective equipment utilized   rest/sleep promoted   single patient room provided  Goal: Optimal Comfort and Wellbeing  Outcome: Ongoing, Progressing  Intervention: Monitor Pain and Promote Comfort  Recent Flowsheet Documentation  Taken 9/11/2022 0003 by Amanda Plata RN  Pain Management Interventions:   pillow support provided   position adjusted   quiet environment facilitated  Taken 9/10/2022 2030 by Amanda Plata RN  Pain Management  Interventions:   see MAR   around-the-clock dosing utilized  Intervention: Provide Person-Centered Care  Recent Flowsheet Documentation  Taken 9/11/2022 0003 by Amanda Plata RN  Trust Relationship/Rapport:   care explained   emotional support provided   questions encouraged   thoughts/feelings acknowledged  Taken 9/10/2022 2030 by Amanda Plata RN  Trust Relationship/Rapport:   care explained   questions answered   questions encouraged  Goal: Readiness for Transition of Care  Outcome: Ongoing, Progressing     Problem: Skin Injury Risk Increased  Goal: Skin Health and Integrity  Outcome: Ongoing, Progressing  Intervention: Promote and Optimize Oral Intake  Recent Flowsheet Documentation  Taken 9/10/2022 2030 by Amanda Plata RN  Oral Nutrition Promotion: rest periods promoted  Intervention: Optimize Skin Protection  Recent Flowsheet Documentation  Taken 9/11/2022 0412 by Amanda Plata RN  Head of Bed (HOB) Positioning: HOB elevated  Taken 9/11/2022 0224 by Amanda Plata RN  Head of Bed (HOB) Positioning: HOB elevated  Taken 9/11/2022 0003 by Amanda Plata RN  Pressure Reduction Techniques:   frequent weight shift encouraged   weight shift assistance provided  Head of Bed (HOB) Positioning: HOB elevated  Pressure Reduction Devices: pressure-redistributing mattress utilized  Skin Protection: adhesive use limited  Taken 9/10/2022 2205 by Amanda Plata RN  Head of Bed (HOB) Positioning: HOB lowered  Taken 9/10/2022 2030 by Amanda Plata RN  Pressure Reduction Techniques:   frequent weight shift encouraged   weight shift assistance provided  Head of Bed (HOB) Positioning: HOB elevated  Pressure Reduction Devices: pressure-redistributing mattress utilized  Skin Protection: adhesive use limited     Problem: COPD (Chronic Obstructive Pulmonary Disease) Comorbidity  Goal: Maintenance of COPD Symptom Control  Outcome: Ongoing, Progressing  Intervention: Maintain COPD-Symptom  Control  Recent Flowsheet Documentation  Taken 9/11/2022 0412 by Amanda Plata RN  Medication Review/Management: medications reviewed  Taken 9/11/2022 0224 by Amanda Plata RN  Medication Review/Management: medications reviewed  Taken 9/11/2022 0003 by Amanda Plata RN  Supportive Measures: active listening utilized  Medication Review/Management: medications reviewed  Taken 9/10/2022 2205 by Amanda Plata RN  Medication Review/Management: medications reviewed  Taken 9/10/2022 2030 by Amanda Plata RN  Supportive Measures:   active listening utilized   verbalization of feelings encouraged  Medication Review/Management: medications reviewed     Problem: Obstructive Sleep Apnea Risk or Actual Comorbidity Management  Goal: Unobstructed Breathing During Sleep  Outcome: Ongoing, Progressing  Intervention: Monitor and Manage Obstructive Sleep Apnea  Recent Flowsheet Documentation  Taken 9/11/2022 0003 by Amanda Plata RN  NPPV/CPAP Maintenance: tubes secured     Problem: Pain Chronic (Persistent) (Comorbidity Management)  Goal: Acceptable Pain Control and Functional Ability  Outcome: Ongoing, Progressing  Intervention: Manage Persistent Pain  Recent Flowsheet Documentation  Taken 9/11/2022 0412 by Amanda Plata RN  Medication Review/Management: medications reviewed  Taken 9/11/2022 0224 by Amanda Plata RN  Medication Review/Management: medications reviewed  Taken 9/11/2022 0003 by Amanda Plata RN  Medication Review/Management: medications reviewed  Taken 9/10/2022 2205 by Amanda Plata RN  Medication Review/Management: medications reviewed  Taken 9/10/2022 2030 by Amanda Plata RN  Sleep/Rest Enhancement: room darkened  Medication Review/Management: medications reviewed  Intervention: Develop Pain Management Plan  Recent Flowsheet Documentation  Taken 9/11/2022 0003 by Amanda Plata RN  Pain Management Interventions:   pillow support provided   position  adjusted   quiet environment facilitated  Taken 9/10/2022 2030 by Amanda Plata, RN  Pain Management Interventions:   see MAR   around-the-clock dosing utilized  Intervention: Optimize Psychosocial Wellbeing  Recent Flowsheet Documentation  Taken 9/11/2022 0003 by Amanda Plata, RN  Supportive Measures: active listening utilized  Taken 9/10/2022 2030 by Amanda Plata, RN  Supportive Measures:   active listening utilized   verbalization of feelings encouraged  Diversional Activities: television

## 2022-09-12 ENCOUNTER — TELEPHONE (OUTPATIENT)
Dept: GASTROENTEROLOGY | Facility: CLINIC | Age: 67
End: 2022-09-12

## 2022-09-12 ENCOUNTER — TRANSITIONAL CARE MANAGEMENT TELEPHONE ENCOUNTER (OUTPATIENT)
Dept: CALL CENTER | Facility: HOSPITAL | Age: 67
End: 2022-09-12

## 2022-09-12 NOTE — TELEPHONE ENCOUNTER
Message sent to Dr De Jesus thru epic for permission for pt to hold Plavix for 5 days prior to colonoscopy with Dr Perez.

## 2022-09-12 NOTE — TELEPHONE ENCOUNTER
Called and passed on that it is ok for her to hold her plavix 5 days prior to her scope  Thanks

## 2022-09-12 NOTE — TELEPHONE ENCOUNTER
----- Message from Julia Perez MD sent at 9/10/2022  3:07 PM EDT -----  Patient needs to schedule outpatient colonoscopy with me.  We need to find her something within the next month.  Also will need to reach out to Dr. De Jesus for clearance to hold her Plavix 5 days prior.  MiraLAX prep or samples of Clenpiq, etc., thanks

## 2022-09-12 NOTE — OUTREACH NOTE
Call Center TCM Note    Flowsheet Row Responses   [facility] patient discharged from? [place]   Does the patient have one of the following disease processes/diagnoses(primary or secondary)? Sepsis   TCM attempt successful? Yes   Call start time 0922   Call end time 0927   Discharge diagnosis fever, sepsis, Acute cystitis, chronic hypoxic resp failure, COPD   Person spoke with today (if not patient) and relationship    Meds reviewed with patient/caregiver? Yes   Is the patient having any side effects they believe may be caused by any medication additions or changes? No   Does the patient have all medications related to this admission filled (includes all antibiotics, inhalers, nebulizers,steroids,etc.) Yes   Is the patient taking all medications as directed (includes completed medication regime)? Yes   Comments HOSP DC FU appt with ELI 9/21/22 @ 9:30 as MD does not have available appt within TCM guidelines.    Has home health visited the patient within 72 hours of discharge? N/A   Psychosocial issues? No   Did the patient receive a copy of their discharge instructions? Yes   Nursing interventions Reviewed instructions with patient   What is the patient's perception of their health status since discharge? Improving   Nursing interventions Nurse provided patient education   Is the patient/caregiver able to teach back TIME? T emperature - higher or lower than normal, I nfection - may have signs and symptoms of an infection, M ental Decline - confused, sleepy, difficult to arouse, E xtremely Ill - severe pain, discomfort, shortness of breath   Is patient/caregiver able to teach back steps to recovery at home? Eat a balanced diet, Set small, achievable goals for return to baseline health, Rest and regain strength   Is the patient/caregiver able to teach back signs and symptoms of worsening condition: Rapid heart rate (>90), Hyperthermia, Fever, Shortness of breath/rapid respiratory rate, Altered mental  status(confusion/coma)   Is the patient/caregiver able to teach back the hierarchy of who to call/visit for symptoms/problems? PCP, Specialist, Home health nurse, Urgent Care, ED, 911 Yes   TCM call completed? Yes   Wrap up additional comments  reports that Pt is improving.           Idalia Fleming RN    9/12/2022, 09:28 EDT

## 2022-09-12 NOTE — TELEPHONE ENCOUNTER
Caller: Brenda Michelle    Relationship to patient: Self    Best call back number: [phone]     Type of visit:COLONOSCOPY     Requested date: NEXT AVAILABLE     Additional notes:PATIENT CALLED IN ASKING TO SCHEDULE COLONOSCOPY.

## 2022-09-14 LAB
BACTERIA SPEC AEROBE CULT: NORMAL
BACTERIA SPEC AEROBE CULT: NORMAL

## 2022-09-16 ENCOUNTER — PREP FOR SURGERY (OUTPATIENT)
Dept: OTHER | Facility: HOSPITAL | Age: 67
End: 2022-09-16

## 2022-09-16 DIAGNOSIS — R93.3 ABNORMAL FINDING ON GI TRACT IMAGING: Primary | ICD-10-CM

## 2022-09-16 RX ORDER — SODIUM CHLORIDE, SODIUM LACTATE, POTASSIUM CHLORIDE, CALCIUM CHLORIDE 600; 310; 30; 20 MG/100ML; MG/100ML; MG/100ML; MG/100ML
30 INJECTION, SOLUTION INTRAVENOUS CONTINUOUS
Status: CANCELLED | OUTPATIENT
Start: 2022-09-29

## 2022-09-16 NOTE — TELEPHONE ENCOUNTER
Can you please place patient on my schedule for colonoscopy in the next 2 weeks?  She will need to hold Plavix for 5 days prior to colonoscopy.

## 2022-09-20 ENCOUNTER — TELEPHONE (OUTPATIENT)
Dept: GASTROENTEROLOGY | Facility: CLINIC | Age: 67
End: 2022-09-20

## 2022-09-20 NOTE — TELEPHONE ENCOUNTER
Hub staff attempted to follow warm transfer process and was unsuccessful     Caller: Shiva Michelle Sr    Relationship to patient: Emergency Contact    Best call back number: [phone]    Patient is needing: RETURNED CALL TO SCHEDULE COLONOSCOPY  WILL BE AVAILABLE TO TAKE CALL ALL DAY TODAY AND OTHER DAYS AFTER NOON.

## 2022-09-21 ENCOUNTER — OFFICE VISIT (OUTPATIENT)
Dept: FAMILY MEDICINE CLINIC | Facility: CLINIC | Age: 67
End: 2022-09-21

## 2022-09-21 VITALS
OXYGEN SATURATION: 95 % | SYSTOLIC BLOOD PRESSURE: 136 MMHG | DIASTOLIC BLOOD PRESSURE: 68 MMHG | BODY MASS INDEX: 48.39 KG/M2 | HEIGHT: 63 IN | HEART RATE: 76 BPM

## 2022-09-21 DIAGNOSIS — Z09 HOSPITAL DISCHARGE FOLLOW-UP: Primary | ICD-10-CM

## 2022-09-21 DIAGNOSIS — N39.0 FREQUENT UTI: ICD-10-CM

## 2022-09-21 PROCEDURE — 99214 OFFICE O/P EST MOD 30 MIN: CPT

## 2022-09-21 NOTE — PROGRESS NOTES
"Subjective   Brenda Michelle is a 66 y.o. female. Who presents for a hospital follow up      History of Present Illness     Was admitted at [facility] from 9/8-9/11- initially presented complaining of fevers and chills.  She was found to have acute cystitis  Broad-spectrum antibiotics were initiated on admission. She was stable and  discharged to complete a 5-day course of antibiotics with Levaquin. She was referred to urology for frequent UTIs and was advised to f/u outpatients.   She was also noted to have ileocecal fullness on a CT scan, GI consultation was obtained and recommended a colonoscopy. Per notes, seems patient declined inpatient colonoscopy and opted for outpatient colonoscopy - patient tells me that it was not declined and rather delayed as had to be off her antiplatelet prior to procedure. She does have an appointment for this at end of month to complete. She denies GI symptoms.      States overall Feeling much better. Denies any urinary symptoms although states in past has not had many symptoms with her UTIs.   Hx frequent UTIs.  Saw urology Monday. Got cath UA, will get results tomorrow.   She brings a list of medications that urology started and wants to know if should start - these include doxycycline three times per week and gemtesa.     The following portions of the patient's history were reviewed and updated as appropriate: allergies, current medications, past family history, past medical history, past social history and past surgical history.    Review of Systems   Constitutional: Negative for chills, fever and unexpected weight loss.   Cardiovascular: Negative.    Gastrointestinal: Negative for abdominal pain, constipation and diarrhea.   Genitourinary: Negative for difficulty urinating, dysuria, frequency, pelvic pain and pelvic pressure.       Objective    /68   Pulse 76   Ht 160 cm (63\")   LMP  (LMP Unknown)   SpO2 95%   BMI 48.39 kg/m²     Physical " Exam  Constitutional:       Appearance: Normal appearance. She is not ill-appearing.   Neck:      Vascular: No carotid bruit.   Cardiovascular:      Rate and Rhythm: Normal rate and regular rhythm.      Pulses: Normal pulses.      Heart sounds: Normal heart sounds, S1 normal and S2 normal. No murmur heard.  Pulmonary:      Effort: Pulmonary effort is normal. No respiratory distress.      Breath sounds: Normal breath sounds.   Neurological:      General: No focal deficit present.      Mental Status: She is alert and oriented to person, place, and time.      Comments: W/c bound   Psychiatric:         Attention and Perception: Attention normal.         Mood and Affect: Mood normal.         Speech: Speech normal.         Behavior: Behavior normal.         Thought Content: Thought content normal.         Cognition and Memory: Cognition normal.         Judgment: Judgment normal.       Assessment & Plan   Diagnoses and all orders for this visit:    1. Hospital discharge follow-up (Primary)  2. Frequent UTI    -    Overall improving. Discussed considering hx frequent UTIs and obviously w complications as ended up with sepsis do agree with prophylactic antibiotics as recommended per guidelines. I did however, advise that we typically do not start patients on daily antibiotics in primary care and would definitely go by urology's advise. If does start routine antibiotics would recommend probiotics.   Advised to stay hydrated, monitor and report any symptoms  Complete colonoscopy as planned     Hospital labs reviewed and stable.    Current outpatient and discharge medications have been reconciled for the patient. Unfortunately do not have urology records to update new meds- will ask MA to request records    Reviewed by: ELI Rivera       - Pt seen with  who participated in care. Pt and  agree with plan of care and states no further concerns or questions today    This document is intended for medical expert  use only. Reading of this document by patients and/or patient's family without participating medical staff guidance may result in misinterpretation and unintended morbidity.  Any interpretation of such data is the responsibility of the patient and/or family member responsible for the patient in concert with their primary or specialist providers, not to be left for sources of online searches such as [facility], [employer] or similar queries. Relying on these approaches to knowledge may result in misinterpretation, misguided goals of care and even death should patients or family members try recommendations outside of the realm of professional medical care in a supervised way.     Please allow 3-5 business days for recommendations based on new results     Go to the ER for any possible lifethreatening symptoms such as chest pain or shortness of air.      I personally spent 30 minutes with this patient, preparing for the visit, reviewing tests, obtaining and/or reviewing a separately obtained history, performing a medically appropriate examination and/or evaluation , counseling and educating the patient/family/caregiver, ordering medications, tests, or procedures, documenting information in the medical record and independently interpreting results.

## 2022-09-29 ENCOUNTER — HOSPITAL ENCOUNTER (OUTPATIENT)
Facility: HOSPITAL | Age: 67
Setting detail: HOSPITAL OUTPATIENT SURGERY
Discharge: HOME | End: 2022-09-29
Attending: INTERNAL MEDICINE | Admitting: INTERNAL MEDICINE

## 2022-09-29 ENCOUNTER — ANESTHESIA EVENT (OUTPATIENT)
Dept: GASTROENTEROLOGY | Facility: HOSPITAL | Age: 67
End: 2022-09-29

## 2022-09-29 ENCOUNTER — ANESTHESIA (OUTPATIENT)
Dept: GASTROENTEROLOGY | Facility: HOSPITAL | Age: 67
End: 2022-09-29

## 2022-09-29 VITALS
HEIGHT: 63 IN | BODY MASS INDEX: 47.31 KG/M2 | SYSTOLIC BLOOD PRESSURE: 154 MMHG | WEIGHT: 267 LBS | RESPIRATION RATE: 26 BRPM | HEART RATE: 73 BPM | OXYGEN SATURATION: 99 % | TEMPERATURE: 98.6 F | DIASTOLIC BLOOD PRESSURE: 83 MMHG

## 2022-09-29 DIAGNOSIS — R93.3 ABNORMAL FINDING ON GI TRACT IMAGING: ICD-10-CM

## 2022-09-29 PROCEDURE — 25010000002 PROPOFOL 10 MG/ML EMULSION: Performed by: ANESTHESIOLOGY

## 2022-09-29 PROCEDURE — 45385 COLONOSCOPY W/LESION REMOVAL: CPT | Performed by: INTERNAL MEDICINE

## 2022-09-29 PROCEDURE — 45380 COLONOSCOPY AND BIOPSY: CPT | Performed by: INTERNAL MEDICINE

## 2022-09-29 PROCEDURE — 45381 COLONOSCOPY SUBMUCOUS NJX: CPT | Performed by: INTERNAL MEDICINE

## 2022-09-29 PROCEDURE — 88305 TISSUE EXAM BY PATHOLOGIST: CPT | Performed by: INTERNAL MEDICINE

## 2022-09-29 DEVICE — DEV CLIP ENDO RESOLUTION360 CONTRL ROT 235CM: Type: IMPLANTABLE DEVICE | Site: COLON | Status: FUNCTIONAL

## 2022-09-29 RX ORDER — EPHEDRINE SULFATE 50 MG/ML
INJECTION, SOLUTION INTRAVENOUS AS NEEDED
Status: DISCONTINUED | OUTPATIENT
Start: 2022-09-29 | End: 2022-09-29 | Stop reason: SURG

## 2022-09-29 RX ORDER — LIDOCAINE HYDROCHLORIDE 20 MG/ML
INJECTION, SOLUTION INFILTRATION; PERINEURAL AS NEEDED
Status: DISCONTINUED | OUTPATIENT
Start: 2022-09-29 | End: 2022-09-29 | Stop reason: SURG

## 2022-09-29 RX ORDER — SODIUM CHLORIDE, SODIUM LACTATE, POTASSIUM CHLORIDE, CALCIUM CHLORIDE 600; 310; 30; 20 MG/100ML; MG/100ML; MG/100ML; MG/100ML
30 INJECTION, SOLUTION INTRAVENOUS CONTINUOUS
Status: DISCONTINUED | OUTPATIENT
Start: 2022-09-29 | End: 2022-09-29 | Stop reason: HOSPADM

## 2022-09-29 RX ORDER — PROPOFOL 10 MG/ML
VIAL (ML) INTRAVENOUS AS NEEDED
Status: DISCONTINUED | OUTPATIENT
Start: 2022-09-29 | End: 2022-09-29 | Stop reason: SURG

## 2022-09-29 RX ORDER — SODIUM CHLORIDE, SODIUM LACTATE, POTASSIUM CHLORIDE, CALCIUM CHLORIDE 600; 310; 30; 20 MG/100ML; MG/100ML; MG/100ML; MG/100ML
30 INJECTION, SOLUTION INTRAVENOUS CONTINUOUS PRN
Status: DISCONTINUED | OUTPATIENT
Start: 2022-09-29 | End: 2022-09-29 | Stop reason: HOSPADM

## 2022-09-29 RX ADMIN — SODIUM CHLORIDE, POTASSIUM CHLORIDE, SODIUM LACTATE AND CALCIUM CHLORIDE 30 ML/HR: 600; 310; 30; 20 INJECTION, SOLUTION INTRAVENOUS at 14:32

## 2022-09-29 RX ADMIN — PROPOFOL 150 MG: 10 INJECTION, EMULSION INTRAVENOUS at 14:54

## 2022-09-29 RX ADMIN — LIDOCAINE HYDROCHLORIDE 60 MG: 20 INJECTION, SOLUTION INFILTRATION; PERINEURAL at 14:54

## 2022-09-29 RX ADMIN — EPHEDRINE SULFATE 10 MG: 50 INJECTION INTRAVENOUS at 14:57

## 2022-09-29 RX ADMIN — PROPOFOL 200 MCG/KG/MIN: 10 INJECTION, EMULSION INTRAVENOUS at 14:54

## 2022-09-29 NOTE — ANESTHESIA POSTPROCEDURE EVALUATION
Patient: Brenda Michelle    Procedure Summary     Date: 09/29/22 Room / Location:  [facility]    Anesthesia Start: 1448 Anesthesia Stop: 1536    Procedure: COLONOSCOPY to cecum and TI with biopsies, cold forceps and hot snare polypectomies with 2cc orise lift and clip x 2 (N/A ) Diagnosis:       Abnormal finding on GI tract imaging      (Abnormal finding on GI tract imaging [R93.3])    Surgeons: Chirag Tony MD Provider: Eren Mirza MD    Anesthesia Type: MAC ASA Status: 3          Anesthesia Type: MAC    Vitals  Vitals Value Taken Time   /86 09/29/22 1529   Temp 37 °C (98.6 °F) 09/29/22 1529   Pulse 72 09/29/22 1529   Resp 15 09/29/22 1529   SpO2 100 % 09/29/22 1529           Post Anesthesia Care and Evaluation    Patient location during evaluation: PHASE II  Patient participation: complete - patient participated  Level of consciousness: awake and alert  Pain management: adequate    Airway patency: patent  Anesthetic complications: No anesthetic complications  PONV Status: none  Cardiovascular status: acceptable and hemodynamically stable  Respiratory status: acceptable, nonlabored ventilation and spontaneous ventilation  Hydration status: acceptable

## 2022-10-03 ENCOUNTER — DOCUMENTATION (OUTPATIENT)
Dept: FAMILY MEDICINE CLINIC | Facility: CLINIC | Age: 67
End: 2022-10-03

## 2022-10-03 LAB
LAB AP CASE REPORT: NORMAL
PATH REPORT.FINAL DX SPEC: NORMAL
PATH REPORT.GROSS SPEC: NORMAL

## 2022-10-04 ENCOUNTER — CONSULT (OUTPATIENT)
Dept: BARIATRICS/WEIGHT MGMT | Facility: CLINIC | Age: 67
End: 2022-10-04

## 2022-10-04 VITALS
HEART RATE: 74 BPM | BODY MASS INDEX: 46.6 KG/M2 | TEMPERATURE: 98.6 F | HEIGHT: 63 IN | SYSTOLIC BLOOD PRESSURE: 138 MMHG | DIASTOLIC BLOOD PRESSURE: 80 MMHG | WEIGHT: 263 LBS

## 2022-10-04 DIAGNOSIS — R79.9 ABNORMAL FINDING OF BLOOD CHEMISTRY, UNSPECIFIED: ICD-10-CM

## 2022-10-04 DIAGNOSIS — E66.01 OBESITY, CLASS III, BMI 40-49.9 (MORBID OBESITY): Primary | ICD-10-CM

## 2022-10-04 DIAGNOSIS — Z01.818 PRE-OP EVALUATION: ICD-10-CM

## 2022-10-04 DIAGNOSIS — K21.9 GASTROESOPHAGEAL REFLUX DISEASE, UNSPECIFIED WHETHER ESOPHAGITIS PRESENT: ICD-10-CM

## 2022-10-04 DIAGNOSIS — G47.33 OSA (OBSTRUCTIVE SLEEP APNEA): ICD-10-CM

## 2022-10-04 DIAGNOSIS — E78.5 DYSLIPIDEMIA: ICD-10-CM

## 2022-10-04 PROBLEM — Z98.890 OTHER SPECIFIED POSTPROCEDURAL STATES: Status: RESOLVED | Noted: 2020-01-14 | Resolved: 2022-10-04

## 2022-10-04 PROBLEM — G62.9 PERIPHERAL NERVE DISEASE: Status: RESOLVED | Noted: 2022-01-25 | Resolved: 2022-10-04

## 2022-10-04 PROBLEM — Z79.4 ENCOUNTER FOR LONG-TERM (CURRENT) USE OF INSULIN: Status: RESOLVED | Noted: 2022-01-25 | Resolved: 2022-10-04

## 2022-10-04 PROBLEM — Z71.3 DIETARY COUNSELING: Status: ACTIVE | Noted: 2022-10-04

## 2022-10-04 PROBLEM — A41.9 SEPSIS WITHOUT ACUTE ORGAN DYSFUNCTION, DUE TO UNSPECIFIED ORGANISM: Status: RESOLVED | Noted: 2022-09-11 | Resolved: 2022-10-04

## 2022-10-04 PROBLEM — Z99.81 SUPPLEMENTAL OXYGEN DEPENDENT: Status: ACTIVE | Noted: 2022-10-04

## 2022-10-04 PROBLEM — Z99.3 WHEELCHAIR DEPENDENCE: Status: ACTIVE | Noted: 2022-10-04

## 2022-10-04 PROBLEM — R50.9 FEVER: Status: RESOLVED | Noted: 2022-09-09 | Resolved: 2022-10-04

## 2022-10-04 PROBLEM — A41.9 SEPSIS WITHOUT ACUTE ORGAN DYSFUNCTION: Status: RESOLVED | Noted: 2022-09-09 | Resolved: 2022-10-04

## 2022-10-04 PROCEDURE — 99215 OFFICE O/P EST HI 40 MIN: CPT | Performed by: NURSE PRACTITIONER

## 2022-10-04 RX ORDER — DOXYCYCLINE HYCLATE 50 MG/1
50 CAPSULE ORAL
COMMUNITY
Start: 2022-09-19

## 2022-10-04 NOTE — PROGRESS NOTES
[facility] BARIATRIC [facility] BARIATRIC SURGERY  [address] [facility] [address]  [facility] [address]  [phone]  [address] [id number] [phone]  Dept: [phone]  10/4/2022      Brenda Michelle.  [id number]  [id number]  1955  female      Chief Complaint of weight gain; unable to maintain weight loss    History of Present Illness:   Brenda is a 66 y.o. female who presents today for evaluation, education and consultation regarding weight loss surgery. The patient is interested in the sleeve gastrectomy.      Diet History:Brenda has been overweight for at least 5 years, has been 35 pounds or more overweight for at least 5 years, has been 100 pounds or more overweight for 4 or more years.  The most weight Brenda lost was 40 pounds on reduced calorie and maintained the weight loss for a short period. Brenda describes her eating habits as volume eater and snacker. Brenda Michelle has tried reduced calorie and exercising among others with success of losing up to 40 pounds, but in each instance regained the weight.      See dietician documentation for complete history.    Bariatric Surgery Evaluation: The patient is being seen for an initial visit for bariatric surgery evaluation.     Bariatric Co-morbidities:  sleep apnea, dyslipidemia, fibromyalgia, GERD and previous DVT    Patient Active Problem List   Diagnosis   • Acute deep vein thrombosis of distal leg (HCC)   • Adenomatous polyp of colon   • Depression   • Fibromyalgia   • Dyslipidemia   • Peripheral arterial occlusive disease (HCC)   • Vitamin D deficiency   • Gastroesophageal reflux disease   • Irritable bowel syndrome   • Disorder of intervertebral disc   • Peripheral nerve disease   • Fracture of multiple ribs of right side   • Fall   • Acute respiratory failure with hypoxia (HCC)   • COPD mixed type (HCC)   • Weakness   • Hypopotassemia   • Dysuria   • Constipation   • Hemorrhoids   • Arthritis   •  Fibromyositis   • Hernia of anterior abdominal wall   • 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[facility])   • Injury of right ankle   • Kidney stone   • Knee pain   • Moderate ankle sprain   • Osteoarthritis of knee   • Spinal stenosis, lumbar region without neurogenic claudication   • Chronic respiratory failure with hypoxia ([facility])   • Bursitis of hip   • Lumbar facet joint pain   • Low back pain   • Acute cystitis   • Obesity, Class III, BMI 40-49.9 (morbid obesity) ([facility])   • Abnormal CT scan, gastrointestinal tract   • Wheelchair dependence   • Supplemental oxygen dependent   • Dietary counseling   • Pre-op evaluation   • STEVEN (obstructive sleep apnea)       Past Medical History:   Diagnosis Date   • Anxiety    • Arthritis    • benign polyps of large intestine    • COPD (chronic obstructive pulmonary disease) ([facility])    • Depression    • Difficulty walking    • Disc degeneration, lumbar    • Fever 9/9/2022   • Fibromyositis    • Hyperlipidemia    • IBS (irritable bowel syndrome)    • Kidney stones    • Movement disorder    • Nephrolithiasis    • Peripheral neuropathy    • PVD (peripheral vascular disease) ([facility])    • Sepsis without acute organ dysfunction ([facility]) 9/9/2022   • Sepsis without acute organ dysfunction, due to unspecified organism ([facility]) 9/11/2022   • Shingles    • Vitamin D deficiency        Past Surgical History:   Procedure Laterality Date   • CATARACT EXTRACTION     • COLONOSCOPY  2004    Colon polyps; family history of colon cancer   • COLONOSCOPY N/A 09/29/2022    Procedure: COLONOSCOPY to cecum and TI with biopsies, cold forceps and hot snare polypectomies with 2cc orise lift and clip x 2;  Surgeon: Chirag Tony MD;  Location: [facility] ENDOSCOPY;  Service: Gastroenterology;  Laterality: N/A;  PRE- abnormal imaging, hx of polyps, family hx of colon cancer  POST- polyps, internal/external hemorrhoids   • KNEE ARTHROPLASTY, PARTIAL REPLACEMENT  2015   • KNEE ARTHROSCOPY Left     Meniscus   • POPLITEAL ARTERY ANGIOPLASTY Bilateral     • STEROID INJECTION     • TONSILLECTOMY     • TUBAL ABDOMINAL LIGATION     • WRIST FRACTURE SURGERY         Allergies   Allergen Reactions   • Ambien [Zolpidem Tartrate] Other (See Comments)     Nightmares   • Bupropion Itching   • Codeine Sulfate Itching   • Zolpidem Hives     Nightmares   • Adhesive Tape Rash   • Aripiprazole Unknown - Low Severity   • Atorvastatin Other (See Comments)     MYALGIAS   • Cilostazol Other (See Comments)     HA   • Colesevelam Nausea Only   • Ferrous Sulfate Nausea Only   • Welchol [Colesevelam Hcl] Nausea Only   • Wound Dressing Adhesive Rash         Current Outpatient Medications:   •  Aspirin (ASPIR-81 PO), Take 81 mg by mouth Daily., Disp: , Rfl:   •  baclofen (LIORESAL) 10 MG tablet, Take 1 tablet by mouth 2 (Two) Times a Day As Needed for Muscle Spasms., Disp: 180 tablet, Rfl: 3  •  clopidogrel (PLAVIX) 75 MG tablet, TAKE 1 TABLET DAILY, Disp: 90 tablet, Rfl: 3  •  docusate sodium (COLACE) 100 MG capsule, Take 100 mg by mouth 2 (Two) Times a Day., Disp: , Rfl:   •  doxycycline (VIBRAMYCIN) 50 MG capsule, , Disp: , Rfl:   •  DULoxetine (CYMBALTA) 60 MG capsule, TAKE 1 CAPSULE DAILY, Disp: 90 capsule, Rfl: 3  •  folic acid (FOLVITE) 1 MG tablet, TAKE 1 TABLET DAILY, Disp: 90 tablet, Rfl: 3  •  HYDROcodone-acetaminophen (NORCO)  MG per tablet, Take 1 tablet by mouth Every 6 (Six) Hours As Needed for Moderate Pain., Disp: , Rfl:   •  pravastatin (PRAVACHOL) 80 MG tablet, Take 1 tablet by mouth Daily., Disp: 90 tablet, Rfl: 51  •  pregabalin (LYRICA) 150 MG capsule, Take 1 capsule by mouth 3 (Three) Times a Day. (Patient taking differently: Take 150 mg by mouth 2 (Two) Times a Day.), Disp: 270 capsule, Rfl: 1  •  risedronate (Actonel) 150 MG tablet, Take 1 tablet by mouth Every 30 (Thirty) Days. with water on empty stomach, nothing by mouth or lie down for next 30 minutes., Disp: 3 tablet, Rfl: 3  •  vitamin B-12 (CYANOCOBALAMIN) 1000 MCG tablet, Take 1,000 mcg by mouth  Daily., Disp: , Rfl:   •  vitamin D3 125 MCG (5000 UT) capsule capsule, Take 5,000 Units by mouth Daily., Disp: , Rfl:     Social History     Socioeconomic History   • Marital status:    • Number of children: 2   • Years of education: 12    Tobacco Use   • Smoking status: Former Smoker     Packs/day: 0.75     Years: 50.00     Pack years: 37.50     Types: Cigarettes     Start date: 1960     Quit date: 2020     Years since quittin.9   • Smokeless tobacco: Never Used   Vaping Use   • Vaping Use: Never used   Substance and Sexual Activity   • Alcohol use: No   • Drug use: No   • Sexual activity: Not Currently     Partners: Male       Family History   Problem Relation Age of Onset   • COPD Mother          at 67 yo    • Colon cancer Mother    • Heart disease Mother    • Stroke Mother    • Lung cancer Father         mesothelioma   • Heart disease Maternal Grandmother    • Heart disease Maternal Grandfather    • Heart disease Paternal Grandfather          Review of Systems:  Review of Systems   Musculoskeletal: Positive for arthralgias.   All other systems reviewed and are negative.      Physical Exam:  Vital Signs:  Weight: 119 kg (263 lb)   Body mass index is 46.59 kg/m².  Temp: 98.6 °F (37 °C)   Heart Rate: 74   BP: 138/80     Physical Exam  Vitals reviewed.   Constitutional:       Appearance: Normal appearance. She is well-developed. She is obese.   HENT:      Head: Normocephalic and atraumatic.   Cardiovascular:      Rate and Rhythm: Normal rate.   Pulmonary:      Effort: Pulmonary effort is normal.   Abdominal:      General: Bowel sounds are normal. There is no distension.      Palpations: Abdomen is soft.      Tenderness: There is no abdominal tenderness.   Musculoskeletal:         General: Normal range of motion.   Skin:     General: Skin is warm and dry.   Neurological:      Mental Status: She is alert and oriented to person, place, and time.   Psychiatric:         Behavior: Behavior normal.          Thought Content: Thought content normal.         Judgment: Judgment normal.            Assessment:         Brenda Michelle is a 66 y.o. year old female with medically complicated severe obesity. Weight: 119 kg (263 lb), Body mass index is 46.59 kg/m². and weight related problems including sleep apnea, dyslipidemia, fibromyalgia, GERD and previous DVT.    I explained in detail, potential surgical options of interest to the patient including the RNY gastric bypass, sleeve gastrectomy, and gastric band while considering the patient's medical history. At this time, the patient expressed interest in the sleeve gastrectomy.  All of those procedures can be performed laparoscopically but there is a chance to convert to open if any technical challenges or complications do occur.  Bariatric surgery is not cosmetic surgery but rather a tool to help a patient make a life-long commitment lifestyle changes including diet, exercise, behavior changes, and taking supplemental vitamins and minerals. The risks, benefits, alternatives, and potential complications of all of the procedures were explained in detail including but not limited to failure to lose weight or gain weight and change in body image, metabolic complications. The patient was informed that surgery is a tool and requires lifestyle change including dietary modification to be successful. The patient was made aware of the need for vitamin supplementation after surgery due to the risk of deficiencies including but not limited to calcium, thiamine, vitamin B12, folate, iron, and anemia.    The patient was advised to start a high protein, low fat and low carbohydrate diet. The patient was given individualized information by our dietician along with handouts. The patient was encouraged to start routine exercise including but not limited to 150 minutes per week. The patient received a resistance band along with a handout of exercises.     The patient was given  information regarding the RAFAEL educational video. RAFAEL is an internet based educational video which explains the surgical procedure and answers basic questions regarding the procedure. The patient was provided with instructions and a password to watch the video.    Due to the patient's BMI, history and co-morbidities they are at a high risk for surgery and will obtain the following:  -The patient has been advised that a letter of medical support and a history and physical must be obtained from her primary care physician. The patient was given a copy of a sample form, that will suffice as their letter to take to their primary are provider.  -A referral for pre-operative psychological evaluation was ordered for the patient to evaluate candidacy as well as provide mental health support, should it be warranted before or after surgery.  -Pre-operative testing will be ordered to include: TSH and HgbA1C will be drawn, EKG. Previous results of testing were reviewed including CBC, CMP, FLP, CXR.   Brenda Michelle will obtain a pre-operative clearance from her pulmonologist and vascular surgeon prior to surgery along with blood thinner clearance.    -Brenda Michelle will be set up for a pre-operative diagnostic esophagogastroduodenoscopy with biopsy for evaluation. The risks and benefits of the procedure were discussed with the patient in detail and all questions were answered.  Possibility of perforation, bleeding, aspiration, anoxic brain injury, respiratory and/or cardiac arrest and death were discussed. The patient received handouts regarding her instructions and all questions were answered.      The patient understands the surgical procedures and the different surgical options that are available.  She understands the lifestyle changes that would be required after surgery and has agreed to participate in a pre-operative and postoperative weight management program.  She also expressed understanding of possible risks, had  several questions answered and desires to proceed.    I think she is a good candidate for this surgery, and is interested in a sleeve gastrectomy.    Encounter Diagnoses   Name Primary?   • Obesity, Class III, BMI 40-49.9 (morbid obesity) (HCC) Yes   • Dyslipidemia    • Gastroesophageal reflux disease, unspecified whether esophagitis present    • STEVEN (obstructive sleep apnea)    • Pre-op evaluation    • Abnormal finding of blood chemistry, unspecified         Plan:    The consultation plan was reviewed with the patient.  Patient will have evaluations and follow up with bariatric dieticians and a psychologist before undergoing a multidisciplinary review of her candidacy.  We also discussed the weight loss requirement and rationale, as well as other program requirements to ensure the safest approach to surgery. We spent time discussing different surgical procedures and plan of care throughout their lifespan to ensure long term success in achieving and maintaining a healthier weight. Patient will proceed with preoperative lab work, radiology, and endoscopy after obtaining agreed upon clearances and letter of support from their primary care provider.    Total encounter exceeded 60+ minutes including reviewing their chart/outside medical records/previous visits, face to face time obtaining medical history and physical, reviewing surgical options and answering any questions, discussing pre-operative plan and requirements along with care coordination.         Julia Hoyos, APRN  10/4/2022

## 2022-10-14 ENCOUNTER — TELEPHONE (OUTPATIENT)
Dept: FAMILY MEDICINE CLINIC | Facility: CLINIC | Age: 67
End: 2022-10-14

## 2022-10-14 NOTE — TELEPHONE ENCOUNTER
Caller: Shiva Michelle Sr    Relationship: Emergency Contact    Best call back number: *  Shiva Michelle Sr () [phone] (Mobile)           Requested Prescriptions:    CAN YOU CALL [facility] AND SEE WHAT KIND OF WHEELCHAIR THAT PATIENT HAS AND THE REPAIRS THAT ARE NEEDED   AND WRITE RX FOR IT     Pharmacy where request should be sent:    [facility]     Additional details provided by patient: *    Does the patient have less than a 3 day supply:  [] Yes  [] No    Avelina Syed Rep   10/14/22 15:50 EDT

## 2022-10-17 NOTE — TELEPHONE ENCOUNTER
I spoke with pt's  and they need a script for wheelchair repairs. Pt's  said it needs new wheels and some other basic maintenance. He asked that we fax script to [facility] and let him know when it is done.    [facility] fax: [phone]

## 2022-10-17 NOTE — TELEPHONE ENCOUNTER
I left message letting pt's  know we don't call [facility] asking for what specifically needs repaired on pt's wheelchair. [facility] usually sends up something with what needs repaired and we sign that to fax back to [facility].

## 2022-11-03 ENCOUNTER — TELEPHONE (OUTPATIENT)
Dept: GASTROENTEROLOGY | Facility: CLINIC | Age: 67
End: 2022-11-03

## 2022-11-03 ENCOUNTER — TELEPHONE (OUTPATIENT)
Dept: FAMILY MEDICINE CLINIC | Facility: CLINIC | Age: 67
End: 2022-11-03

## 2022-11-03 NOTE — TELEPHONE ENCOUNTER
Caller: Shiva Michelle Sr    Relationship: Emergency Contact    Best call back number: [phone]    What was the call regarding: PATIENTS SPOUSE CALLING STATING THAT THE ORDER THAT WAS SENT TO \[facility]\"" WAS SENT TO THE WRONG FAX NUMBER.    \[facility]\"": [phone]

## 2022-11-03 NOTE — TELEPHONE ENCOUNTER
It was sent to the fax number given. Paperwork scanned in chart with fax confirmation of receipt.

## 2022-11-07 DIAGNOSIS — M81.0 AGE-RELATED OSTEOPOROSIS WITHOUT CURRENT PATHOLOGICAL FRACTURE: ICD-10-CM

## 2022-11-07 RX ORDER — RISEDRONATE SODIUM 150 MG/1
150 TABLET, FILM COATED ORAL
Qty: 3 TABLET | Refills: 3 | Status: SHIPPED | OUTPATIENT
Start: 2022-11-07

## 2022-11-21 DIAGNOSIS — M79.10 MYALGIA: ICD-10-CM

## 2022-11-21 RX ORDER — DULOXETIN HYDROCHLORIDE 60 MG/1
CAPSULE, DELAYED RELEASE ORAL
Qty: 90 CAPSULE | Refills: 3 | Status: SHIPPED | OUTPATIENT
Start: 2022-11-21

## 2022-12-02 ENCOUNTER — OFFICE VISIT (OUTPATIENT)
Dept: FAMILY MEDICINE CLINIC | Facility: CLINIC | Age: 67
End: 2022-12-02

## 2022-12-02 VITALS
HEIGHT: 63 IN | HEART RATE: 85 BPM | OXYGEN SATURATION: 92 % | WEIGHT: 262 LBS | BODY MASS INDEX: 46.42 KG/M2 | DIASTOLIC BLOOD PRESSURE: 68 MMHG | SYSTOLIC BLOOD PRESSURE: 140 MMHG

## 2022-12-02 DIAGNOSIS — J44.9 COPD MIXED TYPE: Primary | ICD-10-CM

## 2022-12-02 DIAGNOSIS — E78.5 DYSLIPIDEMIA: ICD-10-CM

## 2022-12-02 DIAGNOSIS — G47.09 OTHER INSOMNIA: ICD-10-CM

## 2022-12-02 DIAGNOSIS — R53.83 OTHER FATIGUE: ICD-10-CM

## 2022-12-02 DIAGNOSIS — J96.11 CHRONIC RESPIRATORY FAILURE WITH HYPOXIA: ICD-10-CM

## 2022-12-02 DIAGNOSIS — R73.01 IFG (IMPAIRED FASTING GLUCOSE): ICD-10-CM

## 2022-12-02 DIAGNOSIS — E66.01 MORBID (SEVERE) OBESITY DUE TO EXCESS CALORIES: ICD-10-CM

## 2022-12-02 DIAGNOSIS — N39.0 RECURRENT UTI: ICD-10-CM

## 2022-12-02 PROCEDURE — 99214 OFFICE O/P EST MOD 30 MIN: CPT | Performed by: FAMILY MEDICINE

## 2022-12-02 RX ORDER — BNT162B2 ORIGINAL AND OMICRON BA.4/BA.5 .1125; .1125 MG/2.25ML; MG/2.25ML
INJECTION, SUSPENSION INTRAMUSCULAR
COMMUNITY
Start: 2022-10-11 | End: 2023-03-16

## 2022-12-02 RX ORDER — PHENTERMINE HYDROCHLORIDE 37.5 MG/1
37.5 TABLET ORAL
Qty: 30 TABLET | Refills: 2 | Status: SHIPPED | OUTPATIENT
Start: 2022-12-02 | End: 2023-02-28

## 2022-12-02 RX ORDER — TRAZODONE HYDROCHLORIDE 50 MG/1
50 TABLET ORAL NIGHTLY
Qty: 30 TABLET | Refills: 5 | Status: SHIPPED | OUTPATIENT
Start: 2022-12-02 | End: 2023-03-16

## 2022-12-02 NOTE — PROGRESS NOTES
Subjective   Brenda Michelle is a 67 y.o. female. Presents today for   Chief Complaint   Patient presents with   • COPD   • Weight Check       History of Present Illness  Patient 68 y/o female with severe copd, spinal stenosis confined to wc having fatigue, has chronic respiratory failure on oxygen via nc;  Went saw bsariatrics but struggling as gained weight at time of appt rather than lose.  With wc confined and respiratory failure became very inactive and gained weight.  Struggling to lose;   On trelegy device at night now.   No cp;  Dyspnea baseline;  Having fatigue;  Also funny feeling with urination and ahd UTI with sepsis this past September, concerned may have again.  No actual dysuria or frequency, is on abx suppression now.   Reports has insomnia;  Would like help  Review of Systems   Constitutional: Positive for fatigue. Negative for chills and fever.   Respiratory: Positive for shortness of breath. Negative for cough.    Cardiovascular: Negative for chest pain.   Gastrointestinal: Negative for abdominal pain.   Genitourinary: Negative for difficulty urinating, dysuria and hematuria.   Musculoskeletal: Positive for back pain.       Patient Active Problem List   Diagnosis   • Acute deep vein thrombosis of distal leg ([facility])   • Adenomatous polyp of colon   • Depression   • Fibromyalgia   • Dyslipidemia   • Peripheral arterial occlusive disease (HCC)   • Vitamin D deficiency   • Gastroesophageal reflux disease   • Irritable bowel syndrome   • Disorder of intervertebral disc   • Peripheral nerve disease   • Fracture of multiple ribs of right side   • Fall   • Acute respiratory failure with hypoxia ([facility])   • COPD mixed type ([facility])   • Weakness   • Hypopotassemia   • Dysuria   • Constipation   • Hemorrhoids   • Arthritis   • Fibromyositis   • Hernia of anterior abdominal wall   • 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[facility])   • Injury of right ankle   • Kidney stone   • Knee pain   • Moderate ankle sprain   • Osteoarthritis of knee   •  Spinal stenosis, lumbar region without neurogenic claudication   • Chronic respiratory failure with hypoxia ([facility])   • Bursitis of hip   • Lumbar facet joint pain   • Low back pain   • Acute cystitis   • Obesity, Class III, BMI 40-49.9 (morbid obesity) ([facility])   • Abnormal CT scan, gastrointestinal tract   • Wheelchair dependence   • Supplemental oxygen dependent   • Dietary counseling   • Pre-op evaluation   • STEVEN (obstructive sleep apnea)       Social History     Socioeconomic History   • Marital status:    • Number of children: 2   • Years of education: 12    Tobacco Use   • Smoking status: Former     Packs/day: 0.75     Years: 50.00     Pack years: 37.50     Types: Cigarettes     Start date: 1960     Quit date: 2020     Years since quittin.0   • Smokeless tobacco: Never   Vaping Use   • Vaping Use: Never used   Substance and Sexual Activity   • Alcohol use: No   • Drug use: No   • Sexual activity: Not Currently     Partners: Male       Allergies   Allergen Reactions   • Ambien [Zolpidem Tartrate] Other (See Comments)     Nightmares   • Bupropion Itching   • Codeine Sulfate Itching   • Zolpidem Hives     Nightmares   • Adhesive Tape Rash   • Aripiprazole Unknown - Low Severity   • Atorvastatin Other (See Comments)     MYALGIAS   • Cilostazol Other (See Comments)     HA   • Colesevelam Nausea Only   • Ferrous Sulfate Nausea Only   • Welchol [Colesevelam Hcl] Nausea Only   • Wound Dressing Adhesive Rash       Current Outpatient Medications on File Prior to Visit   Medication Sig Dispense Refill   • Aspirin (ASPIR-81 PO) Take 81 mg by mouth Daily.     • baclofen (LIORESAL) 10 MG tablet Take 1 tablet by mouth 2 (Two) Times a Day As Needed for Muscle Spasms. 180 tablet 3   • clopidogrel (PLAVIX) 75 MG tablet TAKE 1 TABLET DAILY 90 tablet 3   • docusate sodium (COLACE) 100 MG capsule Take 100 mg by mouth 2 (Two) Times a Day.     • doxycycline (VIBRAMYCIN) 50 MG capsule Take 50 mg by mouth 3 (Three)  "Times a Day Before Meals.     • DULoxetine (CYMBALTA) 60 MG capsule TAKE 1 CAPSULE DAILY 90 capsule 3   • folic acid (FOLVITE) 1 MG tablet TAKE 1 TABLET DAILY 90 tablet 3   • HYDROcodone-acetaminophen (NORCO)  MG per tablet Take 1 tablet by mouth Every 6 (Six) Hours As Needed for Moderate Pain.     • pravastatin (PRAVACHOL) 80 MG tablet Take 1 tablet by mouth Daily. 90 tablet 51   • pregabalin (LYRICA) 150 MG capsule Take 1 capsule by mouth 3 (Three) Times a Day. (Patient taking differently: Take 150 mg by mouth 2 (Two) Times a Day.) 270 capsule 1   • risedronate (Actonel) 150 MG tablet Take 1 tablet by mouth Every 30 (Thirty) Days. with water on empty stomach, nothing by mouth or lie down for next 30 minutes. 3 tablet 3   • vitamin B-12 (CYANOCOBALAMIN) 1000 MCG tablet Take 1,000 mcg by mouth Daily.     • vitamin D3 125 MCG (5000 UT) capsule capsule Take 5,000 Units by mouth Daily.     • Pfizer COVID-19 Vac Bivalent 30 MCG/0.3ML suspension        No current facility-administered medications on file prior to visit.       Objective   Vitals:    12/02/22 1302   BP: 140/68   Pulse: 85   SpO2: 92%   Weight: 119 kg (262 lb)   Height: 160 cm (63\")     Body mass index is 46.41 kg/m².    Physical Exam  Vitals and nursing note reviewed.   Constitutional:       Appearance: She is well-developed.   HENT:      Head: Normocephalic and atraumatic.   Neck:      Thyroid: No thyromegaly.      Vascular: No JVD.   Cardiovascular:      Rate and Rhythm: Normal rate and regular rhythm.      Heart sounds: Normal heart sounds. No murmur heard.    No friction rub. No gallop.   Pulmonary:      Effort: Pulmonary effort is normal. No respiratory distress.      Breath sounds: Decreased air movement present. Decreased breath sounds present. No wheezing or rales.   Abdominal:      General: Bowel sounds are normal. There is no distension.      Palpations: Abdomen is soft.      Tenderness: There is no abdominal tenderness. There is no " guarding or rebound.   Musculoskeletal:      Cervical back: Neck supple.   Skin:     General: Skin is warm and dry.   Neurological:      Mental Status: She is alert.   Psychiatric:         Behavior: Behavior normal.         Assessment & Plan   Diagnoses and all orders for this visit:    1. COPD mixed type (HCC) (Primary)  -     CBC & Differential  -     TSH    2. Morbid (severe) obesity due to excess calories (HCC)  -     phentermine (ADIPEX-P) 37.5 MG tablet; Take 1 tablet by mouth Every Morning Before Breakfast.  Dispense: 30 tablet; Refill: 2    3. Recurrent UTI  -     Urinalysis With Microscopic - Urine, Clean Catch  -     Urine Culture - , Urine, Clean Catch    4. Dyslipidemia  -     Comprehensive Metabolic Panel    5. Chronic respiratory failure with hypoxia (HCC)    6. IFG (impaired fasting glucose)  -     TSH  -     Hemoglobin A1c    7. Other insomnia  -     traZODone (DESYREL) 50 MG tablet; Take 1 tablet by mouth Every Night.  Dispense: 30 tablet; Refill: 5    8. Other fatigue  -     CBC & Differential  -     TSH    continue inhalers, oxygen;  See pulmonary as planned  Warned of side effects of phentermine and stimulant;  Will try help with weigh tloss;   No topmax as hx of renal stones  Due check a1c and labs for fatigue  Check urine, continue abx suppression  Will try trazodone for sleep;  Avoid sedatives         -Follow up: 3months and prn

## 2022-12-02 NOTE — PATIENT INSTRUCTIONS
"\"3-2-3-Almost None!\"  Healthy Habits Start Early    EAT 5 OR MORE SERVINGS OF VEGETABLES AND FRUITS EVERY DAY.    Help Brenda get three vegetables and two fruits each day. Red, green, yellow, orange...encourage them to try all the colors so they can enjoy different flavors and get more vitamins.    How can I help Brenda do this?  ---------------------------------------------  -BE PATIENT WITH Brenda, remember it may take 10 times before they start to like new food. So, start with small bites and just keep trying.  -Serve at least one vegetable or fruit at every meal. Even try two. Remember, portions do not have to be as big as you think.  -Encourage eating fruits and vegetables instead of drinking them..it's a better way to get fiber and vitamins..so limit the amount of juice to 1/2 cup per day for children 1-6 years and one cup per day for children 7-18 years of age. Try using 1/2 part water and 1/2 part juice.    Spend less than two hours per day watching television and other screen media. Screen media includes video games, movies and computer use for entertainment.    How can I help Brenda do this?  -Turn off the TV at dinner. Dinner is the best time to hang out with your kids and just talk, learn about their day, and tell them about your day. Your kids have a lot to learn from you and dinner is a great time to share.  Advance Care Planning and Advance Directives     You make decisions on a daily basis - decisions about where you want to live, your career, your home, your life. Perhaps one of the most important decisions you face is your choice for future medical care. Take time to talk with your family and your healthcare team and start planning today.  Advance Care Planning is a process that can help you:  Understand possible future healthcare decisions in light of your own experiences  Reflect on those decision in light of your goals and values  Discuss your decisions with those closest to you and the healthcare " professionals that care for you  Make a plan by creating a document that reflects your wishes    Surrogate Decision Maker  In the event of a medical emergency, which has left you unable to communicate or to make your own decisions, you would need someone to make decisions for you.  It is important to discuss your preferences for medical treatment with this person while you are in good health.     Qualities of a surrogate decision maker:  Willing to take on this role and responsibility  Knows what you want for future medical care  Willing to follow your wishes even if they don't agree with them  Able to make difficult medical decisions under stressful circumstances    Advance Directives  These are legal documents you can create that will guide your healthcare team and decision maker(s) when needed. These documents can be stored in the electronic medical record.    Living Will - a legal document to guide your care if you have a terminal condition or a serious illness and are unable to communicate. States vary by statute in document names/types, but most forms may include one or more of the following:        -  Directions regarding life-prolonging treatments        -  Directions regarding artificially provided nutrition/hydration        -  Choosing a healthcare decision maker        -  Direction regarding organ/tissue donation    Durable Power of  for Healthcare - this document names an -in-fact to make medical decisions for you, but it may also allow this person to make personal and financial decisions for you. Please seek the advice of an  if you need this type of document.    **Advance Directives are not required and no one may discriminate against you if you do not sign one.    Medical Orders  Many states allow specific forms/orders signed by your physician to record your wishes for medical treatment in your current state of health. This form, signed in personal communication with your  physician, addresses resuscitation and other medical interventions that you may or may not want.      For more information or to schedule a time with a [facility] Advance Care Planning Facilitator contact: [facility]/ACP or call [phone] and someone will contact you directly.

## 2022-12-05 LAB
ALBUMIN SERPL-MCNC: 3.7 G/DL (ref 3.8–4.8)
ALBUMIN/GLOB SERPL: 1.4 {RATIO} (ref 1.2–2.2)
ALP SERPL-CCNC: 102 IU/L (ref 44–121)
ALT SERPL-CCNC: 10 IU/L (ref 0–32)
APPEARANCE UR: CLEAR
AST SERPL-CCNC: 16 IU/L (ref 0–40)
BACTERIA #/AREA URNS HPF: NORMAL /[HPF]
BACTERIA UR CULT: NORMAL
BACTERIA UR CULT: NORMAL
BASOPHILS # BLD AUTO: 0 X10E3/UL (ref 0–0.2)
BASOPHILS NFR BLD AUTO: 0 %
BILIRUB SERPL-MCNC: 0.3 MG/DL (ref 0–1.2)
BILIRUB UR QL STRIP: NEGATIVE
BUN SERPL-MCNC: 12 MG/DL (ref 8–27)
BUN/CREAT SERPL: 16 (ref 12–28)
CALCIUM SERPL-MCNC: 9.6 MG/DL (ref 8.7–10.3)
CASTS URNS QL MICRO: NORMAL /LPF
CHLORIDE SERPL-SCNC: 98 MMOL/L (ref 96–106)
CO2 SERPL-SCNC: 32 MMOL/L (ref 20–29)
COLOR UR: YELLOW
CREAT SERPL-MCNC: 0.74 MG/DL (ref 0.57–1)
EGFRCR SERPLBLD CKD-EPI 2021: 89 ML/MIN/1.73
EOSINOPHIL # BLD AUTO: 0.1 X10E3/UL (ref 0–0.4)
EOSINOPHIL NFR BLD AUTO: 2 %
EPI CELLS #/AREA URNS HPF: NORMAL /HPF (ref 0–10)
ERYTHROCYTE [DISTWIDTH] IN BLOOD BY AUTOMATED COUNT: 14.3 % (ref 11.7–15.4)
GLOBULIN SER CALC-MCNC: 2.7 G/DL (ref 1.5–4.5)
GLUCOSE SERPL-MCNC: 96 MG/DL (ref 70–99)
GLUCOSE UR QL STRIP: NEGATIVE
HBA1C MFR BLD: 5.7 % (ref 4.8–5.6)
HCT VFR BLD AUTO: 40.4 % (ref 34–46.6)
HGB BLD-MCNC: 12.8 G/DL (ref 11.1–15.9)
HGB UR QL STRIP: NEGATIVE
IMM GRANULOCYTES # BLD AUTO: 0 X10E3/UL (ref 0–0.1)
IMM GRANULOCYTES NFR BLD AUTO: 0 %
KETONES UR QL STRIP: NEGATIVE
LEUKOCYTE ESTERASE UR QL STRIP: ABNORMAL
LYMPHOCYTES # BLD AUTO: 1.7 X10E3/UL (ref 0.7–3.1)
LYMPHOCYTES NFR BLD AUTO: 23 %
MCH RBC QN AUTO: 30.3 PG (ref 26.6–33)
MCHC RBC AUTO-ENTMCNC: 31.7 G/DL (ref 31.5–35.7)
MCV RBC AUTO: 96 FL (ref 79–97)
MICRO URNS: ABNORMAL
MONOCYTES # BLD AUTO: 0.7 X10E3/UL (ref 0.1–0.9)
MONOCYTES NFR BLD AUTO: 10 %
NEUTROPHILS # BLD AUTO: 4.8 X10E3/UL (ref 1.4–7)
NEUTROPHILS NFR BLD AUTO: 65 %
NITRITE UR QL STRIP: NEGATIVE
PH UR STRIP: 7.5 [PH] (ref 5–7.5)
PLATELET # BLD AUTO: 235 X10E3/UL (ref 150–450)
POTASSIUM SERPL-SCNC: 5.1 MMOL/L (ref 3.5–5.2)
PROT SERPL-MCNC: 6.4 G/DL (ref 6–8.5)
PROT UR QL STRIP: NEGATIVE
RBC # BLD AUTO: 4.23 X10E6/UL (ref 3.77–5.28)
RBC #/AREA URNS HPF: NORMAL /HPF (ref 0–2)
SODIUM SERPL-SCNC: 142 MMOL/L (ref 134–144)
SP GR UR STRIP: 1.01 (ref 1–1.03)
TSH SERPL DL<=0.005 MIU/L-ACNC: 2.29 UIU/ML (ref 0.45–4.5)
UROBILINOGEN UR STRIP-MCNC: 0.2 MG/DL (ref 0.2–1)
WBC # BLD AUTO: 7.4 X10E3/UL (ref 3.4–10.8)
WBC #/AREA URNS HPF: NORMAL /HPF (ref 0–5)

## 2022-12-15 ENCOUNTER — TELEPHONE (OUTPATIENT)
Dept: FAMILY MEDICINE CLINIC | Facility: CLINIC | Age: 67
End: 2022-12-15

## 2022-12-15 NOTE — TELEPHONE ENCOUNTER
Caller: Shiva Michelle Sr    Relationship: Emergency Contact    Best call back number: [phone]    What test was performed: URINE SAMPLE AND BLOOD WORK     When was the test performed: 12/2/22     Where was the test performed: IN OFFICE     Additional notes:     PLEASE CALL AND ADVISE

## 2022-12-15 NOTE — TELEPHONE ENCOUNTER
Caller: Shiva Michelle Sr    Relationship: Emergency Contact    Best call back number: [phone]    What form or medical record are you requesting: MOST RECENT BLOOD WORK AND URINE SAMPLE     Who is requesting this form or medical record from you: PATIENT FOR PERSONAL RECORDS     How would you like to receive the form or medical records (pick-up, mail, fax): MAIL   If mail, what is the address:    [address] [place] [id number]    Timeframe paperwork needed: ASAP    Additional notes:     PLEASE ADVISE

## 2022-12-15 NOTE — PROGRESS NOTES
Call results to patient.  Blood counts normal  Thyroid,kidney and liver function normal  A1C prediabetes range  Urine normal erlinda, urine micro negative suggesting not infection

## 2022-12-19 ENCOUNTER — TELEPHONE (OUTPATIENT)
Dept: FAMILY MEDICINE CLINIC | Facility: CLINIC | Age: 67
End: 2022-12-19

## 2022-12-19 DIAGNOSIS — U07.1 COVID-19 VIRUS DETECTED: Primary | ICD-10-CM

## 2022-12-19 NOTE — TELEPHONE ENCOUNTER
Caller: Shiva Michelle Sr    Relationship to patient: Emergency Contact    Best call back number: [phone]  Date of positive COVID19 test: 12/19      COVID19 symptoms: BODY ACHES, COUGH,WEAKNESS, FEVER, CONGESTION, RUNNY NOSE     Date of initial quarantine: 12/19    Additional information or concerns: HAVE BEEN TAKING OVER THE COUNTER, WOULD LIKE TO KNOW IF THERE IS SOMETHING ELSE THAT SHE SHOULD BE DOING OR TAKING     What is the patients preferred pharmacy:   [facility] [id number] [place] [id number] [place] [facility] [phone] [facility] [phone] FX

## 2022-12-19 NOTE — TELEPHONE ENCOUNTER
I sent in paxlovid.   Hold pravastatin for 7 days while on.  continue other medications though. Push fluids and rest.  RRJ

## 2023-01-03 DIAGNOSIS — E66.01 MORBID (SEVERE) OBESITY DUE TO EXCESS CALORIES: ICD-10-CM

## 2023-01-03 DIAGNOSIS — I77.9 PERIPHERAL ARTERIAL OCCLUSIVE DISEASE: ICD-10-CM

## 2023-01-03 RX ORDER — CLOPIDOGREL BISULFATE 75 MG/1
75 TABLET ORAL DAILY
Qty: 90 TABLET | Refills: 3 | Status: SHIPPED | OUTPATIENT
Start: 2023-01-03

## 2023-01-03 NOTE — TELEPHONE ENCOUNTER
Caller: Shiva Michelle Sr    Relationship: Emergency Contact    Best call back number: [phone]    Requested Prescriptions:   Requested Prescriptions     Pending Prescriptions Disp Refills   • clopidogrel (PLAVIX) 75 MG tablet 90 tablet 3     Sig: Take 1 tablet by mouth Daily.        Pharmacy where request should be sent: [facility] ([email or web address] ) - [facility] [address] [facility] [phone] [facility] [phone] FX     Additional details provided by patient:     Does the patient have less than a 3 day supply:  [x] Yes  [] No    Would you like a call back once the refill request has been completed: [x] Yes [] No    If the office needs to give you a call back, can they leave a voicemail: [x] Yes [] No    Avelina Brambila Rep   01/03/23 12:45 EST

## 2023-01-31 ENCOUNTER — TELEPHONE (OUTPATIENT)
Dept: BARIATRICS/WEIGHT MGMT | Facility: CLINIC | Age: 68
End: 2023-01-31
Payer: MEDICARE

## 2023-02-28 DIAGNOSIS — E66.01 MORBID (SEVERE) OBESITY DUE TO EXCESS CALORIES: ICD-10-CM

## 2023-02-28 RX ORDER — PHENTERMINE HYDROCHLORIDE 37.5 MG/1
TABLET ORAL
Qty: 30 TABLET | Refills: 0 | Status: SHIPPED | OUTPATIENT
Start: 2023-02-28 | End: 2023-03-30

## 2023-03-14 ENCOUNTER — TELEPHONE (OUTPATIENT)
Dept: FAMILY MEDICINE CLINIC | Facility: CLINIC | Age: 68
End: 2023-03-14
Payer: MEDICARE

## 2023-03-14 NOTE — TELEPHONE ENCOUNTER
Human care review RN calling on needing a peer to peer review for pt to get her wheelchair fixed needs to know if DR Kline will call this number to get the case going so pt can get her wheelchair repaired [phone] needs to hear by 03/16/2023 by 12 noon

## 2023-03-16 ENCOUNTER — OFFICE VISIT (OUTPATIENT)
Dept: FAMILY MEDICINE CLINIC | Facility: CLINIC | Age: 68
End: 2023-03-16
Payer: MEDICARE

## 2023-03-16 VITALS
SYSTOLIC BLOOD PRESSURE: 132 MMHG | HEART RATE: 80 BPM | HEIGHT: 63 IN | OXYGEN SATURATION: 98 % | DIASTOLIC BLOOD PRESSURE: 78 MMHG | BODY MASS INDEX: 46.42 KG/M2 | WEIGHT: 262 LBS

## 2023-03-16 DIAGNOSIS — R26.9 ABNORMAL GAIT: ICD-10-CM

## 2023-03-16 DIAGNOSIS — Z00.00 MEDICARE ANNUAL WELLNESS VISIT, SUBSEQUENT: Primary | ICD-10-CM

## 2023-03-16 DIAGNOSIS — G60.9 IDIOPATHIC PERIPHERAL NEUROPATHY: ICD-10-CM

## 2023-03-16 DIAGNOSIS — M48.061 SPINAL STENOSIS, LUMBAR REGION WITHOUT NEUROGENIC CLAUDICATION: ICD-10-CM

## 2023-03-16 DIAGNOSIS — E66.01 OBESITY, CLASS III, BMI 40-49.9 (MORBID OBESITY): ICD-10-CM

## 2023-03-16 DIAGNOSIS — J44.9 MIXED TYPE COPD (CHRONIC OBSTRUCTIVE PULMONARY DISEASE): ICD-10-CM

## 2023-03-16 RX ORDER — PRAMIPEXOLE DIHYDROCHLORIDE 0.25 MG/1
TABLET ORAL
COMMUNITY
Start: 2023-02-28

## 2023-03-16 NOTE — PROGRESS NOTES
QUICK REFERENCE INFORMATION:  The ABCs of the Annual Wellness Visit    Subsequent Medicare Wellness Visit    HEALTH RISK ASSESSMENT    1955    Recent Hospitalizations:  No hospitalization(s) within the last year..        Current Medical Providers:  Patient Care Team:  Angelito Kline DO as PCP - General (Family Medicine)        Smoking Status:  Social History     Tobacco Use   Smoking Status Former   • Packs/day: 0.75   • Years: 50.00   • Pack years: 37.50   • Types: Cigarettes   • Start date: 1960   • Quit date: 2020   • Years since quittin.3   Smokeless Tobacco Never       Alcohol Consumption:  Social History     Substance and Sexual Activity   Alcohol Use No       Depression Screen:   PHQ-2/PHQ-9 Depression Screening 3/16/2023   Retired PHQ-9 Total Score -   Retired Total Score -   Little Interest or Pleasure in Doing Things 0-->not at all   Feeling Down, Depressed or Hopeless 0-->not at all   PHQ-9: Brief Depression Severity Measure Score 0       Health Habits and Functional and Cognitive Screening:  Functional & Cognitive Status 3/16/2023   Do you have difficulty preparing food and eating? No   Do you have difficulty bathing yourself, getting dressed or grooming yourself? No   Do you have difficulty using the toilet? No   Do you have difficulty moving around from place to place? No   Do you have trouble with steps or getting out of a bed or a chair? No   Current Diet Well Balanced Diet   Dental Exam Up to date   Eye Exam Up to date   Exercise (times per week) 0 times per week   Current Exercises Include -   Do you need help using the phone?  No   Are you deaf or do you have serious difficulty hearing?  No   Do you need help with transportation? Yes   Do you need help shopping? Yes   Do you need help preparing meals?  Yes   Do you need help with housework?  Yes   Do you need help with laundry? Yes   Do you need help taking your medications? No   Do you need help managing money? No   Do you  ever drive or ride in a car without wearing a seat belt? No   Have you felt unusual stress, anger or loneliness in the last month? No   Who do you live with? Spouse   If you need help, do you have trouble finding someone available to you? No   Have you been bothered in the last four weeks by sexual problems? No   Do you have difficulty concentrating, remembering or making decisions? No           Does the patient have evidence of cognitive impairment? No    Aspirin use counseling: Taking ASA appropriately as indicated      Recent Lab Results:  CMP:  Lab Results   Component Value Date    BUN 12 12/02/2022    CREATININE 0.74 12/02/2022    EGFRIFNONA 95 02/21/2022    EGFRIFAFRI 109 02/21/2022    BCR 16 12/02/2022     12/02/2022    K 5.1 12/02/2022    CO2 32 (H) 12/02/2022    CALCIUM 9.6 12/02/2022    PROTENTOTREF 6.4 12/02/2022    ALBUMIN 3.7 (L) 12/02/2022    LABGLOBREF 2.7 12/02/2022    LABIL2 1.4 12/02/2022    BILITOT 0.3 12/02/2022    ALKPHOS 102 12/02/2022    AST 16 12/02/2022    ALT 10 12/02/2022     Lipid Panel:  Lab Results   Component Value Date    TRIG 124 09/01/2022    HDL 56 09/01/2022    VLDL 22 09/01/2022     HbA1c:  Lab Results   Component Value Date    HGBA1C 5.7 (H) 12/02/2022       Visual Acuity:  No results found.    Age-appropriate Screening Schedule:  Refer to the list below for future screening recommendations based on patient's age, sex and/or medical conditions. Orders for these recommended tests are listed in the plan section. The patient has been provided with a written plan.    Health Maintenance   Topic Date Due   • ANNUAL WELLNESS VISIT  02/21/2023   • LUNG CANCER SCREENING  03/14/2023   • PAP SMEAR  06/08/2023   • LIPID PANEL  09/01/2023   • Pneumococcal Vaccine 65+ (3 - PPSV23 if available, else PCV20) 10/11/2023   • MAMMOGRAM  03/14/2024   • DXA SCAN  03/21/2024   • COLORECTAL CANCER SCREENING  09/29/2025   • TDAP/TD VACCINES (3 - Td or Tdap) 10/11/2032   • HEPATITIS C SCREENING   Completed   • COVID-19 Vaccine  Completed   • INFLUENZA VACCINE  Completed        Subjective   History of Present Illness    Brenda Michelle is a 67 y.o. female who presents for an Subsequent Wellness Visit.    The following portions of the patient's history were reviewed and updated as appropriate: allergies, current medications, past family history, past medical history, past social history, past surgical history and problem list.    Outpatient Medications Prior to Visit   Medication Sig Dispense Refill   • Aspirin (ASPIR-81 PO) Take 81 mg by mouth Daily.     • baclofen (LIORESAL) 10 MG tablet Take 1 tablet by mouth 2 (Two) Times a Day As Needed for Muscle Spasms. 180 tablet 3   • clopidogrel (PLAVIX) 75 MG tablet Take 1 tablet by mouth Daily. 90 tablet 3   • docusate sodium (COLACE) 100 MG capsule Take 1 capsule by mouth 2 (Two) Times a Day.     • doxycycline (VIBRAMYCIN) 50 MG capsule Take 1 capsule by mouth 3 (Three) Times a Day Before Meals.     • DULoxetine (CYMBALTA) 60 MG capsule TAKE 1 CAPSULE DAILY 90 capsule 3   • folic acid (FOLVITE) 1 MG tablet TAKE 1 TABLET DAILY 90 tablet 3   • HYDROcodone-acetaminophen (NORCO)  MG per tablet Take 1 tablet by mouth Every 6 (Six) Hours As Needed for Moderate Pain.     • phentermine (ADIPEX-P) 37.5 MG tablet TAKE ONE TABLET BY MOUTH EVERY MORNING BEFORE BREAKFAST 30 tablet 0   • pramipexole (MIRAPEX) 0.25 MG tablet      • pravastatin (PRAVACHOL) 80 MG tablet Take 1 tablet by mouth Daily. 90 tablet 51   • pregabalin (LYRICA) 150 MG capsule Take 1 capsule by mouth 3 (Three) Times a Day. (Patient taking differently: Take 1 capsule by mouth 2 (Two) Times a Day.) 270 capsule 1   • risedronate (Actonel) 150 MG tablet Take 1 tablet by mouth Every 30 (Thirty) Days. with water on empty stomach, nothing by mouth or lie down for next 30 minutes. 3 tablet 3   • vitamin B-12 (CYANOCOBALAMIN) 1000 MCG tablet Take 1 tablet by mouth Daily.     • vitamin D3 125 MCG (5000 UT)  capsule capsule Take 1 capsule by mouth Daily.     • Pfizer COVID-19 Vac Bivalent 30 MCG/0.3ML suspension  (Patient not taking: Reported on 3/16/2023)     • traZODone (DESYREL) 50 MG tablet Take 1 tablet by mouth Every Night. (Patient not taking: Reported on 3/16/2023) 30 tablet 5     No facility-administered medications prior to visit.       Patient Active Problem List   Diagnosis   • Acute deep vein thrombosis of distal leg ([facility])   • Adenomatous polyp of colon   • Depression   • Fibromyalgia   • Dyslipidemia   • Peripheral arterial occlusive disease ([facility])   • Vitamin D deficiency   • Gastroesophageal reflux disease   • Irritable bowel syndrome   • Disorder of intervertebral disc   • Peripheral nerve disease   • Fracture of multiple ribs of right side   • Fall   • Acute respiratory failure with hypoxia ([facility])   • COPD mixed type ([facility])   • Weakness   • Hypopotassemia   • Dysuria   • Constipation   • Hemorrhoids   • Arthritis   • Fibromyositis   • Hernia of anterior abdominal wall   • 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[facility])   • Injury of right ankle   • Kidney stone   • Knee pain   • Moderate ankle sprain   • Osteoarthritis of knee   • Spinal stenosis, lumbar region without neurogenic claudication   • Chronic respiratory failure with hypoxia ([facility])   • Bursitis of hip   • Lumbar facet joint pain   • Low back pain   • Acute cystitis   • Obesity, Class III, BMI 40-49.9 (morbid obesity) ([facility])   • Abnormal CT scan, gastrointestinal tract   • Wheelchair dependence   • Supplemental oxygen dependent   • Dietary counseling   • Pre-op evaluation   • STEVEN (obstructive sleep apnea)       Advance Care Planning:  ACP discussion was held with the patient during this visit. Patient does not have an advance directive, information provided.    Identification of Risk Factors:  Risk factors include: Fall Risk.    Review of Systems   Respiratory: Positive for shortness of breath.    Cardiovascular: Negative for chest pain and palpitations.  "  Gastrointestinal: Negative for abdominal pain.       Compared to one year ago, the patient feels her physical health is the same.  Compared to one year ago, the patient feels her mental health is the same.    Objective     Physical Exam  Vitals and nursing note reviewed.   Constitutional:       Appearance: She is well-developed.   HENT:      Head: Normocephalic and atraumatic.   Neck:      Thyroid: No thyromegaly.      Vascular: No JVD.   Cardiovascular:      Rate and Rhythm: Normal rate and regular rhythm.      Heart sounds: Normal heart sounds. No murmur heard.    No friction rub. No gallop.   Pulmonary:      Effort: Pulmonary effort is normal. No respiratory distress.      Breath sounds: Decreased air movement present. Decreased breath sounds present. No wheezing or rales.   Abdominal:      General: Bowel sounds are normal. There is no distension.      Palpations: Abdomen is soft.      Tenderness: There is no abdominal tenderness. There is no guarding or rebound.   Musculoskeletal:      Cervical back: Neck supple.   Skin:     General: Skin is warm and dry.   Neurological:      Mental Status: She is alert.      Comments: WC bound   Psychiatric:         Behavior: Behavior normal.         Vitals:    03/16/23 1328   BP: 132/78   Pulse: 80   SpO2: 98%   Weight: 119 kg (262 lb)  Comment: No actual pt couldn't stand.   Height: 160 cm (62.99\")     Body mass index is 46.42 kg/m².    Class 3 Severe Obesity (BMI >=40). Obesity-related health conditions include the following: hypertension. Obesity is newly identified. BMI is is above average; BMI management plan is completed. We discussed portion control and increasing exercise.      Assessment & Plan   Patient Self-Management and Personalized Health Advice  The patient has been provided with information about: diet and exercise and preventive services including:   · Annual Wellness Visit (AWV).    Visit Diagnoses:    ICD-10-CM ICD-9-CM   1. Medicare annual wellness visit, " subsequent  Z00.00 V70.0   2. Mixed type COPD (chronic obstructive pulmonary disease) ([facility])  J44.9 496   3. Idiopathic peripheral neuropathy  G60.9 356.9   4. Abnormal gait  R26.9 781.2   5. Spinal stenosis, lumbar region without neurogenic claudication  M48.061 724.02   6. Obesity, Class III, BMI 40-49.9 (morbid obesity) ([facility])  E66.01 278.01       Orders Placed This Encounter   Procedures   • Ambulatory Referral to Bariatric Surgery     Referral Priority:   Routine     Referral Type:   Consultation     Referral Reason:   Specialty Services Required     Referred to Provider:   Chirag Garcia Jr., MD     Requested Specialty:   Bariatrics     Number of Visits Requested:   1       Outpatient Encounter Medications as of 3/16/2023   Medication Sig Dispense Refill   • Aspirin (ASPIR-81 PO) Take 81 mg by mouth Daily.     • baclofen (LIORESAL) 10 MG tablet Take 1 tablet by mouth 2 (Two) Times a Day As Needed for Muscle Spasms. 180 tablet 3   • clopidogrel (PLAVIX) 75 MG tablet Take 1 tablet by mouth Daily. 90 tablet 3   • docusate sodium (COLACE) 100 MG capsule Take 1 capsule by mouth 2 (Two) Times a Day.     • doxycycline (VIBRAMYCIN) 50 MG capsule Take 1 capsule by mouth 3 (Three) Times a Day Before Meals.     • DULoxetine (CYMBALTA) 60 MG capsule TAKE 1 CAPSULE DAILY 90 capsule 3   • folic acid (FOLVITE) 1 MG tablet TAKE 1 TABLET DAILY 90 tablet 3   • HYDROcodone-acetaminophen (NORCO)  MG per tablet Take 1 tablet by mouth Every 6 (Six) Hours As Needed for Moderate Pain.     • phentermine (ADIPEX-P) 37.5 MG tablet TAKE ONE TABLET BY MOUTH EVERY MORNING BEFORE BREAKFAST 30 tablet 0   • pramipexole (MIRAPEX) 0.25 MG tablet      • pravastatin (PRAVACHOL) 80 MG tablet Take 1 tablet by mouth Daily. 90 tablet 51   • pregabalin (LYRICA) 150 MG capsule Take 1 capsule by mouth 3 (Three) Times a Day. (Patient taking differently: Take 1 capsule by mouth 2 (Two) Times a Day.) 270 capsule 1   • risedronate (Actonel) 150 MG  tablet Take 1 tablet by mouth Every 30 (Thirty) Days. with water on empty stomach, nothing by mouth or lie down for next 30 minutes. 3 tablet 3   • vitamin B-12 (CYANOCOBALAMIN) 1000 MCG tablet Take 1 tablet by mouth Daily.     • vitamin D3 125 MCG (5000 UT) capsule capsule Take 1 capsule by mouth Daily.     • [DISCONTINUED] Pfizer COVID-19 Vac Bivalent 30 MCG/0.3ML suspension  (Patient not taking: Reported on 3/16/2023)     • [DISCONTINUED] traZODone (DESYREL) 50 MG tablet Take 1 tablet by mouth Every Night. (Patient not taking: Reported on 3/16/2023) 30 tablet 5     No facility-administered encounter medications on file as of 3/16/2023.       Reviewed use of high risk medication in the elderly: yes  Reviewed for potential of harmful drug interactions in the elderly: yes    Follow Up:  Return if symptoms worsen or fail to improve.     An After Visit Summary and PPPS with all of these plans were given to the patient.           ++++++++++++++++++++++++++++++++++++++++++++++++++++++++++++++++++     Chief Complaint   Patient presents with   • COPD   • Medicare Wellness-subsequent   • Back Pain     HPI  Patient 66 y/o with copd chronic oxygen dependent, breathing doing ok and stable at baseline dyspnea;  Has also severe back issues with spinal stenosis and idiopathic periopheral neuropahty, as a result is wc bound.  She has a wc, but wheels are warn and needs repair, too soon for new.    Review of Systems   Cardiovascular: Negative for chest pain and palpitations.   Respiratory: Positive for shortness of breath.    Gastrointestinal: Negative for abdominal pain.       Social History     Tobacco Use   • Smoking status: Former     Packs/day: 0.75     Years: 50.00     Pack years: 37.50     Types: Cigarettes     Start date: 1960     Quit date: 2020     Years since quittin.3   • Smokeless tobacco: Never   Substance Use Topics   • Alcohol use: No     O:   Vitals:    23 1328   BP: 132/78   Pulse: 80   SpO2:  "98%   Weight: 119 kg (262 lb)  Comment: No actual pt couldn't stand.   Height: 160 cm (62.99\")     Body mass index is 46.42 kg/m².  Vitals and nursing note reviewed.   Constitutional:       Appearance: Well-developed.   HENT:      Head: Normocephalic and atraumatic.   Neck:      Thyroid: No thyromegaly.      Vascular: No JVD.   Pulmonary:      Effort: Pulmonary effort is normal. No respiratory distress.      Breath sounds: Decreased air movement present. Decreased breath sounds present. No wheezing. No rales.   Cardiovascular:      Normal rate. Regular rhythm. Normal heart sounds.      No gallop. No friction rub.   Abdominal:      General: Bowel sounds are normal. There is no distension.      Palpations: Abdomen is soft.      Tenderness: There is no abdominal tenderness. There is no guarding or rebound.   Musculoskeletal:      Cervical back: Neck supple. Skin:     General: Skin is warm and dry.   Neurological:      Mental Status: Alert.      Comments: WC bound   Psychiatric:         Behavior: Behavior normal.         Diagnoses and all orders for this visit:    1. Medicare annual wellness visit, subsequent (Primary)    2. Mixed type COPD (chronic obstructive pulmonary disease) ([facility])    3. Idiopathic peripheral neuropathy    4. Abnormal gait    5. Spinal stenosis, lumbar region without neurogenic claudication    6. Obesity, Class III, BMI 40-49.9 (morbid obesity) ([facility])  -     Ambulatory Referral to Bariatric Surgery        FACE to FACE:  For wheel chair repair.  Wheels are wearing out and need replaced as well as brakes as no longer latching.  Also seat stretching and needs repaired.  Patient unable to safely use cane or walker due to fall risk with medical condition, neuropathy, lumbar spinal stenosis.  Due to immobility related to with severe low back pain with spinal stenosis, COPD with hypoxia, idiopathic peripheral neuropathy and severe ataxic gait she needs a wheel chair to assist with mobility related daily " living activities inside the home.  Patient has upper body strength and mental capability to propel wheelchair inside the home.  She is safe to use a standard manual wheel chair.  She also has a caregiver that can help move wheel chair in home.         Also struggling to lsoe weight, unable exercise;  Will refer to bariatrics    Return if symptoms worsen or fail to improve.

## 2023-03-22 NOTE — PATIENT INSTRUCTIONS
"\"9-4-3-Almost None!\"  Healthy Habits Start Early    EAT 5 OR MORE SERVINGS OF VEGETABLES AND FRUITS EVERY DAY.    Help Brenda get three vegetables and two fruits each day. Red, green, yellow, orange...encourage them to try all the colors so they can enjoy different flavors and get more vitamins.    How can I help Brenad do this?  ---------------------------------------------  -BE PATIENT WITH Brenda, remember it may take 10 times before they start to like new food. So, start with small bites and just keep trying.  -Serve at least one vegetable or fruit at every meal. Even try two. Remember, portions do not have to be as big as you think.  -Encourage eating fruits and vegetables instead of drinking them..it's a better way to get fiber and vitamins..so limit the amount of juice to 1/2 cup per day for children 1-6 years and one cup per day for children 7-18 years of age. Try using 1/2 part water and 1/2 part juice.    Spend less than two hours per day watching television and other screen media. Screen media includes video games, movies and computer use for entertainment.    How can I help Brenda do this?  -Turn off the TV at dinner. Dinner is the best time to hang out with your kids and just talk, learn about their day, and tell them about your day. Your kids have a lot to learn from you and dinner is a great time to share.  Advance Care Planning and Advance Directives     You make decisions on a daily basis - decisions about where you want to live, your career, your home, your life. Perhaps one of the most important decisions you face is your choice for future medical care. Take time to talk with your family and your healthcare team and start planning today.  Advance Care Planning is a process that can help you:  Understand possible future healthcare decisions in light of your own experiences  Reflect on those decision in light of your goals and values  Discuss your decisions with those closest to you and the healthcare " professionals that care for you  Make a plan by creating a document that reflects your wishes    Surrogate Decision Maker  In the event of a medical emergency, which has left you unable to communicate or to make your own decisions, you would need someone to make decisions for you.  It is important to discuss your preferences for medical treatment with this person while you are in good health.     Qualities of a surrogate decision maker:  Willing to take on this role and responsibility  Knows what you want for future medical care  Willing to follow your wishes even if they don't agree with them  Able to make difficult medical decisions under stressful circumstances    Advance Directives  These are legal documents you can create that will guide your healthcare team and decision maker(s) when needed. These documents can be stored in the electronic medical record.    Living Will - a legal document to guide your care if you have a terminal condition or a serious illness and are unable to communicate. States vary by statute in document names/types, but most forms may include one or more of the following:        -  Directions regarding life-prolonging treatments        -  Directions regarding artificially provided nutrition/hydration        -  Choosing a healthcare decision maker        -  Direction regarding organ/tissue donation    Durable Power of  for Healthcare - this document names an -in-fact to make medical decisions for you, but it may also allow this person to make personal and financial decisions for you. Please seek the advice of an  if you need this type of document.    **Advance Directives are not required and no one may discriminate against you if you do not sign one.    Medical Orders  Many states allow specific forms/orders signed by your physician to record your wishes for medical treatment in your current state of health. This form, signed in personal communication with your  physician, addresses resuscitation and other medical interventions that you may or may not want.      For more information or to schedule a time with a [facility] Advance Care Planning Facilitator contact: [facility] or call [phone] and someone will contact you directly.

## 2023-03-27 ENCOUNTER — TELEPHONE (OUTPATIENT)
Dept: FAMILY MEDICINE CLINIC | Facility: CLINIC | Age: 68
End: 2023-03-27
Payer: MEDICARE

## 2023-03-27 NOTE — TELEPHONE ENCOUNTER
Caller: Brenda Michelle    Relationship to patient: Self    Best call back number: [phone]    Chief complaint: COMPLETE PAPERWORK FOR BARIATRIC SURGERY    Type of visit: OFFICE VISIT    Requested date: ASAP - PATIENT CANNOT DO A Tuesday OR A Friday.      If rescheduling, when is the original appointment: N/A

## 2023-03-30 DIAGNOSIS — E66.01 MORBID (SEVERE) OBESITY DUE TO EXCESS CALORIES: ICD-10-CM

## 2023-03-31 RX ORDER — PHENTERMINE HYDROCHLORIDE 37.5 MG/1
TABLET ORAL
Qty: 30 TABLET | Refills: 2 | Status: SHIPPED | OUTPATIENT
Start: 2023-03-31 | End: 2023-04-11 | Stop reason: SDUPTHER

## 2023-04-11 ENCOUNTER — OFFICE VISIT (OUTPATIENT)
Dept: FAMILY MEDICINE CLINIC | Facility: CLINIC | Age: 68
End: 2023-04-11
Payer: MEDICARE

## 2023-04-11 VITALS
OXYGEN SATURATION: 98 % | BODY MASS INDEX: 43.59 KG/M2 | SYSTOLIC BLOOD PRESSURE: 132 MMHG | RESPIRATION RATE: 20 BRPM | HEIGHT: 63 IN | DIASTOLIC BLOOD PRESSURE: 70 MMHG | HEART RATE: 86 BPM | WEIGHT: 246 LBS

## 2023-04-11 DIAGNOSIS — E66.01 MORBID (SEVERE) OBESITY DUE TO EXCESS CALORIES: ICD-10-CM

## 2023-04-11 DIAGNOSIS — N39.0 ACUTE UTI: Primary | ICD-10-CM

## 2023-04-11 LAB
BILIRUB BLD-MCNC: NEGATIVE MG/DL
CLARITY, POC: ABNORMAL
COLOR UR: ABNORMAL
EXPIRATION DATE: ABNORMAL
GLUCOSE UR STRIP-MCNC: NEGATIVE MG/DL
KETONES UR QL: NEGATIVE
LEUKOCYTE EST, POC: ABNORMAL
Lab: ABNORMAL
NITRITE UR-MCNC: NEGATIVE MG/ML
PH UR: 6 [PH] (ref 5–8)
PROT UR STRIP-MCNC: ABNORMAL MG/DL
RBC # UR STRIP: NEGATIVE /UL
SP GR UR: 1.01 (ref 1–1.03)
UROBILINOGEN UR QL: ABNORMAL

## 2023-04-11 RX ORDER — PHENTERMINE HYDROCHLORIDE 37.5 MG/1
TABLET ORAL EVERY 24 HOURS
COMMUNITY

## 2023-04-11 RX ORDER — TOPIRAMATE 25 MG/1
25 TABLET ORAL 2 TIMES DAILY
Qty: 60 TABLET | Refills: 5 | Status: SHIPPED | OUTPATIENT
Start: 2023-04-11

## 2023-04-11 RX ORDER — NITROFURANTOIN 25; 75 MG/1; MG/1
100 CAPSULE ORAL 2 TIMES DAILY
Qty: 14 CAPSULE | Refills: 0 | Status: SHIPPED | OUTPATIENT
Start: 2023-04-11

## 2023-04-11 NOTE — PROGRESS NOTES
Subjective   Brenda Michelle is a 67 y.o. female. Presents today for   Chief Complaint   Patient presents with   • Weight Check   • Urinary Tract Infection       History of Present Illness  patinet hree for f/u of weight check and planning on bariatric;  On phentermine feels like plateaued;  Did drop from 262 to 246.  workin lorne diet, avoiding sweets;   Hard time exercise as mostly wc bound due to neuoropathies and OA;   Is trying walk with walker;   No cp/soa; no palpitations;   She also having dysuria and frquency, has hx of recurrent UTI.    Review of Systems   Cardiovascular: Negative for chest pain, palpitations and leg swelling.   Gastrointestinal: Negative for abdominal pain.       Patient Active Problem List   Diagnosis   • Acute deep vein thrombosis of distal leg   • Adenomatous polyp of colon   • Depression   • Fibromyalgia   • Dyslipidemia   • Peripheral arterial occlusive disease (HCC)   • Vitamin D deficiency   • Gastroesophageal reflux disease   • Irritable bowel syndrome   • Disorder of intervertebral disc   • Peripheral nerve disease   • Fracture of multiple ribs of right side   • Fall   • Acute respiratory failure with hypoxia   • COPD mixed type (HCC)   • Weakness   • Hypopotassemia   • Dysuria   • Constipation   • Hemorrhoids   • Arthritis   • Fibromyositis   • Hernia of anterior abdominal wall   • Homocystinemia   • Injury of right ankle   • Kidney stone   • Knee pain   • Moderate ankle sprain   • Osteoarthritis of knee   • Spinal stenosis, lumbar region without neurogenic claudication   • Chronic respiratory failure with hypoxia (HCC)   • Bursitis of hip   • Lumbar facet joint pain   • Low back pain   • Acute cystitis   • Obesity, Class III, BMI 40-49.9 (morbid obesity)   • Abnormal CT scan, gastrointestinal tract   • Wheelchair dependence   • Supplemental oxygen dependent   • Dietary counseling   • Pre-op evaluation   • STEVEN (obstructive sleep apnea)       Social History     Socioeconomic History    • Marital status:    • Number of children: 2   • Years of education: 12    Tobacco Use   • Smoking status: Former     Packs/day: 0.75     Years: 50.00     Pack years: 37.50     Types: Cigarettes     Start date: 1960     Quit date: 2020     Years since quittin.4   • Smokeless tobacco: Never   Vaping Use   • Vaping Use: Never used   Substance and Sexual Activity   • Alcohol use: No   • Drug use: No   • Sexual activity: Not Currently     Partners: Male       Allergies   Allergen Reactions   • Ambien [Zolpidem Tartrate] Other (See Comments)     Nightmares   • Bupropion Itching   • Codeine Sulfate Itching   • Zolpidem Hives     Nightmares   • Adhesive Tape Rash   • Aripiprazole Unknown - Low Severity   • Atorvastatin Other (See Comments)     MYALGIAS   • Cilostazol Other (See Comments)     HA   • Colesevelam Nausea Only   • Ferrous Sulfate Nausea Only   • Welchol [Colesevelam Hcl] Nausea Only   • Wound Dressing Adhesive Rash       Current Outpatient Medications on File Prior to Visit   Medication Sig Dispense Refill   • Aspirin (ASPIR-81 PO) Take 81 mg by mouth Daily.     • baclofen (LIORESAL) 10 MG tablet Take 1 tablet by mouth 2 (Two) Times a Day As Needed for Muscle Spasms. 180 tablet 3   • clopidogrel (PLAVIX) 75 MG tablet Take 1 tablet by mouth Daily. 90 tablet 3   • docusate sodium (COLACE) 100 MG capsule Take 1 capsule by mouth 2 (Two) Times a Day.     • doxycycline (VIBRAMYCIN) 50 MG capsule Take 1 capsule by mouth 3 (Three) Times a Day Before Meals.     • DULoxetine (CYMBALTA) 60 MG capsule TAKE 1 CAPSULE DAILY 90 capsule 3   • folic acid (FOLVITE) 1 MG tablet TAKE 1 TABLET DAILY 90 tablet 3   • HYDROcodone-acetaminophen (NORCO)  MG per tablet Take 1 tablet by mouth Every 6 (Six) Hours As Needed for Moderate Pain.     • phentermine (ADIPEX-P) 37.5 MG tablet Daily.     • pramipexole (MIRAPEX) 0.25 MG tablet      • pravastatin (PRAVACHOL) 80 MG tablet Take 1 tablet by mouth Daily. 90  "tablet 51   • pregabalin (LYRICA) 150 MG capsule Take 1 capsule by mouth 3 (Three) Times a Day. (Patient taking differently: Take 1 capsule by mouth 2 (Two) Times a Day.) 270 capsule 1   • risedronate (Actonel) 150 MG tablet Take 1 tablet by mouth Every 30 (Thirty) Days. with water on empty stomach, nothing by mouth or lie down for next 30 minutes. 3 tablet 3   • vitamin B-12 (CYANOCOBALAMIN) 1000 MCG tablet Take 1 tablet by mouth Daily.     • vitamin D3 125 MCG (5000 UT) capsule capsule Take 1 capsule by mouth Daily.     • [DISCONTINUED] phentermine (ADIPEX-P) 37.5 MG tablet TAKE ONE TABLET BY MOUTH EVERY MORNING BEFORE BREAKFAST 30 tablet 2     No current facility-administered medications on file prior to visit.       Objective   Vitals:    04/11/23 1543   BP: 132/70   Pulse: 86   Resp: 20   SpO2: 98%  Comment: 3lpm nc   Weight: 112 kg (246 lb)   Height: 160 cm (62.99\")   PainSc: 0-No pain     Body mass index is 43.59 kg/m².    Physical Exam  Vitals and nursing note reviewed.   Constitutional:       Appearance: Normal appearance. She is not toxic-appearing or diaphoretic.   HENT:      Head: Normocephalic and atraumatic.   Musculoskeletal:      Cervical back: Neck supple.   Skin:     General: Skin is warm and dry.      Capillary Refill: Capillary refill takes less than 2 seconds.   Neurological:      Mental Status: She is alert.   Psychiatric:         Mood and Affect: Mood normal.         Behavior: Behavior normal.         Component      Latest Ref Rng 4/11/2023   Color, UA      Yellow, Straw, Dark Yellow, Meghann  Meghann    Appearance, UA      Clear  Cloudy !    Specific Gravity, UA      1.005 - 1.030  1.015    pH, UA      5.0 - 8.0  6.0    Leukocytes, UA      Negative  25 Bin/ul !    Nitrite, UA      Negative  Negative    Protein, UA      Negative mg/dL 2+ !    Glucose      Negative mg/dL Negative    Ketones, UA      Negative  Negative    Urobilinogen, UA      Normal, 0.2 E.U./dL  0.2 E.U./dL    Bilirubin, UA      " Negative  Negative    Blood, UA      Negative  Negative    Lot Number [id number]    Expiration Date 12,212,023       ! Abnormal    Assessment & Plan   Diagnoses and all orders for this visit:    1. Acute UTI (Primary)  -     Urine Culture - Urine, Urine, Clean Catch  -     POCT urinalysis dipstick, automated  -     nitrofurantoin, macrocrystal-monohydrate, (Macrobid) 100 MG capsule; Take 1 capsule by mouth 2 (Two) Times a Day.  Dispense: 14 capsule; Refill: 0    2. Morbid (severe) obesity due to excess calories  -     topiramate (TOPAMAX) 25 MG tablet; Take 1 tablet by mouth 2 (Two) Times a Day.  Dispense: 60 tablet; Refill: 5      Will add topamax to phentermine for weight loss  Counseled on diet and exercise  Send urine culture and start abx       -Follow up: 1 month and prn

## 2023-04-13 LAB
BACTERIA UR CULT: NORMAL
BACTERIA UR CULT: NORMAL

## 2023-04-14 ENCOUNTER — APPOINTMENT (OUTPATIENT)
Dept: CT IMAGING | Facility: HOSPITAL | Age: 68
End: 2023-04-14
Payer: MEDICARE

## 2023-04-14 ENCOUNTER — HOSPITAL ENCOUNTER (EMERGENCY)
Facility: HOSPITAL | Age: 68
Discharge: HOME | End: 2023-04-14
Attending: EMERGENCY MEDICINE | Admitting: EMERGENCY MEDICINE
Payer: MEDICARE

## 2023-04-14 VITALS
TEMPERATURE: 98.7 F | BODY MASS INDEX: 43.59 KG/M2 | HEIGHT: 63 IN | HEART RATE: 87 BPM | OXYGEN SATURATION: 96 % | RESPIRATION RATE: 18 BRPM | WEIGHT: 246 LBS | DIASTOLIC BLOOD PRESSURE: 55 MMHG | SYSTOLIC BLOOD PRESSURE: 115 MMHG

## 2023-04-14 DIAGNOSIS — M48.061 SPINAL STENOSIS, LUMBAR REGION WITHOUT NEUROGENIC CLAUDICATION: Primary | ICD-10-CM

## 2023-04-14 DIAGNOSIS — R35.0 URINARY FREQUENCY: ICD-10-CM

## 2023-04-14 DIAGNOSIS — R30.0 DYSURIA: ICD-10-CM

## 2023-04-14 LAB
ALBUMIN SERPL-MCNC: 3.3 G/DL (ref 3.5–5.2)
ALBUMIN/GLOB SERPL: 1 G/DL
ALP SERPL-CCNC: 98 U/L (ref 39–117)
ALT SERPL W P-5'-P-CCNC: 13 U/L (ref 1–33)
ANION GAP SERPL CALCULATED.3IONS-SCNC: 6.8 MMOL/L (ref 5–15)
AST SERPL-CCNC: 15 U/L (ref 1–32)
BASOPHILS # BLD AUTO: 0.02 10*3/MM3 (ref 0–0.2)
BASOPHILS NFR BLD AUTO: 0.3 % (ref 0–1.5)
BILIRUB SERPL-MCNC: 0.5 MG/DL (ref 0–1.2)
BILIRUB UR QL STRIP: NEGATIVE
BUN SERPL-MCNC: 11 MG/DL (ref 8–23)
BUN/CREAT SERPL: 12.2 (ref 7–25)
CALCIUM SPEC-SCNC: 9.8 MG/DL (ref 8.6–10.5)
CHLORIDE SERPL-SCNC: 97 MMOL/L (ref 98–107)
CLARITY UR: CLEAR
CO2 SERPL-SCNC: 32.2 MMOL/L (ref 22–29)
COLOR UR: YELLOW
CREAT SERPL-MCNC: 0.9 MG/DL (ref 0.57–1)
D-LACTATE SERPL-SCNC: 1.4 MMOL/L (ref 0.5–2)
DEPRECATED RDW RBC AUTO: 52.3 FL (ref 37–54)
EGFRCR SERPLBLD CKD-EPI 2021: 70.2 ML/MIN/1.73
EOSINOPHIL # BLD AUTO: 0.15 10*3/MM3 (ref 0–0.4)
EOSINOPHIL NFR BLD AUTO: 1.9 % (ref 0.3–6.2)
ERYTHROCYTE [DISTWIDTH] IN BLOOD BY AUTOMATED COUNT: 14.5 % (ref 12.3–15.4)
GLOBULIN UR ELPH-MCNC: 3.2 GM/DL
GLUCOSE SERPL-MCNC: 105 MG/DL (ref 65–99)
GLUCOSE UR STRIP-MCNC: NEGATIVE MG/DL
HCT VFR BLD AUTO: 38.9 % (ref 34–46.6)
HGB BLD-MCNC: 12.7 G/DL (ref 12–15.9)
HGB UR QL STRIP.AUTO: NEGATIVE
HOLD SPECIMEN: NORMAL
HOLD SPECIMEN: NORMAL
IMM GRANULOCYTES # BLD AUTO: 0.05 10*3/MM3 (ref 0–0.05)
IMM GRANULOCYTES NFR BLD AUTO: 0.6 % (ref 0–0.5)
KETONES UR QL STRIP: NEGATIVE
LEUKOCYTE ESTERASE UR QL STRIP.AUTO: NEGATIVE
LIPASE SERPL-CCNC: 14 U/L (ref 13–60)
LYMPHOCYTES # BLD AUTO: 0.6 10*3/MM3 (ref 0.7–3.1)
LYMPHOCYTES NFR BLD AUTO: 7.7 % (ref 19.6–45.3)
MCH RBC QN AUTO: 32.4 PG (ref 26.6–33)
MCHC RBC AUTO-ENTMCNC: 32.6 G/DL (ref 31.5–35.7)
MCV RBC AUTO: 99.2 FL (ref 79–97)
MONOCYTES # BLD AUTO: 0.52 10*3/MM3 (ref 0.1–0.9)
MONOCYTES NFR BLD AUTO: 6.7 % (ref 5–12)
NEUTROPHILS NFR BLD AUTO: 6.43 10*3/MM3 (ref 1.7–7)
NEUTROPHILS NFR BLD AUTO: 82.8 % (ref 42.7–76)
NITRITE UR QL STRIP: NEGATIVE
NRBC BLD AUTO-RTO: 0 /100 WBC (ref 0–0.2)
PH UR STRIP.AUTO: 7.5 [PH] (ref 5–8)
PLATELET # BLD AUTO: 181 10*3/MM3 (ref 140–450)
PMV BLD AUTO: 10.4 FL (ref 6–12)
POTASSIUM SERPL-SCNC: 4 MMOL/L (ref 3.5–5.2)
PROT SERPL-MCNC: 6.5 G/DL (ref 6–8.5)
PROT UR QL STRIP: NEGATIVE
RBC # BLD AUTO: 3.92 10*6/MM3 (ref 3.77–5.28)
SODIUM SERPL-SCNC: 136 MMOL/L (ref 136–145)
SP GR UR STRIP: 1.01 (ref 1–1.03)
UROBILINOGEN UR QL STRIP: NORMAL
WBC NRBC COR # BLD: 7.77 10*3/MM3 (ref 3.4–10.8)
WHOLE BLOOD HOLD COAG: NORMAL
WHOLE BLOOD HOLD SPECIMEN: NORMAL

## 2023-04-14 PROCEDURE — 25010000002 HYDROMORPHONE PER 4 MG: Performed by: EMERGENCY MEDICINE

## 2023-04-14 PROCEDURE — 74177 CT ABD & PELVIS W/CONTRAST: CPT

## 2023-04-14 PROCEDURE — P9612 CATHETERIZE FOR URINE SPEC: HCPCS

## 2023-04-14 PROCEDURE — 80053 COMPREHEN METABOLIC PANEL: CPT

## 2023-04-14 PROCEDURE — 96375 TX/PRO/DX INJ NEW DRUG ADDON: CPT

## 2023-04-14 PROCEDURE — 99284 EMERGENCY DEPT VISIT MOD MDM: CPT

## 2023-04-14 PROCEDURE — 85025 COMPLETE CBC W/AUTO DIFF WBC: CPT

## 2023-04-14 PROCEDURE — 25010000002 ONDANSETRON PER 1 MG: Performed by: EMERGENCY MEDICINE

## 2023-04-14 PROCEDURE — 83690 ASSAY OF LIPASE: CPT

## 2023-04-14 PROCEDURE — 96374 THER/PROPH/DIAG INJ IV PUSH: CPT

## 2023-04-14 PROCEDURE — 81003 URINALYSIS AUTO W/O SCOPE: CPT | Performed by: EMERGENCY MEDICINE

## 2023-04-14 PROCEDURE — 83605 ASSAY OF LACTIC ACID: CPT

## 2023-04-14 PROCEDURE — 25510000001 IOPAMIDOL 61 % SOLUTION: Performed by: EMERGENCY MEDICINE

## 2023-04-14 RX ORDER — HYDROMORPHONE HYDROCHLORIDE 1 MG/ML
0.5 INJECTION, SOLUTION INTRAMUSCULAR; INTRAVENOUS; SUBCUTANEOUS ONCE
Status: COMPLETED | OUTPATIENT
Start: 2023-04-14 | End: 2023-04-14

## 2023-04-14 RX ORDER — ONDANSETRON 2 MG/ML
4 INJECTION INTRAMUSCULAR; INTRAVENOUS ONCE
Status: COMPLETED | OUTPATIENT
Start: 2023-04-14 | End: 2023-04-14

## 2023-04-14 RX ORDER — SODIUM CHLORIDE 0.9 % (FLUSH) 0.9 %
10 SYRINGE (ML) INJECTION AS NEEDED
Status: DISCONTINUED | OUTPATIENT
Start: 2023-04-14 | End: 2023-04-15 | Stop reason: HOSPADM

## 2023-04-14 RX ADMIN — HYDROMORPHONE HYDROCHLORIDE 0.5 MG: 1 INJECTION, SOLUTION INTRAMUSCULAR; INTRAVENOUS; SUBCUTANEOUS at 20:59

## 2023-04-14 RX ADMIN — IOPAMIDOL 85 ML: 612 INJECTION, SOLUTION INTRAVENOUS at 21:53

## 2023-04-14 RX ADMIN — ONDANSETRON 4 MG: 2 INJECTION INTRAMUSCULAR; INTRAVENOUS at 20:59

## 2023-04-14 NOTE — ED TRIAGE NOTES
Patient to ed from home via ems with complaints of a UTI that isnt getting better. Per ems, patient has been taking abx. Patient is alert and oriented and having severe back pain. Patient is on 3L chronically

## 2023-04-15 NOTE — ED PROVIDER NOTES
EMERGENCY DEPARTMENT ENCOUNTER    Room Number:  37/37  Date seen:  4/14/2023  PCP: Angelito Kline DO  Historian: Patient and spouse      HPI:  Chief Complaint: Low back pain, dysuria    A complete HPI/ROS/PMH/PSH/SH/FH are unobtainable due to: N/A    Context: Brenda Michelle is a 67 y.o. female who presents to the ED c/o low back pain and dysuria.  She has had dysuria for over a week (see discussion below).  She started having right-sided low back pain a few days ago.  She states her urine is malodorous and she is also having dysuria and frequency.  No fevers or chills.  No nausea, vomiting or diarrhea.  She did feel chilled slightly today and was tremulous.    Additional sources:  - Discussed/ obtained information from independent historians: Yes-she is accompanied by her  who confirms pertinent aspects of the HPI.    - External (non-ED) record review: Patient was last seen by her primary care physician on 4/11/2023 for UTI.  She was treated for a UTI at that visit with Macrobid.  She has a history of neuropathy, osteoarthritis, peripheral arterial disease, IBS, COPD, chronic respiratory failure on oxygen, fibromyositis, hyperlipidemia, prior DVT.    No recent ER visits or hospitalizations about self level.    - Chronic or social conditions impacting care: Multiple medical problems.      PAST MEDICAL HISTORY  Active Ambulatory Problems     Diagnosis Date Noted   • Acute deep vein thrombosis of distal leg 01/24/2016   • Adenomatous polyp of colon 01/24/2016   • Depression 01/24/2016   • Fibromyalgia 01/24/2016   • Dyslipidemia 01/24/2016   • Peripheral arterial occlusive disease (HCC) 01/24/2016   • Vitamin D deficiency 01/24/2016   • Gastroesophageal reflux disease 02/13/2012   • Irritable bowel syndrome 02/13/2012   • Disorder of intervertebral disc 02/13/2012   • Peripheral nerve disease 02/13/2012   • Fracture of multiple ribs of right side 12/10/2017   • Fall 12/11/2017   • Acute respiratory failure  with hypoxia 12/11/2017   • COPD mixed type ([facility]) 12/11/2017   • Weakness 11/15/2019   • Hypopotassemia 11/16/2019   • Dysuria 11/20/2019   • Constipation 11/20/2019   • Hemorrhoids 06/11/2021   • Arthritis 06/11/2021   • Fibromyositis 06/11/2021   • Hernia of anterior abdominal wall 06/11/2021   • Homocystinemia 02/13/2012   • Injury of right ankle 01/05/2016   • Kidney stone 10/24/2012   • Knee pain 09/17/2015   • Moderate ankle sprain 01/06/2016   • Osteoarthritis of knee 02/19/2020   • Spinal stenosis, lumbar region without neurogenic claudication 02/20/2019   • Chronic respiratory failure with hypoxia ([facility]) 07/29/2021   • Bursitis of hip 01/25/2022   • Lumbar facet joint pain 01/25/2022   • Low back pain 01/25/2022   • Acute cystitis 09/09/2022   • Obesity, Class III, BMI 40-49.9 (morbid obesity) 09/09/2022   • Abnormal CT scan, gastrointestinal tract 09/09/2022   • Wheelchair dependence 10/04/2022   • Supplemental oxygen dependent 10/04/2022   • Dietary counseling 10/04/2022   • Pre-op evaluation 10/04/2022   • STEVEN (obstructive sleep apnea) 10/04/2022     Resolved Ambulatory Problems     Diagnosis Date Noted   • Low back pain 01/24/2016   • Tobacco dependence 12/11/2017   • Other specified postprocedural states 01/14/2020   • Encounter for long-term (current) use of insulin 01/25/2022   • Peripheral nerve disease 01/25/2022   • Sepsis without acute organ dysfunction 09/09/2022   • Fever 09/09/2022   • Sepsis without acute organ dysfunction, due to unspecified organism 09/11/2022     Past Medical History:   Diagnosis Date   • Anxiety    • benign polyps of large intestine    • COPD (chronic obstructive pulmonary disease)    • Difficulty walking    • Disc degeneration, lumbar    • Hyperlipidemia    • IBS (irritable bowel syndrome)    • Kidney stones    • Movement disorder    • Nephrolithiasis    • Peripheral neuropathy    • PVD (peripheral vascular disease)    • Shingles          PAST SURGICAL HISTORY  Past  Surgical History:   Procedure Laterality Date   • CATARACT EXTRACTION     • COLONOSCOPY      Colon polyps; family history of colon cancer   • COLONOSCOPY N/A 2022    Procedure: COLONOSCOPY to cecum and TI with biopsies, cold forceps and hot snare polypectomies with 2cc orise lift and clip x 2;  Surgeon: Chirag Tony MD;  Location: [facility] ENDOSCOPY;  Service: Gastroenterology;  Laterality: N/A;  PRE- abnormal imaging, hx of polyps, family hx of colon cancer  POST- polyps, internal/external hemorrhoids   • KNEE ARTHROPLASTY, PARTIAL REPLACEMENT     • KNEE ARTHROSCOPY Left     Meniscus   • POPLITEAL ARTERY ANGIOPLASTY Bilateral    • STEROID INJECTION     • TONSILLECTOMY     • TUBAL ABDOMINAL LIGATION     • WRIST FRACTURE SURGERY           FAMILY HISTORY  Family History   Problem Relation Age of Onset   • COPD Mother          at 67 yo    • Colon cancer Mother    • Heart disease Mother    • Stroke Mother    • Lung cancer Father         mesothelioma   • Heart disease Maternal Grandmother    • Heart disease Maternal Grandfather    • Heart disease Paternal Grandfather          SOCIAL HISTORY  Social History     Socioeconomic History   • Marital status:    • Number of children: 2   • Years of education: 12    Tobacco Use   • Smoking status: Former     Packs/day: 0.75     Years: 50.00     Pack years: 37.50     Types: Cigarettes     Start date: 1960     Quit date: 2020     Years since quittin.4   • Smokeless tobacco: Never   Vaping Use   • Vaping Use: Never used   Substance and Sexual Activity   • Alcohol use: No   • Drug use: No   • Sexual activity: Not Currently     Partners: Male         ALLERGIES  Ambien [zolpidem tartrate], Bupropion, Codeine sulfate, Zolpidem, Adhesive tape, Aripiprazole, Atorvastatin, Cilostazol, Colesevelam, Ferrous sulfate, Welchol [colesevelam hcl], and Wound dressing adhesive        REVIEW OF SYSTEMS  Review of Systems   Constitutional: Positive for chills and  fatigue.   Respiratory: Positive for shortness of breath (Chronic).    Cardiovascular: Negative for chest pain.   Gastrointestinal: Negative for abdominal pain, diarrhea and vomiting.   Genitourinary: Positive for dysuria, flank pain, frequency and urgency.            PHYSICAL EXAM  ED Triage Vitals [04/14/23 1922]   Temp Heart Rate Resp BP SpO2   98.7 °F (37.1 °C) 92 18 104/74 97 %      Temp src Heart Rate Source Patient Position BP Location FiO2 (%)   Tympanic -- -- -- --       Physical Exam      Physical Exam   Constitutional: Pt. is oriented to person, place, and time.  She is well-developed, well-nourished, and in no distress.    HENT: Normocephalic and atraumatic. Pupils are equal, round, and reactive to light.   Neck: Normal range of motion. Neck supple. No JVD present. No tracheal deviation present.   Cardiovascular: Normal rate, regular rhythm and normal heart sounds.   Pulmonary/Chest: Effort normal and breath sounds normal. No stridor. No respiratory distress. No wheezes, no rales.   Abdominal: Soft, obese.  No distension. There is no tenderness. There is no rebound and no guarding.   Musculoskeletal: No edema, tenderness or deformity.  Mild right lower lumbar tenderness.  Neurological: She is alert and oriented to person, place, and time.  She has no focal neurologic deficits.  Skin: Skin is warm and dry. Pt. is not diaphoretic.  Psychiatric: Mood, affect normal.  She is pleasant and cooperative.  Nursing note and vitals reviewed.            LAB RESULTS  Recent Results (from the past 24 hour(s))   Comprehensive Metabolic Panel    Collection Time: 04/14/23  7:37 PM    Specimen: Blood   Result Value Ref Range    Glucose 105 (H) 65 - 99 mg/dL    BUN 11 8 - 23 mg/dL    Creatinine 0.90 0.57 - 1.00 mg/dL    Sodium 136 136 - 145 mmol/L    Potassium 4.0 3.5 - 5.2 mmol/L    Chloride 97 (L) 98 - 107 mmol/L    CO2 32.2 (H) 22.0 - 29.0 mmol/L    Calcium 9.8 8.6 - 10.5 mg/dL    Total Protein 6.5 6.0 - 8.5 g/dL     Albumin 3.3 (L) 3.5 - 5.2 g/dL    ALT (SGPT) 13 1 - 33 U/L    AST (SGOT) 15 1 - 32 U/L    Alkaline Phosphatase 98 39 - 117 U/L    Total Bilirubin 0.5 0.0 - 1.2 mg/dL    Globulin 3.2 gm/dL    A/G Ratio 1.0 g/dL    BUN/Creatinine Ratio 12.2 7.0 - 25.0    Anion Gap 6.8 5.0 - 15.0 mmol/L    eGFR 70.2 >60.0 mL/min/1.73   Lipase    Collection Time: 04/14/23  7:37 PM    Specimen: Blood   Result Value Ref Range    Lipase 14 13 - 60 U/L   Lactic Acid, Plasma    Collection Time: 04/14/23  7:37 PM    Specimen: Blood   Result Value Ref Range    Lactate 1.4 0.5 - 2.0 mmol/L   Green Top (Gel)    Collection Time: 04/14/23  7:37 PM   Result Value Ref Range    Extra Tube Hold for add-ons.    Lavender Top    Collection Time: 04/14/23  7:37 PM   Result Value Ref Range    Extra Tube hold for add-on    Gold Top - SST    Collection Time: 04/14/23  7:37 PM   Result Value Ref Range    Extra Tube Hold for add-ons.    Light Blue Top    Collection Time: 04/14/23  7:37 PM   Result Value Ref Range    Extra Tube Hold for add-ons.    CBC Auto Differential    Collection Time: 04/14/23  7:37 PM    Specimen: Blood   Result Value Ref Range    WBC 7.77 3.40 - 10.80 10*3/mm3    RBC 3.92 3.77 - 5.28 10*6/mm3    Hemoglobin 12.7 12.0 - 15.9 g/dL    Hematocrit 38.9 34.0 - 46.6 %    MCV 99.2 (H) 79.0 - 97.0 fL    MCH 32.4 26.6 - 33.0 pg    MCHC 32.6 31.5 - 35.7 g/dL    RDW 14.5 12.3 - 15.4 %    RDW-SD 52.3 37.0 - 54.0 fl    MPV 10.4 6.0 - 12.0 fL    Platelets 181 140 - 450 10*3/mm3    Neutrophil % 82.8 (H) 42.7 - 76.0 %    Lymphocyte % 7.7 (L) 19.6 - 45.3 %    Monocyte % 6.7 5.0 - 12.0 %    Eosinophil % 1.9 0.3 - 6.2 %    Basophil % 0.3 0.0 - 1.5 %    Immature Grans % 0.6 (H) 0.0 - 0.5 %    Neutrophils, Absolute 6.43 1.70 - 7.00 10*3/mm3    Lymphocytes, Absolute 0.60 (L) 0.70 - 3.10 10*3/mm3    Monocytes, Absolute 0.52 0.10 - 0.90 10*3/mm3    Eosinophils, Absolute 0.15 0.00 - 0.40 10*3/mm3    Basophils, Absolute 0.02 0.00 - 0.20 10*3/mm3    Immature Grans,  Absolute 0.05 0.00 - 0.05 10*3/mm3    nRBC 0.0 0.0 - 0.2 /100 WBC   Urinalysis With Culture If Indicated - Straight Cath    Collection Time: 04/14/23  9:06 PM    Specimen: Straight Cath; Urine   Result Value Ref Range    Color, UA Yellow Yellow, Straw    Appearance, UA Clear Clear    pH, UA 7.5 5.0 - 8.0    Specific Gravity, UA 1.011 1.005 - 1.030    Glucose, UA Negative Negative    Ketones, UA Negative Negative    Bilirubin, UA Negative Negative    Blood, UA Negative Negative    Protein, UA Negative Negative    Leuk Esterase, UA Negative Negative    Nitrite, UA Negative Negative    Urobilinogen, UA 0.2 E.U./dL 0.2 - 1.0 E.U./dL       Ordered the above labs and reviewed the results.        RADIOLOGY  CT Abdomen Pelvis With Contrast    Result Date: 4/14/2023  CT OF THE ABDOMEN AND PELVIS WITH CONTRAST  HISTORY: Urinary tract infection  COMPARISON: 09/09/2022  TECHNIQUE: Axial CT imaging was obtained through the abdomen and pelvis. IV contrast was administered.  FINDINGS: Images through lung bases demonstrate chronic scarring. There is a 4 mm subpleural nodule within the right middle lobe, which was present in July 2018, and is benign. The stomach, duodenum, adrenal glands, spleen, pancreas, and gallbladder are all normal. No suspicious hepatic lesions are seen. The kidneys enhance symmetrically. There is no hydronephrosis. There is an 8 mm nonobstructing stone within the right kidney. The patient has bilateral common iliac artery stents. These are poorly assessed on this non angiographic protocol CT. Uterus appears normal. No distal ureteral or bladder stones are seen. There is some mild perivesical stranding, which would be in keeping with history of cystitis. There is colonic diverticulosis. There is no evidence of appendicitis. No acute osseous abnormalities are seen.      There is no convincing evidence of pyelonephritis on either side. The patient does have some perivesical stranding, which would be in keeping  with history of cystitis.  Radiation dose reduction techniques were utilized, including automated exposure control and exposure modulation based on body size.  This report was finalized on 4/14/2023 11:02 PM by Dr. Linda Guillermo M.D.        Ordered the above noted radiological studies. Reviewed by me in PACS.        PROCEDURES  Procedures      MEDICATIONS GIVEN IN ER  Medications   sodium chloride 0.9 % flush 10 mL (has no administration in time range)   sodium chloride 0.9 % flush 10 mL (has no administration in time range)   HYDROmorphone (DILAUDID) injection 0.5 mg (0.5 mg Intravenous Given 4/14/23 2059)   ondansetron (ZOFRAN) injection 4 mg (4 mg Intravenous Given 4/14/23 2059)   iopamidol (ISOVUE-300) 61 % injection 85 mL (85 mL Intravenous Given by Other 4/14/23 2153)             MEDICAL DECISION MAKING, PROGRESS, and CONSULTS    All labs have been independently reviewed by me.  All radiology studies have been reviewed by me and discussed with radiologist dictating the report.   EKG's independently viewed and interpreted by me.  Discussion below represents my analysis of pertinent findings related to patient's condition, differential diagnosis, treatment plan and final disposition.      Orders placed during this visit:  Orders Placed This Encounter   Procedures   • CT Abdomen Pelvis With Contrast   • [place] Draw   • Comprehensive Metabolic Panel   • Lipase   • Lactic Acid, Plasma   • CBC Auto Differential   • Urinalysis With Culture If Indicated - Urine, Catheter   • NPO Diet NPO Type: Strict NPO   • Undress & Gown   • Insert Peripheral IV   • Insert Peripheral IV   • CBC & Differential   • Green Top (Gel)   • Lavender Top   • Gold Top - SST   • Light Blue Top       Additional orders considered but not ordered:  N/A    Differential diagnosis:  DDx includes but is not limited to: Cholecystitis, choledocholithiasis, cholangitis, peritonitis, pancreatitis/pseudocyst, peptic ulcer, bowel obstruction/ileus,  constipation, diverticulitis/perforation/abscess, inflammatory bowel disease, renal colic/stone, urinary tract infection/pyelonephritis, prostatitis, mesenteric ischemia, expanding/leaking AAA, testicular/ovarian torsion.    Independent interpretation of labs, radiology studies, and discussions with consultants:  ED Course as of 04/14/23 2310 Fri Apr 14, 2023 2157 Catheterized urinalysis is unremarkable. [WC]   2221 CBC, CMP and lipase are unremarkable. [WC]   2221 Awaiting CT abdomen results. [WC]   2310 CT of the abdomen pelvis was independently visualized by me and discussed with/interpreted by Dr. Guillermo (radiology)-there is an 8 mm nonobstructing stone within the right kidney.  There is mild perivesicular stranding.  No convincing evidence of pyelonephritis.  For official interpretation, see dictated report. [WC]      ED Course User Index  [WC] Javier Thao MD              Appropriate PPE was worn by myself and the patient throughout our entire interaction.    DIAGNOSIS  Final diagnoses:   Spinal stenosis, lumbar region without neurogenic claudication   Dysuria   Urinary frequency         DISPOSITION  Discharged            Latest Documented Vital Signs:  As of 23:10 EDT  BP- 103/57 HR- 85 Temp- 98.7 °F (37.1 °C) (Tympanic) O2 sat- 97%        --    Please note that portions of this were completed with a voice recognition program.       Note Disclaimer: At [facility], we believe that sharing information builds trust and better relationships. You are receiving this note because you are receiving care at [facility] or recently visited. It is possible you will see health information before a provider has talked with you about it. This kind of information can be easy to misunderstand. To help you fully understand what it means for your health, we urge you to discuss this note with your provider.           Javier Thao MD  04/14/23 2310

## 2023-04-16 NOTE — PROGRESS NOTES
Call results to patient.  Mixed erlinda on urine culture, finish antibiotics.  If symptoms continue, repeat urine culture.

## 2023-05-02 ENCOUNTER — TELEPHONE (OUTPATIENT)
Dept: FAMILY MEDICINE CLINIC | Facility: CLINIC | Age: 68
End: 2023-05-02
Payer: MEDICARE

## 2023-05-02 RX ORDER — FLUCONAZOLE 150 MG/1
150 TABLET ORAL ONCE
Qty: 1 TABLET | Refills: 0 | Status: SHIPPED | OUTPATIENT
Start: 2023-05-02 | End: 2023-05-02

## 2023-05-02 NOTE — TELEPHONE ENCOUNTER
Caller: Shiva Michelle Sr    Relationship: Emergency Contact    Best call back number: [phone]    What medication are you requesting: ANY    What are your current symptoms: WHITE IN MOUTH     How long have you been experiencing symptoms: NOTICED IT TODAY, MAY HAVE BEEN A FEW DAYS.    If a prescription is needed, what is your preferred pharmacy and phone number: [facility] [phone] [id number] [address] [facility] [phone] [facility] [phone] FX     Additional notes: PATIENT'S  STATED PATIENT TOOK ANTIBIOTICS TWO WEEKS AGO AND USUALLY GETS A YEAST INFECTION IN HER MOUTH AFTER TAKING THEM. PLEASE ADVISE.

## 2023-05-04 RX ORDER — DOXYCYCLINE HYCLATE 50 MG/1
50 CAPSULE ORAL
OUTPATIENT
Start: 2023-05-04

## 2023-05-08 RX ORDER — DOXYCYCLINE HYCLATE 50 MG/1
50 CAPSULE ORAL
OUTPATIENT
Start: 2023-05-08

## 2023-05-24 ENCOUNTER — OFFICE VISIT (OUTPATIENT)
Dept: FAMILY MEDICINE CLINIC | Facility: CLINIC | Age: 68
End: 2023-05-24
Payer: MEDICARE

## 2023-05-24 VITALS
HEART RATE: 87 BPM | WEIGHT: 244 LBS | SYSTOLIC BLOOD PRESSURE: 92 MMHG | RESPIRATION RATE: 18 BRPM | HEIGHT: 65 IN | BODY MASS INDEX: 40.65 KG/M2 | TEMPERATURE: 96.4 F | DIASTOLIC BLOOD PRESSURE: 58 MMHG | OXYGEN SATURATION: 98 %

## 2023-05-24 DIAGNOSIS — E66.01 MORBID (SEVERE) OBESITY DUE TO EXCESS CALORIES: ICD-10-CM

## 2023-05-24 DIAGNOSIS — J96.11 CHRONIC RESPIRATORY FAILURE WITH HYPOXIA: ICD-10-CM

## 2023-05-24 DIAGNOSIS — J30.1 SEASONAL ALLERGIC RHINITIS DUE TO POLLEN: Primary | ICD-10-CM

## 2023-05-24 DIAGNOSIS — J44.9 COPD MIXED TYPE: ICD-10-CM

## 2023-05-24 RX ORDER — DOXYCYCLINE HYCLATE 50 MG/1
50 CAPSULE ORAL DAILY
COMMUNITY

## 2023-05-24 RX ORDER — DULOXETIN HYDROCHLORIDE 60 MG/1
60 CAPSULE, DELAYED RELEASE ORAL DAILY
COMMUNITY

## 2023-05-24 RX ORDER — MONTELUKAST SODIUM 10 MG/1
10 TABLET ORAL NIGHTLY
Qty: 90 TABLET | Refills: 3 | Status: SHIPPED | OUTPATIENT
Start: 2023-05-24

## 2023-05-24 RX ORDER — CETIRIZINE HYDROCHLORIDE 10 MG/1
10 TABLET ORAL DAILY
Qty: 90 TABLET | Refills: 3 | Status: SHIPPED | OUTPATIENT
Start: 2023-05-24

## 2023-05-24 NOTE — PROGRESS NOTES
Subjective   Brenda Michelle is a 67 y.o. female. Presents today for   Chief Complaint   Patient presents with   • COPD       History of Present Illness  Patient here for weight check;   Has chronic lung disease on oxygen;  Having allergies, pnd;   Planning on bariatric procedure as struggling to lose weight;  Working on diet;  Exercise limited as in  most of the time, has spinal stenosis and severe dyspnea with COPD;      Review of Systems   HENT: Positive for postnasal drip.    Respiratory: Positive for cough and shortness of breath.    Cardiovascular: Negative for chest pain and palpitations.   Allergic/Immunologic: Positive for environmental allergies.       Patient Active Problem List   Diagnosis   • Acute deep vein thrombosis of distal leg   • Adenomatous polyp of colon   • Depression   • Fibromyalgia   • Dyslipidemia   • Peripheral arterial occlusive disease (HCC)   • Vitamin D deficiency   • Gastroesophageal reflux disease   • Irritable bowel syndrome   • Disorder of intervertebral disc   • Peripheral nerve disease   • Fracture of multiple ribs of right side   • Fall   • Acute respiratory failure with hypoxia   • COPD mixed type (HCC)   • Weakness   • Hypopotassemia   • Dysuria   • Constipation   • Hemorrhoids   • Arthritis   • Fibromyositis   • Hernia of anterior abdominal wall   • Homocystinemia   • Injury of right ankle   • Kidney stone   • Knee pain   • Moderate ankle sprain   • Osteoarthritis of knee   • Spinal stenosis, lumbar region without neurogenic claudication   • Chronic respiratory failure with hypoxia ([facility])   • Bursitis of hip   • Lumbar facet joint pain   • Low back pain   • Acute cystitis   • Obesity, Class III, BMI 40-49.9 (morbid obesity)   • Abnormal CT scan, gastrointestinal tract   • Wheelchair dependence   • Supplemental oxygen dependent   • Dietary counseling   • Pre-op evaluation   • STEVEN (obstructive sleep apnea)       Social History     Socioeconomic History   • Marital status:     • Number of children: 2   • Years of education: 12    Tobacco Use   • Smoking status: Former     Packs/day: 0.75     Years: 50.00     Pack years: 37.50     Types: Cigarettes     Start date: 1960     Quit date: 2020     Years since quittin.5   • Smokeless tobacco: Never   Vaping Use   • Vaping Use: Never used   Substance and Sexual Activity   • Alcohol use: No   • Drug use: No   • Sexual activity: Not Currently     Partners: Male       Allergies   Allergen Reactions   • Ambien [Zolpidem Tartrate] Other (See Comments)     Nightmares   • Bupropion Itching   • Codeine Sulfate Itching   • Zolpidem Hives     Nightmares   • Adhesive Tape Rash   • Aripiprazole Unknown - Low Severity   • Atorvastatin Other (See Comments)     MYALGIAS   • Cilostazol Other (See Comments)     HA   • Colesevelam Nausea Only   • Ferrous Sulfate Nausea Only   • Welchol [Colesevelam Hcl] Nausea Only   • Wound Dressing Adhesive Rash       Current Outpatient Medications on File Prior to Visit   Medication Sig Dispense Refill   • Aspirin (ASPIR-81 PO) Take 81 mg by mouth Daily.     • baclofen (LIORESAL) 10 MG tablet Take 1 tablet by mouth 2 (Two) Times a Day As Needed for Muscle Spasms. 180 tablet 3   • clopidogrel (PLAVIX) 75 MG tablet Take 1 tablet by mouth Daily. 90 tablet 3   • docusate sodium (COLACE) 100 MG capsule Take 1 capsule by mouth 2 (Two) Times a Day.     • doxycycline (VIBRAMYCIN) 50 MG capsule Take 1 capsule by mouth Daily. 3 times weekly     • DULoxetine (CYMBALTA) 60 MG capsule Take 1 capsule by mouth Daily.     • folic acid (FOLVITE) 1 MG tablet TAKE 1 TABLET DAILY 90 tablet 3   • HYDROcodone-acetaminophen (NORCO)  MG per tablet Take 1 tablet by mouth Every 6 (Six) Hours As Needed for Moderate Pain.     • Mirabegron ER (Myrbetriq) 25 MG tablet sustained-release 24 hour 24 hr tablet Take 1 tablet by mouth Daily. 30 tablet 0   • nystatin (MYCOSTATIN) 100,000 unit/mL suspension 5 ml Swish and spit 4 times  "daily 400 mL 0   • phentermine (ADIPEX-P) 37.5 MG tablet Daily.     • pramipexole (MIRAPEX) 0.25 MG tablet      • pravastatin (PRAVACHOL) 80 MG tablet Take 1 tablet by mouth Daily. 90 tablet 51   • pregabalin (LYRICA) 150 MG capsule Take 1 capsule by mouth 3 (Three) Times a Day. (Patient taking differently: Take 1 capsule by mouth 2 (Two) Times a Day.) 270 capsule 1   • risedronate (Actonel) 150 MG tablet Take 1 tablet by mouth Every 30 (Thirty) Days. with water on empty stomach, nothing by mouth or lie down for next 30 minutes. 3 tablet 3   • vitamin B-12 (CYANOCOBALAMIN) 1000 MCG tablet Take 1 tablet by mouth Daily.     • vitamin D3 125 MCG (5000 UT) capsule capsule Take 1 capsule by mouth Daily.     • topiramate (TOPAMAX) 25 MG tablet Take 1 tablet by mouth 2 (Two) Times a Day. (Patient not taking: Reported on 5/24/2023) 60 tablet 5   • [DISCONTINUED] doxycycline (VIBRAMYCIN) 50 MG capsule Take 1 capsule by mouth 3 (Three) Times a Day Before Meals. (Patient not taking: Reported on 5/24/2023)     • [DISCONTINUED] DULoxetine (CYMBALTA) 60 MG capsule TAKE 1 CAPSULE DAILY (Patient not taking: Reported on 5/24/2023) 90 capsule 3   • [DISCONTINUED] nitrofurantoin, macrocrystal-monohydrate, (Macrobid) 100 MG capsule Take 1 capsule by mouth 2 (Two) Times a Day. (Patient not taking: Reported on 5/24/2023) 14 capsule 0     No current facility-administered medications on file prior to visit.       Objective   Vitals:    05/24/23 1351   BP: 92/58   Pulse: 87   Resp: 18   Temp: 96.4 °F (35.8 °C)   TempSrc: Temporal   SpO2: 98%  Comment: 3lpm nc   Weight: 111 kg (244 lb)   Height: 166 cm (65.35\")   PainSc: 0-No pain     Body mass index is 40.17 kg/m².    Physical Exam  Vitals and nursing note reviewed.   Constitutional:       Appearance: Normal appearance. She is not toxic-appearing or diaphoretic.   HENT:      Head: Normocephalic and atraumatic.      Comments: +PND  Musculoskeletal:      Cervical back: Neck supple.   Skin:    "  General: Skin is warm and dry.      Capillary Refill: Capillary refill takes less than 2 seconds.   Neurological:      Mental Status: She is alert.   Psychiatric:         Mood and Affect: Mood normal.         Behavior: Behavior normal.         Assessment & Plan   Diagnoses and all orders for this visit:    1. Seasonal allergic rhinitis due to pollen (Primary)  -     cetirizine (zyrTEC) 10 MG tablet; Take 1 tablet by mouth Daily.  Dispense: 90 tablet; Refill: 3  -     montelukast (SINGULAIR) 10 MG tablet; Take 1 tablet by mouth Every Night.  Dispense: 90 tablet; Refill: 3    2. COPD mixed type (HCC)  -     montelukast (SINGULAIR) 10 MG tablet; Take 1 tablet by mouth Every Night.  Dispense: 90 tablet; Refill: 3    3. Chronic respiratory failure with hypoxia    4. Morbid (severe) obesity due to excess calories    counseled on diet and exercise;   exezrcise challenging given airway disease and chronic airway issues;   She has modified diet and trying;  Recommend proceed with bariatric procedure (gastric sleeve).             Class 3 Severe Obesity (BMI >=40). Obesity-related health conditions include the following: referral for bariatric procedure;. Obesity is 2 lbs down. BMI is is above average; BMI management plan is completed. We discussed portion control and increasing exercise.     -Follow up: 3 months and prn

## 2023-05-29 DIAGNOSIS — M81.0 AGE-RELATED OSTEOPOROSIS WITHOUT CURRENT PATHOLOGICAL FRACTURE: ICD-10-CM

## 2023-05-29 DIAGNOSIS — E78.5 DYSLIPIDEMIA: ICD-10-CM

## 2023-05-30 RX ORDER — RISEDRONATE SODIUM 150 MG/1
TABLET, FILM COATED ORAL
Qty: 3 TABLET | Refills: 3 | Status: SHIPPED | OUTPATIENT
Start: 2023-05-30

## 2023-05-30 RX ORDER — PRAVASTATIN SODIUM 80 MG/1
TABLET ORAL
Qty: 90 TABLET | Refills: 3 | Status: SHIPPED | OUTPATIENT
Start: 2023-05-30

## 2023-05-31 ENCOUNTER — DOCUMENTATION (OUTPATIENT)
Dept: FAMILY MEDICINE CLINIC | Facility: CLINIC | Age: 68
End: 2023-05-31

## 2023-07-24 ENCOUNTER — TELEPHONE (OUTPATIENT)
Dept: FAMILY MEDICINE CLINIC | Facility: CLINIC | Age: 68
End: 2023-07-24
Payer: MEDICARE

## 2023-07-24 NOTE — TELEPHONE ENCOUNTER
Caller: Shiva Michelle Sr    Relationship: Emergency Contact    Best call back number: [phone]    What test was performed: URINE SAMPLE     When was the test performed: 7/17/23    Where was the test performed: IN OFFICE     Additional notes: PATIENT IS REQUESTING A CALL BACK WITH TEST RESULTS

## 2023-07-31 DIAGNOSIS — E66.01 MORBID (SEVERE) OBESITY DUE TO EXCESS CALORIES: ICD-10-CM

## 2023-07-31 RX ORDER — PHENTERMINE HYDROCHLORIDE 37.5 MG/1
TABLET ORAL
Qty: 30 TABLET | Refills: 2 | Status: SHIPPED | OUTPATIENT
Start: 2023-07-31

## 2023-08-04 RX ORDER — PRAMIPEXOLE DIHYDROCHLORIDE 0.25 MG/1
0.25 TABLET ORAL NIGHTLY
Qty: 30 TABLET | Refills: 0 | Status: SHIPPED | OUTPATIENT
Start: 2023-08-04

## 2023-08-16 ENCOUNTER — OFFICE VISIT (OUTPATIENT)
Dept: FAMILY MEDICINE CLINIC | Facility: CLINIC | Age: 68
End: 2023-08-16
Payer: MEDICARE

## 2023-08-16 VITALS
HEIGHT: 62 IN | WEIGHT: 246 LBS | HEART RATE: 84 BPM | BODY MASS INDEX: 45.27 KG/M2 | DIASTOLIC BLOOD PRESSURE: 72 MMHG | SYSTOLIC BLOOD PRESSURE: 100 MMHG | OXYGEN SATURATION: 96 %

## 2023-08-16 DIAGNOSIS — R42 VERTIGO: ICD-10-CM

## 2023-08-16 DIAGNOSIS — M48.061 SPINAL STENOSIS, LUMBAR REGION WITHOUT NEUROGENIC CLAUDICATION: ICD-10-CM

## 2023-08-16 DIAGNOSIS — E78.5 DYSLIPIDEMIA: ICD-10-CM

## 2023-08-16 DIAGNOSIS — R73.9 HYPERGLYCEMIA: ICD-10-CM

## 2023-08-16 DIAGNOSIS — R73.01 IFG (IMPAIRED FASTING GLUCOSE): ICD-10-CM

## 2023-08-16 DIAGNOSIS — E66.01 MORBID (SEVERE) OBESITY DUE TO EXCESS CALORIES: Primary | ICD-10-CM

## 2023-08-16 PROBLEM — M47.816 LUMBAR SPONDYLOSIS: Status: ACTIVE | Noted: 2023-02-07

## 2023-08-16 RX ORDER — MECLIZINE HYDROCHLORIDE 25 MG/1
25 TABLET ORAL 3 TIMES DAILY PRN
Qty: 45 TABLET | Refills: 0 | Status: SHIPPED | OUTPATIENT
Start: 2023-08-16

## 2023-08-16 RX ORDER — TIRZEPATIDE 5 MG/.5ML
5 INJECTION, SOLUTION SUBCUTANEOUS WEEKLY
Qty: 6 ML | Refills: 3 | Status: SHIPPED | OUTPATIENT
Start: 2023-08-16

## 2023-08-16 NOTE — PROGRESS NOTES
Subjective   Brenda Michelle is a 67 y.o. female. Presents today for   Chief Complaint   Patient presents with    Hyperlipidemia       History of Present Illness  Patient 68 y/o with hld, due check and on statin;  she has copd chronic respiratory failure on oxygen.   Was to have bariatric procedure but pulmonary would not clear as too risky.  Strugglign to lose weight and exercise hard as wc bound most days and severe lung disease.       She has hx of bpv, vertigo with quick head movements;  no hearing loss; no focal neuro deficits;   dx in past;     Review of Systems   Respiratory:  Positive for shortness of breath.    Cardiovascular:  Negative for chest pain.     Patient Active Problem List   Diagnosis    Acute deep vein thrombosis of distal leg    Adenomatous polyp of colon    Depression    Fibromyalgia    Dyslipidemia    Peripheral arterial occlusive disease    Vitamin D deficiency    Gastroesophageal reflux disease    Irritable bowel syndrome    Disorder of intervertebral disc    Peripheral nerve disease    Fracture of multiple ribs of right side    Fall    Acute respiratory failure with hypoxia    COPD mixed type    Weakness    Hypopotassemia    Dysuria    Constipation    Hemorrhoids    Arthritis    Fibromyositis    Hernia of anterior abdominal wall    Homocystinemia    Injury of right ankle    Kidney stone    Knee pain    Moderate ankle sprain    Osteoarthritis of knee    Spinal stenosis, lumbar region without neurogenic claudication    Chronic respiratory failure with hypoxia    Bursitis of hip    Lumbar facet joint pain    Low back pain    Acute cystitis    Obesity, Class III, BMI 40-49.9 (morbid obesity)    Abnormal CT scan, gastrointestinal tract    Wheelchair dependence    Supplemental oxygen dependent    Dietary counseling    Pre-op evaluation    STEVEN (obstructive sleep apnea)    Lumbar spondylosis       Social History     Socioeconomic History    Marital status:     Number of children: 2    Years  of education: 12    Tobacco Use    Smoking status: Former     Packs/day: 0.75     Years: 50.00     Pack years: 37.50     Types: Cigarettes     Start date: 1960     Quit date: 2020     Years since quittin.7    Smokeless tobacco: Never   Vaping Use    Vaping Use: Never used   Substance and Sexual Activity    Alcohol use: No    Drug use: No    Sexual activity: Not Currently     Partners: Male       Allergies   Allergen Reactions    Ambien [Zolpidem Tartrate] Other (See Comments)     Nightmares    Bupropion Itching    Codeine Sulfate Itching    Zolpidem Hives     Nightmares    Adhesive Tape Rash    Aripiprazole Unknown - Low Severity    Atorvastatin Other (See Comments)     MYALGIAS    Cilostazol Other (See Comments)     HA    Colesevelam Nausea Only    Ferrous Sulfate Nausea Only    Welchol [Colesevelam Hcl] Nausea Only    Wound Dressing Adhesive Rash       Current Outpatient Medications on File Prior to Visit   Medication Sig Dispense Refill    Aspirin (ASPIR-81 PO) Take 81 mg by mouth Daily.      baclofen (LIORESAL) 10 MG tablet Take 1 tablet by mouth 2 (Two) Times a Day As Needed for Muscle Spasms. 180 tablet 3    clopidogrel (PLAVIX) 75 MG tablet Take 1 tablet by mouth Daily. 90 tablet 3    docusate sodium (COLACE) 100 MG capsule Take 1 capsule by mouth 2 (Two) Times a Day.      DULoxetine (CYMBALTA) 60 MG capsule Take 1 capsule by mouth Daily.      folic acid (FOLVITE) 1 MG tablet TAKE 1 TABLET DAILY 90 tablet 3    HYDROcodone-acetaminophen (NORCO)  MG per tablet Take 1 tablet by mouth Every 6 (Six) Hours As Needed for Moderate Pain.      phentermine (ADIPEX-P) 37.5 MG tablet TAKE ONE TABLET BY MOUTH EVERY MORNING BEFORE BREAKFAST 30 tablet 2    pramipexole (MIRAPEX) 0.25 MG tablet Take 1 tablet by mouth Every Night. 30 tablet 0    pravastatin (PRAVACHOL) 80 MG tablet TAKE 1 TABLET BY MOUTH DAILY  (DISCONTINUE ROSUVASTATIN AS  INTOLERANT.TRY NEW) 90 tablet 3    pregabalin (LYRICA) 150 MG capsule  "Take 1 capsule by mouth 3 (Three) Times a Day. (Patient taking differently: Take 1 capsule by mouth 2 (Two) Times a Day.) 270 capsule 1    risedronate (ACTONEL) 150 MG tablet TAKE 1 TABLET BY MOUTH EVERY 30  DAYS WITH WATER ON AN EMPTY  STOMACH, NOTHING BY MOUTH AND DO NOT LIE DOWN FOR NEXT 30 MINUTES 3 tablet 3    vitamin B-12 (CYANOCOBALAMIN) 1000 MCG tablet Take 1 tablet by mouth Daily.      vitamin D3 125 MCG (5000 UT) capsule capsule Take 1 capsule by mouth Daily.      [DISCONTINUED] cetirizine (zyrTEC) 10 MG tablet Take 1 tablet by mouth Daily. (Patient not taking: Reported on 8/16/2023) 90 tablet 3    [DISCONTINUED] doxycycline (VIBRAMYCIN) 50 MG capsule Take 1 capsule by mouth Daily. 3 times weekly (Patient not taking: Reported on 8/16/2023)      [DISCONTINUED] Mirabegron ER (Myrbetriq) 25 MG tablet sustained-release 24 hour 24 hr tablet Take 1 tablet by mouth Daily. (Patient not taking: Reported on 8/16/2023) 30 tablet 0    [DISCONTINUED] montelukast (SINGULAIR) 10 MG tablet Take 1 tablet by mouth Every Night. (Patient not taking: Reported on 8/16/2023) 90 tablet 3    [DISCONTINUED] nystatin (MYCOSTATIN) 100,000 unit/mL suspension 5 ml Swish and spit 4 times daily (Patient not taking: Reported on 8/16/2023) 400 mL 0    [DISCONTINUED] topiramate (TOPAMAX) 25 MG tablet Take 1 tablet by mouth 2 (Two) Times a Day. (Patient not taking: Reported on 8/16/2023) 60 tablet 5     No current facility-administered medications on file prior to visit.       Objective   Vitals:    08/16/23 1332   BP: 100/72   Pulse: 84   SpO2: 96%   Weight: 112 kg (246 lb)   Height: 157.5 cm (62\")     Body mass index is 44.99 kg/mý.    Physical Exam  Vitals and nursing note reviewed.   Constitutional:       Appearance: Normal appearance. She is not toxic-appearing or diaphoretic.   HENT:      Head: Normocephalic and atraumatic.   Musculoskeletal:      Cervical back: Neck supple.   Skin:     General: Skin is warm and dry.      Capillary " Refill: Capillary refill takes less than 2 seconds.   Neurological:      Mental Status: She is alert.   Psychiatric:         Mood and Affect: Mood normal.         Behavior: Behavior normal.       Assessment & Plan   Diagnoses and all orders for this visit:    1. Morbid (severe) obesity due to excess calories (Primary)  -     Tirzepatide (Mounjaro) 5 MG/0.5ML solution pen-injector; Inject 0.5 mL under the skin into the appropriate area as directed 1 (One) Time Per Week.  Dispense: 6 mL; Refill: 3    2. IFG (impaired fasting glucose)  -     Tirzepatide (Mounjaro) 5 MG/0.5ML solution pen-injector; Inject 0.5 mL under the skin into the appropriate area as directed 1 (One) Time Per Week.  Dispense: 6 mL; Refill: 3  -     Hemoglobin A1c    3. Hyperglycemia  -     Tirzepatide (Mounjaro) 5 MG/0.5ML solution pen-injector; Inject 0.5 mL under the skin into the appropriate area as directed 1 (One) Time Per Week.  Dispense: 6 mL; Refill: 3  -     Microalbumin / Creatinine Urine Ratio - Urine, Clean Catch  -     Hemoglobin A1c    4. Dyslipidemia  -     Lipid Panel  -     Comprehensive Metabolic Panel    5. Vertigo  -     meclizine (ANTIVERT) 25 MG tablet; Take 1 tablet by mouth 3 (Three) Times a Day As Needed for Dizziness.  Dispense: 45 tablet; Refill: 0    Due check lpiids, continue statin  -ifg - needs lose weight and great candidate for mounjaro;  gave starter pens and demonstrated use.   Go to next dose once ou       Bpv -  gave hadn outs for home epley and patten daroff manuevers to try.   Ok meclizine prn             -Follow up: 3 months and prn

## 2023-08-17 LAB
ALBUMIN SERPL-MCNC: 4.1 G/DL (ref 3.9–4.9)
ALBUMIN/GLOB SERPL: 1.7 {RATIO} (ref 1.2–2.2)
ALP SERPL-CCNC: 98 IU/L (ref 44–121)
ALT SERPL-CCNC: 7 IU/L (ref 0–32)
AST SERPL-CCNC: 20 IU/L (ref 0–40)
BILIRUB SERPL-MCNC: 0.4 MG/DL (ref 0–1.2)
BUN SERPL-MCNC: 12 MG/DL (ref 8–27)
BUN/CREAT SERPL: 16 (ref 12–28)
CALCIUM SERPL-MCNC: 10.3 MG/DL (ref 8.7–10.3)
CHLORIDE SERPL-SCNC: 99 MMOL/L (ref 96–106)
CHOLEST SERPL-MCNC: 224 MG/DL (ref 100–199)
CO2 SERPL-SCNC: 32 MMOL/L (ref 20–29)
CREAT SERPL-MCNC: 0.73 MG/DL (ref 0.57–1)
EGFRCR SERPLBLD CKD-EPI 2021: 90 ML/MIN/1.73
GLOBULIN SER CALC-MCNC: 2.4 G/DL (ref 1.5–4.5)
GLUCOSE SERPL-MCNC: 90 MG/DL (ref 70–99)
HBA1C MFR BLD: 5.4 % (ref 4.8–5.6)
HDLC SERPL-MCNC: 74 MG/DL
LDLC SERPL CALC-MCNC: 127 MG/DL (ref 0–99)
POTASSIUM SERPL-SCNC: 5.7 MMOL/L (ref 3.5–5.2)
PROT SERPL-MCNC: 6.5 G/DL (ref 6–8.5)
SODIUM SERPL-SCNC: 143 MMOL/L (ref 134–144)
TRIGL SERPL-MCNC: 130 MG/DL (ref 0–149)
UNABLE TO VOID: NORMAL
VLDLC SERPL CALC-MCNC: 23 MG/DL (ref 5–40)

## 2023-08-20 NOTE — PROGRESS NOTES
Mail copy of results to patient.  Cholesterol mildly elevated, work on diet  A1c normalized  Kidney and liver function normal  Potassium up a little, likely hemolyzed as renal function normal.  Will monitor.

## 2023-08-27 NOTE — PATIENT INSTRUCTIONS
Calorie Counting for Weight Loss  Calories are units of energy. Your body needs a certain number of calories from food to keep going throughout the day. When you eat or drink more calories than your body needs, your body stores the extra calories mostly as fat. When you eat or drink fewer calories than your body needs, your body burns fat to get the energy it needs.  Calorie counting means keeping track of how many calories you eat and drink each day. Calorie counting can be helpful if you need to lose weight. If you eat fewer calories than your body needs, you should lose weight. Ask your health care provider what a healthy weight is for you.  For calorie counting to work, you will need to eat the right number of calories each day to lose a healthy amount of weight per week. A dietitian can help you figure out how many calories you need in a day and will suggest ways to reach your calorie goal.  A healthy amount of weight to lose each week is usually 1-2 lb (0.5-0.9 kg). This usually means that your daily calorie intake should be reduced by 500-750 calories.  Eating 1,200-1,500 calories a day can help most women lose weight.  Eating 1,500-1,800 calories a day can help most men lose weight.  What do I need to know about calorie counting?  Work with your health care provider or dietitian to determine how many calories you should get each day. To meet your daily calorie goal, you will need to:  Find out how many calories are in each food that you would like to eat. Try to do this before you eat.  Decide how much of the food you plan to eat.  Keep a food log. Do this by writing down what you ate and how many calories it had.  To successfully lose weight, it is important to balance calorie counting with a healthy lifestyle that includes regular activity.  Where do I find calorie information?  The number of calories in a food can be found on a Nutrition Facts label. If a food does not have a Nutrition Facts label, try to  look up the calories online or ask your dietitian for help.  Remember that calories are listed per serving. If you choose to have more than one serving of a food, you will have to multiply the calories per serving by the number of servings you plan to eat. For example, the label on a package of bread might say that a serving size is 1 slice and that there are 90 calories in a serving. If you eat 1 slice, you will have eaten 90 calories. If you eat 2 slices, you will have eaten 180 calories.  How do I keep a food log?  After each time that you eat, record the following in your food log as soon as possible:  What you ate. Be sure to include toppings, sauces, and other extras on the food.  How much you ate. This can be measured in cups, ounces, or number of items.  How many calories were in each food and drink.  The total number of calories in the food you ate.  Keep your food log near you, such as in a pocket-sized notebook or on an chris or website on your mobile phone. Some programs will calculate calories for you and show you how many calories you have left to meet your daily goal.  What are some portion-control tips?  Know how many calories are in a serving. This will help you know how many servings you can have of a certain food.  Use a measuring cup to measure serving sizes. You could also try weighing out portions on a kitchen scale. With time, you will be able to estimate serving sizes for some foods.  Take time to put servings of different foods on your favorite plates or in your favorite bowls and cups so you know what a serving looks like.  Try not to eat straight from a food's packaging, such as from a bag or box. Eating straight from the package makes it hard to see how much you are eating and can lead to overeating. Put the amount you would like to eat in a cup or on a plate to make sure you are eating the right portion.  Use smaller plates, glasses, and bowls for smaller portions and to prevent  overeating.  Try not to multitask. For example, avoid watching TV or using your computer while eating. If it is time to eat, sit down at a table and enjoy your food. This will help you recognize when you are full. It will also help you be more mindful of what and how much you are eating.  What are tips for following this plan?  Reading food labels  Check the calorie count compared with the serving size. The serving size may be smaller than what you are used to eating.  Check the source of the calories. Try to choose foods that are high in protein, fiber, and vitamins, and low in saturated fat, trans fat, and sodium.  Shopping  Read nutrition labels while you shop. This will help you make healthy decisions about which foods to buy.  Pay attention to nutrition labels for low-fat or fat-free foods. These foods sometimes have the same number of calories or more calories than the full-fat versions. They also often have added sugar, starch, or salt to make up for flavor that was removed with the fat.  Make a grocery list of lower-calorie foods and stick to it.  Cooking  Try to cook your favorite foods in a healthier way. For example, try baking instead of frying.  Use low-fat dairy products.  Meal planning  Use more fruits and vegetables. One-half of your plate should be fruits and vegetables.  Include lean proteins, such as chicken, turkey, and fish.  Lifestyle  Each week, aim to do one of the followin minutes of moderate exercise, such as walking.  75 minutes of vigorous exercise, such as running.  General information  Know how many calories are in the foods you eat most often. This will help you calculate calorie counts faster.  Find a way of tracking calories that works for you. Get creative. Try different apps or programs if writing down calories does not work for you.  What foods should I eat?    Eat nutritious foods. It is better to have a nutritious, high-calorie food, such as an avocado, than a food with  few nutrients, such as a bag of potato chips.  Use your calories on foods and drinks that will fill you up and will not leave you hungry soon after eating.  Examples of foods that fill you up are nuts and nut butters, vegetables, lean proteins, and high-fiber foods such as whole grains. High-fiber foods are foods with more than 5 g of fiber per serving.  Pay attention to calories in drinks. Low-calorie drinks include water and unsweetened drinks.  The items listed above may not be a complete list of foods and beverages you can eat. Contact a dietitian for more information.  What foods should I limit?  Limit foods or drinks that are not good sources of vitamins, minerals, or protein or that are high in unhealthy fats. These include:  Candy.  Other sweets.  Sodas, specialty coffee drinks, alcohol, and juice.  The items listed above may not be a complete list of foods and beverages you should avoid. Contact a dietitian for more information.  How do I count calories when eating out?  Pay attention to portions. Often, portions are much larger when eating out. Try these tips to keep portions smaller:  Consider sharing a meal instead of getting your own.  If you get your own meal, eat only half of it. Before you start eating, ask for a container and put half of your meal into it.  When available, consider ordering smaller portions from the menu instead of full portions.  Pay attention to your food and drink choices. Knowing the way food is cooked and what is included with the meal can help you eat fewer calories.  If calories are listed on the menu, choose the lower-calorie options.  Choose dishes that include vegetables, fruits, whole grains, low-fat dairy products, and lean proteins.  Choose items that are boiled, broiled, grilled, or steamed. Avoid items that are buttered, battered, fried, or served with cream sauce. Items labeled as crispy are usually fried, unless stated otherwise.  Choose water, low-fat milk,  unsweetened iced tea, or other drinks without added sugar. If you want an alcoholic beverage, choose a lower-calorie option, such as a glass of wine or light beer.  Ask for dressings, sauces, and syrups on the side. These are usually high in calories, so you should limit the amount you eat.  If you want a salad, choose a garden salad and ask for grilled meats. Avoid extra toppings such as luna, cheese, or fried items. Ask for the dressing on the side, or ask for olive oil and vinegar or lemon to use as dressing.  Estimate how many servings of a food you are given. Knowing serving sizes will help you be aware of how much food you are eating at restaurants.  Where to find more information  Centers for Disease Control and Prevention: www.cdc.gov  [place] Department of Agriculture: myplate.gov  Summary  Calorie counting means keeping track of how many calories you eat and drink each day. If you eat fewer calories than your body needs, you should lose weight.  A healthy amount of weight to lose per week is usually 1-2 lb (0.5-0.9 kg). This usually means reducing your daily calorie intake by 500-750 calories.  The number of calories in a food can be found on a Nutrition Facts label. If a food does not have a Nutrition Facts label, try to look up the calories online or ask your dietitian for help.  Use smaller plates, glasses, and bowls for smaller portions and to prevent overeating.  Use your calories on foods and drinks that will fill you up and not leave you hungry shortly after a meal.  This information is not intended to replace advice given to you by your health care provider. Make sure you discuss any questions you have with your health care provider.  Document Revised: 01/28/2021 Document Reviewed: 01/28/2021  [employer] Patient Education c 2022 [employer]    Exercising to Lose Weight  Getting regular exercise is important for everyone. It is especially important if you are overweight. Being overweight increases  your risk of heart disease, stroke, diabetes, high blood pressure, and several types of cancer. Exercising, and reducing the calories you consume, can help you lose weight and improve fitness and health.  Exercise can be moderate or vigorous intensity. To lose weight, most people need to do a certain amount of moderate or vigorous-intensity exercise each week.  How can exercise affect me?  You lose weight when you exercise enough to burn more calories than you eat. Exercise also reduces body fat and builds muscle. The more muscle you have, the more calories you burn. Exercise also:  Improves mood.  Reduces stress and tension.  Improves your overall fitness, flexibility, and endurance.  Increases bone strength.  Moderate-intensity exercise  Moderate-intensity exercise is any activity that gets you moving enough to burn at least three times more energy (calories) than if you were sitting.  Examples of moderate exercise include:  Walking a mile in 15 minutes.  Doing light yard work.  Biking at an easy pace.  Most people should get at least 150 minutes of moderate-intensity exercise a week to maintain their body weight.  Vigorous-intensity exercise  Vigorous-intensity exercise is any activity that gets you moving enough to burn at least six times more calories than if you were sitting. When you exercise at this intensity, you should be working hard enough that you are not able to carry on a conversation.  Examples of vigorous exercise include:  Running.  Playing a team sport, such as football, basketball, and soccer.  Jumping rope.  Most people should get at least 75 minutes a week of vigorous exercise to maintain their body weight.  What actions can I take to lose weight?  The amount of exercise you need to lose weight depends on:  Your age.  The type of exercise.  Any health conditions you have.  Your overall physical ability.  Talk to your health care provider about how much exercise you need and what types of  activities are safe for you.  Nutrition    Make changes to your diet as told by your health care provider or diet and nutrition specialist (dietitian). This may include:  Eating fewer calories.  Eating more protein.  Eating less unhealthy fats.  Eating a diet that includes fresh fruits and vegetables, whole grains, low-fat dairy products, and lean protein.  Avoiding foods with added fat, salt, and sugar.  Drink plenty of water while you exercise to prevent dehydration or heat stroke.  Activity  Choose an activity that you enjoy and set realistic goals. Your health care provider can help you make an exercise plan that works for you.  Exercise at a moderate or vigorous intensity most days of the week.  The intensity of exercise may vary from person to person. You can tell how intense a workout is for you by paying attention to your breathing and heartbeat. Most people will notice their breathing and heartbeat get faster with more intense exercise.  Do resistance training twice each week, such as:  Push-ups.  Sit-ups.  Lifting weights.  Using resistance bands.  Getting short amounts of exercise can be just as helpful as long, structured periods of exercise. If you have trouble finding time to exercise, try doing these things as part of your daily routine:  Get up, stretch, and walk around every 30 minutes throughout the day.  Go for a walk during your lunch break.  Park your car farther away from your destination.  If you take public transportation, get off one stop early and walk the rest of the way.  Make phone calls while standing up and walking around.  Take the stairs instead of elevators or escalators.  Wear comfortable clothes and shoes with good support.  Do not exercise so much that you hurt yourself, feel dizzy, or get very short of breath.  Where to find more information  [place] Department of Health and Human Services: www.hhs.gov  Centers for Disease Control and Prevention: www.cdc.gov  Contact a health care  provider:  Before starting a new exercise program.  If you have questions or concerns about your weight.  If you have a medical problem that keeps you from exercising.  Get help right away if:  You have any of the following while exercising:  Injury.  Dizziness.  Difficulty breathing or shortness of breath that does not go away when you stop exercising.  Chest pain.  Rapid heartbeat.  These symptoms may represent a serious problem that is an emergency. Do not wait to see if the symptoms will go away. Get medical help right away. Call your local emergency services (911 in the [place] Do not drive yourself to the hospital.  Summary  Getting regular exercise is especially important if you are overweight.  Being overweight increases your risk of heart disease, stroke, diabetes, high blood pressure, and several types of cancer.  Losing weight happens when you burn more calories than you eat.  Reducing the amount of calories you eat, and getting regular moderate or vigorous exercise each week, helps you lose weight.  This information is not intended to replace advice given to you by your health care provider. Make sure you discuss any questions you have with your health care provider.  Document Revised: 02/13/2022 Document Reviewed: 02/13/2022  [employer] Patient Education c 2022 [employer]

## 2023-08-30 RX ORDER — PRAMIPEXOLE DIHYDROCHLORIDE 0.25 MG/1
TABLET ORAL
Qty: 30 TABLET | Refills: 3 | Status: SHIPPED | OUTPATIENT
Start: 2023-08-30

## 2023-09-01 ENCOUNTER — TRANSCRIBE ORDERS (OUTPATIENT)
Dept: ADMINISTRATIVE | Facility: HOSPITAL | Age: 68
End: 2023-09-01
Payer: MEDICARE

## 2023-09-01 DIAGNOSIS — I73.9 PAD (PERIPHERAL ARTERY DISEASE): Primary | ICD-10-CM

## 2023-09-12 RX ORDER — DULOXETIN HYDROCHLORIDE 60 MG/1
CAPSULE, DELAYED RELEASE ORAL
Qty: 90 CAPSULE | Refills: 3 | Status: SHIPPED | OUTPATIENT
Start: 2023-09-12

## 2023-09-18 ENCOUNTER — TELEPHONE (OUTPATIENT)
Dept: FAMILY MEDICINE CLINIC | Facility: CLINIC | Age: 68
End: 2023-09-18
Payer: MEDICARE

## 2023-09-18 DIAGNOSIS — G60.9 IDIOPATHIC PERIPHERAL NEUROPATHY: ICD-10-CM

## 2023-09-18 RX ORDER — BACLOFEN 10 MG/1
10 TABLET ORAL 2 TIMES DAILY PRN
Qty: 180 TABLET | Refills: 3 | Status: SHIPPED | OUTPATIENT
Start: 2023-09-18

## 2023-09-18 NOTE — TELEPHONE ENCOUNTER
Caller: Shiva Michelle Sr    Relationship: Emergency Contact    Best call back number: [phone]    Requested Prescriptions: baclofen (LIORESAL) 10 MG tablet   Requested Prescriptions      No prescriptions requested or ordered in this encounter        Pharmacy where request should be sent:    [facility] ([facility] - [facility] [address] - [phone] [facility] [phone]  [phone]   Last office visit with prescribing clinician: 8/16/2023   Last telemedicine visit with prescribing clinician: Visit date not found   Next office visit with prescribing clinician: 11/16/2023     Additional details provided by patient: PATIENT'S  IS CALLING TO REQUEST A NEW 90 DAY PRESCRIPTION WITH REFILLS FOR THE ABOVE MEDICATION.    Does the patient have less than a 3 day supply:  [x] Yes  [] No    Would you like a call back once the refill request has been completed: [] Yes [] No    If the office needs to give you a call back, can they leave a voicemail: [] Yes [] No    Avelina Guthrie Rep   09/18/23 11:41 EDT     PLEASE ADVISE.

## 2023-09-22 ENCOUNTER — HOSPITAL ENCOUNTER (OUTPATIENT)
Dept: CT IMAGING | Facility: HOSPITAL | Age: 68
Discharge: HOME | End: 2023-09-22
Admitting: SURGERY
Payer: MEDICARE

## 2023-09-22 DIAGNOSIS — I73.9 PAD (PERIPHERAL ARTERY DISEASE): ICD-10-CM

## 2023-09-22 LAB — CREAT BLDA-MCNC: 0.9 MG/DL (ref 0.6–1.3)

## 2023-09-22 PROCEDURE — 75635 CT ANGIO ABDOMINAL ARTERIES: CPT

## 2023-09-22 PROCEDURE — 25510000001 IOPAMIDOL PER 1 ML: Performed by: SURGERY

## 2023-09-22 PROCEDURE — 82565 ASSAY OF CREATININE: CPT

## 2023-09-22 RX ADMIN — IOPAMIDOL 95 ML: 755 INJECTION, SOLUTION INTRAVENOUS at 12:09

## 2023-10-05 DIAGNOSIS — E66.01 MORBID (SEVERE) OBESITY DUE TO EXCESS CALORIES: ICD-10-CM

## 2023-10-05 RX ORDER — TOPIRAMATE 25 MG/1
TABLET ORAL
Qty: 120 TABLET | OUTPATIENT
Start: 2023-10-05

## 2023-10-29 DIAGNOSIS — E66.01 MORBID (SEVERE) OBESITY DUE TO EXCESS CALORIES: ICD-10-CM

## 2023-10-30 RX ORDER — PHENTERMINE HYDROCHLORIDE 37.5 MG/1
TABLET ORAL
Qty: 30 TABLET | Refills: 2 | Status: SHIPPED | OUTPATIENT
Start: 2023-10-30

## 2023-11-08 DIAGNOSIS — I77.9 PERIPHERAL ARTERIAL OCCLUSIVE DISEASE: ICD-10-CM

## 2023-11-08 RX ORDER — CLOPIDOGREL BISULFATE 75 MG/1
75 TABLET ORAL DAILY
Qty: 90 TABLET | Refills: 1 | Status: SHIPPED | OUTPATIENT
Start: 2023-11-08

## 2023-11-16 ENCOUNTER — OFFICE VISIT (OUTPATIENT)
Dept: FAMILY MEDICINE CLINIC | Facility: CLINIC | Age: 68
End: 2023-11-16
Payer: MEDICARE

## 2023-11-16 VITALS
WEIGHT: 233 LBS | HEIGHT: 62 IN | BODY MASS INDEX: 42.88 KG/M2 | HEART RATE: 82 BPM | OXYGEN SATURATION: 90 % | DIASTOLIC BLOOD PRESSURE: 72 MMHG | SYSTOLIC BLOOD PRESSURE: 110 MMHG

## 2023-11-16 DIAGNOSIS — L81.9 PIGMENTED SKIN LESION: ICD-10-CM

## 2023-11-16 DIAGNOSIS — G89.29 CHRONIC LEFT HIP PAIN: ICD-10-CM

## 2023-11-16 DIAGNOSIS — M25.552 CHRONIC LEFT HIP PAIN: ICD-10-CM

## 2023-11-16 DIAGNOSIS — E66.01 MORBID (SEVERE) OBESITY DUE TO EXCESS CALORIES: Primary | ICD-10-CM

## 2023-11-16 DIAGNOSIS — R73.9 HYPERGLYCEMIA: ICD-10-CM

## 2023-11-16 DIAGNOSIS — R73.01 IFG (IMPAIRED FASTING GLUCOSE): ICD-10-CM

## 2023-11-16 RX ORDER — TIRZEPATIDE 7.5 MG/.5ML
7.5 INJECTION, SOLUTION SUBCUTANEOUS WEEKLY
Qty: 6 ML | Refills: 3 | Status: SHIPPED | OUTPATIENT
Start: 2023-11-16

## 2023-11-16 NOTE — PROGRESS NOTES
Subjective   Bredna Michelle is a 68 y.o. female. Presents today for   Chief Complaint   Patient presents with    Weight Check       History of Present Illness  Patient 69 y/o wc bound trying lose weight;  Was not approved for weight loss surgery as poor pulmonary status;  Has been hard to lose but is toelrating tx.   Down 13 more pounds;   no chest pain;  dyspnea baseline;  on oxygen therapy.  Left hip pain worse of late and would like see ortho;  She also has new pigmented lesion on back.       Review of Systems   Respiratory:  Positive for shortness of breath.    Cardiovascular:  Negative for chest pain and palpitations.       Patient Active Problem List   Diagnosis    Acute deep vein thrombosis of distal leg    Adenomatous polyp of colon    Depression    Fibromyalgia    Dyslipidemia    Peripheral arterial occlusive disease    Vitamin D deficiency    Gastroesophageal reflux disease    Irritable bowel syndrome    Disorder of intervertebral disc    Peripheral nerve disease    Fracture of multiple ribs of right side    Fall    Acute respiratory failure with hypoxia    COPD mixed type    Weakness    Hypopotassemia    Dysuria    Constipation    Hemorrhoids    Arthritis    Fibromyositis    Hernia of anterior abdominal wall    Homocystinemia    Injury of right ankle    Kidney stone    Knee pain    Moderate ankle sprain    Osteoarthritis of knee    Spinal stenosis, lumbar region without neurogenic claudication    Chronic respiratory failure with hypoxia    Bursitis of hip    Lumbar facet joint pain    Low back pain    Acute cystitis    Obesity, Class III, BMI 40-49.9 (morbid obesity)    Abnormal CT scan, gastrointestinal tract    Wheelchair dependence    Supplemental oxygen dependent    Dietary counseling    Pre-op evaluation    STEVEN (obstructive sleep apnea)    Lumbar spondylosis       Social History     Socioeconomic History    Marital status:     Number of children: 2    Years of education: 12    Tobacco Use     Smoking status: Former     Packs/day: 0.75     Years: 50.00     Additional pack years: 0.00     Total pack years: 37.50     Types: Cigarettes     Start date: 1/1/1960     Quit date: 11/4/2020     Years since quitting: 3.0    Smokeless tobacco: Never   Vaping Use    Vaping Use: Never used   Substance and Sexual Activity    Alcohol use: No    Drug use: No    Sexual activity: Not Currently     Partners: Male       Allergies   Allergen Reactions    Ambien [Zolpidem Tartrate] Other (See Comments)     Nightmares    Bupropion Itching    Codeine Sulfate Itching    Zolpidem Hives     Nightmares    Adhesive Tape Rash    Aripiprazole Unknown - Low Severity    Atorvastatin Other (See Comments)     MYALGIAS    Cilostazol Other (See Comments)     HA    Colesevelam Nausea Only    Ferrous Sulfate Nausea Only    Welchol [Colesevelam Hcl] Nausea Only    Wound Dressing Adhesive Rash       Current Outpatient Medications on File Prior to Visit   Medication Sig Dispense Refill    Aspirin (ASPIR-81 PO) Take 81 mg by mouth Daily.      baclofen (LIORESAL) 10 MG tablet Take 1 tablet by mouth 2 (Two) Times a Day As Needed for Muscle Spasms. 180 tablet 3    clopidogrel (PLAVIX) 75 MG tablet TAKE 1 TABLET BY MOUTH DAILY 90 tablet 1    docusate sodium (COLACE) 100 MG capsule Take 1 capsule by mouth 2 (Two) Times a Day.      DULoxetine (CYMBALTA) 60 MG capsule TAKE 1 CAPSULE DAILY 90 capsule 3    folic acid (FOLVITE) 1 MG tablet TAKE 1 TABLET DAILY 90 tablet 3    HYDROcodone-acetaminophen (NORCO)  MG per tablet Take 1 tablet by mouth Every 6 (Six) Hours As Needed for Moderate Pain.      meclizine (ANTIVERT) 25 MG tablet Take 1 tablet by mouth 3 (Three) Times a Day As Needed for Dizziness. 45 tablet 0    phentermine (ADIPEX-P) 37.5 MG tablet TAKE ONE TABLET BY MOUTH EVERY MORNING BEFORE BREAKFAST 30 tablet 2    pramipexole (MIRAPEX) 0.25 MG tablet TAKE ONE TABLET BY MOUTH ONCE NIGHTLY 30 tablet 3    pravastatin (PRAVACHOL) 80 MG tablet TAKE  "1 TABLET BY MOUTH DAILY  (DISCONTINUE ROSUVASTATIN AS  INTOLERANT.TRY NEW) 90 tablet 3    pregabalin (LYRICA) 150 MG capsule Take 1 capsule by mouth 3 (Three) Times a Day. (Patient taking differently: Take 1 capsule by mouth 2 (Two) Times a Day.) 270 capsule 1    risedronate (ACTONEL) 150 MG tablet TAKE 1 TABLET BY MOUTH EVERY 30  DAYS WITH WATER ON AN EMPTY  STOMACH, NOTHING BY MOUTH AND DO NOT LIE DOWN FOR NEXT 30 MINUTES 3 tablet 3    Tirzepatide (Mounjaro) 5 MG/0.5ML solution pen-injector Inject 0.5 mL under the skin into the appropriate area as directed 1 (One) Time Per Week. 6 mL 3    vitamin B-12 (CYANOCOBALAMIN) 1000 MCG tablet Take 1 tablet by mouth Daily.      vitamin D3 125 MCG (5000 UT) capsule capsule Take 1 capsule by mouth Daily.       No current facility-administered medications on file prior to visit.       Objective   Vitals:    11/16/23 1333   BP: 110/72   Pulse: 82   SpO2: 90%   Weight: 106 kg (233 lb)   Height: 157.5 cm (62\")     Body mass index is 42.62 kg/m².    Physical Exam  Vitals and nursing note reviewed.   Constitutional:       Appearance: Normal appearance. She is not toxic-appearing or diaphoretic.   HENT:      Head: Normocephalic and atraumatic.   Musculoskeletal:      Cervical back: Neck supple.   Skin:     General: Skin is warm and dry.      Capillary Refill: Capillary refill takes less than 2 seconds.      Comments: Pigmented lesion on back   Neurological:      Mental Status: She is alert.   Psychiatric:         Mood and Affect: Mood normal.         Behavior: Behavior normal.       Component      Latest Ref Rng 8/16/2023   Glucose      70 - 99 mg/dL 90    BUN      8 - 27 mg/dL 12    Creatinine      0.57 - 1.00 mg/dL 0.73    EGFR Result      >59 mL/min/1.73 90    BUN/Creatinine Ratio      12 - 28  16    Sodium      134 - 144 mmol/L 143    Potassium      3.5 - 5.2 mmol/L 5.7 (H)    Chloride      96 - 106 mmol/L 99    CO2      20 - 29 mmol/L 32 (H)    Calcium      8.7 - 10.3 mg/dL " 10.3    Total Protein      6.0 - 8.5 g/dL 6.5    Albumin      3.9 - 4.9 g/dL 4.1    Globulin      1.5 - 4.5 g/dL 2.4    A/G Ratio      1.2 - 2.2  1.7    Total Bilirubin      0.0 - 1.2 mg/dL 0.4    Alkaline Phosphatase      44 - 121 IU/L 98    AST (SGOT)      0 - 40 IU/L 20    ALT (SGPT)      0 - 32 IU/L 7    Total Cholesterol      100 - 199 mg/dL 224 (H)    Triglycerides      0 - 149 mg/dL 130    HDL Cholesterol      >39 mg/dL 74    VLDL Cholesterol Dileep      5 - 40 mg/dL 23    LDL Cholesterol       0 - 99 mg/dL 127 (H)    Hemoglobin A1C      4.8 - 5.6 % 5.4       Legend:  (H) High    Assessment & Plan   Diagnoses and all orders for this visit:    1. Morbid (severe) obesity due to excess calories (Primary)  -     Tirzepatide (Mounjaro) 7.5 MG/0.5ML solution pen-injector; Inject 0.5 mL under the skin into the appropriate area as directed 1 (One) Time Per Week.  Dispense: 6 mL; Refill: 3  -     Ambulatory Referral to Physical Therapy Evaluate and treat    2. IFG (impaired fasting glucose)  -     Tirzepatide (Mounjaro) 7.5 MG/0.5ML solution pen-injector; Inject 0.5 mL under the skin into the appropriate area as directed 1 (One) Time Per Week.  Dispense: 6 mL; Refill: 3    3. Hyperglycemia  -     Tirzepatide (Mounjaro) 7.5 MG/0.5ML solution pen-injector; Inject 0.5 mL under the skin into the appropriate area as directed 1 (One) Time Per Week.  Dispense: 6 mL; Refill: 3    4. Pigmented skin lesion  -     Ambulatory Referral to Dermatology    5. Chronic left hip pain  -     Ambulatory Referral to Orthopedic Surgery  -     Ambulatory Referral to Physical Therapy Evaluate and treat    Doing well on mounjaro and losing weight;   will go up on dose when due  Has pigmented skin lesion, refer to dermatology to evaluate;  likely seb k on back  Patient hip pain, requests see Ortho, I placed referral;  Also recommend [facility] program and placed referral  Check A1c, lipid profile and cmp next ov               -Follow up: 3 months  and prn

## 2023-11-27 DIAGNOSIS — R39.9 UTI SYMPTOMS: Primary | ICD-10-CM

## 2023-11-27 RX ORDER — PRAMIPEXOLE DIHYDROCHLORIDE 0.25 MG/1
TABLET ORAL
Qty: 90 TABLET | Refills: 3 | Status: SHIPPED | OUTPATIENT
Start: 2023-11-27

## 2023-11-28 LAB
APPEARANCE UR: CLEAR
BACTERIA #/AREA URNS HPF: NORMAL /[HPF]
BILIRUB UR QL STRIP: NEGATIVE
CASTS URNS QL MICRO: NORMAL /LPF
COLOR UR: YELLOW
EPI CELLS #/AREA URNS HPF: NORMAL /HPF (ref 0–10)
GLUCOSE UR QL STRIP: NEGATIVE
HGB UR QL STRIP: NEGATIVE
KETONES UR QL STRIP: NEGATIVE
LEUKOCYTE ESTERASE UR QL STRIP: NEGATIVE
MICRO URNS: NORMAL
MICRO URNS: NORMAL
NITRITE UR QL STRIP: NEGATIVE
PH UR STRIP: 7 [PH] (ref 5–7.5)
PROT UR QL STRIP: NEGATIVE
RBC #/AREA URNS HPF: NORMAL /HPF (ref 0–2)
SP GR UR STRIP: 1.01 (ref 1–1.03)
URINALYSIS REFLEX: NORMAL
UROBILINOGEN UR STRIP-MCNC: 0.2 MG/DL (ref 0.2–1)
WBC #/AREA URNS HPF: NORMAL /HPF (ref 0–5)

## 2023-11-29 ENCOUNTER — TELEPHONE (OUTPATIENT)
Dept: FAMILY MEDICINE CLINIC | Facility: CLINIC | Age: 68
End: 2023-11-29

## 2023-11-29 NOTE — TELEPHONE ENCOUNTER
Caller: Trinh Michelle Sr    Relationship: Emergency Contact    Best call back number: [phone]     Caller requesting test results:      What test was performed: URINE SAMPLE    When was the test performed: 11/27/23    Where was the test performed: OFFICE    Additional notes: PATIENT  TRINH CALLED AND WANTS TO KNOW IF THE RESULTS OF THE URINE SAMPLE HE DROPPED OFF ON MONDAY 11/27/23 HAS CAME BACK AND PLEASE CALL THEM BACK.

## 2023-12-01 NOTE — TELEPHONE ENCOUNTER
-- DO NOT REPLY / DO NOT REPLY ALL --  -- Message is from Engagement Center Operations (ECO) --    General Patient Message: Patient is returning call      Caller Information       Type Contact Phone/Fax    11/30/2023 08:04 AM CST Phone (Incoming) Neno Peng (Self) [phone] (H)    11/30/2023 10:28 AM CST Phone (Outgoing) Neno Peng (Self) [phone] (H)    Left Message     12/01/2023 07:49 AM CST Phone (Incoming) Neno Peng (Self) [phone] (H)        Alternative phone number: none     Can a detailed message be left? Yes    Message Turnaround: [place]:    Refer to site's KB page for routing instructions    Please give this turnaround time to the caller:   \"You can expect to receive a response 2-3 business days after your provider's clinical team reviews the message\"               The incorrect amount was sent to pharmacy

## 2024-01-18 ENCOUNTER — TELEPHONE (OUTPATIENT)
Dept: FAMILY MEDICINE CLINIC | Facility: CLINIC | Age: 69
End: 2024-01-18
Payer: MEDICARE

## 2024-01-18 NOTE — TELEPHONE ENCOUNTER
Patient was referred to [facility] Physical therapy and they called to inform us that they are no longer doing the power program (weight loss Program). They want to know if Dr. Kline still wants them to treat the patient.    [phone]

## 2024-01-24 ENCOUNTER — TELEPHONE (OUTPATIENT)
Dept: FAMILY MEDICINE CLINIC | Facility: CLINIC | Age: 69
End: 2024-01-24

## 2024-01-24 NOTE — TELEPHONE ENCOUNTER
Caller: Shiva Michelle Sr    Relationship: Emergency Contact    Best call back number: [phone]     Which medication are you concerned about: Tirzepatide (Mounjaro) 7.5 MG/0.5ML solution pen-injector     Who prescribed you this medication: CLAUDETTE LAURENT    What are your concerns: PATIENTS  STATED PATIENT IS ALMOST OUT OF THIS MEDICATION, AND FEELS SHE IS READY FOR THE NEXT DOSE 10 MG.    PATIENTS  IS REQUESTING THIS NEW PRESCRIPTION BE SENT TO HER PHARMACY, [facility] - [address] [phone] [facility] [phone] .

## 2024-01-25 DIAGNOSIS — E66.01 MORBID (SEVERE) OBESITY DUE TO EXCESS CALORIES: ICD-10-CM

## 2024-01-25 DIAGNOSIS — R73.9 HYPERGLYCEMIA: ICD-10-CM

## 2024-01-25 DIAGNOSIS — R73.01 IFG (IMPAIRED FASTING GLUCOSE): ICD-10-CM

## 2024-01-25 DIAGNOSIS — E66.01 MORBID (SEVERE) OBESITY DUE TO EXCESS CALORIES: Primary | ICD-10-CM

## 2024-01-25 NOTE — TELEPHONE ENCOUNTER
Was sent to [facility] and pt asked for [facility] so I re-sent to [facility]. Pt was informed.

## 2024-01-25 NOTE — ANESTHESIA PREPROCEDURE EVALUATION
Anesthesia Evaluation     Patient summary reviewed and Nursing notes reviewed   NPO Solid Status: > 8 hours  NPO Liquid Status: > 6 hours           Airway   Mallampati: III  TM distance: >3 FB  Neck ROM: full  Possible difficult intubation  Dental - normal exam     Pulmonary - normal exam   (+) a smoker Former, COPD, sleep apnea on CPAP,     ROS comment: Chronic respiratory failure with hypoxia, on 3L O2 at home  Cardiovascular - normal exam    Rhythm: regular  Rate: normal    (+) PVD, DVT, hyperlipidemia,     ROS comment: Bilateral iliac stents    Neuro/Psych  (+) weakness, numbness, psychiatric history Depression and Anxiety,      ROS Comment: Peripheral neuropathy  GI/Hepatic/Renal/Endo    (+) obesity, morbid obesity, GERD,  renal disease stones,     Musculoskeletal     (+) back pain, myalgias,       ROS comment: Fibromyalgia/lumbar DDD with spinal stenosis  Abdominal   (+) obese,    Substance History      OB/GYN          Other   arthritis,                    Anesthesia Plan    ASA 3     MAC     (POM mask due to morbid obesity, STEVEN and oxygen dependence)  intravenous induction     Anesthetic plan, risks, benefits, and alternatives have been provided, discussed and informed consent has been obtained with: patient.        CODE STATUS:        Him/He

## 2024-01-29 DIAGNOSIS — E66.01 MORBID (SEVERE) OBESITY DUE TO EXCESS CALORIES: ICD-10-CM

## 2024-01-29 RX ORDER — PHENTERMINE HYDROCHLORIDE 37.5 MG/1
TABLET ORAL
Qty: 30 TABLET | Refills: 2 | Status: SHIPPED | OUTPATIENT
Start: 2024-01-29

## 2024-02-01 DIAGNOSIS — N39.0 RECURRENT UTI: Primary | ICD-10-CM

## 2024-02-02 ENCOUNTER — TELEPHONE (OUTPATIENT)
Dept: FAMILY MEDICINE CLINIC | Facility: CLINIC | Age: 69
End: 2024-02-02
Payer: MEDICARE

## 2024-02-02 NOTE — TELEPHONE ENCOUNTER
Can you order off of last office note (11/16/23) It doesn't look like it was discussed at that time.

## 2024-02-07 NOTE — TELEPHONE ENCOUNTER
Pt has been on oxygen for probably close to 20 years ago. The latest tests were done by Dr. Tom Mathias. Dr. Mathias set her up testing about 3+ years ago and pt wasn't able to do them or work for her. Pt is currently on 3 liters constantly. She needs something lighter for when she is out shopping and doing errands. Pt has not seen Dr. Mathias in years.

## 2024-02-07 NOTE — TELEPHONE ENCOUNTER
Hub to relay    May be, who originally ordered?   I need to track down original oxygen information as medicare will want all data.  RRJ     I left message asking pt to call back with information.

## 2024-02-07 NOTE — TELEPHONE ENCOUNTER
Name: Shiva Michelle Sr    Relationship: Emergency Contact    Best Callback Number: [phone]    HUB PROVIDED THE RELAY MESSAGE FROM THE OFFICE   PATIENT HAS FURTHER QUESTIONS AND WOULD LIKE A CALL BACK AT THE FOLLOWING PHONE NUMBER [phone]    ADDITIONAL INFORMATION: THE LATEST OXYGEN WAS PRESCRIBED BY DR. VERÓNICA NASCIMENTO.

## 2024-03-03 ENCOUNTER — APPOINTMENT (OUTPATIENT)
Dept: GENERAL RADIOLOGY | Facility: HOSPITAL | Age: 69
DRG: 312 | End: 2024-03-03
Payer: MEDICARE

## 2024-03-03 ENCOUNTER — HOSPITAL ENCOUNTER (INPATIENT)
Facility: HOSPITAL | Age: 69
LOS: 4 days | Discharge: NURSING, REHAB OR LONG-TERM CARE | DRG: 312 | End: 2024-03-09
Attending: EMERGENCY MEDICINE | Admitting: INTERNAL MEDICINE
Payer: MEDICARE

## 2024-03-03 ENCOUNTER — APPOINTMENT (OUTPATIENT)
Dept: CT IMAGING | Facility: HOSPITAL | Age: 69
DRG: 312 | End: 2024-03-03
Payer: MEDICARE

## 2024-03-03 DIAGNOSIS — G60.9 IDIOPATHIC PERIPHERAL NEUROPATHY: ICD-10-CM

## 2024-03-03 DIAGNOSIS — M25.561 ACUTE PAIN OF BOTH KNEES: ICD-10-CM

## 2024-03-03 DIAGNOSIS — S09.90XA CLOSED HEAD INJURY, INITIAL ENCOUNTER: ICD-10-CM

## 2024-03-03 DIAGNOSIS — M79.604 RIGHT LEG PAIN: ICD-10-CM

## 2024-03-03 DIAGNOSIS — J96.11 CHRONIC HYPOXIC RESPIRATORY FAILURE, ON HOME OXYGEN THERAPY: ICD-10-CM

## 2024-03-03 DIAGNOSIS — Z99.81 CHRONIC HYPOXIC RESPIRATORY FAILURE, ON HOME OXYGEN THERAPY: ICD-10-CM

## 2024-03-03 DIAGNOSIS — M25.562 ACUTE PAIN OF BOTH KNEES: ICD-10-CM

## 2024-03-03 DIAGNOSIS — R55 RECURRENT SYNCOPE: Primary | ICD-10-CM

## 2024-03-03 DIAGNOSIS — G89.29 OTHER CHRONIC PAIN: ICD-10-CM

## 2024-03-03 PROBLEM — Z79.899 POLYPHARMACY: Status: ACTIVE | Noted: 2024-03-03

## 2024-03-03 PROBLEM — I95.1 ORTHOSTATIC SYNCOPE: Status: ACTIVE | Noted: 2024-03-03

## 2024-03-03 LAB
ALBUMIN SERPL-MCNC: 3.7 G/DL (ref 3.5–5.2)
ALBUMIN/GLOB SERPL: 1.5 G/DL
ALP SERPL-CCNC: 85 U/L (ref 39–117)
ALT SERPL W P-5'-P-CCNC: 13 U/L (ref 1–33)
ANION GAP SERPL CALCULATED.3IONS-SCNC: 9 MMOL/L (ref 5–15)
APTT PPP: 28.3 SECONDS (ref 22.7–35.4)
AST SERPL-CCNC: 25 U/L (ref 1–32)
BASOPHILS # BLD AUTO: 0.02 10*3/MM3 (ref 0–0.2)
BASOPHILS NFR BLD AUTO: 0.3 % (ref 0–1.5)
BILIRUB SERPL-MCNC: 0.4 MG/DL (ref 0–1.2)
BUN SERPL-MCNC: 11 MG/DL (ref 8–23)
BUN/CREAT SERPL: 11.1 (ref 7–25)
CALCIUM SPEC-SCNC: 10.4 MG/DL (ref 8.6–10.5)
CHLORIDE SERPL-SCNC: 103 MMOL/L (ref 98–107)
CO2 SERPL-SCNC: 32 MMOL/L (ref 22–29)
CREAT SERPL-MCNC: 0.99 MG/DL (ref 0.57–1)
DEPRECATED RDW RBC AUTO: 50.2 FL (ref 37–54)
EGFRCR SERPLBLD CKD-EPI 2021: 62.2 ML/MIN/1.73
EOSINOPHIL # BLD AUTO: 0.13 10*3/MM3 (ref 0–0.4)
EOSINOPHIL NFR BLD AUTO: 2.2 % (ref 0.3–6.2)
ERYTHROCYTE [DISTWIDTH] IN BLOOD BY AUTOMATED COUNT: 13.3 % (ref 12.3–15.4)
GLOBULIN UR ELPH-MCNC: 2.5 GM/DL
GLUCOSE SERPL-MCNC: 95 MG/DL (ref 65–99)
HCT VFR BLD AUTO: 39.9 % (ref 34–46.6)
HGB BLD-MCNC: 12.5 G/DL (ref 12–15.9)
HOLD SPECIMEN: NORMAL
HOLD SPECIMEN: NORMAL
IMM GRANULOCYTES # BLD AUTO: 0.01 10*3/MM3 (ref 0–0.05)
IMM GRANULOCYTES NFR BLD AUTO: 0.2 % (ref 0–0.5)
INR PPP: 1.05 (ref 0.9–1.1)
LYMPHOCYTES # BLD AUTO: 1.6 10*3/MM3 (ref 0.7–3.1)
LYMPHOCYTES NFR BLD AUTO: 27.5 % (ref 19.6–45.3)
MAGNESIUM SERPL-MCNC: 1.6 MG/DL (ref 1.6–2.4)
MCH RBC QN AUTO: 31.8 PG (ref 26.6–33)
MCHC RBC AUTO-ENTMCNC: 31.3 G/DL (ref 31.5–35.7)
MCV RBC AUTO: 101.5 FL (ref 79–97)
MONOCYTES # BLD AUTO: 0.54 10*3/MM3 (ref 0.1–0.9)
MONOCYTES NFR BLD AUTO: 9.3 % (ref 5–12)
NEUTROPHILS NFR BLD AUTO: 3.52 10*3/MM3 (ref 1.7–7)
NEUTROPHILS NFR BLD AUTO: 60.5 % (ref 42.7–76)
NRBC BLD AUTO-RTO: 0 /100 WBC (ref 0–0.2)
NT-PROBNP SERPL-MCNC: 98.3 PG/ML (ref 0–900)
PLATELET # BLD AUTO: 203 10*3/MM3 (ref 140–450)
PMV BLD AUTO: 10.9 FL (ref 6–12)
POTASSIUM SERPL-SCNC: 3.7 MMOL/L (ref 3.5–5.2)
PROT SERPL-MCNC: 6.2 G/DL (ref 6–8.5)
PROTHROMBIN TIME: 13.8 SECONDS (ref 11.7–14.2)
QT INTERVAL: 378 MS
QTC INTERVAL: 417 MS
RBC # BLD AUTO: 3.93 10*6/MM3 (ref 3.77–5.28)
SODIUM SERPL-SCNC: 144 MMOL/L (ref 136–145)
TROPONIN T SERPL HS-MCNC: 21 NG/L
WBC NRBC COR # BLD AUTO: 5.82 10*3/MM3 (ref 3.4–10.8)
WHOLE BLOOD HOLD COAG: NORMAL
WHOLE BLOOD HOLD SPECIMEN: NORMAL

## 2024-03-03 PROCEDURE — 93005 ELECTROCARDIOGRAM TRACING: CPT

## 2024-03-03 PROCEDURE — 99285 EMERGENCY DEPT VISIT HI MDM: CPT

## 2024-03-03 PROCEDURE — G0378 HOSPITAL OBSERVATION PER HR: HCPCS

## 2024-03-03 PROCEDURE — 72125 CT NECK SPINE W/O DYE: CPT

## 2024-03-03 PROCEDURE — 25010000002 HYDROMORPHONE PER 4 MG: Performed by: EMERGENCY MEDICINE

## 2024-03-03 PROCEDURE — 25010000002 ONDANSETRON PER 1 MG: Performed by: EMERGENCY MEDICINE

## 2024-03-03 PROCEDURE — 93010 ELECTROCARDIOGRAM REPORT: CPT | Performed by: INTERNAL MEDICINE

## 2024-03-03 PROCEDURE — 73560 X-RAY EXAM OF KNEE 1 OR 2: CPT

## 2024-03-03 PROCEDURE — 83735 ASSAY OF MAGNESIUM: CPT

## 2024-03-03 PROCEDURE — 85730 THROMBOPLASTIN TIME PARTIAL: CPT | Performed by: PHYSICIAN ASSISTANT

## 2024-03-03 PROCEDURE — 85025 COMPLETE CBC W/AUTO DIFF WBC: CPT

## 2024-03-03 PROCEDURE — 83880 ASSAY OF NATRIURETIC PEPTIDE: CPT | Performed by: PHYSICIAN ASSISTANT

## 2024-03-03 PROCEDURE — 73610 X-RAY EXAM OF ANKLE: CPT

## 2024-03-03 PROCEDURE — 25010000002 HYDROMORPHONE PER 4 MG: Performed by: INTERNAL MEDICINE

## 2024-03-03 PROCEDURE — 70450 CT HEAD/BRAIN W/O DYE: CPT

## 2024-03-03 PROCEDURE — 84484 ASSAY OF TROPONIN QUANT: CPT

## 2024-03-03 PROCEDURE — 80053 COMPREHEN METABOLIC PANEL: CPT

## 2024-03-03 PROCEDURE — 85610 PROTHROMBIN TIME: CPT | Performed by: PHYSICIAN ASSISTANT

## 2024-03-03 RX ORDER — DOCUSATE SODIUM 100 MG/1
100 CAPSULE, LIQUID FILLED ORAL 2 TIMES DAILY
Status: DISCONTINUED | OUTPATIENT
Start: 2024-03-03 | End: 2024-03-09 | Stop reason: HOSPADM

## 2024-03-03 RX ORDER — HYDROMORPHONE HYDROCHLORIDE 1 MG/ML
0.5 INJECTION, SOLUTION INTRAMUSCULAR; INTRAVENOUS; SUBCUTANEOUS ONCE
Status: COMPLETED | OUTPATIENT
Start: 2024-03-03 | End: 2024-03-03

## 2024-03-03 RX ORDER — SODIUM CHLORIDE 9 MG/ML
100 INJECTION, SOLUTION INTRAVENOUS CONTINUOUS
Status: DISCONTINUED | OUTPATIENT
Start: 2024-03-03 | End: 2024-03-04

## 2024-03-03 RX ORDER — ACETAMINOPHEN 650 MG/1
650 SUPPOSITORY RECTAL EVERY 4 HOURS PRN
Status: DISCONTINUED | OUTPATIENT
Start: 2024-03-03 | End: 2024-03-09 | Stop reason: HOSPADM

## 2024-03-03 RX ORDER — ACETAMINOPHEN 325 MG/1
650 TABLET ORAL EVERY 4 HOURS PRN
Status: DISCONTINUED | OUTPATIENT
Start: 2024-03-03 | End: 2024-03-09 | Stop reason: HOSPADM

## 2024-03-03 RX ORDER — NITROGLYCERIN 0.4 MG/1
0.4 TABLET SUBLINGUAL
Status: DISCONTINUED | OUTPATIENT
Start: 2024-03-03 | End: 2024-03-09 | Stop reason: HOSPADM

## 2024-03-03 RX ORDER — ONDANSETRON 2 MG/ML
4 INJECTION INTRAMUSCULAR; INTRAVENOUS ONCE
Status: COMPLETED | OUTPATIENT
Start: 2024-03-03 | End: 2024-03-03

## 2024-03-03 RX ORDER — SODIUM CHLORIDE 9 MG/ML
40 INJECTION, SOLUTION INTRAVENOUS AS NEEDED
Status: DISCONTINUED | OUTPATIENT
Start: 2024-03-03 | End: 2024-03-09 | Stop reason: HOSPADM

## 2024-03-03 RX ORDER — SODIUM CHLORIDE 0.9 % (FLUSH) 0.9 %
10 SYRINGE (ML) INJECTION EVERY 12 HOURS SCHEDULED
Status: DISCONTINUED | OUTPATIENT
Start: 2024-03-03 | End: 2024-03-09 | Stop reason: HOSPADM

## 2024-03-03 RX ORDER — FOLIC ACID 1 MG/1
1000 TABLET ORAL DAILY
Status: DISCONTINUED | OUTPATIENT
Start: 2024-03-03 | End: 2024-03-09 | Stop reason: HOSPADM

## 2024-03-03 RX ORDER — ACETAMINOPHEN 160 MG/5ML
650 SOLUTION ORAL EVERY 4 HOURS PRN
Status: DISCONTINUED | OUTPATIENT
Start: 2024-03-03 | End: 2024-03-09 | Stop reason: HOSPADM

## 2024-03-03 RX ORDER — PRAVASTATIN SODIUM 40 MG
80 TABLET ORAL NIGHTLY
Status: DISCONTINUED | OUTPATIENT
Start: 2024-03-03 | End: 2024-03-09 | Stop reason: HOSPADM

## 2024-03-03 RX ORDER — HYDROMORPHONE HYDROCHLORIDE 1 MG/ML
0.5 INJECTION, SOLUTION INTRAMUSCULAR; INTRAVENOUS; SUBCUTANEOUS
Status: DISCONTINUED | OUTPATIENT
Start: 2024-03-03 | End: 2024-03-09 | Stop reason: HOSPADM

## 2024-03-03 RX ORDER — SODIUM CHLORIDE 0.9 % (FLUSH) 0.9 %
10 SYRINGE (ML) INJECTION AS NEEDED
Status: DISCONTINUED | OUTPATIENT
Start: 2024-03-03 | End: 2024-03-09 | Stop reason: HOSPADM

## 2024-03-03 RX ORDER — ONDANSETRON 2 MG/ML
4 INJECTION INTRAMUSCULAR; INTRAVENOUS EVERY 6 HOURS PRN
Status: DISCONTINUED | OUTPATIENT
Start: 2024-03-03 | End: 2024-03-09 | Stop reason: HOSPADM

## 2024-03-03 RX ORDER — MECLIZINE HYDROCHLORIDE 25 MG/1
25 TABLET ORAL 3 TIMES DAILY PRN
Status: DISCONTINUED | OUTPATIENT
Start: 2024-03-03 | End: 2024-03-09 | Stop reason: HOSPADM

## 2024-03-03 RX ORDER — CLOPIDOGREL BISULFATE 75 MG/1
75 TABLET ORAL DAILY
Status: DISCONTINUED | OUTPATIENT
Start: 2024-03-03 | End: 2024-03-05

## 2024-03-03 RX ORDER — ASPIRIN 81 MG/1
81 TABLET ORAL DAILY
Status: DISCONTINUED | OUTPATIENT
Start: 2024-03-03 | End: 2024-03-09 | Stop reason: HOSPADM

## 2024-03-03 RX ORDER — HYDROCODONE BITARTRATE AND ACETAMINOPHEN 10; 325 MG/1; MG/1
1 TABLET ORAL EVERY 6 HOURS PRN
Status: DISCONTINUED | OUTPATIENT
Start: 2024-03-03 | End: 2024-03-04

## 2024-03-03 RX ORDER — DULOXETIN HYDROCHLORIDE 60 MG/1
60 CAPSULE, DELAYED RELEASE ORAL DAILY
Status: DISCONTINUED | OUTPATIENT
Start: 2024-03-03 | End: 2024-03-09 | Stop reason: HOSPADM

## 2024-03-03 RX ADMIN — HYDROMORPHONE HYDROCHLORIDE 0.5 MG: 1 INJECTION, SOLUTION INTRAMUSCULAR; INTRAVENOUS; SUBCUTANEOUS at 17:17

## 2024-03-03 RX ADMIN — HYDROMORPHONE HYDROCHLORIDE 0.5 MG: 1 INJECTION, SOLUTION INTRAMUSCULAR; INTRAVENOUS; SUBCUTANEOUS at 14:46

## 2024-03-03 RX ADMIN — ONDANSETRON 4 MG: 2 INJECTION INTRAMUSCULAR; INTRAVENOUS at 14:44

## 2024-03-03 RX ADMIN — HYDROCODONE BITARTRATE AND ACETAMINOPHEN 1 TABLET: 10; 325 TABLET ORAL at 19:24

## 2024-03-03 RX ADMIN — Medication 10 ML: at 22:59

## 2024-03-03 RX ADMIN — SODIUM CHLORIDE 100 ML/HR: 9 INJECTION, SOLUTION INTRAVENOUS at 19:23

## 2024-03-03 NOTE — ED NOTES
"Nursing report ED to floor  Brenda Michelle  68 y.o.  female    HPI :  HPI (Adult)  Stated Reason for Visit: From home with c/o R ankle pain s/p fall due to syncope  History Obtained From: EMS, patient    Chief Complaint  Chief Complaint   Patient presents with    Syncope    Fall       Admitting doctor:   Chiara Clements MD    Admitting diagnosis:   The primary encounter diagnosis was Recurrent syncope. Diagnoses of Closed head injury, initial encounter, Right leg pain, Acute pain of both knees, and Chronic hypoxic respiratory failure, on home oxygen therapy were also pertinent to this visit.    Code status:   Current Code Status       Date Active Code Status Order ID Comments User Context       Prior            Allergies:   Ambien [zolpidem tartrate], Bupropion, Codeine sulfate, Zolpidem, Adhesive tape, Aripiprazole, Atorvastatin, Cilostazol, Colesevelam, Ferrous sulfate, Welchol [colesevelam hcl], and Wound dressing adhesive    Isolation:   No active isolations    Intake and Output  No intake or output data in the 24 hours ending 03/03/24 1446    Weight:       03/03/24  1329   Weight: 95.7 kg (211 lb)       Most recent vitals:   Vitals:    03/03/24 1314 03/03/24 1329 03/03/24 1401 03/03/24 1402   BP: 115/61  119/61    Pulse: 77   68   Resp: 16      Temp:       SpO2: 97%   94%   Weight:  95.7 kg (211 lb)     Height:  160 cm (63\")         Active LDAs/IV Access:   Lines, Drains & Airways       Active LDAs       Name Placement date Placement time Site Days    Peripheral IV 03/03/24 1328 Right Antecubital 03/03/24  1328  Antecubital  less than 1                    Labs (abnormal labs have a star):   Labs Reviewed   COMPREHENSIVE METABOLIC PANEL - Abnormal; Notable for the following components:       Result Value    CO2 32.0 (*)     All other components within normal limits    Narrative:     GFR Normal >60  Chronic Kidney Disease <60  Kidney Failure <15     SINGLE HSTROPONIN T - Abnormal; Notable for the following " components:    HS Troponin T 21 (*)     All other components within normal limits    Narrative:     High Sensitive Troponin T Reference Range:  <14.0 ng/L- Negative Female for AMI  <22.0 ng/L- Negative Male for AMI  >=14 - Abnormal Female indicating possible myocardial injury.  >=22 - Abnormal Male indicating possible myocardial injury.   Clinicians would have to utilize clinical acumen, EKG, Troponin, and serial changes to determine if it is an Acute Myocardial Infarction or myocardial injury due to an underlying chronic condition.        CBC WITH AUTO DIFFERENTIAL - Abnormal; Notable for the following components:    .5 (*)     MCHC 31.3 (*)     All other components within normal limits   MAGNESIUM - Normal   PROTIME-INR - Normal   APTT - Normal   BNP (IN-HOUSE) - Normal    Narrative:     This assay is used as an aid in the diagnosis of individuals suspected of having heart failure. It can be used as an aid in the diagnosis of acute decompensated heart failure (ADHF) in patients presenting with signs and symptoms of ADHF to the emergency department (ED). In addition, NT-proBNP of <300 pg/mL indicates ADHF is not likely.    Age Range Result Interpretation  NT-proBNP Concentration (pg/mL:      <50             Positive            >450                   Gray                 300-450                    Negative             <300    50-75           Positive            >900                  Gray                300-900                  Negative            <300      >75             Positive            >1800                  Gray                300-1800                  Negative            <300   RAINBOW DRAW    Narrative:     The following orders were created for panel order [place] Draw.  Procedure                               Abnormality         Status                     ---------                               -----------         ------                     Green Top (Gel)[[id number]]                                   Final result               Lavender Top[[id number]]                                     Final result               Gold Top - SST[[id number]]                                   Final result               Light Blue Top[[id number]]                                   Final result                 Please view results for these tests on the individual orders.   POCT GLUCOSE FINGERSTICK   CBC AND DIFFERENTIAL    Narrative:     The following orders were created for panel order CBC & Differential.  Procedure                               Abnormality         Status                     ---------                               -----------         ------                     CBC Auto Differential[[id number]]        Abnormal            Final result                 Please view results for these tests on the individual orders.   GREEN TOP   LAVENDER TOP   GOLD TOP - SST   LIGHT BLUE TOP       EKG:   ECG 12 Lead ED Triage Standing Order; Syncope   Preliminary Result   HEART RATE= 73  bpm   RR Interval= 822  ms   OR Interval= 168  ms   P Horizontal Axis= -3  deg   P Front Axis= 43  deg   QRSD Interval= 93  ms   QT Interval= 378  ms   QTcB= 417  ms   QRS Axis= -5  deg   T Wave Axis= 34  deg   - BORDERLINE ECG -   Sinus rhythm   Low voltage, precordial leads   Borderline T abnormalities, anterior leads   Electronically Signed By:    Date and Time of Study: 2024-03-03 13:24:41          Meds given in ED:   Medications   sodium chloride 0.9 % flush 10 mL (has no administration in time range)   sodium chloride 0.9 % flush 10 mL (has no administration in time range)   HYDROmorphone (DILAUDID) injection 0.5 mg (0.5 mg Intravenous Given 3/3/24 1446)   ondansetron (ZOFRAN) injection 4 mg (4 mg Intravenous Given 3/3/24 1444)       Imaging results:  XR Ankle 3+ View Right    Result Date: 3/3/2024  No fracture or dislocation. Lateral ankle soft tissue swelling  This report was finalized on 3/3/2024 2:14 PM by Dr. Abisai Berger M.D on Workstation:  [id number]      XR Knee 1 or 2 View Bilateral    Result Date: 3/3/2024   1. No fracture or dislocation. 2. Right knee medial compartment arthroplasty. 3. Mild left knee osteoarthritis. 4. Mild osteoarthritis in the right knee lateral and patellofemoral joints  This report was finalized on 3/3/2024 2:13 PM by Dr. Abisai Berger M.D on Workstation: [id number]      CT Cervical Spine Without Contrast    Result Date: 3/3/2024  No fracture. Chronic findings above.  This report was finalized on 3/3/2024 2:08 PM by Dr. Abisai Berger M.D on Workstation: [id number]      CT Head Without Contrast    Result Date: 3/3/2024  No acute intracranial process.  This report was finalized on 3/3/2024 2:04 PM by Dr. Abisai Berger M.D on Workstation: [id number]       Ambulatory status:   - has not ambulated in ED    Social issues:   Social History     Socioeconomic History    Marital status:     Number of children: 2    Years of education: 12    Tobacco Use    Smoking status: Former     Current packs/day: 0.00     Average packs/day: 0.8 packs/day for 60.8 years (45.6 ttl pk-yrs)     Types: Cigarettes     Start date: 1/1/1960     Quit date: 11/4/2020     Years since quitting: 3.3    Smokeless tobacco: Never   Vaping Use    Vaping status: Never Used   Substance and Sexual Activity    Alcohol use: No    Drug use: No    Sexual activity: Not Currently     Partners: Male       Peripheral Neurovascular  Peripheral Neurovascular (Adult)  Peripheral Neurovascular WDL: WDL, pulse assessment  Pulse Assessment: radial    Neuro Cognitive  Neuro Cognitive (Adult)  Cognitive/Neuro/Behavioral WDL: WDL, level of consciousness, orientation  Level of Consciousness: Alert  Orientation: oriented x 4    Learning  Learning Assessment (Adult)  Learning Readiness and Ability: physical limitation noted    Respiratory  Respiratory WDL  Respiratory WDL: WDL, rhythm/pattern  Rhythm/Pattern, Respiratory: no shortness of breath reported, depth  regular, pattern regular, unlabored    Abdominal Pain       Pain Assessments  Pain (Adult)  (0-10) Pain Rating: Rest: 10  Pain Location: ankle  Pain Side/Orientation: right    NIH Stroke Scale       Nikki Unger RN  03/03/24 14:46 EST

## 2024-03-03 NOTE — ED PROVIDER NOTES
EMERGENCY DEPARTMENT ENCOUNTER  Room Number:  02/02  PCP: Angelito Kline DO  Independent Historians: Patient, Family, and EMS      HPI:  Chief Complaint: had concerns including Syncope and Fall.     A complete HPI/ROS/PMH/PSH/SH/FH are unobtainable due to: None    Chronic or social conditions impacting patient care (Social Determinants of Health): None      Context: Brenda Michelle is a 68 y.o. female with a medical history of DVT, depression, fibromyalgia, dyslipidemia, GERD, IBS, chronic respiratory failure, and morbid obesity who presents to the ED c/o acute syncope versus seizure.  Patient presents from home where she has had 2 syncopal versus seizure-like episodes over the past 2 days.  Has been reports yesterday as she was sitting on the toilet, all of her extremities began to shake and she went out of it.  He was able to hold her and prevent her from falling.  Today, patient was walking from the bedroom to the bathroom.  She was about to sit on the toilet when all extremities began shaking again and she went out of it again.  She fell backwards and struck the back of her head.  Reports the right leg was collapsed underneath her.  Patient is on Plavix.  Patient complains of pain from the right knee to the right ankle.  She is chronically on 3 L oxygen nasal cannula.  Patient denies neck pain, chest pain, dyspnea, vomiting, or any other systemic complaint.      Review of prior external notes (non-ED) -and- Review of prior external test results outside of this encounter:  Patient seen by pain management on 2/29/2024 for bilateral trochanteric bursitis and low back pain.  Unable to load note to review assessment and plan.  Reviewed labs collected on 8/16/2023.  Lipid panel with total cholesterol 224, CMP with creatinine 0.73.    Prescription drug monitoring program review:     N/A    PAST MEDICAL HISTORY  Active Ambulatory Problems     Diagnosis Date Noted    Acute deep vein thrombosis of distal leg  01/24/2016    Adenomatous polyp of colon 01/24/2016    Depression 01/24/2016    Fibromyalgia 01/24/2016    Dyslipidemia 01/24/2016    Peripheral arterial occlusive disease 01/24/2016    Vitamin D deficiency 01/24/2016    Gastroesophageal reflux disease 02/13/2012    Irritable bowel syndrome 02/13/2012    Disorder of intervertebral disc 02/13/2012    Peripheral nerve disease 02/13/2012    Fracture of multiple ribs of right side 12/10/2017    Fall 12/11/2017    Acute respiratory failure with hypoxia 12/11/2017    COPD mixed type 12/11/2017    Weakness 11/15/2019    Hypopotassemia 11/16/2019    Dysuria 11/20/2019    Constipation 11/20/2019    Hemorrhoids 06/11/2021    Arthritis 06/11/2021    Fibromyositis 06/11/2021    Hernia of anterior abdominal wall 06/11/2021    Homocystinemia 02/13/2012    Injury of right ankle 01/05/2016    Kidney stone 10/24/2012    Knee pain 09/17/2015    Moderate ankle sprain 01/06/2016    Osteoarthritis of knee 02/19/2020    Spinal stenosis, lumbar region without neurogenic claudication 02/20/2019    Chronic respiratory failure with hypoxia 07/29/2021    Bursitis of hip 01/25/2022    Lumbar facet joint pain 01/25/2022    Low back pain 01/25/2022    Acute cystitis 09/09/2022    Obesity, Class III, BMI 40-49.9 (morbid obesity) 09/09/2022    Abnormal CT scan, gastrointestinal tract 09/09/2022    Wheelchair dependence 10/04/2022    Supplemental oxygen dependent 10/04/2022    Dietary counseling 10/04/2022    Pre-op evaluation 10/04/2022    STEVEN (obstructive sleep apnea) 10/04/2022    Lumbar spondylosis 02/07/2023     Resolved Ambulatory Problems     Diagnosis Date Noted    Low back pain 01/24/2016    Tobacco dependence 12/11/2017    Other specified postprocedural states 01/14/2020    Encounter for long-term (current) use of insulin 01/25/2022    Peripheral nerve disease 01/25/2022    Sepsis without acute organ dysfunction 09/09/2022    Fever 09/09/2022    Sepsis without acute organ dysfunction, due  to unspecified organism 2022     Past Medical History:   Diagnosis Date    Anxiety     benign polyps of large intestine     COPD (chronic obstructive pulmonary disease)     Difficulty walking     Disc degeneration, lumbar     Hyperlipidemia     IBS (irritable bowel syndrome)     Kidney stones     Movement disorder     Nephrolithiasis     Peripheral neuropathy     PVD (peripheral vascular disease)     Shingles          PAST SURGICAL HISTORY  Past Surgical History:   Procedure Laterality Date    CATARACT EXTRACTION      COLONOSCOPY  2004    Colon polyps; family history of colon cancer    COLONOSCOPY N/A 2022    Procedure: COLONOSCOPY to cecum and TI with biopsies, cold forceps and hot snare polypectomies with 2cc orise lift and clip x 2;  Surgeon: Chirag Tony MD;  Location: [facility] ENDOSCOPY;  Service: Gastroenterology;  Laterality: N/A;  PRE- abnormal imaging, hx of polyps, family hx of colon cancer  POST- polyps, internal/external hemorrhoids    KNEE ARTHROPLASTY, PARTIAL REPLACEMENT      KNEE ARTHROSCOPY Left     Meniscus    POPLITEAL ARTERY ANGIOPLASTY Bilateral     STEROID INJECTION      TONSILLECTOMY      TUBAL ABDOMINAL LIGATION      WRIST FRACTURE SURGERY           FAMILY HISTORY  Family History   Problem Relation Age of Onset    COPD Mother          at 69 yo     Colon cancer Mother     Heart disease Mother     Stroke Mother     Lung cancer Father         mesothelioma    Heart disease Maternal Grandmother     Heart disease Maternal Grandfather     Heart disease Paternal Grandfather          SOCIAL HISTORY  Social History     Socioeconomic History    Marital status:     Number of children: 2    Years of education: 12    Tobacco Use    Smoking status: Former     Current packs/day: 0.00     Average packs/day: 0.8 packs/day for 60.8 years (45.6 ttl pk-yrs)     Types: Cigarettes     Start date: 1960     Quit date: 2020     Years since quitting: 3.3    Smokeless tobacco: Never    Vaping Use    Vaping status: Never Used   Substance and Sexual Activity    Alcohol use: No    Drug use: No    Sexual activity: Not Currently     Partners: Male         ALLERGIES  Ambien [zolpidem tartrate], Bupropion, Codeine sulfate, Zolpidem, Adhesive tape, Aripiprazole, Atorvastatin, Cilostazol, Colesevelam, Ferrous sulfate, Welchol [colesevelam hcl], and Wound dressing adhesive      REVIEW OF SYSTEMS  Review of Systems   Constitutional:  Negative for chills and fever.   HENT:  Negative for ear pain and sore throat.    Respiratory:  Negative for cough and shortness of breath.    Cardiovascular:  Negative for chest pain and palpitations.   Gastrointestinal:  Negative for abdominal pain and vomiting.   Genitourinary:  Negative for dysuria and hematuria.   Musculoskeletal:  Positive for arthralgias. Negative for joint swelling.   Skin:  Negative for pallor and rash.   Neurological:  Positive for syncope and headaches. Negative for numbness.   Psychiatric/Behavioral:  Negative for confusion and hallucinations.      Included in HPI  All systems reviewed and negative except for those discussed in HPI.      PHYSICAL EXAM    I have reviewed the triage vital signs and nursing notes.    ED Triage Vitals [03/03/24 1304]   Temp Heart Rate Resp BP SpO2   96.1 °F (35.6 °C) 82 16 130/94 97 %      Temp src Heart Rate Source Patient Position BP Location FiO2 (%)   -- -- -- -- --       Physical Exam  Constitutional:       General: She is not in acute distress.     Appearance: She is well-developed.   HENT:      Head: Normocephalic and atraumatic.   Eyes:      Extraocular Movements: Extraocular movements intact.   Cardiovascular:      Rate and Rhythm: Normal rate and regular rhythm.      Heart sounds: Normal heart sounds.      Comments: Distal pulses intact  Pulmonary:      Effort: Pulmonary effort is normal.      Breath sounds: Normal breath sounds.      Comments: O2 sats 94% on 3 L oxygen nasal cannula.  Faint end expiratory  wheezing.  Abdominal:      General: There is no distension.   Skin:     General: Skin is warm.   Neurological:      General: No focal deficit present.      Mental Status: She is alert and oriented to person, place, and time.      Comments: No gross neurologic deficit.  No facial asymmetry.   Psychiatric:         Mood and Affect: Mood normal.           LAB RESULTS  Recent Results (from the past 24 hour(s))   ECG 12 Lead ED Triage Standing Order; Syncope    Collection Time: 03/03/24  1:24 PM   Result Value Ref Range    QT Interval 378 ms    QTC Interval 417 ms   Comprehensive Metabolic Panel    Collection Time: 03/03/24  1:28 PM    Specimen: Blood   Result Value Ref Range    Glucose 95 65 - 99 mg/dL    BUN 11 8 - 23 mg/dL    Creatinine 0.99 0.57 - 1.00 mg/dL    Sodium 144 136 - 145 mmol/L    Potassium 3.7 3.5 - 5.2 mmol/L    Chloride 103 98 - 107 mmol/L    CO2 32.0 (H) 22.0 - 29.0 mmol/L    Calcium 10.4 8.6 - 10.5 mg/dL    Total Protein 6.2 6.0 - 8.5 g/dL    Albumin 3.7 3.5 - 5.2 g/dL    ALT (SGPT) 13 1 - 33 U/L    AST (SGOT) 25 1 - 32 U/L    Alkaline Phosphatase 85 39 - 117 U/L    Total Bilirubin 0.4 0.0 - 1.2 mg/dL    Globulin 2.5 gm/dL    A/G Ratio 1.5 g/dL    BUN/Creatinine Ratio 11.1 7.0 - 25.0    Anion Gap 9.0 5.0 - 15.0 mmol/L    eGFR 62.2 >60.0 mL/min/1.73   Magnesium    Collection Time: 03/03/24  1:28 PM    Specimen: Blood   Result Value Ref Range    Magnesium 1.6 1.6 - 2.4 mg/dL   Single High Sensitivity Troponin T    Collection Time: 03/03/24  1:28 PM    Specimen: Blood   Result Value Ref Range    HS Troponin T 21 (H) <14 ng/L   Green Top (Gel)    Collection Time: 03/03/24  1:28 PM   Result Value Ref Range    Extra Tube Hold for add-ons.    Lavender Top    Collection Time: 03/03/24  1:28 PM   Result Value Ref Range    Extra Tube hold for add-on    Gold Top - SST    Collection Time: 03/03/24  1:28 PM   Result Value Ref Range    Extra Tube Hold for add-ons.    Light Blue Top    Collection Time: 03/03/24  1:28  PM   Result Value Ref Range    Extra Tube Hold for add-ons.    CBC Auto Differential    Collection Time: 03/03/24  1:28 PM    Specimen: Blood   Result Value Ref Range    WBC 5.82 3.40 - 10.80 10*3/mm3    RBC 3.93 3.77 - 5.28 10*6/mm3    Hemoglobin 12.5 12.0 - 15.9 g/dL    Hematocrit 39.9 34.0 - 46.6 %    .5 (H) 79.0 - 97.0 fL    MCH 31.8 26.6 - 33.0 pg    MCHC 31.3 (L) 31.5 - 35.7 g/dL    RDW 13.3 12.3 - 15.4 %    RDW-SD 50.2 37.0 - 54.0 fl    MPV 10.9 6.0 - 12.0 fL    Platelets 203 140 - 450 10*3/mm3    Neutrophil % 60.5 42.7 - 76.0 %    Lymphocyte % 27.5 19.6 - 45.3 %    Monocyte % 9.3 5.0 - 12.0 %    Eosinophil % 2.2 0.3 - 6.2 %    Basophil % 0.3 0.0 - 1.5 %    Immature Grans % 0.2 0.0 - 0.5 %    Neutrophils, Absolute 3.52 1.70 - 7.00 10*3/mm3    Lymphocytes, Absolute 1.60 0.70 - 3.10 10*3/mm3    Monocytes, Absolute 0.54 0.10 - 0.90 10*3/mm3    Eosinophils, Absolute 0.13 0.00 - 0.40 10*3/mm3    Basophils, Absolute 0.02 0.00 - 0.20 10*3/mm3    Immature Grans, Absolute 0.01 0.00 - 0.05 10*3/mm3    nRBC 0.0 0.0 - 0.2 /100 WBC   Protime-INR    Collection Time: 03/03/24  1:28 PM    Specimen: Blood   Result Value Ref Range    Protime 13.8 11.7 - 14.2 Seconds    INR 1.05 0.90 - 1.10   aPTT    Collection Time: 03/03/24  1:28 PM    Specimen: Blood   Result Value Ref Range    PTT 28.3 22.7 - 35.4 seconds   BNP    Collection Time: 03/03/24  1:28 PM    Specimen: Blood   Result Value Ref Range    proBNP 98.3 0.0 - 900.0 pg/mL         RADIOLOGY  XR Ankle 3+ View Right    Result Date: 3/3/2024  XR ANKLE 3+ VW RIGHT-   INDICATION: Swelling after fall  COMPARISON: None  TECHNIQUE: 3 view right ankle  FINDINGS:  Decreased bone mineralization. No fracture. No dislocation. Symmetric mortise. Preserved tibiotalar joint. Lateral ankle soft tissue swelling.      No fracture or dislocation. Lateral ankle soft tissue swelling  This report was finalized on 3/3/2024 2:14 PM by Dr. Abisai Berger M.D on Workstation: [id number]       XR Knee 1 or 2 View Bilateral    Result Date: 3/3/2024  XR KNEE 1 OR 2 VW BILATERAL-   INDICATION: Pain after seizure or syncope  COMPARISON: None  TECHNIQUE: 2 view of the bilateral knees  FINDINGS:  Left knee: No fracture. No bone lesion. No dislocation. Tricompartmental osteoarthritis with osteophytosis. No effusion.  Right knee: No fracture. Medial compartment arthroplasty. No lucency surrounding the hardware. Lateral and patellofemoral joint osteophytosis. No effusion.  Other: Vascular atherosclerotic calcifications.       1. No fracture or dislocation. 2. Right knee medial compartment arthroplasty. 3. Mild left knee osteoarthritis. 4. Mild osteoarthritis in the right knee lateral and patellofemoral joints  This report was finalized on 3/3/2024 2:13 PM by Dr. Abisai Berger M.D on Workstation: [id number]      CT Cervical Spine Without Contrast    Result Date: 3/3/2024  CT CERVICAL SPINE WO CONTRAST-  INDICATION: Fall. Seizure versus syncope. Neck pain.  COMPARISON: Cervical spine CT June 4, 2021  TECHNIQUE: Routine CT cervical spine without IV contrast. Coronal and sagittal reformats. Radiation dose reduction techniques were utilized, including automated exposure control and exposure modulation based on body size.  FINDINGS:  Lung apices: Emphysematous changes seen at the apices. Secretions seen in the trachea.  Soft tissues: Bilateral carotid bifurcation atherosclerotic calcifications.  Alignment: Cervical spine straightening. No spondylolisthesis.  Osseous structures: No fracture. Decreased bone mineralization. Degenerative atlantodens articulation. Mild multilevel facet degenerative arthropathy, greatest on the left at C3/C4, C4/C5, C5/C6 and C6/C7.      No fracture. Chronic findings above.  This report was finalized on 3/3/2024 2:08 PM by Dr. Abisai Berger M.D on Workstation: [id number]      CT Head Without Contrast    Result Date: 3/3/2024  CT HEAD WO CONTRAST-  INDICATION: Seizure versus  syncope. Hit head.  COMPARISON: CT head June 4, 2021  TECHNIQUE: Routine CT head without IV contrast. Coronal and sagittal reformats. Radiation dose reduction techniques were utilized, including automated exposure control and exposure modulation based on body size.  FINDINGS:  Brain: No intraparenchymal hemorrhage. Preserved gray-white differentiation. No mass effect or midline shift. Chronic small vessel ischemic changes.  Ventricles: No intraventricular hemorrhage. Ventriculomegaly, suspect ex vacuo dilatation from central volume loss.  Extra-axial spaces: No extra-axial hemorrhage or fluid collection.  Orbits: Suspect cataract extractions.  Osseous structures: No fracture. No bone lesion.  Sinuses: Patent mastoid air cells and middle ears.      No acute intracranial process.  This report was finalized on 3/3/2024 2:04 PM by Dr. Abisai Berger M.D on Workstation: [id number]         MEDICATIONS GIVEN IN ER  Medications   sodium chloride 0.9 % flush 10 mL (has no administration in time range)   sodium chloride 0.9 % flush 10 mL (has no administration in time range)   HYDROmorphone (DILAUDID) injection 0.5 mg (has no administration in time range)   ondansetron (ZOFRAN) injection 4 mg (has no administration in time range)         ORDERS PLACED DURING THIS VISIT:  Orders Placed This Encounter   Procedures    CT Head Without Contrast    CT Cervical Spine Without Contrast    XR Knee 1 or 2 View Bilateral    XR Ankle 3+ View Right    Scheller Draw    Comprehensive Metabolic Panel    Magnesium    Single High Sensitivity Troponin T    CBC Auto Differential    Protime-INR    aPTT    BNP    NPO Diet NPO Type: Strict NPO    Undress & Gown    Continuous Pulse Oximetry    Vital Signs    Orthostatic Blood Pressure    LHA (on-call MD unless specified) Details    Oxygen Therapy- Nasal Cannula; Titrate 1-6 LPM Per SpO2; 90 - 95%    POC Glucose Once    ECG 12 Lead ED Triage Standing Order; Syncope    Insert Peripheral IV    Insert  Peripheral IV    CBC & Differential    Green Top (Gel)    Lavender Top    Gold Top - SST    Light Blue Top         OUTPATIENT MEDICATION MANAGEMENT:  Current Facility-Administered Medications Ordered in Epic   Medication Dose Route Frequency Provider Last Rate Last Admin    HYDROmorphone (DILAUDID) injection 0.5 mg  0.5 mg Intravenous Once Bharat Seymour MD        ondansetron (ZOFRAN) injection 4 mg  4 mg Intravenous Once Bharat Seymour MD        sodium chloride 0.9 % flush 10 mL  10 mL Intravenous PRN Emergency, Triage Protocol, MD        sodium chloride 0.9 % flush 10 mL  10 mL Intravenous PRN Chela Lepe PA-C         Current Outpatient Medications Ordered in Epic   Medication Sig Dispense Refill    Aspirin (ASPIR-81 PO) Take 81 mg by mouth Daily.      baclofen (LIORESAL) 10 MG tablet Take 1 tablet by mouth 2 (Two) Times a Day As Needed for Muscle Spasms. 180 tablet 3    clopidogrel (PLAVIX) 75 MG tablet TAKE 1 TABLET BY MOUTH DAILY 90 tablet 1    docusate sodium (COLACE) 100 MG capsule Take 1 capsule by mouth 2 (Two) Times a Day.      DULoxetine (CYMBALTA) 60 MG capsule TAKE 1 CAPSULE DAILY 90 capsule 3    folic acid (FOLVITE) 1 MG tablet TAKE 1 TABLET DAILY 90 tablet 3    HYDROcodone-acetaminophen (NORCO)  MG per tablet Take 1 tablet by mouth Every 6 (Six) Hours As Needed for Moderate Pain.      meclizine (ANTIVERT) 25 MG tablet Take 1 tablet by mouth 3 (Three) Times a Day As Needed for Dizziness. 45 tablet 0    phentermine (ADIPEX-P) 37.5 MG tablet TAKE ONE TABLET BY MOUTH EVERY MORNING BEFORE BREAKFAST 30 tablet 2    pramipexole (MIRAPEX) 0.25 MG tablet TAKE ONE TABLET BY MOUTH ONCE NIGHTLY 90 tablet 3    pravastatin (PRAVACHOL) 80 MG tablet TAKE 1 TABLET BY MOUTH DAILY  (DISCONTINUE ROSUVASTATIN AS  INTOLERANT.TRY NEW) 90 tablet 3    pregabalin (LYRICA) 150 MG capsule Take 1 capsule by mouth 3 (Three) Times a Day. (Patient taking differently: Take 1 capsule by mouth 2 (Two) Times a Day.) 270  capsule 1    risedronate (ACTONEL) 150 MG tablet TAKE 1 TABLET BY MOUTH EVERY 30  DAYS WITH WATER ON AN EMPTY  STOMACH, NOTHING BY MOUTH AND DO NOT LIE DOWN FOR NEXT 30 MINUTES 3 tablet 3    Tirzepatide (MOUNJARO) 10 MG/0.5ML solution pen-injector pen Inject 0.5 mL under the skin into the appropriate area as directed 1 (One) Time Per Week. 6 mL 1    vitamin B-12 (CYANOCOBALAMIN) 1000 MCG tablet Take 1 tablet by mouth Daily.      vitamin D3 125 MCG (5000 UT) capsule capsule Take 1 capsule by mouth Daily.               PROGRESS, DATA ANALYSIS, CONSULTS, AND MEDICAL DECISION MAKING  All labs have been independently interpreted by me.  All radiology studies have been reviewed by me. All EKG's have been independently viewed and interpreted by me.  Discussion below represents my analysis of pertinent findings related to patient's condition, differential diagnosis, treatment plan and final disposition.    Differential diagnosis includes but is not limited to convulsive syncope, new onset seizure, closed head injury, ankle sprain, ankle fracture.        ED Course as of 03/03/24 1432   Sun Mar 03, 2024   1426 XR Ankle 3+ View Right  My independent interpretation of the imaging study is no gross fracture [TR]   1426 EKG          EKG time: 1324  Rhythm/Rate: Normal sinus, rate 73  P waves and MD: Normal P, normal MD  QRS, axis: Narrow QRS, normal axis  ST and T waves: No acute    Independently Interpreted by me  Prior EKG available for comparison   [TR]   1427 I reviewed the workup and findings with the patient and family at the bedside.  Answered all questions.  The etiology of the patient's syncopal episode is unclear at this time.  It may be related to anxiety or may not.  Her imaging is negative.  She reports severe pain to her right ankle and states she is unable to bear weight.  She reports that she has had fractures in the past which do not show up on x-rays and only show up on bone scans.  In light of the entire  picture, plan admission for further syncopal workup and management.  They are agreeable. [TR]   1431 I spoke with Dr. Clements with [facility].  I reviewed patient presentation and ED findings.  He agrees to admit patient to a telemetry observation bed. [MP]      ED Course User Index  [MP] Chela Lepe PA-C  [TR] Bharat Seymour MD             AS OF 14:32 EST VITALS:    BP - 119/61  HR - 68  TEMP - 96.1 °F (35.6 °C)  O2 SATS - 94%    COMPLEXITY OF CARE  The patient requires admission.      DIAGNOSIS  Final diagnoses:   Recurrent syncope   Closed head injury, initial encounter   Right leg pain   Acute pain of both knees   Chronic hypoxic respiratory failure, on home oxygen therapy         DISPOSITION  ED Disposition       ED Disposition   Intended Admit    Condition   --    Comment   --                Please note that portions of this document were completed with a voice recognition program.    Note Disclaimer: At [facility], we believe that sharing information builds trust and better relationships. You are receiving this note because you recently visited [facility]. It is possible you will see health information before a provider has talked with you about it. This kind of information can be easy to misunderstand. To help you fully understand what it means for your health, we urge you to discuss this note with your provider.         Chela Lepe PA-C  03/03/24 [id number]

## 2024-03-03 NOTE — ED PROVIDER NOTES
EMERGENCY DEPARTMENT MD ATTESTATION NOTE    SHARED VISIT: This visit was performed by BOTH a physician and an APC. The substantive portion of the medical decision making was performed by this attesting physician who made or approved the management plan and takes responsibility for patient management. All studies documented in the APC note (if performed) were independently interpreted by me.    The HILARIO and I have discussed this patient's history, physical exam, MDM, and treatment plan.  I have reviewed the documentation and personally had a face to face interaction with the patient. The attached note describes my personal findings.        Room Number:  02/02  PCP: Angelito Kline DO  Independent Historians: Patient, Family, and EMS    HPI:  A complete HPI/ROS/PMH/PSH/SH/FH are unobtainable due to: None    Chronic or social conditions impacting patient care (Social Determinants of Health): None      Context: Brenda Michelle is a 68 y.o. female with a medical history of anxiety, arthritis, movement disorder, peripheral neuropathy, lumbar disc degeneration who presents to the ED c/o acute syncopal episode.   reports that the patient has had some shaking over the last several days.  He reports today she was shaking and then she passed out.  She reports injury to her right knee and right ankle.  The patient reports that she has had some constipation difficulty urinating.  They deny any prior syncopal episodes.  The patient denies any chest pain or shortness of breath.        Review of prior external notes (non-ED) -and- Review of prior external test results outside of this encounter:  Laboratory evaluation 8/16/2023 shows normal CMP, normal hemoglobin A1c    Prescription drug monitoring program review:           PHYSICAL EXAM    I have reviewed the triage vital signs and nursing notes.    ED Triage Vitals [03/03/24 1304]   Temp Heart Rate Resp BP SpO2   96.1 °F (35.6 °C) 82 16 130/94 97 %      Temp src Heart  Rate Source Patient Position BP Location FiO2 (%)   -- -- -- -- --       Physical Exam  GENERAL: Awake, alert, no acute distress  SKIN: Warm, dry  HENT: Normocephalic, atraumatic  EYES: no scleral icterus  CV: regular rhythm, regular rate  RESPIRATORY: normal effort, lungs clear  ABDOMEN: soft, nontender, nondistended  MUSCULOSKELETAL: no deformity.  Tenderness to the right ankle and right knee.  There are some ecchymosis in the right ankle.  Pulses are intact.  NEURO: alert, moves all extremities, follows commands            MEDICATIONS GIVEN IN ER  Medications   sodium chloride 0.9 % flush 10 mL (has no administration in time range)   sodium chloride 0.9 % flush 10 mL (has no administration in time range)   HYDROmorphone (DILAUDID) injection 0.5 mg (0.5 mg Intravenous Given 3/3/24 1446)   ondansetron (ZOFRAN) injection 4 mg (4 mg Intravenous Given 3/3/24 1444)         ORDERS PLACED DURING THIS VISIT:  Orders Placed This Encounter   Procedures    CT Head Without Contrast    CT Cervical Spine Without Contrast    XR Knee 1 or 2 View Bilateral    XR Ankle 3+ View Right    Rosser Draw    Comprehensive Metabolic Panel    Magnesium    Single High Sensitivity Troponin T    CBC Auto Differential    Protime-INR    aPTT    BNP    NPO Diet NPO Type: Strict NPO    Undress & Gown    Continuous Pulse Oximetry    Vital Signs    Orthostatic Blood Pressure    LHA (on-call MD unless specified) Details    Oxygen Therapy- Nasal Cannula; Titrate 1-6 LPM Per SpO2; 90 - 95%    POC Glucose Once    ECG 12 Lead ED Triage Standing Order; Syncope    Insert Peripheral IV    Insert Peripheral IV    Initiate Observation Status    CBC & Differential    Green Top (Gel)    Lavender Top    Gold Top - SST    Light Blue Top         PROCEDURES  Procedures            PROGRESS, DATA ANALYSIS, CONSULTS, AND MEDICAL DECISION MAKING  All labs have been independently interpreted by me.  All radiology studies have been reviewed by me. All EKG's have been  independently viewed and interpreted by me.  Discussion below represents my analysis of pertinent findings related to patient's condition, differential diagnosis, treatment plan and final disposition.    Differential diagnosis includes but is not limited to syncope, anxiety, intracranial hemorrhage, electrolyte disturbance, ankle fracture, foot fracture, ankle dislocation, knee fracture.    Clinical Scores:                   ED Course as of 03/03/24 1455   Sun Mar 03, 2024   1426 XR Ankle 3+ View Right  My independent interpretation of the imaging study is no gross fracture [TR]   1426 EKG          EKG time: 1324  Rhythm/Rate: Normal sinus, rate 73  P waves and CA: Normal P, normal CA  QRS, axis: Narrow QRS, normal axis  ST and T waves: No acute    Independently Interpreted by me  Prior EKG available for comparison   [TR]   1427 I reviewed the workup and findings with the patient and family at the bedside.  Answered all questions.  The etiology of the patient's syncopal episode is unclear at this time.  It may be related to anxiety or may not.  Her imaging is negative.  She reports severe pain to her right ankle and states she is unable to bear weight.  She reports that she has had fractures in the past which do not show up on x-rays and only show up on bone scans.  In light of the entire picture, plan admission for further syncopal workup and management.  They are agreeable. [TR]   1431 I spoke with Dr. Clements with [facility].  I reviewed patient presentation and ED findings.  He agrees to admit patient to a telemetry observation bed. [MP]      ED Course User Index  [MP] Chela Lepe, TESFAYE  [TR] Bharat Seymour MD       MDM: The patient presents with an acute syncopal episode.  She is chronically on oxygen.  Her baseline requirement has not changed.  She has normal vital signs.  Plan to obtain imaging of her right lower extremity as well as of her head given her syncopal episode.  Will obtain laboratory evaluation.   It is unclear whether her episode today was related to anxiety or another underlying process.  She will likely require admission today for further workup.      COMPLEXITY OF CARE  The patient requires admission.    Please note that portions of this document were completed with a voice recognition program.    Note Disclaimer: At [facility], we believe that sharing information builds trust and better relationships. You are receiving this note because you recently visited [facility]. It is possible you will see health information before a provider has talked with you about it. This kind of information can be easy to misunderstand. To help you fully understand what it means for your health, we urge you to discuss this note with your provider.         Bharat Seymour MD  03/03/24 9570       Bharat Seymour MD  03/03/24 3667

## 2024-03-03 NOTE — H&P
Internal medicine history and physical  INTERNAL MEDICINE   [facility]       Patient Identification:  Name: Brenda Michelle  Age: 68 y.o.  Sex: female  :  1955  MRN: [id number]                   Primary Care Physician: Angelito Kline DO                               Date of admission:3/3/2024    Chief Complaint: Passed out twice in the last couple days while using toilet and standing at the toilet.    History of Present Illness:   Patient is a 68-year-old female with history of COPD, irritable bowel syndrome, peripheral neuropathy, chronic respiratory failure, morbid obesity, fibromyositis, chronic pain, anxiety and depression as well as chronic degenerative disc of lumbar spine and arthritis and movement disorder who is chronically taking baclofen, Lyrica, Cymbalta, Mirapex and also using hydrocodone and ongoing use of weight loss medications consisting of phentermine and Mounjaro was started on Topamax about a month ago.  Patient was recently prescribed Topamax for her complicated ongoing chronic symptoms about a month ago.    According to the  at the last couple of weeks patient is having tremors playing games on iPad and 2 days ago while she was in the bathroom for quite some time when he went to check on her and found her shaking on the toilet and passed out.  After which she recovered and was able to carry out routine activities and no issues woke up this afternoon at around noon and was able to walk to the bathroom and as she was standing there ready to sit on the toilet these episodes occurred again and this time before her  could help her she passed out fell on her right side and leg and hit the back of her head.  Because of these 2 episodes and what has been going on for the last couple weeks patient was brought to the emergency room for further evaluation where x-rays of the right lower extremity did not show any fractures and CT scan of the head did not show  any acute intracranial process.  Patient did not have any incontinence.      Past Medical History:  Past Medical History:   Diagnosis Date    Anxiety     Arthritis     benign polyps of large intestine     COPD (chronic obstructive pulmonary disease)     Depression     Difficulty walking     Disc degeneration, lumbar     Fever 9/9/2022    Fibromyositis     Hyperlipidemia     IBS (irritable bowel syndrome)     Kidney stones     Movement disorder     Nephrolithiasis     Peripheral neuropathy     PVD (peripheral vascular disease)     Sepsis without acute organ dysfunction 9/9/2022    Sepsis without acute organ dysfunction, due to unspecified organism 9/11/2022    Shingles     Vitamin D deficiency      Past Surgical History:  Past Surgical History:   Procedure Laterality Date    CATARACT EXTRACTION      COLONOSCOPY  2004    Colon polyps; family history of colon cancer    COLONOSCOPY N/A 09/29/2022    Procedure: COLONOSCOPY to cecum and TI with biopsies, cold forceps and hot snare polypectomies with 2cc orise lift and clip x 2;  Surgeon: Chirag Tony MD;  Location: [facility] ENDOSCOPY;  Service: Gastroenterology;  Laterality: N/A;  PRE- abnormal imaging, hx of polyps, family hx of colon cancer  POST- polyps, internal/external hemorrhoids    KNEE ARTHROPLASTY, PARTIAL REPLACEMENT  2015    KNEE ARTHROSCOPY Left     Meniscus    POPLITEAL ARTERY ANGIOPLASTY Bilateral     STEROID INJECTION      TONSILLECTOMY      TUBAL ABDOMINAL LIGATION      WRIST FRACTURE SURGERY        Home Meds:  Medications Prior to Admission   Medication Sig Dispense Refill Last Dose    Aspirin (ASPIR-81 PO) Take 81 mg by mouth Daily.       baclofen (LIORESAL) 10 MG tablet Take 1 tablet by mouth 2 (Two) Times a Day As Needed for Muscle Spasms. 180 tablet 3     clopidogrel (PLAVIX) 75 MG tablet TAKE 1 TABLET BY MOUTH DAILY 90 tablet 1     docusate sodium (COLACE) 100 MG capsule Take 1 capsule by mouth 2 (Two) Times a Day.       DULoxetine (CYMBALTA) 60 MG  capsule TAKE 1 CAPSULE DAILY 90 capsule 3     folic acid (FOLVITE) 1 MG tablet TAKE 1 TABLET DAILY 90 tablet 3     HYDROcodone-acetaminophen (NORCO)  MG per tablet Take 1 tablet by mouth Every 6 (Six) Hours As Needed for Moderate Pain.       meclizine (ANTIVERT) 25 MG tablet Take 1 tablet by mouth 3 (Three) Times a Day As Needed for Dizziness. 45 tablet 0     phentermine (ADIPEX-P) 37.5 MG tablet TAKE ONE TABLET BY MOUTH EVERY MORNING BEFORE BREAKFAST 30 tablet 2     pravastatin (PRAVACHOL) 80 MG tablet TAKE 1 TABLET BY MOUTH DAILY  (DISCONTINUE ROSUVASTATIN AS  INTOLERANT.TRY NEW) (Patient taking differently: Every Night.) 90 tablet 3     pregabalin (LYRICA) 150 MG capsule Take 1 capsule by mouth 3 (Three) Times a Day. (Patient taking differently: Take 1 capsule by mouth 2 (Two) Times a Day.) 270 capsule 1     risedronate (ACTONEL) 150 MG tablet TAKE 1 TABLET BY MOUTH EVERY 30  DAYS WITH WATER ON AN EMPTY  STOMACH, NOTHING BY MOUTH AND DO NOT LIE DOWN FOR NEXT 30 MINUTES 3 tablet 3 1/2/2023    Tirzepatide (MOUNJARO) 10 MG/0.5ML solution pen-injector pen Inject 0.5 mL under the skin into the appropriate area as directed 1 (One) Time Per Week. 6 mL 1 2/28/2024    vitamin B-12 (CYANOCOBALAMIN) 1000 MCG tablet Take 1 tablet by mouth Daily.       vitamin D3 125 MCG (5000 UT) capsule capsule Take 1 capsule by mouth Daily.       pramipexole (MIRAPEX) 0.25 MG tablet TAKE ONE TABLET BY MOUTH ONCE NIGHTLY 90 tablet 3      Current Meds:     Current Facility-Administered Medications:     sodium chloride 0.9 % flush 10 mL, 10 mL, Intravenous, PRN, Emergency, Triage Protocol, MD    [COMPLETED] Insert Peripheral IV, , , Once **AND** sodium chloride 0.9 % flush 10 mL, 10 mL, Intravenous, PRN, Chela Lepe PA-C  Allergies:  Allergies   Allergen Reactions    Ambien [Zolpidem Tartrate] Other (See Comments)     Nightmares    Bupropion Itching    Codeine Sulfate Itching    Zolpidem Hives     Nightmares    Adhesive Tape Rash  "   Aripiprazole Unknown - Low Severity    Atorvastatin Other (See Comments)     MYALGIAS    Cilostazol Other (See Comments)     HA    Colesevelam Nausea Only    Ferrous Sulfate Nausea Only    Welchol [Colesevelam Hcl] Nausea Only    Wound Dressing Adhesive Rash     Social History:   Social History     Tobacco Use    Smoking status: Former     Current packs/day: 0.00     Average packs/day: 0.8 packs/day for 60.8 years (45.6 ttl pk-yrs)     Types: Cigarettes     Start date: 1960     Quit date: 2020     Years since quitting: 3.3    Smokeless tobacco: Never   Substance Use Topics    Alcohol use: No      Family History:  Family History   Problem Relation Age of Onset    COPD Mother          at 67 yo     Colon cancer Mother     Heart disease Mother     Stroke Mother     Lung cancer Father         mesothelioma    Heart disease Maternal Grandmother     Heart disease Maternal Grandfather     Heart disease Paternal Grandfather           Review of Systems  See history of present illness and past medical history.    Complaining of pain and discomfort in the right foot and leg.      Vitals:   /53   Pulse 71   Temp 96.1 °F (35.6 °C)   Resp 16   Ht 160 cm (63\")   Wt 95.7 kg (211 lb)   LMP  (LMP Unknown)   SpO2 98%   BMI 37.38 kg/m²   I/O: No intake or output data in the 24 hours ending 24 1653  Exam:  Patient is examined using the personal protective equipment as per guidelines from infection control for this particular patient as enacted.  Hand washing was performed before and after patient interaction.  General Appearance:    Alert, cooperative, no distress, appears stated age   Head:  No obvious deformity or injury noted   Eyes:    PERRL, conjunctiva/corneas clear, EOM's intact, both eyes   Ears:    Normal external ear canals, both ears   Nose:   Nares normal, septum midline, mucosa normal, no drainage    or sinus tenderness   Throat:   Lips, tongue, gums normal; oral mucosa pink and moist "   Neck: Supple and no adenopathy   Back:     Symmetric, no curvature, ROM normal, no CVA tenderness   Lungs:     Clear to auscultation bilaterally, respirations unlabored   Chest Wall:    No tenderness or deformity    Heart:    S1-S2 regular     Abdomen:   Soft nontender   Extremities: Contusion of the right foot and right lower extremity noted.       Pulses:   Pulses palpable in all extremities; symmetric all extremities   Skin: Conclusion ecchymotic changes noted   Neurologic: Alert and oriented and grossly nonfocal       Data Review:      I reviewed the patient's new clinical results.  Results from last 7 days   Lab Units 03/03/24  1328   WBC 10*3/mm3 5.82   HEMOGLOBIN g/dL 12.5   PLATELETS 10*3/mm3 203     Results from last 7 days   Lab Units 03/03/24  1328   SODIUM mmol/L 144   POTASSIUM mmol/L 3.7   CHLORIDE mmol/L 103   CO2 mmol/L 32.0*   BUN mg/dL 11   CREATININE mg/dL 0.99   CALCIUM mg/dL 10.4   GLUCOSE mg/dL 95     XR Ankle 3+ View Right    Result Date: 3/3/2024  No fracture or dislocation. Lateral ankle soft tissue swelling  This report was finalized on 3/3/2024 2:14 PM by Dr. Abisai Berger M.D on Workstation: [id number]      XR Knee 1 or 2 View Bilateral    Result Date: 3/3/2024   1. No fracture or dislocation. 2. Right knee medial compartment arthroplasty. 3. Mild left knee osteoarthritis. 4. Mild osteoarthritis in the right knee lateral and patellofemoral joints  This report was finalized on 3/3/2024 2:13 PM by Dr. Abisai Berger M.D on Workstation: [id number]      CT Cervical Spine Without Contrast    Result Date: 3/3/2024  No fracture. Chronic findings above.  This report was finalized on 3/3/2024 2:08 PM by Dr. Abisai Berger M.D on Workstation: [id number]      CT Head Without Contrast    Result Date: 3/3/2024  No acute intracranial process.  This report was finalized on 3/3/2024 2:04 PM by Dr. Abisai Berger M.D on Workstation: [id number]     Microbiology Results (last 10 days)        ** No results found for the last 240 hours. **            Assessment:  Active Hospital Problems    Diagnosis  POA    **Recurrent syncope [R55]  Yes    Vasovagal episode [R55]  Unknown    Orthostatic syncope [I95.1]  Unknown    Polypharmacy [Z79.899]  Not Applicable    Supplemental oxygen dependent [Z99.81]  Not Applicable    STEVEN (obstructive sleep apnea) [G47.33]  Yes    Obesity, Class III, BMI 40-49.9 (morbid obesity) [E66.01]  Yes    Low back pain [M54.50]  Yes    Chronic respiratory failure with hypoxia [J96.11]  Yes    COPD mixed type [J44.9]  Yes    Peripheral arterial occlusive disease [I77.9]  Yes     Description: 2004,2011-Stents--Dr De Jesus      Fibromyalgia [M79.7]  Yes    Gastroesophageal reflux disease [K21.9]  Yes    Irritable bowel syndrome [K58.9]  Yes       Medical decision making/care plan: See admitting orders  Recurrent passing out spells with physical injury-while these recurrent episodes in the setting of ongoing medications for underlying chronic illnesses with addition of with patient's  described to [facility] couple of months ago-concerning for:  -Effect of polypharmacy leading to hypotension and neuromuscular incoordination including effect of weight loss medications   -Orthostatic hypotension   -Vasovagal syncope   -Seizure   -Cardiac arrhythmias  -Episodic hypoglycemia,  -Episodic CO2 narcosis with narcolepsy-plan is to admit the patient hold some of her nonlife saving chronic comforting medications, check orthostatics, provide her with IV fluids, neurology and cardiology consultation and further management as her condition evolves.  COPD with chronic hypoxic respiratory failure with potential for CO2 narcosis given elevated CO2 in CMP-provide her with continuous pulse ox monitoring, hold sedatives and low threshold to check ABG to rule out evolving hypercapnic failure.  Continue with as needed nebulizer treatment  Pain and discomfort in the right lower extremity after the fall  x-rays negative for fracture-patient's  and herself is concerned that some time previously when she had falls plain x-rays were unable to detect fractures and she required scans.  She is still has leg pain and discomfort in her right foot and does not want to bear weight.  Plan is to consult orthopedic surgery service and OT PT evaluation and provide him with nonnarcotic pain medications.  History of peripheral vascular disease status post stenting of her femoral and iliac arteries by Dr. De Jesus in 2011-continue Plavix and aspirin and a statin and monitor.  Chiara Clements MD   3/3/2024  16:53 EST    Parts of this note may be an electronic transcription/translation of spoken language to printed text using the Dragon dictation system.

## 2024-03-03 NOTE — ED NOTES
Patient c/o unsteadiness when standing this morning when she was on the toilet. Patient states that she had a syncopal episode but did not hit her head. Spouse states that she fell on her right leg and did hit the right side of her head.     Spouse states that the patient is having difficulty urinating and having a bowel movements and has had testing for this. Spouse states that yesterday she had an episode of shaking and unresponsiveness when she was sitting on the toilet.    Patient c/o pain to her right lower leg and right ankle.    Patient on oxygen 3 lpm continuously. Patient uses a walker at home.

## 2024-03-04 ENCOUNTER — APPOINTMENT (OUTPATIENT)
Dept: CARDIOLOGY | Facility: HOSPITAL | Age: 69
DRG: 312 | End: 2024-03-04
Payer: MEDICARE

## 2024-03-04 LAB
ALBUMIN SERPL-MCNC: 3.3 G/DL (ref 3.5–5.2)
ALBUMIN/GLOB SERPL: 1.6 G/DL
ALP SERPL-CCNC: 83 U/L (ref 39–117)
ALT SERPL W P-5'-P-CCNC: 12 U/L (ref 1–33)
ANION GAP SERPL CALCULATED.3IONS-SCNC: 7 MMOL/L (ref 5–15)
ASCENDING AORTA: 2.5 CM
AST SERPL-CCNC: 22 U/L (ref 1–32)
BASOPHILS # BLD AUTO: 0.02 10*3/MM3 (ref 0–0.2)
BASOPHILS NFR BLD AUTO: 0.3 % (ref 0–1.5)
BH CV ECHO MEAS - ACS: 1.75 CM
BH CV ECHO MEAS - AO MEAN PG: 5.9 MMHG
BH CV ECHO MEAS - AO ROOT DIAM: 3.2 CM
BH CV ECHO MEAS - AO V2 VTI: 33.5 CM
BH CV ECHO MEAS - AVA(I,D): 2.43 CM2
BH CV ECHO MEAS - EDV(CUBED): 114.2 ML
BH CV ECHO MEAS - EDV(MOD-SP2): 80 ML
BH CV ECHO MEAS - EDV(MOD-SP4): 80 ML
BH CV ECHO MEAS - EF(MOD-BP): 67 %
BH CV ECHO MEAS - EF(MOD-SP2): 68.8 %
BH CV ECHO MEAS - EF(MOD-SP4): 66.3 %
BH CV ECHO MEAS - ESV(CUBED): 29.9 ML
BH CV ECHO MEAS - ESV(MOD-SP2): 25 ML
BH CV ECHO MEAS - ESV(MOD-SP4): 27 ML
BH CV ECHO MEAS - FS: 36.1 %
BH CV ECHO MEAS - IVS/LVPW: 1.06 CM
BH CV ECHO MEAS - IVSD: 0.92 CM
BH CV ECHO MEAS - LAT PEAK E' VEL: 8.7 CM/SEC
BH CV ECHO MEAS - LV DIASTOLIC VOL/BSA (35-75): 40.4 CM2
BH CV ECHO MEAS - LV MASS(C)D: 148.3 GRAMS
BH CV ECHO MEAS - LV MEAN PG: 3.6 MMHG
BH CV ECHO MEAS - LV SYSTOLIC VOL/BSA (12-30): 13.6 CM2
BH CV ECHO MEAS - LV V1 VTI: 27.2 CM
BH CV ECHO MEAS - LVIDD: 4.9 CM
BH CV ECHO MEAS - LVIDS: 3.1 CM
BH CV ECHO MEAS - LVOT AREA: 3 CM2
BH CV ECHO MEAS - LVOT DIAM: 1.95 CM
BH CV ECHO MEAS - LVPWD: 0.86 CM
BH CV ECHO MEAS - MED PEAK E' VEL: 9.1 CM/SEC
BH CV ECHO MEAS - MR MAX PG: 6.6 MMHG
BH CV ECHO MEAS - MR MAX VEL: 128.1 CM/SEC
BH CV ECHO MEAS - MR MEAN PG: 2.9 MMHG
BH CV ECHO MEAS - MR MEAN VEL: 79.2 CM/SEC
BH CV ECHO MEAS - MR VTI: 34 CM
BH CV ECHO MEAS - MV A DUR: 0.13 SEC
BH CV ECHO MEAS - MV A MAX VEL: 98.2 CM/SEC
BH CV ECHO MEAS - MV DEC SLOPE: 326.4 CM/SEC2
BH CV ECHO MEAS - MV DEC TIME: 0.24 SEC
BH CV ECHO MEAS - MV E MAX VEL: 81 CM/SEC
BH CV ECHO MEAS - MV E/A: 0.82
BH CV ECHO MEAS - MV P1/2T: 84.4 MSEC
BH CV ECHO MEAS - MVA(P1/2T): 2.6 CM2
BH CV ECHO MEAS - PA ACC SLOPE: 1335 CM/SEC2
BH CV ECHO MEAS - PA ACC TIME: 0.07 SEC
BH CV ECHO MEAS - PA V2 MAX: 118.1 CM/SEC
BH CV ECHO MEAS - PULM A REVS DUR: 0.12 SEC
BH CV ECHO MEAS - PULM A REVS VEL: 29.9 CM/SEC
BH CV ECHO MEAS - PULM DIAS VEL: 42.9 CM/SEC
BH CV ECHO MEAS - PULM S/D: 1.61
BH CV ECHO MEAS - PULM SYS VEL: 68.8 CM/SEC
BH CV ECHO MEAS - QP/QS: 0.88
BH CV ECHO MEAS - RAP SYSTOLE: 3 MMHG
BH CV ECHO MEAS - RV MAX PG: 3.1 MMHG
BH CV ECHO MEAS - RV V1 MAX: 87.9 CM/SEC
BH CV ECHO MEAS - RV V1 VTI: 16.8 CM
BH CV ECHO MEAS - RVOT DIAM: 2.33 CM
BH CV ECHO MEAS - RVSP: 13 MMHG
BH CV ECHO MEAS - SI(MOD-SP2): 27.8 ML/M2
BH CV ECHO MEAS - SI(MOD-SP4): 26.8 ML/M2
BH CV ECHO MEAS - SV(LVOT): 81.2 ML
BH CV ECHO MEAS - SV(MOD-SP2): 55 ML
BH CV ECHO MEAS - SV(MOD-SP4): 53 ML
BH CV ECHO MEAS - SV(RVOT): 71.6 ML
BH CV ECHO MEAS - TR MAX PG: 10.5 MMHG
BH CV ECHO MEAS - TR MAX VEL: 161.9 CM/SEC
BH CV ECHO MEASUREMENTS AVERAGE E/E' RATIO: 9.1
BILIRUB SERPL-MCNC: 0.4 MG/DL (ref 0–1.2)
BUN SERPL-MCNC: 12 MG/DL (ref 8–23)
BUN/CREAT SERPL: 12.6 (ref 7–25)
CALCIUM SPEC-SCNC: 9.5 MG/DL (ref 8.6–10.5)
CHLORIDE SERPL-SCNC: 104 MMOL/L (ref 98–107)
CHOLEST SERPL-MCNC: 166 MG/DL (ref 0–200)
CO2 SERPL-SCNC: 30 MMOL/L (ref 22–29)
CREAT SERPL-MCNC: 0.95 MG/DL (ref 0.57–1)
D-LACTATE SERPL-SCNC: 0.6 MMOL/L (ref 0.5–2)
DEPRECATED RDW RBC AUTO: 48.1 FL (ref 37–54)
EGFRCR SERPLBLD CKD-EPI 2021: 65.4 ML/MIN/1.73
EOSINOPHIL # BLD AUTO: 0.14 10*3/MM3 (ref 0–0.4)
EOSINOPHIL NFR BLD AUTO: 2.2 % (ref 0.3–6.2)
ERYTHROCYTE [DISTWIDTH] IN BLOOD BY AUTOMATED COUNT: 13.2 % (ref 12.3–15.4)
GLOBULIN UR ELPH-MCNC: 2.1 GM/DL
GLUCOSE SERPL-MCNC: 97 MG/DL (ref 65–99)
HBA1C MFR BLD: 4.8 % (ref 4.8–5.6)
HCT VFR BLD AUTO: 34.3 % (ref 34–46.6)
HDLC SERPL-MCNC: 57 MG/DL (ref 40–60)
HGB BLD-MCNC: 11.2 G/DL (ref 12–15.9)
IMM GRANULOCYTES # BLD AUTO: 0.02 10*3/MM3 (ref 0–0.05)
IMM GRANULOCYTES NFR BLD AUTO: 0.3 % (ref 0–0.5)
LDLC SERPL CALC-MCNC: 93 MG/DL (ref 0–100)
LDLC/HDLC SERPL: 1.6 {RATIO}
LEFT ATRIUM VOLUME INDEX: 14.1 ML/M2
LYMPHOCYTES # BLD AUTO: 1.16 10*3/MM3 (ref 0.7–3.1)
LYMPHOCYTES NFR BLD AUTO: 18.2 % (ref 19.6–45.3)
MCH RBC QN AUTO: 32.6 PG (ref 26.6–33)
MCHC RBC AUTO-ENTMCNC: 32.7 G/DL (ref 31.5–35.7)
MCV RBC AUTO: 99.7 FL (ref 79–97)
MONOCYTES # BLD AUTO: 0.63 10*3/MM3 (ref 0.1–0.9)
MONOCYTES NFR BLD AUTO: 9.9 % (ref 5–12)
NEUTROPHILS NFR BLD AUTO: 4.4 10*3/MM3 (ref 1.7–7)
NEUTROPHILS NFR BLD AUTO: 69.1 % (ref 42.7–76)
NRBC BLD AUTO-RTO: 0 /100 WBC (ref 0–0.2)
PLATELET # BLD AUTO: 156 10*3/MM3 (ref 140–450)
PMV BLD AUTO: 11 FL (ref 6–12)
POTASSIUM SERPL-SCNC: 4.1 MMOL/L (ref 3.5–5.2)
PROT SERPL-MCNC: 5.4 G/DL (ref 6–8.5)
RBC # BLD AUTO: 3.44 10*6/MM3 (ref 3.77–5.28)
SODIUM SERPL-SCNC: 141 MMOL/L (ref 136–145)
TRIGL SERPL-MCNC: 89 MG/DL (ref 0–150)
VLDLC SERPL-MCNC: 16 MG/DL (ref 5–40)
WBC NRBC COR # BLD AUTO: 6.37 10*3/MM3 (ref 3.4–10.8)

## 2024-03-04 PROCEDURE — 80061 LIPID PANEL: CPT | Performed by: INTERNAL MEDICINE

## 2024-03-04 PROCEDURE — 93306 TTE W/DOPPLER COMPLETE: CPT | Performed by: INTERNAL MEDICINE

## 2024-03-04 PROCEDURE — 93306 TTE W/DOPPLER COMPLETE: CPT

## 2024-03-04 PROCEDURE — 99204 OFFICE O/P NEW MOD 45 MIN: CPT | Performed by: PSYCHIATRY & NEUROLOGY

## 2024-03-04 PROCEDURE — 80053 COMPREHEN METABOLIC PANEL: CPT | Performed by: INTERNAL MEDICINE

## 2024-03-04 PROCEDURE — 99204 OFFICE O/P NEW MOD 45 MIN: CPT | Performed by: INTERNAL MEDICINE

## 2024-03-04 PROCEDURE — 25510000001 PERFLUTREN (DEFINITY) 8.476 MG IN SODIUM CHLORIDE (PF) 0.9 % 10 ML INJECTION: Performed by: INTERNAL MEDICINE

## 2024-03-04 PROCEDURE — 83036 HEMOGLOBIN GLYCOSYLATED A1C: CPT | Performed by: INTERNAL MEDICINE

## 2024-03-04 PROCEDURE — G0378 HOSPITAL OBSERVATION PER HR: HCPCS

## 2024-03-04 PROCEDURE — 83605 ASSAY OF LACTIC ACID: CPT | Performed by: INTERNAL MEDICINE

## 2024-03-04 PROCEDURE — 85025 COMPLETE CBC W/AUTO DIFF WBC: CPT | Performed by: INTERNAL MEDICINE

## 2024-03-04 RX ORDER — PREGABALIN 100 MG/1
100 CAPSULE ORAL 2 TIMES DAILY
Status: DISCONTINUED | OUTPATIENT
Start: 2024-03-04 | End: 2024-03-05

## 2024-03-04 RX ORDER — IPRATROPIUM BROMIDE AND ALBUTEROL SULFATE 2.5; .5 MG/3ML; MG/3ML
3 SOLUTION RESPIRATORY (INHALATION) EVERY 6 HOURS PRN
Status: DISCONTINUED | OUTPATIENT
Start: 2024-03-04 | End: 2024-03-09 | Stop reason: HOSPADM

## 2024-03-04 RX ORDER — HYDROCODONE BITARTRATE AND ACETAMINOPHEN 10; 325 MG/1; MG/1
1 TABLET ORAL EVERY 8 HOURS PRN
Status: DISCONTINUED | OUTPATIENT
Start: 2024-03-04 | End: 2024-03-09 | Stop reason: HOSPADM

## 2024-03-04 RX ADMIN — PERFLUTREN 2 ML: 6.52 INJECTION, SUSPENSION INTRAVENOUS at 15:08

## 2024-03-04 RX ADMIN — HYDROCODONE BITARTRATE AND ACETAMINOPHEN 1 TABLET: 10; 325 TABLET ORAL at 18:35

## 2024-03-04 RX ADMIN — ACETAMINOPHEN 325MG 650 MG: 325 TABLET ORAL at 05:27

## 2024-03-04 RX ADMIN — HYDROCODONE BITARTRATE AND ACETAMINOPHEN 1 TABLET: 10; 325 TABLET ORAL at 10:12

## 2024-03-04 RX ADMIN — DULOXETINE HYDROCHLORIDE 60 MG: 60 CAPSULE, DELAYED RELEASE ORAL at 10:09

## 2024-03-04 RX ADMIN — CLOPIDOGREL BISULFATE 75 MG: 75 TABLET, FILM COATED ORAL at 10:09

## 2024-03-04 RX ADMIN — HYDROCODONE BITARTRATE AND ACETAMINOPHEN 1 TABLET: 10; 325 TABLET ORAL at 01:20

## 2024-03-04 RX ADMIN — FOLIC ACID 1000 MCG: 1 TABLET ORAL at 10:09

## 2024-03-04 RX ADMIN — DOCUSATE SODIUM 100 MG: 100 CAPSULE, LIQUID FILLED ORAL at 10:09

## 2024-03-04 RX ADMIN — ASPIRIN 81 MG: 81 TABLET, COATED ORAL at 10:09

## 2024-03-04 RX ADMIN — PREGABALIN 100 MG: 100 CAPSULE ORAL at 15:26

## 2024-03-04 NOTE — CONSULTS
[facility] Update  3/4/2024  13:38 EST    In hospital and will miss her scheduled appointment with Urology tomorrow, 3/5/2024    Plan  - No acute Urologic surgical intervention   - Nursing to straight-cath as needed for any urinary retenton  - We will get her rescheduled for her outpatient cystoscopy since she is currently in hospital   - Urology will sign off; please call with any questions/concerns or clinical change         Alexis Horowitz MD  [facility]  General & Reconstructive Urology  Office: [phone]  Fax: [phone]

## 2024-03-04 NOTE — PLAN OF CARE
Goal Outcome Evaluation:      Pt resting in bed. Medicated for pain per MAR. IVF's infusing. No other c/o voiced. Consults called per MD order. VSS No s/s of distress.

## 2024-03-04 NOTE — PLAN OF CARE
Goal Outcome Evaluation:      Pt resting in bed. Medicated for pain per MAR. No other c/o voiced. VSS No s/s of distress

## 2024-03-04 NOTE — PROGRESS NOTES
Name: Brenda Michelle ADMIT: 3/3/2024   : 1955  PCP: Angelito Kline DO    MRN: [id number] LOS: 0 days   AGE/SEX: 68 y.o. female  ROOM: [facility]/     Subjective   Subjective   Sitting up in the bed.  Denies dizziness, headache.  No nausea no vomiting.  Complains of neuropathy in bilateral feet, home Lyrica was held due to syncope on admission.  Also has pain in her right foot from injury.    Review of Systems   As above  Objective   Objective   Vital Signs  Temp:  [96.1 °F (35.6 °C)-98.4 °F (36.9 °C)] 98.2 °F (36.8 °C)  Heart Rate:  [68-90] 90  Resp:  [16-18] 18  BP: ()/(53-94) 148/61  SpO2:  [91 %-99 %] 99 %  on  Flow (L/min):  [3] 3;   Device (Oxygen Therapy): nasal cannula  Body mass index is 37.38 kg/m².  Physical Exam    General: Alert, sitting up in the bed, not in distress, obese  HEENT: Normocephalic, atraumatic  CV: Regular rate and rhythm, no murmurs rubs or gallops  Lungs: Diminished at bases, no wheezing, on 3 L nasal cannula which is baseline  Abdomen: Soft, nontender, nondistended  Extremities: No significant peripheral edema , no cyanosis.  Right foot swollen, tender to palpation.        Results Review     I reviewed the patient's new clinical results.  Results from last 7 days   Lab Units 24  03024  1328   WBC 10*3/mm3 6.37 5.82   HEMOGLOBIN g/dL 11.2* 12.5   PLATELETS 10*3/mm3 156 203     Results from last 7 days   Lab Units 24  0304 24  1328   SODIUM mmol/L 141 144   POTASSIUM mmol/L 4.1 3.7   CHLORIDE mmol/L 104 103   CO2 mmol/L 30.0* 32.0*   BUN mg/dL 12 11   CREATININE mg/dL 0.95 0.99   GLUCOSE mg/dL 97 95   Estimated Creatinine Clearance: 62.4 mL/min (by C-G formula based on SCr of 0.95 mg/dL).  Results from last 7 days   Lab Units 24  0304 24  1328   ALBUMIN g/dL 3.3* 3.7   BILIRUBIN mg/dL 0.4 0.4   ALK PHOS U/L 83 85   AST (SGOT) U/L 22 25   ALT (SGPT) U/L 12 13     Results from last 7 days   Lab Units 24  0304 24  1328    CALCIUM mg/dL 9.5 10.4   ALBUMIN g/dL 3.3* 3.7   MAGNESIUM mg/dL  --  1.6     Results from last 7 days   Lab Units 03/04/24  0304   LACTATE mmol/L 0.6     COVID19   Date Value Ref Range Status   06/14/2022 Not Detected Not Detected - Ref. Range Final     Hemoglobin A1C   Date/Time Value Ref Range Status   03/04/2024 0304 4.80 4.80 - 5.60 % Final           XR Ankle 3+ View Right  Narrative: XR ANKLE 3+ VW RIGHT-        INDICATION: Swelling after fall     COMPARISON: None     TECHNIQUE: 3 view right ankle     FINDINGS:      Decreased bone mineralization. No fracture. No dislocation. Symmetric  mortise. Preserved tibiotalar joint. Lateral ankle soft tissue swelling.     Impression: No fracture or dislocation. Lateral ankle soft tissue swelling     This report was finalized on 3/3/2024 2:14 PM by Dr. Abisai Berger M.D  on Workstation: [email or web address]     XR Knee 1 or 2 View Bilateral  Narrative: XR KNEE 1 OR 2 VW BILATERAL-        INDICATION: Pain after seizure or syncope     COMPARISON: None     TECHNIQUE: 2 view of the bilateral knees     FINDINGS:      Left knee: No fracture. No bone lesion. No dislocation. Tricompartmental  osteoarthritis with osteophytosis. No effusion.     Right knee: No fracture. Medial compartment arthroplasty. No lucency  surrounding the hardware. Lateral and patellofemoral joint  osteophytosis. No effusion.     Other: Vascular atherosclerotic calcifications.     Impression:    1. No fracture or dislocation.  2. Right knee medial compartment arthroplasty.  3. Mild left knee osteoarthritis.  4. Mild osteoarthritis in the right knee lateral and patellofemoral  joints     This report was finalized on 3/3/2024 2:13 PM by Dr. Abisai Berger M.D  on Workstation: [id number]     CT Cervical Spine Without Contrast  Narrative: CT CERVICAL SPINE WO CONTRAST-     INDICATION: Fall. Seizure versus syncope. Neck pain.     COMPARISON: Cervical spine CT June 4, 2021     TECHNIQUE:  Routine CT cervical  spine without IV contrast. Coronal and sagittal  reformats. Radiation dose reduction techniques were utilized, including  automated exposure control and exposure modulation based on body size.     FINDINGS:      Lung apices: Emphysematous changes seen at the apices. Secretions seen  in the trachea.     Soft tissues: Bilateral carotid bifurcation atherosclerotic  calcifications.     Alignment: Cervical spine straightening. No spondylolisthesis.     Osseous structures: No fracture. Decreased bone mineralization.  Degenerative atlantodens articulation. Mild multilevel facet  degenerative arthropathy, greatest on the left at C3/C4, C4/C5, C5/C6  and C6/C7.     Impression: No fracture. Chronic findings above.     This report was finalized on 3/3/2024 2:08 PM by Dr. Abisai Berger M.D  on Workstation: [email or web address]     CT Head Without Contrast  Narrative: CT HEAD WO CONTRAST-     INDICATION: Seizure versus syncope. Hit head.     COMPARISON: CT head June 4, 2021     TECHNIQUE:  Routine CT head without IV contrast. Coronal and sagittal reformats.  Radiation dose reduction techniques were utilized, including automated  exposure control and exposure modulation based on body size.     FINDINGS:      Brain: No intraparenchymal hemorrhage. Preserved gray-white  differentiation. No mass effect or midline shift. Chronic small vessel  ischemic changes.     Ventricles: No intraventricular hemorrhage. Ventriculomegaly, suspect ex  vacuo dilatation from central volume loss.     Extra-axial spaces: No extra-axial hemorrhage or fluid collection.     Orbits: Suspect cataract extractions.     Osseous structures: No fracture. No bone lesion.     Sinuses: Patent mastoid air cells and middle ears.     Impression: No acute intracranial process.     This report was finalized on 3/3/2024 2:04 PM by Dr. Abisai Berger M.D  on Workstation: [id number]       Scheduled Medications  aspirin, 81 mg, Oral, Daily  clopidogrel, 75 mg, Oral,  Daily  docusate sodium, 100 mg, Oral, BID  DULoxetine, 60 mg, Oral, Daily  folic acid, 1,000 mcg, Oral, Daily  pravastatin, 80 mg, Oral, Nightly  pregabalin, 100 mg, Oral, BID  sodium chloride, 10 mL, Intravenous, Q12H    Infusions     Diet  Diet: Cardiac, Diabetic, Renal; Healthy Heart (2-3 Na+); Consistent Carbohydrate; Low Sodium (2-3g), Low Potassium, Low Phosphorus; Fluid Consistency: Thin (IDDSI 0)    I have personally reviewed     [x]  Laboratory   [x]  Microbiology   [x]  Radiology   [x]  EKG/Telemetry  []  Cardiology/Vascular   []  Pathology    []  Records       Assessment/Plan     Active Hospital Problems    Diagnosis  POA    **Recurrent syncope [R55]  Yes    Vasovagal episode [R55]  Unknown    Orthostatic syncope [I95.1]  Unknown    Polypharmacy [Z79.899]  Not Applicable    Supplemental oxygen dependent [Z99.81]  Not Applicable    STEVEN (obstructive sleep apnea) [G47.33]  Yes    Obesity, Class III, BMI 40-49.9 (morbid obesity) [E66.01]  Yes    Low back pain [M54.50]  Yes    Chronic respiratory failure with hypoxia [J96.11]  Yes    COPD mixed type [J44.9]  Yes    Peripheral arterial occlusive disease [I77.9]  Yes    Fibromyalgia [M79.7]  Yes    Gastroesophageal reflux disease [K21.9]  Yes    Irritable bowel syndrome [K58.9]  Yes      Resolved Hospital Problems   No resolved problems to display.       Patient is a 68 year-old female with a PMH significant for COPD on O2, IBS, PAD status post stent placement, peripheral neuropathy, chronic hypoxic respiratory failure, morbid obesity, fibromyalgia, anxiety, and depression, presented to the ED with chief complaint of acute syncopal episode.    Syncope  -Happened while she was using the bathroom, likely vasovagal  -Also patient is on multiple sedating meds including Lyrica, Norco which could be contributing.  Resumed home Lyrica at lower dose 100 mg twice daily, continue as needed Norco 10 mg every 6 hours  -Cardiology consult, echocardiogram pending  -Neurology  consult  -Ordered orthostatics  -Monitor telemetry    Peripheral artery disease status post stent placement  -Continue Plavix, aspirin, atorvastatin    COPD/chronic hypoxia respiratory failure   -continue oxygen supplement, as needed DuoNebs      Fibromyalgia/peripheral neuropathy  -Decreased dose of Lyrica to 100 mg twice daily, continue as needed Percocet    Macrocytic anemia  -Ordered B12, folate, iron panel  -Monitor hemoglobin daily      Right foot injury  -X-ray  showed Lateral ankle soft tissue swelling   -Pain management  -Orthopedic surgery consult      SCDs for DVT prophylaxis.  Full code.  Discussed with patient and care team on multidisciplinary rounds.  Anticipate discharge TBD      Copied text in this note has been reviewed and is accurate as of 03/04/24.         Dictated utilizing Dragon dictation        Lashon Carroll MD  [facility]  03/04/24  12:29 EST

## 2024-03-04 NOTE — CONSULTS
[facility]    Patient Name: Brenda Michelle  :1955  68 y.o.  LOS: 0  Encounter Provider: Diego Islas MD      Patient Care Team:  Angelito Kline DO as PCP - General (Family Medicine)    Chief Complaint:  recurrent syncope    Interval History:     Brenda Michelle is a 68 year-old female with a PMH significant for COPD on O2, IBS, PAD s/p PCI, peripheral neuropathy, chronic respiratory failure, morbid obesity, fibromyositis, anxiety, and depression.     She presented to the emergency department on 3/3/2024 with complaints of 2 syncopal episodes. Two days prior to arrival, her  stated that she had been in the restroom for a long period of time and when he went to check on her, he found her passed out on the toilet and shaking. After she woke up, she was able to continue her normal everyday activities. Prior to arrival, she walked to the bathroom and was standing getting ready to sit and use the restroom and she passed out again. She fell on her right side and hit the back of her head. The CT of the brain was negative for any acute intracranial process.    Of note, She is on phentermine and Mounjaro and was also started on Topamax about a month ago.      Vitals on arrival: Temp 96.1, HR 82, Resp 16, /94, SpO2 97% on 3L  ER workup: HS troponin 21, otherwise essentially normal labwork.   EKG showed SR, rate 73.    Cardiology has been asked to see this patient for recurrent syncope.     Today, patient has no acute complaints. No prior or recurrent episode; does not chest pain or palpitation prior to her passing out. Retired [employer] worker. Not active for 20 years due to peripheral neuropathy. Denies chest pain, dyspnea on exertion, palpitation, orthopnea, PND, bleeding. Endorses leg edema since her fall. On 3L home O2 for a long time. Does not recall having had a heart attack or stroke. Former smoker, 1.5 ppd x 50 years and quit 3 years. Denies alcohol or substance. On hydrocodone  "and Lyrica at home for pain with no recent changes in dose. Pt lost 42lb with the assistance of Mounjaro.     Objective   Vital Signs  Temp:  [96.1 °F (35.6 °C)-98.4 °F (36.9 °C)] 98.2 °F (36.8 °C)  Heart Rate:  [68-90] 90  Resp:  [16-18] 18  BP: ()/(53-94) 148/61    Intake/Output Summary (Last 24 hours) at 3/4/2024 0831  Last data filed at 3/3/2024 1724  Gross per 24 hour   Intake --   Output 700 ml   Net -700 ml     Flowsheet Rows      Flowsheet Row First Filed Value   Admission Height 160 cm (63\") Documented at 03/03/2024 1329   Admission Weight 95.7 kg (211 lb) Documented at 03/03/2024 1329              Vitals and nursing note reviewed.   Constitutional:       Appearance: Not in distress. Chronically ill-appearing.   Neck:      Vascular: No JVR.   Pulmonary:      Effort: Pulmonary effort is normal.      Breath sounds: No wheezing. No rhonchi. No rales.   Cardiovascular:      Normal rate. Regular rhythm.      Murmurs: There is no murmur.   Edema:     Peripheral edema absent.   Abdominal:      General: There is no distension.      Palpations: Abdomen is soft.      Tenderness: There is no abdominal tenderness.   Musculoskeletal:      Cervical back: Neck supple. Skin:     General: Skin is warm and cool.   Neurological:      Mental Status: Alert and oriented to person, place and time.           Pertinent Test Results:  Results from last 7 days   Lab Units 03/04/24  0304 03/03/24  1328   SODIUM mmol/L 141 144   POTASSIUM mmol/L 4.1 3.7   CHLORIDE mmol/L 104 103   CO2 mmol/L 30.0* 32.0*   BUN mg/dL 12 11   CREATININE mg/dL 0.95 0.99   GLUCOSE mg/dL 97 95   CALCIUM mg/dL 9.5 10.4   AST (SGOT) U/L 22 25   ALT (SGPT) U/L 12 13     Results from last 7 days   Lab Units 03/03/24  1328   HSTROP T ng/L 21*     Results from last 7 days   Lab Units 03/04/24  0304 03/03/24  1328   WBC 10*3/mm3 6.37 5.82   HEMOGLOBIN g/dL 11.2* 12.5   HEMATOCRIT % 34.3 39.9   PLATELETS 10*3/mm3 156 203     Results from last 7 days   Lab " "Units 03/03/24  1328   INR  1.05   APTT seconds 28.3     Results from last 7 days   Lab Units 03/03/24  1328   MAGNESIUM mg/dL 1.6           Invalid input(s): \"LDLCALC\"  Results from last 7 days   Lab Units 03/03/24  1328   PROBNP pg/mL 98.3               Medication Review:   aspirin, 81 mg, Oral, Daily  clopidogrel, 75 mg, Oral, Daily  docusate sodium, 100 mg, Oral, BID  DULoxetine, 60 mg, Oral, Daily  folic acid, 1,000 mcg, Oral, Daily  pravastatin, 80 mg, Oral, Nightly  sodium chloride, 10 mL, Intravenous, Q12H         sodium chloride, 100 mL/hr, Last Rate: 100 mL/hr (03/03/24 1923)      3/3/2024      Assessment & Plan     Active Hospital Problems    Diagnosis  POA    **Recurrent syncope [R55]  Yes    Vasovagal episode [R55]  Unknown    Orthostatic syncope [I95.1]  Unknown    Polypharmacy [Z79.899]  Not Applicable    Supplemental oxygen dependent [Z99.81]  Not Applicable    STEVEN (obstructive sleep apnea) [G47.33]  Yes    Obesity, Class III, BMI 40-49.9 (morbid obesity) [E66.01]  Yes    Low back pain [M54.50]  Yes    Chronic respiratory failure with hypoxia [J96.11]  Yes    COPD mixed type [J44.9]  Yes    Peripheral arterial occlusive disease [I77.9]  Yes    Fibromyalgia [M79.7]  Yes    Gastroesophageal reflux disease [K21.9]  Yes    Irritable bowel syndrome [K58.9]  Yes      Resolved Hospital Problems   No resolved problems to display.        Syncope: Patient reports 2 episodes of syncope while at home using the bathroom.  This is likely vasovagal in the setting of straining and exacerbated by her chronic narcotic and Lyrica use for pain and peripheral neuropathy and Mounjaro use for weight loss.  She is hemodynamically stable and without acute abnormalities on blood work.  This is unlikely to be cardiogenic; we will order echocardiogram to rule out significant functional/structural abnormalities.  Troponin elevation: High-sensitivity troponin minimally elevated on admission at 21, patient denies chest pain.  " Likely demand ischemia, defer inpatient ischemic evaluation unless echo shows unexpected abnormalities.  Continue DAPT as below for history of PAD.  PAD s/p PCI: Per chart, pt had stenting of her femoral and iliac arteries by Dr. De Jesus in 2011. On ASA, Plavix at home, continue per primary team.  See below regarding lipid control.  HLD: On pravastatin 80 at home, lipid pending from this admission.  COPD: On home O2, stable. Management per primary team and other specialists.  Obesity: BMI 37 but patient was able to lose over 40 pounds on Mounjaro.  Defer management to PCP.      Diego Islas MD  [facility]  03/04/24  06:28 EST

## 2024-03-04 NOTE — CONSULTS
Neurology Consult Note    Consult Date: 3/4/2024    Referring MD: Bharat Seymour MD    Reason for Consult I have been asked to see the patient in neurological consultation to render advice and opinion regarding syncope, tremor    Brenda Michelle is a 68 y.o. female with COPD, HLD, idiopathic peripheral neuropathy, restless legs.    She was seen by our service in 2019 for a mysterious illness where she became weak in all 4 extremities and lost the ability to walk. There was some suspicion for GBS but apparently this was ruled out. She went to rehab and gradually recovered ultimately over 6 months or so. She had persistent neuropathic symptoms and Dr. Juvencio Hoyos diagnosed her with idiopathic peripheral neuropathy. She was started on Lyrica and currently takes 150mg 4 times daily (150 twice daily and 300 at night).     She takes phentermine and Moujaro for weight loss.    For restless legs she takes Lyrica, Cymbalta, and Mirapex.    Recently Topamax was added by pain management for RLS. She was started on Topamax 50 bid but did not tolerate that dose so her  quickly stopped it. She developed intermittent tremors of the limbs and then in the last week had two separate syncopal episodes, the first on the toilet, the second while standing. Both involved some myoclonic movements at the onset. With the second episode yesterday she fell and landed on her left leg and continues to complain of pain from the fall.    On admission Dr. Clements held most of her home medications and her tremor resolved.    Past Medical History:   Diagnosis Date    Anxiety     Arthritis     benign polyps of large intestine     COPD (chronic obstructive pulmonary disease)     Depression     Difficulty walking     Disc degeneration, lumbar     Fever 9/9/2022    Fibromyositis     Hyperlipidemia     IBS (irritable bowel syndrome)     Kidney stones     Movement disorder     Nephrolithiasis     Peripheral neuropathy     PVD (peripheral vascular  "disease)     Sepsis without acute organ dysfunction 9/9/2022    Sepsis without acute organ dysfunction, due to unspecified organism 9/11/2022    Shingles     Vitamin D deficiency        Exam  /61 (BP Location: Left arm, Patient Position: Lying)   Pulse 90   Temp 98.2 °F (36.8 °C) (Oral)   Resp 18   Ht 160 cm (63\")   Wt 95.7 kg (211 lb)   LMP  (LMP Unknown)   SpO2 99%   BMI 37.38 kg/m²   Gen: NAD, vitals reviewed  MS: oriented x3, recent/remote memory intact, normal attention/concentration, language intact, no neglect.  CN: visual acuity grossly normal, PERRL, EOMI, no facial droop, no dysarthria  Motor: 5/5 throughout upper and lower extremities although right LE limited by pain  Sensory: diminished to cold temperature bilateral lower ext.    DATA:    Lab Results   Component Value Date    GLUCOSE 97 03/04/2024    CALCIUM 9.5 03/04/2024     03/04/2024    K 4.1 03/04/2024    CO2 30.0 (H) 03/04/2024     03/04/2024    BUN 12 03/04/2024    CREATININE 0.95 03/04/2024    EGFRIFAFRI 109 02/21/2022    EGFRIFNONA 95 02/21/2022    BCR 12.6 03/04/2024    ANIONGAP 7.0 03/04/2024     Lab Results   Component Value Date    WBC 6.37 03/04/2024    HGB 11.2 (L) 03/04/2024    HCT 34.3 03/04/2024    MCV 99.7 (H) 03/04/2024     03/04/2024       Lab review: CBC, BMP unremarkable    Imaging review: Ct head performed in the ED personally reviewed and shows no acute findings. Radiology report reviewed.    Diagnoses:  Asterixis, likely due to high dose of Lyrica  Orthostatic syncope with syncopal myoclonus  Polypharmacy  Idiopathic peripheral neuropathy  Right leg pain    Comment: Tremor as described is most consistent with asterixis which may be a side effect of her high dose Lyrica. I would reduce that to 150mg tid which is the normal maximum dose. Her syncopal spells seem most likely orthostatic. She may have some degree of dysautonomia related to her neuropathy    PLAN:  Reduce Lyrica to max dose of 150mg " tid  Agree with checking orthostatics if she can tolerate standing    No further inpatient neurologic workup needed.

## 2024-03-04 NOTE — CASE MANAGEMENT/SOCIAL WORK
Discharge Planning Assessment  [facility]     Patient Name: Brenda Michelle  MRN: [id number]  Today's Date: 3/4/2024    Admit Date: 3/3/2024    Plan: Home vs home w/ hh - PT eval pending   Discharge Needs Assessment       Row Name 03/04/24 1016       Living Environment    People in Home spouse    Current Living Arrangements home    Primary Care Provided by self    Provides Primary Care For no one    Family Caregiver if Needed spouse    Able to Return to Prior Arrangements yes       Resource/Environmental Concerns    Resource/Environmental Concerns none       Transition Planning    Patient/Family Anticipates Transition to home with help/services;home    Patient/Family Anticipated Services at Transition none    Transportation Anticipated family or friend will provide       Discharge Needs Assessment    Equipment Currently Used at Home walker, rolling;oxygen;wheelchair    Concerns to be Addressed discharge planning                   Discharge Plan       Row Name 03/04/24 1017       Plan    Plan Home vs home w/ hh - PT eval pending    Patient/Family in Agreement with Plan yes    Plan Comments CCP spoke with patient at bedside; explained role, verified facesheet, and discussed dc plan. Patient uses a walker, wheelchair, and 3L continuous oxygen from [employer]. She lives with her spouse in a 2 level home but resides on 1 level. There is a ramp to enter. She reports she is able to get around her house with no issues. She has used HH but cannot remember who. She has been to [facility] in the past. Plan is home, patient unsure if she will need any HH or DME at this time. PT eval pending - follow for recommendations. Family to transport. ASHLEY, CHRISTINA                  Continued Care and Services - Admitted Since 3/3/2024    No active coordination exists for this encounter.          Demographic Summary       Row Name 03/04/24 1015       General Information    Admission Type observation    Arrived From home    Referral Source  admission list    Reason for Consult discharge planning    Preferred Language English       Contact Information    Permission Granted to Share Info With family/designee                   Functional Status       Row Name 03/04/24 1015       Functional Status    Usual Activity Tolerance moderate    Current Activity Tolerance moderate       Functional Status, IADL    Medications assistive equipment    Meal Preparation assistive equipment    Housekeeping assistive equipment    Laundry assistive equipment    Shopping assistive equipment       Mental Status    General Appearance WDL WDL                   Psychosocial    No documentation.                  Abuse/Neglect    No documentation.                  Legal    No documentation.                  Substance Abuse    No documentation.                  Patient Forms    No documentation.                     CHRISTINA Richard

## 2024-03-04 NOTE — SIGNIFICANT NOTE
03/04/24 1342   OTHER   Discipline physical therapist   Rehab Time/Intention   Session Not Performed other (see comments)  ([facility]mite for syncope, workup ongoing, pt off the floor to cardio in PM. PT will follow up tomorrow 3/5)   Recommendation   PT - Next Appointment 03/05/24

## 2024-03-05 ENCOUNTER — APPOINTMENT (OUTPATIENT)
Dept: GENERAL RADIOLOGY | Facility: HOSPITAL | Age: 69
DRG: 312 | End: 2024-03-05
Payer: MEDICARE

## 2024-03-05 PROCEDURE — 25010000002 HEPARIN (PORCINE) PER 1000 UNITS: Performed by: STUDENT IN AN ORGANIZED HEALTH CARE EDUCATION/TRAINING PROGRAM

## 2024-03-05 PROCEDURE — 73630 X-RAY EXAM OF FOOT: CPT

## 2024-03-05 PROCEDURE — 63710000001 DIPHENHYDRAMINE PER 50 MG: Performed by: STUDENT IN AN ORGANIZED HEALTH CARE EDUCATION/TRAINING PROGRAM

## 2024-03-05 RX ORDER — HEPARIN SODIUM 5000 [USP'U]/ML
5000 INJECTION, SOLUTION INTRAVENOUS; SUBCUTANEOUS EVERY 8 HOURS SCHEDULED
Status: DISCONTINUED | OUTPATIENT
Start: 2024-03-05 | End: 2024-03-06

## 2024-03-05 RX ORDER — DIPHENHYDRAMINE HCL 25 MG
25 CAPSULE ORAL ONCE
Status: COMPLETED | OUTPATIENT
Start: 2024-03-05 | End: 2024-03-05

## 2024-03-05 RX ORDER — PANTOPRAZOLE SODIUM 40 MG/1
40 TABLET, DELAYED RELEASE ORAL
Status: DISCONTINUED | OUTPATIENT
Start: 2024-03-05 | End: 2024-03-09 | Stop reason: HOSPADM

## 2024-03-05 RX ORDER — PREGABALIN 75 MG/1
150 CAPSULE ORAL NIGHTLY
Status: DISCONTINUED | OUTPATIENT
Start: 2024-03-05 | End: 2024-03-06

## 2024-03-05 RX ORDER — PREGABALIN 75 MG/1
150 CAPSULE ORAL 2 TIMES DAILY
Status: DISCONTINUED | OUTPATIENT
Start: 2024-03-05 | End: 2024-03-06

## 2024-03-05 RX ADMIN — PRAVASTATIN SODIUM 80 MG: 40 TABLET ORAL at 21:13

## 2024-03-05 RX ADMIN — ASPIRIN 81 MG: 81 TABLET, COATED ORAL at 08:52

## 2024-03-05 RX ADMIN — DOCUSATE SODIUM 100 MG: 100 CAPSULE, LIQUID FILLED ORAL at 00:53

## 2024-03-05 RX ADMIN — CLOPIDOGREL BISULFATE 75 MG: 75 TABLET, FILM COATED ORAL at 08:52

## 2024-03-05 RX ADMIN — PREGABALIN 100 MG: 100 CAPSULE ORAL at 00:52

## 2024-03-05 RX ADMIN — DIPHENHYDRAMINE HYDROCHLORIDE 25 MG: 25 CAPSULE ORAL at 14:06

## 2024-03-05 RX ADMIN — Medication 10 ML: at 21:14

## 2024-03-05 RX ADMIN — PANTOPRAZOLE SODIUM 40 MG: 40 TABLET, DELAYED RELEASE ORAL at 21:13

## 2024-03-05 RX ADMIN — PREGABALIN 100 MG: 100 CAPSULE ORAL at 08:52

## 2024-03-05 RX ADMIN — HEPARIN SODIUM 5000 UNITS: 5000 INJECTION INTRAVENOUS; SUBCUTANEOUS at 21:13

## 2024-03-05 RX ADMIN — FOLIC ACID 1000 MCG: 1 TABLET ORAL at 08:52

## 2024-03-05 RX ADMIN — DULOXETINE HYDROCHLORIDE 60 MG: 60 CAPSULE, DELAYED RELEASE ORAL at 08:52

## 2024-03-05 RX ADMIN — HYDROCODONE BITARTRATE AND ACETAMINOPHEN 1 TABLET: 10; 325 TABLET ORAL at 01:58

## 2024-03-05 RX ADMIN — Medication 10 ML: at 08:52

## 2024-03-05 RX ADMIN — PREGABALIN 150 MG: 75 CAPSULE ORAL at 21:13

## 2024-03-05 RX ADMIN — HYDROCODONE BITARTRATE AND ACETAMINOPHEN 1 TABLET: 10; 325 TABLET ORAL at 10:21

## 2024-03-05 RX ADMIN — DOCUSATE SODIUM 100 MG: 100 CAPSULE, LIQUID FILLED ORAL at 08:52

## 2024-03-05 RX ADMIN — DOCUSATE SODIUM 100 MG: 100 CAPSULE, LIQUID FILLED ORAL at 21:14

## 2024-03-05 RX ADMIN — Medication 10 ML: at 00:53

## 2024-03-05 RX ADMIN — HYDROCODONE BITARTRATE AND ACETAMINOPHEN 1 TABLET: 10; 325 TABLET ORAL at 18:48

## 2024-03-05 NOTE — CONSULTS
ORTHOPEDIC SURGERY CONSULT      Patient: Brenda Michelle  Date of Admission: 3/3/2024  1:05 PM  YOB: 1955  Medical Record Number: [id number]  Attending Physician: Lashon Carroll MD  Consulting Physician: Lionel Fleming MD    CHIEF COMPLAINT: Right foot pain after fall    HISTORY OF PRESENT ILLNESS: Patient is a 68 y.o. year old female presents to [facility] with above complaints.  I was consulted for further evaluation and treatment.  She reports she fell and landed on her right foot and had immediate pain and swelling and bruising in her right foot.  She presented to [facility] where x-rays were obtained of her right knee and her right ankle which were negative for fracture.  Due to inability to ambulate and complaints of consistent pain orthopedics was consulted for definitive management.  There has been also some concerns of care from the patient and her .   is  present at bedside.    ALLERGIES:   Allergies   Allergen Reactions    Ambien [Zolpidem Tartrate] Other (See Comments)     Nightmares    Bupropion Itching    Codeine Sulfate Itching    Zolpidem Hives     Nightmares    Adhesive Tape Rash    Aripiprazole Unknown - Low Severity    Atorvastatin Other (See Comments)     MYALGIAS    Cilostazol Other (See Comments)     HA    Colesevelam Nausea Only    Ferrous Sulfate Nausea Only    Welchol [Colesevelam Hcl] Nausea Only    Wound Dressing Adhesive Rash       HOME MEDICATIONS:  Medications Prior to Admission   Medication Sig Dispense Refill Last Dose    Aspirin (ASPIR-81 PO) Take 81 mg by mouth Daily.       baclofen (LIORESAL) 10 MG tablet Take 1 tablet by mouth 2 (Two) Times a Day As Needed for Muscle Spasms. 180 tablet 3     clopidogrel (PLAVIX) 75 MG tablet TAKE 1 TABLET BY MOUTH DAILY 90 tablet 1     docusate sodium (COLACE) 100 MG capsule Take 1 capsule by mouth 2 (Two) Times a Day.       DULoxetine (CYMBALTA) 60 MG capsule TAKE 1  CAPSULE DAILY 90 capsule 3     folic acid (FOLVITE) 1 MG tablet TAKE 1 TABLET DAILY 90 tablet 3     HYDROcodone-acetaminophen (NORCO)  MG per tablet Take 1 tablet by mouth Every 6 (Six) Hours As Needed for Moderate Pain.       meclizine (ANTIVERT) 25 MG tablet Take 1 tablet by mouth 3 (Three) Times a Day As Needed for Dizziness. 45 tablet 0     phentermine (ADIPEX-P) 37.5 MG tablet TAKE ONE TABLET BY MOUTH EVERY MORNING BEFORE BREAKFAST 30 tablet 2     pravastatin (PRAVACHOL) 80 MG tablet TAKE 1 TABLET BY MOUTH DAILY  (DISCONTINUE ROSUVASTATIN AS  INTOLERANT.TRY NEW) (Patient taking differently: Every Night.) 90 tablet 3     pregabalin (LYRICA) 150 MG capsule Take 1 capsule by mouth 3 (Three) Times a Day. (Patient taking differently: Take 1 capsule by mouth 2 (Two) Times a Day.) 270 capsule 1     risedronate (ACTONEL) 150 MG tablet TAKE 1 TABLET BY MOUTH EVERY 30  DAYS WITH WATER ON AN EMPTY  STOMACH, NOTHING BY MOUTH AND DO NOT LIE DOWN FOR NEXT 30 MINUTES 3 tablet 3 1/2/2023    Tirzepatide (MOUNJARO) 10 MG/0.5ML solution pen-injector pen Inject 0.5 mL under the skin into the appropriate area as directed 1 (One) Time Per Week. 6 mL 1 2/28/2024    vitamin B-12 (CYANOCOBALAMIN) 1000 MCG tablet Take 1 tablet by mouth Daily.       vitamin D3 125 MCG (5000 UT) capsule capsule Take 1 capsule by mouth Daily.       pramipexole (MIRAPEX) 0.25 MG tablet TAKE ONE TABLET BY MOUTH ONCE NIGHTLY 90 tablet 3        CURRENT MEDICATIONS:  Scheduled Meds:aspirin, 81 mg, Oral, Daily  clopidogrel, 75 mg, Oral, Daily  docusate sodium, 100 mg, Oral, BID  DULoxetine, 60 mg, Oral, Daily  folic acid, 1,000 mcg, Oral, Daily  pravastatin, 80 mg, Oral, Nightly  pregabalin, 100 mg, Oral, BID  sodium chloride, 10 mL, Intravenous, Q12H      Continuous Infusions:   PRN Meds:.  acetaminophen **OR** acetaminophen **OR** acetaminophen    Calcium Replacement - Follow Nurse / BPA Driven Protocol    HYDROcodone-acetaminophen    HYDROmorphone     ipratropium-albuterol    Magnesium Standard Dose Replacement - Follow Nurse / BPA Driven Protocol    meclizine    nitroglycerin    ondansetron    Phosphorus Replacement - Follow Nurse / BPA Driven Protocol    Potassium Replacement - Follow Nurse / BPA Driven Protocol    sodium chloride    [COMPLETED] Insert Peripheral IV **AND** sodium chloride    sodium chloride    sodium chloride    Past Medical History:   Diagnosis Date    Anxiety     Arthritis     benign polyps of large intestine     COPD (chronic obstructive pulmonary disease)     Depression     Difficulty walking     Disc degeneration, lumbar     Fever 9/9/2022    Fibromyositis     Hyperlipidemia     IBS (irritable bowel syndrome)     Kidney stones     Movement disorder     Nephrolithiasis     Peripheral neuropathy     PVD (peripheral vascular disease)     Sepsis without acute organ dysfunction 9/9/2022    Sepsis without acute organ dysfunction, due to unspecified organism 9/11/2022    Shingles     Vitamin D deficiency      Past Surgical History:   Procedure Laterality Date    CATARACT EXTRACTION      COLONOSCOPY  2004    Colon polyps; family history of colon cancer    COLONOSCOPY N/A 09/29/2022    Procedure: COLONOSCOPY to cecum and TI with biopsies, cold forceps and hot snare polypectomies with 2cc orise lift and clip x 2;  Surgeon: Chirag Tony MD;  Location: [facility] ENDOSCOPY;  Service: Gastroenterology;  Laterality: N/A;  PRE- abnormal imaging, hx of polyps, family hx of colon cancer  POST- polyps, internal/external hemorrhoids    KNEE ARTHROPLASTY, PARTIAL REPLACEMENT  2015    KNEE ARTHROSCOPY Left     Meniscus    POPLITEAL ARTERY ANGIOPLASTY Bilateral     STEROID INJECTION      TONSILLECTOMY      TUBAL ABDOMINAL LIGATION      WRIST FRACTURE SURGERY       Social History     Occupational History    Occupation: retired   Tobacco Use    Smoking status: Former     Current packs/day: 0.00     Average packs/day: 0.8 packs/day for 60.8 years (45.6 ttl pk-yrs)      Types: Cigarettes     Start date: 1960     Quit date: 2020     Years since quitting: 3.3    Smokeless tobacco: Never   Vaping Use    Vaping status: Never Used   Substance and Sexual Activity    Alcohol use: No    Drug use: No    Sexual activity: Not Currently     Partners: Male      Social History     Social History Narrative    Not on file     Family History   Problem Relation Age of Onset    COPD Mother          at 69 yo     Colon cancer Mother     Heart disease Mother     Stroke Mother     Lung cancer Father         mesothelioma    Heart disease Maternal Grandmother     Heart disease Maternal Grandfather     Heart disease Paternal Grandfather        REVIEW OF SYSTEMS:    HEENT: Patient denies any headaches, vision changes, change in hearing, or tinnitus, Patient denies epistaxis, sinus pain, hoarseness, or dysphagia   Pulmonary: Patient denies any cough, congestion, acute change in SOA or wheezing.   Cardiovascular: Patient denies any change in chest pain, dyspnea, palpitations, weakness, intolerance of exercise, varicosities, change in murmur   Gastrointestinal:  Patient denies change in appetite, melena, change in bowel habits.   Genital/Urinary: Patient denies dysuria, change in color of urine, change in frequency of urination, pain with urgency, change in incontinence, retention.   Musculoskeletal: Patient denies complaints of acute changes in symptoms of other joints not mentioned above.   Neurological: Patient denies changes in dizziness, tremor, ataxia, or difficulty in speaking or changes in memory.   Endocrine system: Patient denies acute changes in tremors, palpitations, polyuria, polydipsia, polyphagia, diaphoresis, exophthalmos, or goiter.   Psychological: Patient denies thoughts/plans or harming self or other; denies acute changes in depression,  insomnia, night terrors, karel, disorientation.   Skin: Patient denies any bruising, rashes, discoloration, pruritus,or wounds not  "mentioned in history of present illness or chief complaint above.   Hematopoietic: Patient denies current bleeding, epistaxis, hematuria, or melena.    PHYSICAL EXAM:   Vitals:  Vitals:    03/04/24 0724 03/04/24 1507 03/04/24 1919 03/04/24 2311   BP: 148/61 148/61 135/68 127/59   BP Location: Left arm  Left arm Left arm   Patient Position: Lying  Lying Lying   Pulse: 90  83 89   Resp: 18  18 18   Temp: 98.2 °F (36.8 °C)  98.7 °F (37.1 °C) 98.8 °F (37.1 °C)   TempSrc: Oral  Oral Oral   SpO2: 99%  96% 94%   Weight:  95.7 kg (211 lb)     Height:  160 cm (63\")         General:  68 y.o. female who appears about stated age.    Alert, cooperative, in no acute distress         Head:    Normocephalic, without obvious abnormality, atraumatic   Eyes:            Lids and lashes normal, conjunctivae and sclerae normal, no         icterus, no pallor, corneas clear, PERRLA   Ears:    Ears appear intact with no abnormalities noted   Throat:   No oral lesions, no thrush, oral mucosa moist   Neck:   No adenopathy, supple, trachea midline, no JVD   Back:     Limited exam shows no severe kyphosis present,no visible           erythema, no excessive  tenderness to palpation.    Lungs:     Respirations regular, even and unlabored.     Heart:    Normal rate, Pulses palpable   Chest Wall:    No abnormalities observed.   Abdomen:     Normal bowel sounds, no masses, no organomegaly, soft              non-tender, non-distended, no guarding, no rebound                      tenderness   Rectal:     Deferred   Pulses:   Pulses palpable and equal bilaterally   Skin:   No bleeding, bruising or rash   Lymph nodes:   No palpable adenopathy   Extremities:     Right ankle and foot: Skin is intact.  Swelling and ecchymosis is present.  She is tender to palpation over the first third and fourth metatarsals.  She is able to wiggle the toes dorsally and volarly.  She also has some tenderness over the ankle mortise.  There is swelling also present.  Brisk " cap refill is present distally.  Pulses are unable to be palpated secondary to swelling.   Right knee examination: Well-healed incision which is clean dry intact.  No erythema or signs infection.  No effusion.  She has negative logroll.    DIAGNOSTIC TEST:  Admission on 03/03/2024   Component Date Value Ref Range Status    QT Interval 03/03/2024 378  ms Final    QTC Interval 03/03/2024 417  ms Final    Glucose 03/03/2024 95  65 - 99 mg/dL Final    BUN 03/03/2024 11  8 - 23 mg/dL Final    Creatinine 03/03/2024 0.99  0.57 - 1.00 mg/dL Final    Sodium 03/03/2024 144  136 - 145 mmol/L Final    Potassium 03/03/2024 3.7  3.5 - 5.2 mmol/L Final    Chloride 03/03/2024 103  98 - 107 mmol/L Final    CO2 03/03/2024 32.0 (H)  22.0 - 29.0 mmol/L Final    Calcium 03/03/2024 10.4  8.6 - 10.5 mg/dL Final    Total Protein 03/03/2024 6.2  6.0 - 8.5 g/dL Final    Albumin 03/03/2024 3.7  3.5 - 5.2 g/dL Final    ALT (SGPT) 03/03/2024 13  1 - 33 U/L Final    AST (SGOT) 03/03/2024 25  1 - 32 U/L Final    Alkaline Phosphatase 03/03/2024 85  39 - 117 U/L Final    Total Bilirubin 03/03/2024 0.4  0.0 - 1.2 mg/dL Final    Globulin 03/03/2024 2.5  gm/dL Final    A/G Ratio 03/03/2024 1.5  g/dL Final    BUN/Creatinine Ratio 03/03/2024 11.1  7.0 - 25.0 Final    Anion Gap 03/03/2024 9.0  5.0 - 15.0 mmol/L Final    eGFR 03/03/2024 62.2  >60.0 mL/min/1.73 Final    Magnesium 03/03/2024 1.6  1.6 - 2.4 mg/dL Final    HS Troponin T 03/03/2024 21 (H)  <14 ng/L Final    Extra Tube 03/03/2024 Hold for add-ons.   Final    Auto resulted.    Extra Tube 03/03/2024 hold for add-on   Final    Auto resulted    Extra Tube 03/03/2024 Hold for add-ons.   Final    Auto resulted.    Extra Tube 03/03/2024 Hold for add-ons.   Final    Auto resulted    WBC 03/03/2024 5.82  3.40 - 10.80 10*3/mm3 Final    RBC 03/03/2024 3.93  3.77 - 5.28 10*6/mm3 Final    Hemoglobin 03/03/2024 12.5  12.0 - 15.9 g/dL Final    Hematocrit 03/03/2024 39.9  34.0 - 46.6 % Final    MCV 03/03/2024  101.5 (H)  79.0 - 97.0 fL Final    MCH 03/03/2024 31.8  26.6 - 33.0 pg Final    MCHC 03/03/2024 31.3 (L)  31.5 - 35.7 g/dL Final    RDW 03/03/2024 13.3  12.3 - 15.4 % Final    RDW-SD 03/03/2024 50.2  37.0 - 54.0 fl Final    MPV 03/03/2024 10.9  6.0 - 12.0 fL Final    Platelets 03/03/2024 203  140 - 450 10*3/mm3 Final    Neutrophil % 03/03/2024 60.5  42.7 - 76.0 % Final    Lymphocyte % 03/03/2024 27.5  19.6 - 45.3 % Final    Monocyte % 03/03/2024 9.3  5.0 - 12.0 % Final    Eosinophil % 03/03/2024 2.2  0.3 - 6.2 % Final    Basophil % 03/03/2024 0.3  0.0 - 1.5 % Final    Immature Grans % 03/03/2024 0.2  0.0 - 0.5 % Final    Neutrophils, Absolute 03/03/2024 3.52  1.70 - 7.00 10*3/mm3 Final    Lymphocytes, Absolute 03/03/2024 1.60  0.70 - 3.10 10*3/mm3 Final    Monocytes, Absolute 03/03/2024 0.54  0.10 - 0.90 10*3/mm3 Final    Eosinophils, Absolute 03/03/2024 0.13  0.00 - 0.40 10*3/mm3 Final    Basophils, Absolute 03/03/2024 0.02  0.00 - 0.20 10*3/mm3 Final    Immature Grans, Absolute 03/03/2024 0.01  0.00 - 0.05 10*3/mm3 Final    nRBC 03/03/2024 0.0  0.0 - 0.2 /100 WBC Final    Protime 03/03/2024 13.8  11.7 - 14.2 Seconds Final    INR 03/03/2024 1.05  0.90 - 1.10 Final    PTT 03/03/2024 28.3  22.7 - 35.4 seconds Final    proBNP 03/03/2024 98.3  0.0 - 900.0 pg/mL Final    Glucose 03/04/2024 97  65 - 99 mg/dL Final    BUN 03/04/2024 12  8 - 23 mg/dL Final    Creatinine 03/04/2024 0.95  0.57 - 1.00 mg/dL Final    Sodium 03/04/2024 141  136 - 145 mmol/L Final    Potassium 03/04/2024 4.1  3.5 - 5.2 mmol/L Final    Chloride 03/04/2024 104  98 - 107 mmol/L Final    CO2 03/04/2024 30.0 (H)  22.0 - 29.0 mmol/L Final    Calcium 03/04/2024 9.5  8.6 - 10.5 mg/dL Final    Total Protein 03/04/2024 5.4 (L)  6.0 - 8.5 g/dL Final    Albumin 03/04/2024 3.3 (L)  3.5 - 5.2 g/dL Final    ALT (SGPT) 03/04/2024 12  1 - 33 U/L Final    AST (SGOT) 03/04/2024 22  1 - 32 U/L Final    Alkaline Phosphatase 03/04/2024 83  39 - 117 U/L Final    Total  Bilirubin 03/04/2024 0.4  0.0 - 1.2 mg/dL Final    Globulin 03/04/2024 2.1  gm/dL Final    A/G Ratio 03/04/2024 1.6  g/dL Final    BUN/Creatinine Ratio 03/04/2024 12.6  7.0 - 25.0 Final    Anion Gap 03/04/2024 7.0  5.0 - 15.0 mmol/L Final    eGFR 03/04/2024 65.4  >60.0 mL/min/1.73 Final    WBC 03/04/2024 6.37  3.40 - 10.80 10*3/mm3 Final    RBC 03/04/2024 3.44 (L)  3.77 - 5.28 10*6/mm3 Final    Hemoglobin 03/04/2024 11.2 (L)  12.0 - 15.9 g/dL Final    Hematocrit 03/04/2024 34.3  34.0 - 46.6 % Final    MCV 03/04/2024 99.7 (H)  79.0 - 97.0 fL Final    MCH 03/04/2024 32.6  26.6 - 33.0 pg Final    MCHC 03/04/2024 32.7  31.5 - 35.7 g/dL Final    RDW 03/04/2024 13.2  12.3 - 15.4 % Final    RDW-SD 03/04/2024 48.1  37.0 - 54.0 fl Final    MPV 03/04/2024 11.0  6.0 - 12.0 fL Final    Platelets 03/04/2024 156  140 - 450 10*3/mm3 Final    Neutrophil % 03/04/2024 69.1  42.7 - 76.0 % Final    Lymphocyte % 03/04/2024 18.2 (L)  19.6 - 45.3 % Final    Monocyte % 03/04/2024 9.9  5.0 - 12.0 % Final    Eosinophil % 03/04/2024 2.2  0.3 - 6.2 % Final    Basophil % 03/04/2024 0.3  0.0 - 1.5 % Final    Immature Grans % 03/04/2024 0.3  0.0 - 0.5 % Final    Neutrophils, Absolute 03/04/2024 4.40  1.70 - 7.00 10*3/mm3 Final    Lymphocytes, Absolute 03/04/2024 1.16  0.70 - 3.10 10*3/mm3 Final    Monocytes, Absolute 03/04/2024 0.63  0.10 - 0.90 10*3/mm3 Final    Eosinophils, Absolute 03/04/2024 0.14  0.00 - 0.40 10*3/mm3 Final    Basophils, Absolute 03/04/2024 0.02  0.00 - 0.20 10*3/mm3 Final    Immature Grans, Absolute 03/04/2024 0.02  0.00 - 0.05 10*3/mm3 Final    nRBC 03/04/2024 0.0  0.0 - 0.2 /100 WBC Final    Hemoglobin A1C 03/04/2024 4.80  4.80 - 5.60 % Final    Lactate 03/04/2024 0.6  0.5 - 2.0 mmol/L Final    EF(MOD-bp) 03/04/2024 67.0  % Final    LVIDd 03/04/2024 4.9  cm Final    LVIDs 03/04/2024 3.1  cm Final    IVSd 03/04/2024 0.92  cm Final    LVPWd 03/04/2024 0.86  cm Final    FS 03/04/2024 36.1  % Final    IVS/LVPW 03/04/2024 1.06   cm Final    ESV(cubed) 03/04/2024 29.9  ml Final    LV Sys Vol (BSA corrected) 03/04/2024 13.6  cm2 Final    EDV(cubed) 03/04/2024 114.2  ml Final    LV Smith Vol (BSA corrected) 03/04/2024 40.4  cm2 Final    LV mass(C)d 03/04/2024 148.3  grams Final    LVOT area 03/04/2024 3.0  cm2 Final    LVOT diam 03/04/2024 1.95  cm Final    EDV(MOD-sp2) 03/04/2024 80.0  ml Final    EDV(MOD-sp4) 03/04/2024 80.0  ml Final    ESV(MOD-sp2) 03/04/2024 25.0  ml Final    ESV(MOD-sp4) 03/04/2024 27.0  ml Final    SV(MOD-sp2) 03/04/2024 55.0  ml Final    SV(MOD-sp4) 03/04/2024 53.0  ml Final    SI(MOD-sp2) 03/04/2024 27.8  ml/m2 Final    SI(MOD-sp4) 03/04/2024 26.8  ml/m2 Final    EF(MOD-sp2) 03/04/2024 68.8  % Final    EF(MOD-sp4) 03/04/2024 66.3  % Final    MV E max ken 03/04/2024 81.0  cm/sec Final    MV A max ken 03/04/2024 98.2  cm/sec Final    MV dec time 03/04/2024 0.24  sec Final    MV E/A 03/04/2024 0.82   Final    Pulm A Revs Dur 03/04/2024 0.12  sec Final    MV A dur 03/04/2024 0.13  sec Final    LA ESV Index (BP) 03/04/2024 14.1  ml/m2 Final    TR max ken 03/04/2024 161.9  cm/sec Final    SV(LVOT) 03/04/2024 81.2  ml Final    SV(RVOT) 03/04/2024 71.6  ml Final    Qp/Qs 03/04/2024 0.88   Final    Pulm Sys Ken 03/04/2024 68.8  cm/sec Final    Pulm Smith Ken 03/04/2024 42.9  cm/sec Final    Pulm S/D 03/04/2024 1.61   Final    Pulm A Revs Ken 03/04/2024 29.9  cm/sec Final    LV V1 mean PG 03/04/2024 3.6  mmHg Final    LV V1 VTI 03/04/2024 27.2  cm Final    Ao mean PG 03/04/2024 5.9  mmHg Final    Ao V2 VTI 03/04/2024 33.5  cm Final    ARTURO(I,D) 03/04/2024 2.43  cm2 Final    MV P1/2t 03/04/2024 84.4  msec Final    MVA(P1/2t) 03/04/2024 2.6  cm2 Final    MV dec slope 03/04/2024 326.4  cm/sec2 Final    MR max ken 03/04/2024 128.1  cm/sec Final    MR max PG 03/04/2024 6.6  mmHg Final    MR mean ken 03/04/2024 79.2  cm/sec Final    MR mean PG 03/04/2024 2.9  mmHg Final    MR VTI 03/04/2024 34.0  cm Final    TR max PG 03/04/2024 10.5   mmHg Final    RVOT diam 03/04/2024 2.33  cm Final    RV V1 max PG 03/04/2024 3.1  mmHg Final    RV V1 max 03/04/2024 87.9  cm/sec Final    RV V1 VTI 03/04/2024 16.8  cm Final    PA V2 max 03/04/2024 118.1  cm/sec Final    PA acc slope 03/04/2024 1,335  cm/sec2 Final    PA acc time 03/04/2024 0.07  sec Final    Ao root diam 03/04/2024 3.2  cm Final    ACS 03/04/2024 1.75  cm Final    Med Peak E' Ken 03/04/2024 9.10  cm/sec Final    Lat Peak E' Ken 03/04/2024 8.7  cm/sec Final    Avg E/e' ratio 03/04/2024 9.10   Final    RVSP(TR) 03/04/2024 13  mmHg Final    RAP systole 03/04/2024 3  mmHg Final    Ascending aorta 03/04/2024 2.5  cm Final    Total Cholesterol 03/04/2024 166  0 - 200 mg/dL Final    Triglycerides 03/04/2024 89  0 - 150 mg/dL Final    HDL Cholesterol 03/04/2024 57  40 - 60 mg/dL Final    LDL Cholesterol  03/04/2024 93  0 - 100 mg/dL Final    VLDL Cholesterol 03/04/2024 16  5 - 40 mg/dL Final    LDL/HDL Ratio 03/04/2024 1.60   Final       XR ANKLE 3+ VW RIGHT-        INDICATION: Swelling after fall     COMPARISON: None     TECHNIQUE: 3 view right ankle     FINDINGS:      Decreased bone mineralization. No fracture. No dislocation. Symmetric  mortise. Preserved tibiotalar joint. Lateral ankle soft tissue swelling.     IMPRESSION:  No fracture or dislocation. Lateral ankle soft tissue swelling     This report was finalized on 3/3/2024 2:14 PM by Dr. Abisai Berger M.D  on Workstation: [id number]       ASSESSMENT:  Right ankle and foot pain, incompletely evaluated  Negative right ankle x-rays      Recurrent syncope    Fibromyalgia    Peripheral arterial occlusive disease    Gastroesophageal reflux disease    Irritable bowel syndrome    COPD mixed type    Chronic respiratory failure with hypoxia    Low back pain    Obesity, Class III, BMI 40-49.9 (morbid obesity)    Supplemental oxygen dependent    STEVEN (obstructive sleep apnea)    Vasovagal episode    Orthostatic syncope    Polypharmacy      PLAN:    Will  order x-rays of her right foot to rule out metatarsal or other foot fracture.  Will will follow-up after x-rays are completed.  Continue nonweightbearing status on right lower extremity until x-rays are completed.    The above diagnosis and treatment plan was discussed with the patient.  They were educated in treatment options for their condition.   They were given the opportunity to ask questions and were answered to their satisfaction.  They agreed to proceed with the above treatment plan.        Lionel Fleming MD  Date: 3/5/2024

## 2024-03-05 NOTE — PROGRESS NOTES
Name: Brenda Michelle ADMIT: 3/3/2024   : 1955  PCP: Angelito Kline DO    MRN: [id number] LOS: 0 days   AGE/SEX: 68 y.o. female  ROOM: E6/     Subjective   Subjective   No acute events overnight, continues to complain of right ankle/foot pain.  Unable to step on it.  Denies dizziness, chest pain or palpitations.  No fevers no chills.    Review of Systems   As above  Objective   Objective   Vital Signs  Temp:  [98.3 °F (36.8 °C)-98.8 °F (37.1 °C)] 98.3 °F (36.8 °C)  Heart Rate:  [83-89] 89  Resp:  [18] 18  BP: (127-150)/(59-68) 150/64  SpO2:  [94 %-96 %] 94 %  on  Flow (L/min):  [2-3] 3;   Device (Oxygen Therapy): nasal cannula  Body mass index is 37.38 kg/m².  Physical Exam    General: Alert, oriented x 3, In the bed,  HEENT: Normocephalic, atraumatic  CV: Regular rate and rhythm, no murmurs rubs or gallops  Lungs: CTA anteriorly, no wheezing, nonlabored breathing,  Abdomen: Soft, nontender, nondistended  Extremities: No significant peripheral edema , no cyanosis.  Right foot swollen, tender to palpation.        Results Review     I reviewed the patient's new clinical results.  Results from last 7 days   Lab Units 24  0304 24  1328   WBC 10*3/mm3 6.37 5.82   HEMOGLOBIN g/dL 11.2* 12.5   PLATELETS 10*3/mm3 156 203     Results from last 7 days   Lab Units 24  0304 24  1328   SODIUM mmol/L 141 144   POTASSIUM mmol/L 4.1 3.7   CHLORIDE mmol/L 104 103   CO2 mmol/L 30.0* 32.0*   BUN mg/dL 12 11   CREATININE mg/dL 0.95 0.99   GLUCOSE mg/dL 97 95   Estimated Creatinine Clearance: 62.4 mL/min (by C-G formula based on SCr of 0.95 mg/dL).  Results from last 7 days   Lab Units 24  0304 24  1328   ALBUMIN g/dL 3.3* 3.7   BILIRUBIN mg/dL 0.4 0.4   ALK PHOS U/L 83 85   AST (SGOT) U/L 22 25   ALT (SGPT) U/L 12 13     Results from last 7 days   Lab Units 24  0304 24  1328   CALCIUM mg/dL 9.5 10.4   ALBUMIN g/dL 3.3* 3.7   MAGNESIUM mg/dL  --  1.6     Results from  last 7 days   Lab Units 03/04/24  0304   LACTATE mmol/L 0.6     COVID19   Date Value Ref Range Status   06/14/2022 Not Detected Not Detected - Ref. Range Final     Hemoglobin A1C   Date/Time Value Ref Range Status   03/04/2024 0304 4.80 4.80 - 5.60 % Final           Adult Transthoracic Echo Complete W/ Cont if Necessary Per Protocol    Left ventricular systolic function is normal. Left ventricular ejection   fraction appears to be 66 - 70%.    Left ventricular diastolic function is consistent with (grade I)   impaired relaxation.    Normal right ventricular cavity size and systolic function noted.    Calculated right ventricular systolic pressure from tricuspid   regurgitation is 13 mmHg.    There is a small circumferential pericardial effusion which is most   prominent anteriorly. There is no tamponade    Scheduled Medications  aspirin, 81 mg, Oral, Daily  clopidogrel, 75 mg, Oral, Daily  docusate sodium, 100 mg, Oral, BID  DULoxetine, 60 mg, Oral, Daily  folic acid, 1,000 mcg, Oral, Daily  pravastatin, 80 mg, Oral, Nightly  pregabalin, 150 mg, Oral, BID  sodium chloride, 10 mL, Intravenous, Q12H    Infusions     Diet  Diet: Cardiac, Diabetic, Renal; Healthy Heart (2-3 Na+); Consistent Carbohydrate; Low Sodium (2-3g), Low Potassium, Low Phosphorus; Fluid Consistency: Thin (IDDSI 0)    I have personally reviewed     [x]  Laboratory   []  Microbiology   []  Radiology   [x]  EKG/Telemetry  []  Cardiology/Vascular   []  Pathology    []  Records       Assessment/Plan     Active Hospital Problems    Diagnosis  POA    **Recurrent syncope [R55]  Yes    Vasovagal episode [R55]  Unknown    Orthostatic syncope [I95.1]  Unknown    Polypharmacy [Z79.899]  Not Applicable    Supplemental oxygen dependent [Z99.81]  Not Applicable    STEVEN (obstructive sleep apnea) [G47.33]  Yes    Obesity, Class III, BMI 40-49.9 (morbid obesity) [E66.01]  Yes    Low back pain [M54.50]  Yes    Chronic respiratory failure with hypoxia [J96.11]  Yes     COPD mixed type [J44.9]  Yes    Peripheral arterial occlusive disease [I77.9]  Yes    Fibromyalgia [M79.7]  Yes    Gastroesophageal reflux disease [K21.9]  Yes    Irritable bowel syndrome [K58.9]  Yes      Resolved Hospital Problems   No resolved problems to display.       Patient is a 68 year-old female with a PMH significant for COPD on O2, IBS, PAD status post stent placement, peripheral neuropathy, chronic hypoxic respiratory failure, morbid obesity, fibromyalgia, anxiety, and depression, presented to the ED with chief complaint of acute syncopal episode.    Syncope/tremor  -Happened while she was using the bathroom, likely vasovagal in addition to multiple sedating meds.  -Patient is on Lyrica 150 mg, takes 1 pill twice daily during the day, and 300 at night with total of 600 mg in 1 day.  -Discussed with patient about side effects of Lyrica especially in the combination with Norco, including sedation/respiratory distraction which can lead to falls and significant injuries.  Resumed Lyrica at 150 mg twice daily for now.  -Cardiology evaluated and signed off, unlikely cardiogenic., echocardiogram with no acute findings.  -Neurology evaluated and signed off, likely orthostatic, tremors related to high-dose of Lyricac  -Unable to obtain orthostatic vitals secondary to right foot pain as unable to stay.  Will obtain once stable.    Peripheral artery disease status post stent placement  -Continue Plavix, aspirin, atorvastatin    COPD/chronic hypoxia respiratory failure   -continue oxygen supplement, as needed DuoNebs      Fibromyalgia/peripheral neuropathy  -Decreased dose of Lyrica to 100 mg twice daily, continue as needed Percocet    Macrocytic anemia  -Ordered B12, folate, iron panel-pending  -Monitor hemoglobin      Right foot injury  -X-ray  showed Lateral ankle soft tissue swelling   -Orthopedic surgery consulted, repeat x-ray pending.      Urinary retention/uterine prolapse  -Followed by urology (Dr. Horowitz).  Was supposed to have a cysto today.   -Consulted urology since patient admitted to the hospital.    SCDs for DVT prophylaxis.  Full code.  Discussed with patient and care team on multidisciplinary rounds.  Anticipate discharge TBD      Copied text in this note has been reviewed and is accurate as of 03/05/24.         Dictated utilizing Dragon dictation        Lashon Carroll MD  [facility]  03/05/24  11:23 EST

## 2024-03-05 NOTE — PLAN OF CARE
Goal Outcome Evaluation:  Plan of Care Reviewed With: patient        Progress: no change  Outcome Evaluation: vss. telemetry monitor, sr. alert and oriented x4, 3 lpm via nc. q2 turn. pure-wick. x ray's complete.

## 2024-03-05 NOTE — PLAN OF CARE
"Goal Outcome Evaluation:      Pt resting in bed. Medicated for pain per MAR. C/o being hot. Became angry, yelled at staff accused staff of putting a heating pillow in her bed so she couldn't sleep. Even after she inspected the pillow. Demands to speak w the \"superintendent\"  called came to speak w pt. Pt w some improvement after speaking w Daina. RN helped pt call her .  at bedside. No further c/o voiced. No s/s of distress                                        "

## 2024-03-06 ENCOUNTER — TELEPHONE (OUTPATIENT)
Dept: FAMILY MEDICINE CLINIC | Facility: CLINIC | Age: 69
End: 2024-03-06
Payer: MEDICARE

## 2024-03-06 PROBLEM — S92.344A CLOSED NONDISPLACED FRACTURE OF FOURTH METATARSAL BONE OF RIGHT FOOT: Status: ACTIVE | Noted: 2024-03-06

## 2024-03-06 PROBLEM — S92.334A: Status: ACTIVE | Noted: 2024-03-06

## 2024-03-06 LAB
ANION GAP SERPL CALCULATED.3IONS-SCNC: 17.3 MMOL/L (ref 5–15)
BUN SERPL-MCNC: 9 MG/DL (ref 8–23)
BUN/CREAT SERPL: 12.9 (ref 7–25)
CALCIUM SPEC-SCNC: 9.6 MG/DL (ref 8.6–10.5)
CHLORIDE SERPL-SCNC: 97 MMOL/L (ref 98–107)
CO2 SERPL-SCNC: 23.7 MMOL/L (ref 22–29)
CREAT SERPL-MCNC: 0.7 MG/DL (ref 0.57–1)
DEPRECATED RDW RBC AUTO: 47.3 FL (ref 37–54)
EGFRCR SERPLBLD CKD-EPI 2021: 94.3 ML/MIN/1.73
ERYTHROCYTE [DISTWIDTH] IN BLOOD BY AUTOMATED COUNT: 13.1 % (ref 12.3–15.4)
FERRITIN SERPL-MCNC: 178 NG/ML (ref 13–150)
FOLATE SERPL-MCNC: >20 NG/ML (ref 4.78–24.2)
GLUCOSE BLDC GLUCOMTR-MCNC: 59 MG/DL (ref 70–130)
GLUCOSE BLDC GLUCOMTR-MCNC: 60 MG/DL (ref 70–130)
GLUCOSE BLDC GLUCOMTR-MCNC: 67 MG/DL (ref 70–130)
GLUCOSE BLDC GLUCOMTR-MCNC: 70 MG/DL (ref 70–130)
GLUCOSE BLDC GLUCOMTR-MCNC: 74 MG/DL (ref 70–130)
GLUCOSE BLDC GLUCOMTR-MCNC: 79 MG/DL (ref 70–130)
GLUCOSE SERPL-MCNC: 50 MG/DL (ref 65–99)
HCT VFR BLD AUTO: 35.3 % (ref 34–46.6)
HGB BLD-MCNC: 11.2 G/DL (ref 12–15.9)
IRON 24H UR-MRATE: 33 MCG/DL (ref 37–145)
IRON SATN MFR SERPL: 15 % (ref 20–50)
MAGNESIUM SERPL-MCNC: 1.7 MG/DL (ref 1.6–2.4)
MCH RBC QN AUTO: 31.7 PG (ref 26.6–33)
MCHC RBC AUTO-ENTMCNC: 31.7 G/DL (ref 31.5–35.7)
MCV RBC AUTO: 100 FL (ref 79–97)
PHOSPHATE SERPL-MCNC: 1.9 MG/DL (ref 2.5–4.5)
PHOSPHATE SERPL-MCNC: 2.6 MG/DL (ref 2.5–4.5)
PLATELET # BLD AUTO: 168 10*3/MM3 (ref 140–450)
PMV BLD AUTO: 11.3 FL (ref 6–12)
POTASSIUM SERPL-SCNC: 4.1 MMOL/L (ref 3.5–5.2)
RBC # BLD AUTO: 3.53 10*6/MM3 (ref 3.77–5.28)
SODIUM SERPL-SCNC: 138 MMOL/L (ref 136–145)
TIBC SERPL-MCNC: 221 MCG/DL (ref 298–536)
TRANSFERRIN SERPL-MCNC: 148 MG/DL (ref 200–360)
VIT B12 BLD-MCNC: >2000 PG/ML (ref 211–946)
WBC NRBC COR # BLD AUTO: 5.75 10*3/MM3 (ref 3.4–10.8)

## 2024-03-06 PROCEDURE — 97162 PT EVAL MOD COMPLEX 30 MIN: CPT

## 2024-03-06 PROCEDURE — 80048 BASIC METABOLIC PNL TOTAL CA: CPT | Performed by: STUDENT IN AN ORGANIZED HEALTH CARE EDUCATION/TRAINING PROGRAM

## 2024-03-06 PROCEDURE — 83735 ASSAY OF MAGNESIUM: CPT | Performed by: STUDENT IN AN ORGANIZED HEALTH CARE EDUCATION/TRAINING PROGRAM

## 2024-03-06 PROCEDURE — 84100 ASSAY OF PHOSPHORUS: CPT | Performed by: STUDENT IN AN ORGANIZED HEALTH CARE EDUCATION/TRAINING PROGRAM

## 2024-03-06 PROCEDURE — 82728 ASSAY OF FERRITIN: CPT | Performed by: STUDENT IN AN ORGANIZED HEALTH CARE EDUCATION/TRAINING PROGRAM

## 2024-03-06 PROCEDURE — 82607 VITAMIN B-12: CPT | Performed by: STUDENT IN AN ORGANIZED HEALTH CARE EDUCATION/TRAINING PROGRAM

## 2024-03-06 PROCEDURE — 25010000002 HEPARIN (PORCINE) PER 1000 UNITS: Performed by: STUDENT IN AN ORGANIZED HEALTH CARE EDUCATION/TRAINING PROGRAM

## 2024-03-06 PROCEDURE — 82746 ASSAY OF FOLIC ACID SERUM: CPT | Performed by: STUDENT IN AN ORGANIZED HEALTH CARE EDUCATION/TRAINING PROGRAM

## 2024-03-06 PROCEDURE — 83540 ASSAY OF IRON: CPT | Performed by: STUDENT IN AN ORGANIZED HEALTH CARE EDUCATION/TRAINING PROGRAM

## 2024-03-06 PROCEDURE — 84466 ASSAY OF TRANSFERRIN: CPT | Performed by: STUDENT IN AN ORGANIZED HEALTH CARE EDUCATION/TRAINING PROGRAM

## 2024-03-06 PROCEDURE — 85027 COMPLETE CBC AUTOMATED: CPT | Performed by: STUDENT IN AN ORGANIZED HEALTH CARE EDUCATION/TRAINING PROGRAM

## 2024-03-06 PROCEDURE — 97530 THERAPEUTIC ACTIVITIES: CPT

## 2024-03-06 PROCEDURE — 25810000003 SODIUM CHLORIDE 0.9 % SOLUTION: Performed by: STUDENT IN AN ORGANIZED HEALTH CARE EDUCATION/TRAINING PROGRAM

## 2024-03-06 PROCEDURE — 82948 REAGENT STRIP/BLOOD GLUCOSE: CPT

## 2024-03-06 RX ORDER — PREGABALIN 75 MG/1
150 CAPSULE ORAL 3 TIMES DAILY
Status: DISCONTINUED | OUTPATIENT
Start: 2024-03-06 | End: 2024-03-06

## 2024-03-06 RX ORDER — PREGABALIN 75 MG/1
150 CAPSULE ORAL 3 TIMES DAILY
Status: DISCONTINUED | OUTPATIENT
Start: 2024-03-06 | End: 2024-03-09 | Stop reason: HOSPADM

## 2024-03-06 RX ORDER — IBUPROFEN 600 MG/1
1 TABLET ORAL
Status: DISCONTINUED | OUTPATIENT
Start: 2024-03-06 | End: 2024-03-09 | Stop reason: HOSPADM

## 2024-03-06 RX ORDER — DEXTROSE MONOHYDRATE 25 G/50ML
25 INJECTION, SOLUTION INTRAVENOUS
Status: DISCONTINUED | OUTPATIENT
Start: 2024-03-06 | End: 2024-03-09 | Stop reason: HOSPADM

## 2024-03-06 RX ORDER — CLOPIDOGREL BISULFATE 75 MG/1
75 TABLET ORAL DAILY
Status: DISCONTINUED | OUTPATIENT
Start: 2024-03-06 | End: 2024-03-09 | Stop reason: HOSPADM

## 2024-03-06 RX ORDER — NICOTINE POLACRILEX 4 MG
15 LOZENGE BUCCAL
Status: DISCONTINUED | OUTPATIENT
Start: 2024-03-06 | End: 2024-03-09 | Stop reason: HOSPADM

## 2024-03-06 RX ADMIN — Medication 10 ML: at 08:40

## 2024-03-06 RX ADMIN — HEPARIN SODIUM 5000 UNITS: 5000 INJECTION INTRAVENOUS; SUBCUTANEOUS at 06:35

## 2024-03-06 RX ADMIN — PREGABALIN 150 MG: 75 CAPSULE ORAL at 20:28

## 2024-03-06 RX ADMIN — PRAVASTATIN SODIUM 80 MG: 40 TABLET ORAL at 20:27

## 2024-03-06 RX ADMIN — PREGABALIN 150 MG: 75 CAPSULE ORAL at 08:39

## 2024-03-06 RX ADMIN — DOCUSATE SODIUM 100 MG: 100 CAPSULE, LIQUID FILLED ORAL at 08:40

## 2024-03-06 RX ADMIN — CLOPIDOGREL BISULFATE 75 MG: 75 TABLET, FILM COATED ORAL at 09:58

## 2024-03-06 RX ADMIN — DULOXETINE HYDROCHLORIDE 60 MG: 60 CAPSULE, DELAYED RELEASE ORAL at 08:39

## 2024-03-06 RX ADMIN — ASPIRIN 81 MG: 81 TABLET, COATED ORAL at 08:39

## 2024-03-06 RX ADMIN — DOCUSATE SODIUM 100 MG: 100 CAPSULE, LIQUID FILLED ORAL at 20:28

## 2024-03-06 RX ADMIN — FOLIC ACID 1000 MCG: 1 TABLET ORAL at 08:39

## 2024-03-06 RX ADMIN — PANTOPRAZOLE SODIUM 40 MG: 40 TABLET, DELAYED RELEASE ORAL at 06:35

## 2024-03-06 RX ADMIN — Medication 10 ML: at 20:28

## 2024-03-06 RX ADMIN — HYDROCODONE BITARTRATE AND ACETAMINOPHEN 1 TABLET: 10; 325 TABLET ORAL at 06:49

## 2024-03-06 RX ADMIN — HYDROCODONE BITARTRATE AND ACETAMINOPHEN 1 TABLET: 10; 325 TABLET ORAL at 15:44

## 2024-03-06 RX ADMIN — SODIUM PHOSPHATE, MONOBASIC, MONOHYDRATE AND SODIUM PHOSPHATE, DIBASIC, ANHYDROUS 15 MMOL: 142; 276 INJECTION, SOLUTION INTRAVENOUS at 09:59

## 2024-03-06 NOTE — DISCHARGE PLACEMENT REQUEST
"Brenda Michelle (68 y.o. Female)       Date of Birth   1955    Social Security Number       Address   [address] [place] [address]    Home Phone   [phone]    MRN   [id number]       [place]   Taoism    Marital Status                               Admission Date   3/3/24    Admission Type   Emergency    Admitting Provider   Chiara Clements MD    Attending Provider   Lashon Carroll MD    Department, Room/Bed   [address], E648/1       Discharge Date       Discharge Disposition       Discharge Destination                                 Attending Provider: Lashon Carroll MD    Allergies: Ambien [Zolpidem Tartrate], Bupropion, Codeine Sulfate, Zolpidem, Adhesive Tape, Aripiprazole, Atorvastatin, Cilostazol, Colesevelam, Ferrous Sulfate, Welchol [Colesevelam Hcl], Wound Dressing Adhesive    Isolation: None   Infection: None   Code Status: CPR    Ht: 160 cm (63\")   Wt: 95.7 kg (211 lb)    Admission Cmt: None   Principal Problem: Recurrent syncope [R55]                   Active Insurance as of 3/3/2024       Primary Coverage       Payor Plan Insurance Group Employer/Plan Group    MEDICARE MEDICARE A & B        Payor Plan Address Payor Plan Phone Number Payor Plan Fax Number Effective Dates    [address] [phone]  10/1/2020 - None Entered    [facility] [address]         Subscriber Name Subscriber Birth Date Member ID       BRENDA MICHELLE 1955 [id number]               Secondary Coverage       Payor Plan Insurance Group Employer/Plan Group    ANTH BLUE CROSS ANTH BLUE CROSS BLUE SHIELD [facility] [id number]       Payor Plan Address Payor Plan Phone Number Payor Plan Fax Number Effective Dates    [address] [phone]  1/1/2024 - None Entered    [facility] [id number]         Subscriber Name Subscriber Birth Date Member ID       BRENDA MICHELLE 1955 [id number]                     Emergency Contacts        (Rel.) Home " Phone Work Phone Mobile Phone    Shiva Michelle Sr (Spouse) [phone] -- [phone]    Shiva Michelle Jr (Son) [phone] -- [phone]    Nikunj Michelle (Son) [phone] -- [phone]

## 2024-03-06 NOTE — THERAPY EVALUATION
Patient Name: Brenda Michelle  : 1955    MRN: [id number]                              Today's Date: 3/6/2024       Admit Date: 3/3/2024    Visit Dx:     ICD-10-CM ICD-9-CM   1. Recurrent syncope  R55 780.2   2. Closed head injury, initial encounter  S09.90XA 959.01   3. Right leg pain  M79.604 729.5   4. Acute pain of both knees  M25.561 338.19    M25.562 719.46   5. Chronic hypoxic respiratory failure, on home oxygen therapy  J96.11 518.83    Z99.81 799.02     V46.2     Patient Active Problem List   Diagnosis    Acute deep vein thrombosis of distal leg    Adenomatous polyp of colon    Depression    Fibromyalgia    Dyslipidemia    Peripheral arterial occlusive disease    Vitamin D deficiency    Gastroesophageal reflux disease    Irritable bowel syndrome    Disorder of intervertebral disc    Peripheral nerve disease    Fracture of multiple ribs of right side    Fall    Acute respiratory failure with hypoxia    COPD mixed type    Weakness    Hypopotassemia    Dysuria    Constipation    Hemorrhoids    Arthritis    Fibromyositis    Hernia of anterior abdominal wall    Homocystinemia    Injury of right ankle    Kidney stone    Knee pain    Moderate ankle sprain    Osteoarthritis of knee    Spinal stenosis, lumbar region without neurogenic claudication    Chronic respiratory failure with hypoxia    Bursitis of hip    Lumbar facet joint pain    Low back pain    Acute cystitis    Obesity, Class III, BMI 40-49.9 (morbid obesity)    Abnormal CT scan, gastrointestinal tract    Wheelchair dependence    Supplemental oxygen dependent    Dietary counseling    Pre-op evaluation    STEVEN (obstructive sleep apnea)    Lumbar spondylosis    Recurrent syncope    Vasovagal episode    Orthostatic syncope    Polypharmacy    Closed nondisplaced fracture of fourth metatarsal bone of right foot    Nondisplaced fracture of third metatarsal bone of right foot     Past Medical History:   Diagnosis Date    Anxiety     Arthritis     benign  polyps of large intestine     COPD (chronic obstructive pulmonary disease)     Depression     Difficulty walking     Disc degeneration, lumbar     Fever 9/9/2022    Fibromyositis     Hyperlipidemia     IBS (irritable bowel syndrome)     Kidney stones     Movement disorder     Nephrolithiasis     Peripheral neuropathy     PVD (peripheral vascular disease)     Sepsis without acute organ dysfunction 9/9/2022    Sepsis without acute organ dysfunction, due to unspecified organism 9/11/2022    Shingles     Vitamin D deficiency      Past Surgical History:   Procedure Laterality Date    CATARACT EXTRACTION      COLONOSCOPY  2004    Colon polyps; family history of colon cancer    COLONOSCOPY N/A 09/29/2022    Procedure: COLONOSCOPY to cecum and TI with biopsies, cold forceps and hot snare polypectomies with 2cc orise lift and clip x 2;  Surgeon: Chirag Tony MD;  Location: [facility] ENDOSCOPY;  Service: Gastroenterology;  Laterality: N/A;  PRE- abnormal imaging, hx of polyps, family hx of colon cancer  POST- polyps, internal/external hemorrhoids    KNEE ARTHROPLASTY, PARTIAL REPLACEMENT  2015    KNEE ARTHROSCOPY Left     Meniscus    POPLITEAL ARTERY ANGIOPLASTY Bilateral     STEROID INJECTION      TONSILLECTOMY      TUBAL ABDOMINAL LIGATION      WRIST FRACTURE SURGERY        General Information       Row Name 03/06/24 1505          Physical Therapy Time and Intention    Document Type evaluation  Pt. admitted with a Right ankle Non-displaced fx.  (WBAT with cam boot donned)  -MS     Mode of Treatment physical therapy;individual therapy  -MS       Row Name 03/06/24 1504          General Information    Patient Profile Reviewed yes  -MS     Prior Level of Function --  Use of Rwx for ambulation  -MS     Existing Precautions/Restrictions fall   Exit alarm;  WBAT RLE with cam boot donned  -MS     Barriers to Rehab none identified  -MS       Row Name 03/06/24 1505          Cognition    Orientation Status (Cognition) oriented x 3  -MS        Row Name 03/06/24 1505          Safety Issues, Functional Mobility    Comment, Safety Issues/Impairments (Mobility) Gait belt used for safety.  -MS               User Key  (r) = Recorded By, (t) = Taken By, (c) = Cosigned By      Initials Name Provider Type    MS McclainAvila, Didier ESTRELLA, PT Physical Therapist                   Mobility       Row Name 03/06/24 1507          Bed Mobility    Bed Mobility supine-sit;sit-supine  -MS     Supine-Sit [place] (Bed Mobility) minimum assist (75% patient effort);2 person assist  -MS     Sit-Supine [place] (Bed Mobility) minimum assist (75% patient effort);2 person assist  -MS       Row Name 03/06/24 1507          Sit-Stand Transfer    Sit-Stand [place] (Transfers) minimum assist (75% patient effort);moderate assist (50% patient effort);2 person assist  -MS     Assistive Device (Sit-Stand Transfers) walker, front-wheeled  -MS     Comment, (Sit-Stand Transfer) Pt. able to  place at bedside but is unable to bear weight on her RLE even with the Cam boot donned, limiting her upright mobility at this time.  -MS       Row Name 03/06/24 1507          Mobility    Extremity Weight-bearing Status right lower extremity  -MS     Right Lower Extremity (Weight-bearing Status) --   WBAT with Cam boot donned  -MS               User Key  (r) = Recorded By, (t) = Taken By, (c) = Cosigned By      Initials Name Provider Type    MS Avila Didier ESTRELLA, PT Physical Therapist                   Obj/Interventions       Row Name 03/06/24 1508          Range of Motion Comprehensive    Comment, General Range of Motion BUE/LLE (WFL's)  -MS       Row Name 03/06/24 1508          Strength Comprehensive (MMT)    Comment, General Manual Muscle Testing (MMT) Assessment BUE (3+/5); LLE (>/=3/5)  -MS               User Key  (r) = Recorded By, (t) = Taken By, (c) = Cosigned By      Initials Name Provider Type    Didier Gonzalez, PT Physical Therapist                   Goals/Plan       [facility]  Name 03/06/24 1510          Bed Mobility Goal 1 (PT)    Activity/Assistive Device (Bed Mobility Goal 1, PT) bed mobility activities, all  -MS     [place] Level/Cues Needed (Bed Mobility Goal 1, PT) contact guard required  -MS     Time Frame (Bed Mobility Goal 1, PT) long term goal (LTG);1 week  -MS       Row Name 03/06/24 1510          Transfer Goal 1 (PT)    Activity/Assistive Device (Transfer Goal 1, PT) transfers, all;walker, rolling  -MS     [place] Level/Cues Needed (Transfer Goal 1, PT) contact guard required  -MS     Time Frame (Transfer Goal 1, PT) long term goal (LTG);1 week  -MS       Row Name 03/06/24 1510          Gait Training Goal 1 (PT)    Activity/Assistive Device (Gait Training Goal 1, PT) gait (walking locomotion);walker, rolling  -MS     [place] Level (Gait Training Goal 1, PT) minimum assist (75% or more patient effort)  -MS     Distance (Gait Training Goal 1, PT) 15 feet  -MS     Time Frame (Gait Training Goal 1, PT) long term goal (LTG);1 week  -MS     Strategies/Barriers (Gait Training Goal 1, PT) With Cam boot donned on RLE  -MS       Row Name 03/06/24 1510          Therapy Assessment/Plan (PT)    Planned Therapy Interventions (PT) balance training;bed mobility training;gait training;home exercise program;patient/family education;postural re-education;transfer training;strengthening  -MS               User Key  (r) = Recorded By, (t) = Taken By, (c) = Cosigned By      Initials Name Provider Type    Dideir Gonzalez, PT Physical Therapist                   Clinical Impression       Row Name 03/06/24 1509          Pain    Pretreatment Pain Rating 9/10  -MS     Posttreatment Pain Rating 10/10  -MS     Pain Location - Side/Orientation Right  -MS     Pain Location - ankle;foot  -MS     Pain Intervention(s) Medication (See MAR);Elevated;Nursing Notified;Repositioned  -MS       Row Name 03/06/24 1501          Plan of Care Review    Plan of Care Reviewed With patient;family  -MS        Row Name 03/06/24 1509          Therapy Assessment/Plan (PT)    Rehab Potential (PT) good, to achieve stated therapy goals  -MS     Criteria for Skilled Interventions Met (PT) meets criteria  -MS     Therapy Frequency (PT) 6 times/wk  -MS       Row Name 03/06/24 1509          Positioning and Restraints    Pre-Treatment Position in bed  -MS     Post Treatment Position bed  -MS     In Bed notified nsg;supine;encouraged to call for assist;exit alarm on;with family/caregiver;call light within reach;RLE elevated  -MS               User Key  (r) = Recorded By, (t) = Taken By, (c) = Cosigned By      Initials Name Provider Type    Didier Gonzalez, PT Physical Therapist                   Outcome Measures       Row Name 03/06/24 1511          How much help from another person do you currently need...    Turning from your back to your side while in flat bed without using bedrails? 2  -MS     Moving from lying on back to sitting on the side of a flat bed without bedrails? 2  -MS     Moving to and from a bed to a chair (including a wheelchair)? 2  -MS     Standing up from a chair using your arms (e.g., wheelchair, bedside chair)? 2  -MS     Climbing 3-5 steps with a railing? 2  -MS     To walk in hospital room? 2  -MS     AM-PAC 6 Clicks Score (PT) 12  -MS     Highest Level of Mobility Goal 4 --> Transfer to chair/commode  -MS       Row Name 03/06/24 1511          Functional Assessment    Outcome Measure Options AM-PAC 6 Clicks Basic Mobility (PT)  -MS               User Key  (r) = Recorded By, (t) = Taken By, (c) = Cosigned By      Initials Name Provider Type    Didier Gonzalez, PT Physical Therapist                                 Physical Therapy Education       Title: PT OT SLP Therapies (In Progress)       Topic: Physical Therapy (In Progress)       Point: Mobility training (Done)       Learning Progress Summary             Patient Acceptance, E,D, VU,NR by MS at 3/6/2024 1511                         Point:  Home exercise program (Not Started)       Learner Progress:  Not documented in this visit.              Point: Body mechanics (Done)       Learning Progress Summary             Patient Acceptance, E,D, VU,NR by MS at 3/6/2024 1511                         Point: Precautions (Done)       Learning Progress Summary             Patient Acceptance, E,D, VU,NR by MS at 3/6/2024 1511                                         User Key       Initials Effective Dates Name Provider Type Discipline    MS 06/16/21 -  Didier Avila, PT Physical Therapist PT                  PT Recommendation and Plan  Planned Therapy Interventions (PT): balance training, bed mobility training, gait training, home exercise program, patient/family education, postural re-education, transfer training, strengthening  Plan of Care Reviewed With: patient  Outcome Evaluation: Pt. is a 68 year old Female admitted to the hospital after a syncope episode and pt. falling sustaining a Right Ankle Non-displaced Fx. (WBAT with cam boot donned). Pt. reports that prior to admission she was using a Rwx for ambulation.  Pt. currently presents with decreased strength, decreased balance, and decreased tolerance to functional activity. This PM, pt. requires Min assist x 2 for bed mobility and Min-Mod. assist x 2 for sit <-> stand transfers. Once standing, pt. remains unable to bear weight on her RLE even with Cam boot donned due to pain, limiting her upright mobility.  Pt. also c/o dizziness throughout upright mobility.  Pt. will benefit from skilled inpt. P.T. to address her functional deficits and to assist pt. in regaining her maximum level of independence with functional mobility.     Time Calculation:         PT Charges       Row Name 03/06/24 1515             Time Calculation    Start Time 1445  -MS      Stop Time 1502  -MS      Time Calculation (min) 17 min  -MS      PT Received On 03/06/24  -MS      PT - Next Appointment 03/07/24  -MS      PT Goal Re-Cert Due  Date 03/13/24  -MS         Time Calculation- PT    Total Timed Code Minutes- PT 16 minute(s)  -MS                User Key  (r) = Recorded By, (t) = Taken By, (c) = Cosigned By      Initials Name Provider Type    Didier Gonzalez, PT Physical Therapist                  Therapy Charges for Today       Code Description Service Date Service Provider Modifiers Qty    63591835731 HC PT EVAL MOD COMPLEXITY 2 3/6/2024 Didier Avila, PT GP 1    88992063285 HC PT THERAPEUTIC ACT EA 15 MIN 3/6/2024 Didier Avila, PT GP 1    02127926214 HC PT THER SUPP EA 15 MIN 3/6/2024 Didier vAila, PT GP 1            PT G-Codes  Outcome Measure Options: AM-PAC 6 Clicks Basic Mobility (PT)  AM-PAC 6 Clicks Score (PT): 12  PT Discharge Summary  Anticipated Discharge Disposition (PT): skilled nursing facility    Didier Avila, PT  3/6/2024

## 2024-03-06 NOTE — TELEPHONE ENCOUNTER
Caller: KATHY NOVA    Relationship: Other    Best call back number: [phone]      What orders are you requesting (i.e. lab or imaging):     In what timeframe would the patient need to come in: UPON DISCHARGE    Where will you receive your lab/imaging services: HOME    Additional notes: KATHY WITH [facility] STATES THAT SHE WOULD LIKE ORDERS TO FOLLOW PATIENT UPON HOSPITAL DISCHARGE FOR POSSIBLE SKILLED NURSING AND PHYSICAL THERAPY.    PLEASE ADVISE.

## 2024-03-06 NOTE — PROGRESS NOTES
Orthopedic Progress Note    Subjective :   Patient seen and examined.  X-rays obtained yesterday of the right ankle and the right foot.  Showing nondisplaced fractures of the right lateral malleolus as well as transverse fractures nondisplaced of the third and fourth metatarsals.    Objective :    Vital signs in last 24 hours:  Temp:  [98.2 °F (36.8 °C)-98.4 °F (36.9 °C)] 98.4 °F (36.9 °C)  Heart Rate:  [88-89] 88  Resp:  [16-18] 18  BP: (142-152)/(64-87) 142/71  Vitals:    03/05/24 0752 03/05/24 1232 03/05/24 1950 03/05/24 2248   BP: 150/64 151/87 152/69 142/71   BP Location: Left arm Left arm Left arm Left arm   Patient Position: Lying Lying Lying Lying   Pulse:   89 88   Resp: 18 16 18 18   Temp: 98.3 °F (36.8 °C) 98.2 °F (36.8 °C) 98.4 °F (36.9 °C) 98.4 °F (36.9 °C)   TempSrc: Oral Oral Oral Oral   SpO2:   97% 97%   Weight:       Height:           PHYSICAL EXAM:  Patient is calm, in no acute distress, awake and oriented x 3.  Patient's resting comfortably in her hospital bed.  She is in no acute distress.  Skin is intact.  Trace to moderate effusion.  Ecchymosis noted.  Tenderness over the third and fourth metatarsals.  Wiggles her toes freely.  Good capillary refill.  Foot otherwise warm well-perfused.  Sensation intact.    LABS:  Results from last 7 days   Lab Units 03/06/24  0311   WBC 10*3/mm3 5.75   HEMOGLOBIN g/dL 11.2*   HEMATOCRIT % 35.3   PLATELETS 10*3/mm3 168     Results from last 7 days   Lab Units 03/06/24  0311   SODIUM mmol/L 138   POTASSIUM mmol/L 4.1   CHLORIDE mmol/L 97*   CO2 mmol/L 23.7   BUN mg/dL 9   CREATININE mg/dL 0.70   GLUCOSE mg/dL 50*   CALCIUM mg/dL 9.6     Results from last 7 days   Lab Units 03/03/24  1328   INR  1.05   APTT seconds 28.3     Study Result    Narrative & Impression   XR FOOT 3+ VW RIGHT-     INDICATION: Right foot and ankle pain post fall     COMPARISON: Ankle radiograph 3/3/2024 and foot radiograph 11/23/2019     IMPRESSION:  Acute nondisplaced transverse  fractures through the third  and fourth metatarsal proximal diaphyses. Nondisplaced transverse distal  fibular fracture below the level of the syndesmosis. Low bone  mineralization. Normal midfoot alignment. Mild IP joint osteoarthritis.     This report was finalized on 3/5/2024 11:25 AM by Dr. Didier Mckeon M.D on Workstation: [id number]     Study Result    Narrative & Impression   XR ANKLE 3+ VW RIGHT-        INDICATION: Swelling after fall     COMPARISON: None     TECHNIQUE: 3 view right ankle     FINDINGS:      Decreased bone mineralization. No fracture. No dislocation. Symmetric  mortise. Preserved tibiotalar joint. Lateral ankle soft tissue swelling.     IMPRESSION:  No fracture or dislocation. Lateral ankle soft tissue swelling     This report was finalized on 3/3/2024 2:14 PM by Dr. Abisai Berger M.D  on Workstation: [id number]     Study Result    Narrative & Impression   XR KNEE 1 OR 2 VW BILATERAL-        INDICATION: Pain after seizure or syncope     COMPARISON: None     TECHNIQUE: 2 view of the bilateral knees     FINDINGS:      Left knee: No fracture. No bone lesion. No dislocation. Tricompartmental  osteoarthritis with osteophytosis. No effusion.     Right knee: No fracture. Medial compartment arthroplasty. No lucency  surrounding the hardware. Lateral and patellofemoral joint  osteophytosis. No effusion.     Other: Vascular atherosclerotic calcifications.     IMPRESSION:     1. No fracture or dislocation.  2. Right knee medial compartment arthroplasty.  3. Mild left knee osteoarthritis.  4. Mild osteoarthritis in the right knee lateral and patellofemoral  joints     This report was finalized on 3/3/2024 2:13 PM by Dr. Abisai Berger M.D  on Workstation: [id number]       ASSESSMENT:  Nondisplaced right lateral malleolus fracture  Nondisplaced transverse fractures of the third and fourth metatarsals right foot    Plan:  I did have an extensive discussion with the patient regards her situation  in the hospital today.  X-rays have been reviewed.  Patient with nondisplaced fractures of the third and fourth metatarsals as well as a nondisplaced right lateral malleolus fracture.  X-rays reviewed by myself as well as my supervising physician.  We will place the patient in a cam walking boot.  Patient can be weightbearing as tolerated in the boot.  She is still quite swollen.  If unable to tolerate a boot then I would suggest being placed in a posterior slab splint.  Hopefully patient will be able to tolerate the boot.  From orthopedic standpoint.  No further intervention warranted at this time.  We will sign off from the orthopedic standpoint.  Please call if needed.  Thank you very much for this consultation allowing us to be a part of patient's care.    Patient to follow-up in the office with Dr. Fleming in 1 week for repeat x-rays and fracture management.    Suresh Mixon PA-C    Date: 3/6/2024  Time: 06:42 EST

## 2024-03-06 NOTE — PLAN OF CARE
Goal Outcome Evaluation:            Pt is alert and oriented. Pt remains on 3 L o2 NC. Pt rested most of shift. Pt SR on monitor. Pt VSS. Plan of care ongoing.

## 2024-03-06 NOTE — PROGRESS NOTES
Name: Brenda Michelle ADMIT: 3/3/2024   : 1955  PCP: Angelito Kline DO    MRN: [id number] LOS: 1 days   AGE/SEX: 68 y.o. female  ROOM: [facility]     Subjective   Subjective   Laying in bed.  Continues to complain of right foot pain, repeat x-ray unfortunately showed fracture.  Patient denies dizziness, chest pain, palpitations.  No fevers or chills.    Review of Systems   As above  Objective   Objective   Vital Signs  Temp:  [97.7 °F (36.5 °C)-98.4 °F (36.9 °C)] 97.7 °F (36.5 °C)  Heart Rate:  [83-89] 83  Resp:  [16-18] 18  BP: (142-153)/(69-88) 153/88  SpO2:  [97 %] 97 %  on  Flow (L/min):  [3] 3;   Device (Oxygen Therapy): room air  Body mass index is 37.38 kg/m².  Physical Exam    General: Alert, laying in bed, not in distress,  HEENT: Normocephalic, atraumatic  CV: Regular rate and rhythm, no murmurs   Lungs: Clear to auscultation anteriorly, no wheezing,  Abdomen: Soft, nontender, nondistended  Extremities: No significant peripheral edema , no cyanosis.  Right foot swollen, tender to palpation.        Results Review     I reviewed the patient's new clinical results.  Results from last 7 days   Lab Units 24  0311 24  0304 24  1328   WBC 10*3/mm3 5.75 6.37 5.82   HEMOGLOBIN g/dL 11.2* 11.2* 12.5   PLATELETS 10*3/mm3 168 156 203     Results from last 7 days   Lab Units 24  0311 24  0304 24  1328   SODIUM mmol/L 138 141 144   POTASSIUM mmol/L 4.1 4.1 3.7   CHLORIDE mmol/L 97* 104 103   CO2 mmol/L 23.7 30.0* 32.0*   BUN mg/dL 9 12 11   CREATININE mg/dL 0.70 0.95 0.99   GLUCOSE mg/dL 50* 97 95   Estimated Creatinine Clearance: 84.6 mL/min (by C-G formula based on SCr of 0.7 mg/dL).  Results from last 7 days   Lab Units 24  0304 24  1328   ALBUMIN g/dL 3.3* 3.7   BILIRUBIN mg/dL 0.4 0.4   ALK PHOS U/L 83 85   AST (SGOT) U/L 22 25   ALT (SGPT) U/L 12 13     Results from last 7 days   Lab Units 24  0311 24  0304 24  1328   CALCIUM mg/dL 9.6  9.5 10.4   ALBUMIN g/dL  --  3.3* 3.7   MAGNESIUM mg/dL 1.7  --  1.6   PHOSPHORUS mg/dL 1.9*  --   --      Results from last 7 days   Lab Units 03/04/24  0304   LACTATE mmol/L 0.6     COVID19   Date Value Ref Range Status   06/14/2022 Not Detected Not Detected - Ref. Range Final     Hemoglobin A1C   Date/Time Value Ref Range Status   03/04/2024 0304 4.80 4.80 - 5.60 % Final           XR Foot 3+ View Right  Narrative: XR FOOT 3+ VW RIGHT-     INDICATION: Right foot and ankle pain post fall     COMPARISON: Ankle radiograph 3/3/2024 and foot radiograph 11/23/2019     Impression: Acute nondisplaced transverse fractures through the third  and fourth metatarsal proximal diaphyses. Nondisplaced transverse distal  fibular fracture below the level of the syndesmosis. Low bone  mineralization. Normal midfoot alignment. Mild IP joint osteoarthritis.     This report was finalized on 3/5/2024 11:25 AM by Dr. Didier Mckeon M.D on Workstation: [id number]       Scheduled Medications  aspirin, 81 mg, Oral, Daily  clopidogrel, 75 mg, Oral, Daily  docusate sodium, 100 mg, Oral, BID  DULoxetine, 60 mg, Oral, Daily  folic acid, 1,000 mcg, Oral, Daily  pantoprazole, 40 mg, Oral, Q AM  pravastatin, 80 mg, Oral, Nightly  pregabalin, 150 mg, Oral, BID  pregabalin, 150 mg, Oral, Nightly  sodium chloride, 10 mL, Intravenous, Q12H  sodium phosphate, 15 mmol, Intravenous, Once    Infusions     Diet  Diet: Cardiac, Diabetic, Renal; Healthy Heart (2-3 Na+); Consistent Carbohydrate; Low Sodium (2-3g); Fluid Consistency: Thin (IDDSI 0)    I have personally reviewed     [x]  Laboratory   []  Microbiology   [x]  Radiology   []  EKG/Telemetry  []  Cardiology/Vascular   []  Pathology    []  Records       Assessment/Plan     Active Hospital Problems    Diagnosis  POA    **Recurrent syncope [R55]  Yes    Closed nondisplaced fracture of fourth metatarsal bone of right foot [S92.344A]  Yes    Nondisplaced fracture of third metatarsal bone of right foot  [S92.334A]  Unknown    Vasovagal episode [R55]  Unknown    Orthostatic syncope [I95.1]  Unknown    Polypharmacy [Z79.899]  Not Applicable    Supplemental oxygen dependent [Z99.81]  Not Applicable    STEVEN (obstructive sleep apnea) [G47.33]  Yes    Obesity, Class III, BMI 40-49.9 (morbid obesity) [E66.01]  Yes    Low back pain [M54.50]  Yes    Chronic respiratory failure with hypoxia [J96.11]  Yes    COPD mixed type [J44.9]  Yes    Peripheral arterial occlusive disease [I77.9]  Yes    Fibromyalgia [M79.7]  Yes    Gastroesophageal reflux disease [K21.9]  Yes    Irritable bowel syndrome [K58.9]  Yes      Resolved Hospital Problems   No resolved problems to display.       Patient is a 68 year-old female with a PMH significant for COPD on O2, IBS, PAD status post stent placement, peripheral neuropathy, chronic hypoxic respiratory failure, morbid obesity, fibromyalgia, anxiety, and depression, presented to the ED with chief complaint of acute syncopal episode.    Syncope/tremor  -Happened while she was using the bathroom, likely vasovagal in addition to multiple sedating meds.  -Patient is on Lyrica 150 mg, takes 1 pill twice daily during the day, and 300 at night with total of 600 mg in 1 day.  -Discussed with patient about side effects of Lyrica especially in the combination with Norco, including sedation/respiratory distraction which can lead to falls and significant injuries.   -Resume Lyrica at 150 mg 3 times daily per neurology recommendations, discussed with patient will need to be tapered down as able.  -Cardiology evaluated and signed off, unlikely cardiogenic., echocardiogram with no acute findings.  -Neurology evaluated and signed off, likely orthostatic, tremors related to high-dose of Lyricac  -Unable to obtain orthostatic vitals secondary to right foot fracture.        Peripheral artery disease status post stent placement  -Continue Plavix, aspirin, atorvastatin    COPD/chronic hypoxia respiratory failure  "  -continue oxygen supplement, as needed DuoNebs      Fibromyalgia/peripheral neuropathy  -Continue as needed Percocet, Lyrica at lower dose 150 mg 3 times daily    Macrocytic anemia  -Iron panel consistent with anemia of chronic disease  -B12 elevated, folate normal.  -Hemoglobin stable    Nondisplaced fractures of the third and fourth metatarsals   Nondisplaced right lateral malleolus fracture.   Initial x-ray did not show fracture, however repeat x-ray confirmed fractures.  -Orthopedic surgery evaluated, plan for nonoperative management with cam walking boot  -Weightbearing as tolerated  -If unable to tolerate boot then plan for posterior slab splint  -Continue pain management  -Orthopedic surgery following    Hypophosphatemia  -Phosphorus 1.9, replacement ordered per protocol.  -Repeat labs in AM      Urinary retention/uterine prolapse  -Followed by urology (Dr. Horowitz). Was supposed to have 03/05/2024  -Consulted urology inpatient   Recommendations   \"No acute Urologic surgical intervention   - Nursing to straight-cath as needed for any urinary retenton  - We will get her rescheduled for her outpatient cystoscopy since she is currently in hospital   - Urology will sign off; please call with any questions/concerns or clinical change \"       DVT prophylaxis.  Patient is on dual antiplatelets, SCDs which should be sufficient for prophylaxis.  Did not initiate Lovenox due to high risk of bleeding when combined with dual antiplatelets.  Full code.  Discussed with patient and care team on multidisciplinary rounds.  Anticipate discharge home with home health versus SNF, recommended rehab, patient to think about it and discuss with spouse.      Copied text in this note has been reviewed and is accurate as of 03/06/24.         Dictated utilizing Dragon dictation        Lashon Carroll MD  [facility]  03/06/24  08:39 EST      "

## 2024-03-06 NOTE — PROGRESS NOTES
Continued Stay Note  [facility]     Patient Name: Brenda Michelle  MRN: [id number]  Today's Date: 3/6/2024    Admit Date: 3/3/2024    Plan: Plan home with HH or SNF at accepting facility.  CRISTINA Roblero RN   Discharge Plan       Row Name 03/06/24 1444       Plan    Plan Plan home with HH or SNF at accepting facility.  CRISTINA Roblero RN    Patient/Family in Agreement with Plan yes    Provided Post Acute Provider List? Yes    Post Acute Provider List Home Health;Nursing Home    Provided Post Acute Provider Quality & Resource List? Yes    Post Acute Provider Quality and Resource List Home Health;Inpatient Rehab    Delivered To Patient    Method of Delivery In person    Plan Comments Awaiting boot for PT to work with pt.  Spoke with pt and pt's spouse (Shiva) at bedside.  Pt would like in home PT if possible.  Pt's choice for HH is [facility].  Alyce  ( [phone]) called to follow.  Pt's [facility] for skilled care is [facility].  Ophelia  ( [phone]) called to follow.  Plan home with HH or SNF at accepting facility pending PT eval.   CRISTINA Roblero RN                   Discharge Codes    No documentation.                 Expected Discharge Date and Time       Expected Discharge Date Expected Discharge Time    Mar 9, 2024               Ana Roblero RN

## 2024-03-06 NOTE — PLAN OF CARE
Goal Outcome Evaluation:  Plan of Care Reviewed With: patient           Outcome Evaluation: Pt. is a 68 year old Female admitted to the hospital after a syncope episode and pt. falling sustaining a Right Ankle Non-displaced Fx. (WBAT with cam boot donned). Pt. reports that prior to admission she was using a Rwx for ambulation.  Pt. currently presents with decreased strength, decreased balance, and decreased tolerance to functional activity. This PM, pt. requires Min assist x 2 for bed mobility and Min-Mod. assist x 2 for sit <-> stand transfers. Once standing, pt. remains unable to bear weight on her RLE even with Cam boot donned due to pain, limiting her upright mobility.  Pt. also c/o dizziness throughout upright mobility.  Pt. will benefit from skilled inpt. P.T. to address her functional deficits and to assist pt. in regaining her maximum level of independence with functional mobility.      Anticipated Discharge Disposition (PT): skilled nursing facility

## 2024-03-06 NOTE — PLAN OF CARE
Goal Outcome Evaluation:     Patient A&Ox4. Medicated per orders. Cam boot ordered - PT evaluated. PRN pain meds as needed.  remained at bedside throughout shift.

## 2024-03-07 LAB
ANION GAP SERPL CALCULATED.3IONS-SCNC: 11.8 MMOL/L (ref 5–15)
BUN SERPL-MCNC: 8 MG/DL (ref 8–23)
BUN/CREAT SERPL: 12.3 (ref 7–25)
CALCIUM SPEC-SCNC: 9.5 MG/DL (ref 8.6–10.5)
CHLORIDE SERPL-SCNC: 98 MMOL/L (ref 98–107)
CO2 SERPL-SCNC: 29.2 MMOL/L (ref 22–29)
CREAT SERPL-MCNC: 0.65 MG/DL (ref 0.57–1)
DEPRECATED RDW RBC AUTO: 45 FL (ref 37–54)
EGFRCR SERPLBLD CKD-EPI 2021: 96 ML/MIN/1.73
ERYTHROCYTE [DISTWIDTH] IN BLOOD BY AUTOMATED COUNT: 12.8 % (ref 12.3–15.4)
GLUCOSE BLDC GLUCOMTR-MCNC: 82 MG/DL (ref 70–130)
GLUCOSE BLDC GLUCOMTR-MCNC: 85 MG/DL (ref 70–130)
GLUCOSE SERPL-MCNC: 87 MG/DL (ref 65–99)
HCT VFR BLD AUTO: 33.7 % (ref 34–46.6)
HGB BLD-MCNC: 11 G/DL (ref 12–15.9)
MAGNESIUM SERPL-MCNC: 1.7 MG/DL (ref 1.6–2.4)
MCH RBC QN AUTO: 31.7 PG (ref 26.6–33)
MCHC RBC AUTO-ENTMCNC: 32.6 G/DL (ref 31.5–35.7)
MCV RBC AUTO: 97.1 FL (ref 79–97)
PHOSPHATE SERPL-MCNC: 1.7 MG/DL (ref 2.5–4.5)
PHOSPHATE SERPL-MCNC: 2.1 MG/DL (ref 2.5–4.5)
PLATELET # BLD AUTO: 191 10*3/MM3 (ref 140–450)
PMV BLD AUTO: 10.6 FL (ref 6–12)
POTASSIUM SERPL-SCNC: 4.3 MMOL/L (ref 3.5–5.2)
RBC # BLD AUTO: 3.47 10*6/MM3 (ref 3.77–5.28)
SODIUM SERPL-SCNC: 139 MMOL/L (ref 136–145)
WBC NRBC COR # BLD AUTO: 5.17 10*3/MM3 (ref 3.4–10.8)

## 2024-03-07 PROCEDURE — 82948 REAGENT STRIP/BLOOD GLUCOSE: CPT

## 2024-03-07 PROCEDURE — 84100 ASSAY OF PHOSPHORUS: CPT | Performed by: STUDENT IN AN ORGANIZED HEALTH CARE EDUCATION/TRAINING PROGRAM

## 2024-03-07 PROCEDURE — 80048 BASIC METABOLIC PNL TOTAL CA: CPT | Performed by: STUDENT IN AN ORGANIZED HEALTH CARE EDUCATION/TRAINING PROGRAM

## 2024-03-07 PROCEDURE — 25810000003 SODIUM CHLORIDE 0.9 % SOLUTION: Performed by: STUDENT IN AN ORGANIZED HEALTH CARE EDUCATION/TRAINING PROGRAM

## 2024-03-07 PROCEDURE — 97110 THERAPEUTIC EXERCISES: CPT

## 2024-03-07 PROCEDURE — 83735 ASSAY OF MAGNESIUM: CPT | Performed by: STUDENT IN AN ORGANIZED HEALTH CARE EDUCATION/TRAINING PROGRAM

## 2024-03-07 PROCEDURE — 85027 COMPLETE CBC AUTOMATED: CPT | Performed by: STUDENT IN AN ORGANIZED HEALTH CARE EDUCATION/TRAINING PROGRAM

## 2024-03-07 PROCEDURE — 97530 THERAPEUTIC ACTIVITIES: CPT

## 2024-03-07 RX ADMIN — PREGABALIN 150 MG: 75 CAPSULE ORAL at 08:38

## 2024-03-07 RX ADMIN — ASPIRIN 81 MG: 81 TABLET, COATED ORAL at 08:37

## 2024-03-07 RX ADMIN — CLOPIDOGREL BISULFATE 75 MG: 75 TABLET, FILM COATED ORAL at 08:37

## 2024-03-07 RX ADMIN — Medication 10 ML: at 20:59

## 2024-03-07 RX ADMIN — FOLIC ACID 1000 MCG: 1 TABLET ORAL at 08:38

## 2024-03-07 RX ADMIN — PREGABALIN 150 MG: 75 CAPSULE ORAL at 20:56

## 2024-03-07 RX ADMIN — HYDROCODONE BITARTRATE AND ACETAMINOPHEN 1 TABLET: 10; 325 TABLET ORAL at 08:38

## 2024-03-07 RX ADMIN — PRAVASTATIN SODIUM 80 MG: 40 TABLET ORAL at 20:56

## 2024-03-07 RX ADMIN — DOCUSATE SODIUM 100 MG: 100 CAPSULE, LIQUID FILLED ORAL at 20:56

## 2024-03-07 RX ADMIN — DOCUSATE SODIUM 100 MG: 100 CAPSULE, LIQUID FILLED ORAL at 08:37

## 2024-03-07 RX ADMIN — DULOXETINE HYDROCHLORIDE 60 MG: 60 CAPSULE, DELAYED RELEASE ORAL at 08:37

## 2024-03-07 RX ADMIN — HYDROCODONE BITARTRATE AND ACETAMINOPHEN 1 TABLET: 10; 325 TABLET ORAL at 16:33

## 2024-03-07 RX ADMIN — SODIUM PHOSPHATE, MONOBASIC, MONOHYDRATE AND SODIUM PHOSPHATE, DIBASIC, ANHYDROUS 15 MMOL: 142; 276 INJECTION, SOLUTION INTRAVENOUS at 12:26

## 2024-03-07 RX ADMIN — PREGABALIN 150 MG: 75 CAPSULE ORAL at 17:07

## 2024-03-07 RX ADMIN — PANTOPRAZOLE SODIUM 40 MG: 40 TABLET, DELAYED RELEASE ORAL at 06:22

## 2024-03-07 NOTE — PROGRESS NOTES
Name: Brenda Michelle ADMIT: 3/3/2024   : 1955  PCP: Angelito Kline DO    MRN: [id number] LOS: 2 days   AGE/SEX: 68 y.o. female  ROOM: [facility]/     Subjective   Subjective   No acute events overnight.  Patient has not had any additional syncopal episodes.  Her spouse is at bedside this morning.  She denies any dizziness, chest pain, or shortness of breath.    Objective   Objective     Vital Signs  Temp:  [97.7 °F (36.5 °C)-99.1 °F (37.3 °C)] 98 °F (36.7 °C)  Heart Rate:  [88-96] 89  Resp:  [16-20] 18  BP: (131-153)/(64-76) 133/75  SpO2:  [96 %-98 %] 96 %  on  Flow (L/min):  [3] 3;   Device (Oxygen Therapy): nasal cannula  Body mass index is 37.38 kg/m².    Physical Exam  General: Alert, no acute distress.  Sitting up in bed.  Answers questions appropriately.  ENT: No conjunctival injection or scleral icterus. Moist mucous membranes.   Neuro: Eyes open and moving in all directions, strength normal in all extremities, no focal deficits.   Lungs: Clear to auscultation bilaterally. No wheeze or crackles. No distress.   Heart: RRR, no murmurs. No edema.  Abdomen: Morbidly obese.  Soft, non-tender, non-distended. Normal bowel sounds.   Ext: Warm and well-perfused. No edema.   Skin: No rashes or lesions. IV site without swelling or erythema.     Results Review     I reviewed the patient's new clinical results:  Results from last 7 days   Lab Units 24  0742 24  0311 24  0304 24  1328   WBC 10*3/mm3 5.17 5.75 6.37 5.82   HEMOGLOBIN g/dL 11.0* 11.2* 11.2* 12.5   PLATELETS 10*3/mm3 191 168 156 203     Results from last 7 days   Lab Units 24  0742 24  0311 24  0304 24  1328   SODIUM mmol/L 139 138 141 144   POTASSIUM mmol/L 4.3 4.1 4.1 3.7   CHLORIDE mmol/L 98 97* 104 103   CO2 mmol/L 29.2* 23.7 30.0* 32.0*   BUN mg/dL 8 9 12 11   CREATININE mg/dL 0.65 0.70 0.95 0.99   GLUCOSE mg/dL 87 50* 97 95   EGFR mL/min/1.73 96.0 94.3 65.4 62.2     Results from last 7 days    Lab Units 03/04/24  0304 03/03/24  1328   ALBUMIN g/dL 3.3* 3.7   BILIRUBIN mg/dL 0.4 0.4   ALK PHOS U/L 83 85   AST (SGOT) U/L 22 25   ALT (SGPT) U/L 12 13     Results from last 7 days   Lab Units 03/07/24  0742 03/06/24  1622 03/06/24  0311 03/04/24  0304 03/03/24  1328   CALCIUM mg/dL 9.5  --  9.6 9.5 10.4   ALBUMIN g/dL  --   --   --  3.3* 3.7   MAGNESIUM mg/dL 1.7  --  1.7  --  1.6   PHOSPHORUS mg/dL 1.7* 2.6 1.9*  --   --      Results from last 7 days   Lab Units 03/04/24  0304   LACTATE mmol/L 0.6     Glucose   Date/Time Value Ref Range Status   03/07/2024 0611 82 70 - 130 mg/dL Final   03/06/2024 2344 74 70 - 130 mg/dL Final   03/06/2024 2317 67 (L) 70 - 130 mg/dL Final   03/06/2024 1600 70 70 - 130 mg/dL Final   03/06/2024 1141 79 70 - 130 mg/dL Final   03/06/2024 1058 60 (L) 70 - 130 mg/dL Final   03/06/2024 1016 59 (L) 70 - 130 mg/dL Final       No radiology results for the last day    I have personally reviewed all medications:  Scheduled Medications  aspirin, 81 mg, Oral, Daily  clopidogrel, 75 mg, Oral, Daily  docusate sodium, 100 mg, Oral, BID  DULoxetine, 60 mg, Oral, Daily  folic acid, 1,000 mcg, Oral, Daily  pantoprazole, 40 mg, Oral, Q AM  pravastatin, 80 mg, Oral, Nightly  pregabalin, 150 mg, Oral, TID  sodium chloride, 10 mL, Intravenous, Q12H    Infusions   Diet  Diet: Cardiac, Renal; Healthy Heart (2-3 Na+); Low Sodium (2-3g); Fluid Consistency: Thin (IDDSI 0)    Assessment/Plan     Active Hospital Problems    Diagnosis  POA    **Recurrent syncope [R55]  Yes    Closed nondisplaced fracture of fourth metatarsal bone of right foot [S92.344A]  Yes    Nondisplaced fracture of third metatarsal bone of right foot [S92.334A]  Unknown    Vasovagal episode [R55]  Unknown    Orthostatic syncope [I95.1]  Unknown    Polypharmacy [Z79.899]  Not Applicable    Supplemental oxygen dependent [Z99.81]  Not Applicable    STEVEN (obstructive sleep apnea) [G47.33]  Yes    Obesity, Class III, BMI 40-49.9 (morbid  obesity) [E66.01]  Yes    Low back pain [M54.50]  Yes    Chronic respiratory failure with hypoxia [J96.11]  Yes    COPD mixed type [J44.9]  Yes    Peripheral arterial occlusive disease [I77.9]  Yes    Fibromyalgia [M79.7]  Yes    Gastroesophageal reflux disease [K21.9]  Yes    Irritable bowel syndrome [K58.9]  Yes      Resolved Hospital Problems   No resolved problems to display.       68 y.o. female with Recurrent syncope.    Patient is a 68 year-old female with a PMH significant for COPD on O2, IBS, PAD status post stent placement, peripheral neuropathy, chronic hypoxic respiratory failure, morbid obesity, fibromyalgia, anxiety, and depression, presented to the ED with chief complaint of acute syncopal episode.     Syncope/tremor  -Happened while she was using the bathroom, likely vasovagal in addition to multiple sedating meds.  -Patient is on Lyrica 150 mg, takes 1 pill twice daily during the day, and 300 at night with total of 600 mg in 1 day.  -Discussed with patient about side effects of Lyrica especially in the combination with Norco, including sedation/respiratory distraction which can lead to falls and significant injuries.   -Resumed Lyrica at 150 mg 3 times daily per neurology recommendations, discussed with patient will need to be tapered down as able.  -Cardiology evaluated and signed off, unlikely cardiogenic.  -Cardiogram with EF 66-70%, grade 1 diastolic dysfunction  -Neurology evaluated and signed off, likely orthostatic, tremors related to high-dose of Lyrica  -Unable to obtain orthostatic vitals secondary to right foot fracture.  Given that patient has now received boot, will attempt to repeat orthostatics     Peripheral artery disease status post stent placement  -Continue Plavix, aspirin, atorvastatin     COPD/chronic hypoxia respiratory failure   -continue oxygen supplement, as needed DuoNebs     Fibromyalgia/peripheral neuropathy  -Continue as needed Percocet, Lyrica at lower dose 150 mg 3  times daily     Macrocytic anemia  -Iron panel consistent with anemia of chronic disease  -B12 elevated, folate normal.  -Hemoglobin stable  -Monitor with daily CBC, transfuse for Hgb less than 7     Nondisplaced fractures of the third and fourth metatarsals   Nondisplaced right lateral malleolus fracture.   -Initial x-ray did not show fracture, however repeat x-ray confirmed fractures.  -Orthopedic surgery evaluated, plan for nonoperative management with cam walking boot  -Weightbearing as tolerated  -If unable to tolerate boot then plan for posterior slab splint  -Continue pain management  -Follow-up with Dr. Fleming in 1 week     Hypophosphatemia  -Phosphorus 1.7, replacement ordered per protocol.  -Repeat labs in AM     Urinary retention/uterine prolapse  -Followed by urology (Dr. Horowitz). Was supposed to have 03/05/2024  -Consulted urology, no acute Urologic surgical intervention   -Nursing to straight-cath as needed for any urinary retenton  -Urology to follow-up as an outpatient    Hypoglycemia  -Patient has had some lower blood glucoses, not on insulin  -Mounjaro being held, will not resume at discharge  -Continue monitoring blood glucoses     SCDs for DVT prophylaxis.  Full code.  Discussed with patient, spouse, nursing staff, and care team on multidisciplinary rounds.  Anticipate discharge to SNF timing yet to be determined.    Patient and all medical problems are new to me today.  Extensive chart review was performed.  The patient's spouse was at bedside today and had many questions.  These were all answered to the best my ability.  At this point, it seems that the Lyrica/polypharmacy was likely contributing to her tremors and syncopal episodes.  She has not had any syncopal episodes since being here.  She is tolerating the reduced dose of her Lyrica, and tremor seems to have improved.  She did receive a cam boot for her fracture.  Hopefully she will be able to get up and work with physical therapy today.   This will also allow us to check her orthostatic vital signs.  PT is recommending SNF and therapy is following for placement.    Julia Munoz MD  [facility]  03/07/24  16:47 EST

## 2024-03-07 NOTE — PLAN OF CARE
Goal Outcome Evaluation:  Plan of Care Reviewed With: patient        Progress: improving  Outcome Evaluation: Pt tolerated treatment well this date. CAM boot was donned in supine. Required min A for bed mobility to assist w/ R LE only. Pt completed a few exercises while seated on EOB, then completed 2 stands. Pt stood w/ min-mod A x2 and Rw, bed height slightly elevated. Encouraged pt to attempt a few supine exercises on own during the day.      Anticipated Discharge Disposition (PT): skilled nursing facility

## 2024-03-07 NOTE — THERAPY TREATMENT NOTE
Patient Name: Brenda Michelle  : 1955    MRN: [id number]                              Today's Date: 3/7/2024       Admit Date: 3/3/2024    Visit Dx:     ICD-10-CM ICD-9-CM   1. Recurrent syncope  R55 780.2   2. Closed head injury, initial encounter  S09.90XA 959.01   3. Right leg pain  M79.604 729.5   4. Acute pain of both knees  M25.561 338.19    M25.562 719.46   5. Chronic hypoxic respiratory failure, on home oxygen therapy  J96.11 518.83    Z99.81 799.02     V46.2     Patient Active Problem List   Diagnosis    Acute deep vein thrombosis of distal leg    Adenomatous polyp of colon    Depression    Fibromyalgia    Dyslipidemia    Peripheral arterial occlusive disease    Vitamin D deficiency    Gastroesophageal reflux disease    Irritable bowel syndrome    Disorder of intervertebral disc    Peripheral nerve disease    Fracture of multiple ribs of right side    Fall    Acute respiratory failure with hypoxia    COPD mixed type    Weakness    Hypopotassemia    Dysuria    Constipation    Hemorrhoids    Arthritis    Fibromyositis    Hernia of anterior abdominal wall    Homocystinemia    Injury of right ankle    Kidney stone    Knee pain    Moderate ankle sprain    Osteoarthritis of knee    Spinal stenosis, lumbar region without neurogenic claudication    Chronic respiratory failure with hypoxia    Bursitis of hip    Lumbar facet joint pain    Low back pain    Acute cystitis    Obesity, Class III, BMI 40-49.9 (morbid obesity)    Abnormal CT scan, gastrointestinal tract    Wheelchair dependence    Supplemental oxygen dependent    Dietary counseling    Pre-op evaluation    STEVEN (obstructive sleep apnea)    Lumbar spondylosis    Recurrent syncope    Vasovagal episode    Orthostatic syncope    Polypharmacy    Closed nondisplaced fracture of fourth metatarsal bone of right foot    Nondisplaced fracture of third metatarsal bone of right foot     Past Medical History:   Diagnosis Date    Anxiety     Arthritis     benign  polyps of large intestine     COPD (chronic obstructive pulmonary disease)     Depression     Difficulty walking     Disc degeneration, lumbar     Fever 9/9/2022    Fibromyositis     Hyperlipidemia     IBS (irritable bowel syndrome)     Kidney stones     Movement disorder     Nephrolithiasis     Peripheral neuropathy     PVD (peripheral vascular disease)     Sepsis without acute organ dysfunction 9/9/2022    Sepsis without acute organ dysfunction, due to unspecified organism 9/11/2022    Shingles     Vitamin D deficiency      Past Surgical History:   Procedure Laterality Date    CATARACT EXTRACTION      COLONOSCOPY  2004    Colon polyps; family history of colon cancer    COLONOSCOPY N/A 09/29/2022    Procedure: COLONOSCOPY to cecum and TI with biopsies, cold forceps and hot snare polypectomies with 2cc orise lift and clip x 2;  Surgeon: Chirag Tony MD;  Location: [facility] ENDOSCOPY;  Service: Gastroenterology;  Laterality: N/A;  PRE- abnormal imaging, hx of polyps, family hx of colon cancer  POST- polyps, internal/external hemorrhoids    KNEE ARTHROPLASTY, PARTIAL REPLACEMENT  2015    KNEE ARTHROSCOPY Left     Meniscus    POPLITEAL ARTERY ANGIOPLASTY Bilateral     STEROID INJECTION      TONSILLECTOMY      TUBAL ABDOMINAL LIGATION      WRIST FRACTURE SURGERY        General Information       Row Name 03/07/24 1414          Physical Therapy Time and Intention    Document Type therapy note (daily note)  -     Mode of Treatment physical therapy  -       Row Name 03/07/24 1414          General Information    Existing Precautions/Restrictions fall;oxygen therapy device and L/min  WBAT w/ CAM boot on R LE  -       Row Name 03/07/24 1414          Cognition    Orientation Status (Cognition) oriented x 4  -               User Key  (r) = Recorded By, (t) = Taken By, (c) = Cosigned By      Initials Name Provider Type     Julia Brewer PTA Physical Therapist Assistant                   Mobility       Row Name  03/07/24 1415          Bed Mobility    Bed Mobility supine-sit;sit-supine  -     Supine-Sit [place] (Bed Mobility) minimum assist (75% patient effort)  -     Sit-Supine [place] (Bed Mobility) minimum assist (75% patient effort)  -     Assistive Device (Bed Mobility) bed rails;head of bed elevated  -     Comment, (Bed Mobility) assist w/ R LE only  -       Row Name 03/07/24 1415          Sit-Stand Transfer    Sit-Stand [place] (Transfers) minimum assist (75% patient effort);moderate assist (50% patient effort);2 person assist  -     Assistive Device (Sit-Stand Transfers) walker, front-wheeled  -     Comment, (Sit-Stand Transfer) stood twice; bed height slightly elevated  -               User Key  (r) = Recorded By, (t) = Taken By, (c) = Cosigned By      Initials Name Provider Type    Julia Aguilar PTA Physical Therapist Assistant                   Obj/Interventions       [facility] Name 03/07/24 1417          Motor Skills    Therapeutic Exercise --  seated LAQ x10 on L, 5 on R; encouraged pt to attempt other supine exercises on own during the day  -               User Key  (r) = Recorded By, (t) = Taken By, (c) = Cosigned By      Initials Name Provider Type    Julia Aguilar PTA Physical Therapist Assistant                   Goals/Plan    No documentation.                  Clinical Impression       [facility] Name 03/07/24 1417          Pain    Pretreatment Pain Rating 6/10  -     Posttreatment Pain Rating 8/10  -     Pain Location - Side/Orientation Right  -     Pain Location - foot  -     Pain Intervention(s) Repositioned;Ambulation/increased activity;Rest  -       Row Name 03/07/24 1417          Positioning and Restraints    Pre-Treatment Position in bed  -     Post Treatment Position bed  -SM     In Bed supine;call light within reach;encouraged to call for assist;exit alarm on;with family/caregiver  -               User Key  (r) = Recorded By, (t) = Taken By,  (c) = Cosigned By      Initials Name Provider Type    Julia Aguilar PTA Physical Therapist Assistant                   Outcome Measures       Row Name 03/07/24 1419 03/07/24 0805       How much help from another person do you currently need...    Turning from your back to your side while in flat bed without using bedrails? 3  -SM 2  -BR    Moving from lying on back to sitting on the side of a flat bed without bedrails? 3  -SM 2  -BR    Moving to and from a bed to a chair (including a wheelchair)? 1  -SM 2  -BR    Standing up from a chair using your arms (e.g., wheelchair, bedside chair)? 2  -SM 2  -BR    Climbing 3-5 steps with a railing? 1  -SM 2  -BR    To walk in hospital room? 1  -SM 2  -BR    AM-PAC 6 Clicks Score (PT) 11  -SM 12  -BR    Highest Level of Mobility Goal 4 --> Transfer to chair/commode  -SM 4 --> Transfer to chair/commode  -BR      Row Name 03/07/24 1419          Functional Assessment    Outcome Measure Options AM-PAC 6 Clicks Basic Mobility (PT)  -               User Key  (r) = Recorded By, (t) = Taken By, (c) = Cosigned By      Initials Name Provider Type    Cheikh Stone, RN Registered Nurse    Julia Aguilar PTA Physical Therapist Assistant                                 Physical Therapy Education       Title: PT OT SLP Therapies (Done)       Topic: Physical Therapy (Done)       Point: Mobility training (Done)       Learning Progress Summary             Patient Acceptance, E,TB,D, VU,NR by  at 3/7/2024 1419    Acceptance, E,D, VU,NR by MS at 3/6/2024 1511                         Point: Home exercise program (Done)       Learning Progress Summary             Patient Acceptance, E,TB,D, VU,NR by  at 3/7/2024 1419                         Point: Body mechanics (Done)       Learning Progress Summary             Patient Acceptance, E,TB,D, VU,NR by  at 3/7/2024 1419    Acceptance, E,D, VU,NR by MS at 3/6/2024 1511                         Point: Precautions (Done)        Learning Progress Summary             Patient Acceptance, E,TB,D, VU,NR by  at 3/7/2024 1419    Acceptance, E,D, VU,NR by MS at 3/6/2024 1511                                         User Key       Initials Effective Dates Name Provider Type Discipline    MS 06/16/21 -  Didier Avila, PT Physical Therapist PT     03/07/18 -  Julia Brewer PTA Physical Therapist Assistant PT                  PT Recommendation and Plan     Plan of Care Reviewed With: patient  Progress: improving  Outcome Evaluation: Pt tolerated treatment well this date. CAM boot was donned in supine. Required min A for bed mobility to assist w/ R LE only. Pt completed a few exercises while seated on EOB, then completed 2 stands. Pt stood w/ min-mod A x2 and Rw, bed height slightly elevated. Encouraged pt to attempt a few supine exercises on own during the day.     Time Calculation:         PT Charges       Row Name 03/07/24 1422             Time Calculation    Start Time 1338  -      Stop Time 1403  -      Time Calculation (min) 25 min  -      PT Received On 03/07/24  -      PT - Next Appointment 03/08/24  -                User Key  (r) = Recorded By, (t) = Taken By, (c) = Cosigned By      Initials Name Provider Type     Julia Brewer PTA Physical Therapist Assistant                  Therapy Charges for Today       Code Description Service Date Service Provider Modifiers Qty    67872404924 HC PT THERAPEUTIC ACT EA 15 MIN 3/7/2024 Julia Brewer, WILLIAN GP 1    25091493142 HC PT THER PROC EA 15 MIN 3/7/2024 Julia Brewer PTA GP 1    86882127620 HC PT THER SUPP EA 15 MIN 3/7/2024 Julia Brewer PTA GP 2            PT G-Codes  Outcome Measure Options: AM-PAC 6 Clicks Basic Mobility (PT)  AM-PAC 6 Clicks Score (PT): 11  PT Discharge Summary  Anticipated Discharge Disposition (PT): skilled nursing facility    Julai Brewer PTA  3/7/2024

## 2024-03-07 NOTE — PLAN OF CARE
Goal Outcome Evaluation:         Pt continues to improve, no complications. 1 x pain meds. 1 x BM, will continue to monitor

## 2024-03-07 NOTE — PLAN OF CARE
Goal Outcome Evaluation:      Pt resting in bed , Aox4 , 3 L nasal canula,  VSS, no sign of acute distress noted. Plan of care is ongoing.

## 2024-03-07 NOTE — NURSING NOTE
Pt resting in bed , Aox4 , 3L NC . SR on tele.  VSS , No sign of acute distress noted . Plan of care is ongoing

## 2024-03-08 LAB
ANION GAP SERPL CALCULATED.3IONS-SCNC: 9.9 MMOL/L (ref 5–15)
BUN SERPL-MCNC: 7 MG/DL (ref 8–23)
BUN/CREAT SERPL: 11.1 (ref 7–25)
CALCIUM SPEC-SCNC: 9.4 MG/DL (ref 8.6–10.5)
CHLORIDE SERPL-SCNC: 101 MMOL/L (ref 98–107)
CO2 SERPL-SCNC: 31.1 MMOL/L (ref 22–29)
CREAT SERPL-MCNC: 0.63 MG/DL (ref 0.57–1)
DEPRECATED RDW RBC AUTO: 49.1 FL (ref 37–54)
EGFRCR SERPLBLD CKD-EPI 2021: 96.8 ML/MIN/1.73
ERYTHROCYTE [DISTWIDTH] IN BLOOD BY AUTOMATED COUNT: 13.3 % (ref 12.3–15.4)
GLUCOSE BLDC GLUCOMTR-MCNC: 90 MG/DL (ref 70–130)
GLUCOSE SERPL-MCNC: 103 MG/DL (ref 65–99)
HCT VFR BLD AUTO: 35.2 % (ref 34–46.6)
HGB BLD-MCNC: 11.2 G/DL (ref 12–15.9)
MCH RBC QN AUTO: 32.1 PG (ref 26.6–33)
MCHC RBC AUTO-ENTMCNC: 31.8 G/DL (ref 31.5–35.7)
MCV RBC AUTO: 100.9 FL (ref 79–97)
PHOSPHATE SERPL-MCNC: 2.6 MG/DL (ref 2.5–4.5)
PLATELET # BLD AUTO: 194 10*3/MM3 (ref 140–450)
PMV BLD AUTO: 11 FL (ref 6–12)
POTASSIUM SERPL-SCNC: 3.6 MMOL/L (ref 3.5–5.2)
POTASSIUM SERPL-SCNC: 4.6 MMOL/L (ref 3.5–5.2)
RBC # BLD AUTO: 3.49 10*6/MM3 (ref 3.77–5.28)
SODIUM SERPL-SCNC: 142 MMOL/L (ref 136–145)
WBC NRBC COR # BLD AUTO: 5.62 10*3/MM3 (ref 3.4–10.8)

## 2024-03-08 PROCEDURE — 82948 REAGENT STRIP/BLOOD GLUCOSE: CPT

## 2024-03-08 PROCEDURE — 97530 THERAPEUTIC ACTIVITIES: CPT

## 2024-03-08 PROCEDURE — 84100 ASSAY OF PHOSPHORUS: CPT | Performed by: STUDENT IN AN ORGANIZED HEALTH CARE EDUCATION/TRAINING PROGRAM

## 2024-03-08 PROCEDURE — 84132 ASSAY OF SERUM POTASSIUM: CPT | Performed by: STUDENT IN AN ORGANIZED HEALTH CARE EDUCATION/TRAINING PROGRAM

## 2024-03-08 PROCEDURE — 25810000003 SODIUM CHLORIDE 0.9 % SOLUTION: Performed by: STUDENT IN AN ORGANIZED HEALTH CARE EDUCATION/TRAINING PROGRAM

## 2024-03-08 PROCEDURE — 85027 COMPLETE CBC AUTOMATED: CPT | Performed by: STUDENT IN AN ORGANIZED HEALTH CARE EDUCATION/TRAINING PROGRAM

## 2024-03-08 PROCEDURE — 80048 BASIC METABOLIC PNL TOTAL CA: CPT | Performed by: STUDENT IN AN ORGANIZED HEALTH CARE EDUCATION/TRAINING PROGRAM

## 2024-03-08 RX ORDER — POTASSIUM CHLORIDE 750 MG/1
40 TABLET, FILM COATED, EXTENDED RELEASE ORAL EVERY 4 HOURS
Status: COMPLETED | OUTPATIENT
Start: 2024-03-08 | End: 2024-03-08

## 2024-03-08 RX ADMIN — PREGABALIN 150 MG: 75 CAPSULE ORAL at 16:18

## 2024-03-08 RX ADMIN — DOCUSATE SODIUM 100 MG: 100 CAPSULE, LIQUID FILLED ORAL at 20:31

## 2024-03-08 RX ADMIN — Medication 10 ML: at 20:31

## 2024-03-08 RX ADMIN — PREGABALIN 150 MG: 75 CAPSULE ORAL at 20:31

## 2024-03-08 RX ADMIN — DOCUSATE SODIUM 100 MG: 100 CAPSULE, LIQUID FILLED ORAL at 09:53

## 2024-03-08 RX ADMIN — PRAVASTATIN SODIUM 80 MG: 40 TABLET ORAL at 20:31

## 2024-03-08 RX ADMIN — PANTOPRAZOLE SODIUM 40 MG: 40 TABLET, DELAYED RELEASE ORAL at 06:52

## 2024-03-08 RX ADMIN — SODIUM PHOSPHATE, MONOBASIC, MONOHYDRATE AND SODIUM PHOSPHATE, DIBASIC, ANHYDROUS 15 MMOL: 142; 276 INJECTION, SOLUTION INTRAVENOUS at 00:34

## 2024-03-08 RX ADMIN — POTASSIUM CHLORIDE 40 MEQ: 750 TABLET, EXTENDED RELEASE ORAL at 16:18

## 2024-03-08 RX ADMIN — DULOXETINE HYDROCHLORIDE 60 MG: 60 CAPSULE, DELAYED RELEASE ORAL at 09:54

## 2024-03-08 RX ADMIN — CLOPIDOGREL BISULFATE 75 MG: 75 TABLET, FILM COATED ORAL at 09:53

## 2024-03-08 RX ADMIN — HYDROCODONE BITARTRATE AND ACETAMINOPHEN 1 TABLET: 10; 325 TABLET ORAL at 09:03

## 2024-03-08 RX ADMIN — HYDROCODONE BITARTRATE AND ACETAMINOPHEN 1 TABLET: 10; 325 TABLET ORAL at 00:20

## 2024-03-08 RX ADMIN — ASPIRIN 81 MG: 81 TABLET, COATED ORAL at 09:53

## 2024-03-08 RX ADMIN — POTASSIUM CHLORIDE 40 MEQ: 750 TABLET, EXTENDED RELEASE ORAL at 12:14

## 2024-03-08 RX ADMIN — HYDROCODONE BITARTRATE AND ACETAMINOPHEN 1 TABLET: 10; 325 TABLET ORAL at 16:55

## 2024-03-08 RX ADMIN — FOLIC ACID 1000 MCG: 1 TABLET ORAL at 09:54

## 2024-03-08 RX ADMIN — Medication 10 ML: at 09:54

## 2024-03-08 RX ADMIN — PREGABALIN 150 MG: 75 CAPSULE ORAL at 09:54

## 2024-03-08 NOTE — PLAN OF CARE
Goal Outcome Evaluation:      Pt is alert and oriented. VSS. Pt on 3 L NC. Pt treated with pain med, per order. VSS. NAD Plan of care ongoing.

## 2024-03-08 NOTE — PROGRESS NOTES
Name: Brenda Michelle ADMIT: 3/3/2024   : 1955  PCP: Angelito Kline DO    MRN: [id number] LOS: 3 days   AGE/SEX: 68 y.o. female  ROOM: [facility]     Subjective   Subjective   No acute events overnight.  Patient states that she is feeling pretty well.  Still having some right foot pain.  Orthostatics still pending.   is at bedside this morning.  Hoping for SNF.  No chest pain or shortness of breath.    Objective   Objective     Vital Signs  Temp:  [97.5 °F (36.4 °C)-98.3 °F (36.8 °C)] 97.5 °F (36.4 °C)  Heart Rate:  [89-96] 96  Resp:  [18] 18  BP: (131-136)/(66-81) 131/66  SpO2:  [94 %-97 %] 94 %  on  Flow (L/min):  [3] 3;   Device (Oxygen Therapy): nasal cannula;humidified  Body mass index is 37.38 kg/m².    Physical Exam  General: Alert, no acute distress.  Sitting up in bed.  Answers questions appropriately.  ENT: No conjunctival injection or scleral icterus. Moist mucous membranes.   Neuro: Eyes open and moving in all directions, strength normal in all extremities, no focal deficits.   Lungs: Clear to auscultation bilaterally. No wheeze or crackles. No distress.   Heart: RRR, no murmurs.  Abdomen: Morbidly obese.  Soft, non-tender, non-distended. Normal bowel sounds.   Ext: Right foot with bruising and swelling.  Skin: No rashes or lesions. IV site without swelling or erythema.     Results Review     I reviewed the patient's new clinical results:  Results from last 7 days   Lab Units 24  0742 24  0311 24  0304 24  1328   WBC 10*3/mm3 5.17 5.75 6.37 5.82   HEMOGLOBIN g/dL 11.0* 11.2* 11.2* 12.5   PLATELETS 10*3/mm3 191 168 156 203     Results from last 7 days   Lab Units 24  0742 24  0311 24  0304 24  1328   SODIUM mmol/L 139 138 141 144   POTASSIUM mmol/L 4.3 4.1 4.1 3.7   CHLORIDE mmol/L 98 97* 104 103   CO2 mmol/L 29.2* 23.7 30.0* 32.0*   BUN mg/dL 8 9 12 11   CREATININE mg/dL 0.65 0.70 0.95 0.99   GLUCOSE mg/dL 87 50* 97 95   EGFR mL/min/1.73  96.0 94.3 65.4 62.2     Results from last 7 days   Lab Units 03/04/24  0304 03/03/24  1328   ALBUMIN g/dL 3.3* 3.7   BILIRUBIN mg/dL 0.4 0.4   ALK PHOS U/L 83 85   AST (SGOT) U/L 22 25   ALT (SGPT) U/L 12 13     Results from last 7 days   Lab Units 03/07/24  1956 03/07/24  0742 03/06/24  1622 03/06/24  0311 03/04/24  0304 03/03/24  1328   CALCIUM mg/dL  --  9.5  --  9.6 9.5 10.4   ALBUMIN g/dL  --   --   --   --  3.3* 3.7   MAGNESIUM mg/dL  --  1.7  --  1.7  --  1.6   PHOSPHORUS mg/dL 2.1* 1.7* 2.6 1.9*  --   --      Results from last 7 days   Lab Units 03/04/24  0304   LACTATE mmol/L 0.6     Glucose   Date/Time Value Ref Range Status   03/08/2024 0529 90 70 - 130 mg/dL Final   03/07/2024 1721 85 70 - 130 mg/dL Final   03/07/2024 0611 82 70 - 130 mg/dL Final   03/06/2024 2344 74 70 - 130 mg/dL Final   03/06/2024 2317 67 (L) 70 - 130 mg/dL Final   03/06/2024 1600 70 70 - 130 mg/dL Final   03/06/2024 1141 79 70 - 130 mg/dL Final       No radiology results for the last day    I have personally reviewed all medications:  Scheduled Medications  aspirin, 81 mg, Oral, Daily  clopidogrel, 75 mg, Oral, Daily  docusate sodium, 100 mg, Oral, BID  DULoxetine, 60 mg, Oral, Daily  folic acid, 1,000 mcg, Oral, Daily  pantoprazole, 40 mg, Oral, Q AM  pravastatin, 80 mg, Oral, Nightly  pregabalin, 150 mg, Oral, TID  sodium chloride, 10 mL, Intravenous, Q12H    Infusions   Diet  Diet: Cardiac, Renal; Healthy Heart (2-3 Na+); Low Sodium (2-3g); Fluid Consistency: Thin (IDDSI 0)    Assessment/Plan     Active Hospital Problems    Diagnosis  POA    **Recurrent syncope [R55]  Yes    Closed nondisplaced fracture of fourth metatarsal bone of right foot [S92.344A]  Yes    Nondisplaced fracture of third metatarsal bone of right foot [S92.334A]  Unknown    Vasovagal episode [R55]  Unknown    Orthostatic syncope [I95.1]  Unknown    Polypharmacy [Z79.899]  Not Applicable    Supplemental oxygen dependent [Z99.81]  Not Applicable    STEVEN  (obstructive sleep apnea) [G47.33]  Yes    Obesity, Class III, BMI 40-49.9 (morbid obesity) [E66.01]  Yes    Low back pain [M54.50]  Yes    Chronic respiratory failure with hypoxia [J96.11]  Yes    COPD mixed type [J44.9]  Yes    Peripheral arterial occlusive disease [I77.9]  Yes    Fibromyalgia [M79.7]  Yes    Gastroesophageal reflux disease [K21.9]  Yes    Irritable bowel syndrome [K58.9]  Yes      Resolved Hospital Problems   No resolved problems to display.       68 y.o. female with Recurrent syncope.    Patient is a 68 year-old female with a PMH significant for COPD on O2, IBS, PAD status post stent placement, peripheral neuropathy, chronic hypoxic respiratory failure, morbid obesity, fibromyalgia, anxiety, and depression, presented to the ED with chief complaint of acute syncopal episode.     Syncope/tremor  -Happened while she was using the bathroom, likely vasovagal in addition to multiple sedating meds.  -Patient is on Lyrica 150 mg, takes 1 pill twice daily during the day, and 300 at night with total of 600 mg in 1 day.  -Discussed with patient about side effects of Lyrica especially in the combination with Norco, including sedation/respiratory depression which can lead to falls and significant injuries.   -Resumed Lyrica at 150 mg 3 times daily per neurology recommendations, discussed with patient will need to be tapered down as able.  -Cardiology evaluated and signed off, unlikely cardiogenic.  -Cardiogram with EF 66-70%, grade 1 diastolic dysfunction  -Neurology evaluated and signed off, likely orthostatic, tremors related to high-dose of Lyrica  -Unable to obtain orthostatic vitals secondary to right foot fracture.  Given that patient has now received boot, will attempt to repeat orthostatics today.  Have discussed with nurse and she will obtain.     Peripheral artery disease status post stent placement  -Continue Plavix, aspirin, atorvastatin     COPD/chronic hypoxia respiratory failure   -continue  oxygen supplement, as needed DuoNebs     Fibromyalgia/peripheral neuropathy  -Continue as needed Percocet, Lyrica at lower dose 150 mg 3 times daily     Macrocytic anemia  -Iron panel consistent with anemia of chronic disease  -B12 elevated, folate normal.  -Hemoglobin stable  -Monitor with daily CBC, transfuse for Hgb less than 7     Nondisplaced fractures of the third and fourth metatarsals   Nondisplaced right lateral malleolus fracture.   -Initial x-ray did not show fracture, however repeat x-ray confirmed fractures.  -Orthopedic surgery evaluated, plan for nonoperative management with cam walking boot  -Weightbearing as tolerated  -Continue pain management  -Follow-up with Dr. Fleming in 1 week     Hypophosphatemia  -Phosphorus 1.7 yesterday, replacement ordered per protocol.  -Labs pending this morning, will follow-up  -Repeat labs in AM     Urinary retention/uterine prolapse  -Followed by urology (Dr. Horowitz). Was supposed to have 03/05/2024  -Consulted urology, no acute Urologic surgical intervention   -Nursing to straight-cath as needed for any urinary retenton  -Urology to follow-up as an outpatient    Hypoglycemia  -Blood glucose trend has improved  -Mounjaro being held, will not resume at discharge  -Continue monitoring blood glucoses     SCDs for DVT prophylaxis.  Full code.  Discussed with patient, spouse, nursing staff, and care team on multidisciplinary rounds.  Anticipate discharge to SNF timing yet to be determined.    Pending orthostatic vital signs, patient medically cleared for discharge to SNF when placement has been arranged.    Julia Munoz MD  [facility]  03/08/24  09:53 EST

## 2024-03-08 NOTE — THERAPY TREATMENT NOTE
Patient Name: Brenda Michelle  : 1955    MRN: [id number]                              Today's Date: 3/8/2024       Admit Date: 3/3/2024    Visit Dx:     ICD-10-CM ICD-9-CM   1. Recurrent syncope  R55 780.2   2. Closed head injury, initial encounter  S09.90XA 959.01   3. Right leg pain  M79.604 729.5   4. Acute pain of both knees  M25.561 338.19    M25.562 719.46   5. Chronic hypoxic respiratory failure, on home oxygen therapy  J96.11 518.83    Z99.81 799.02     V46.2     Patient Active Problem List   Diagnosis    Acute deep vein thrombosis of distal leg    Adenomatous polyp of colon    Depression    Fibromyalgia    Dyslipidemia    Peripheral arterial occlusive disease    Vitamin D deficiency    Gastroesophageal reflux disease    Irritable bowel syndrome    Disorder of intervertebral disc    Peripheral nerve disease    Fracture of multiple ribs of right side    Fall    Acute respiratory failure with hypoxia    COPD mixed type    Weakness    Hypopotassemia    Dysuria    Constipation    Hemorrhoids    Arthritis    Fibromyositis    Hernia of anterior abdominal wall    Homocystinemia    Injury of right ankle    Kidney stone    Knee pain    Moderate ankle sprain    Osteoarthritis of knee    Spinal stenosis, lumbar region without neurogenic claudication    Chronic respiratory failure with hypoxia    Bursitis of hip    Lumbar facet joint pain    Low back pain    Acute cystitis    Obesity, Class III, BMI 40-49.9 (morbid obesity)    Abnormal CT scan, gastrointestinal tract    Wheelchair dependence    Supplemental oxygen dependent    Dietary counseling    Pre-op evaluation    STEVEN (obstructive sleep apnea)    Lumbar spondylosis    Recurrent syncope    Vasovagal episode    Orthostatic syncope    Polypharmacy    Closed nondisplaced fracture of fourth metatarsal bone of right foot    Nondisplaced fracture of third metatarsal bone of right foot     Past Medical History:   Diagnosis Date    Anxiety     Arthritis     benign  polyps of large intestine     COPD (chronic obstructive pulmonary disease)     Depression     Difficulty walking     Disc degeneration, lumbar     Fever 9/9/2022    Fibromyositis     Hyperlipidemia     IBS (irritable bowel syndrome)     Kidney stones     Movement disorder     Nephrolithiasis     Peripheral neuropathy     PVD (peripheral vascular disease)     Sepsis without acute organ dysfunction 9/9/2022    Sepsis without acute organ dysfunction, due to unspecified organism 9/11/2022    Shingles     Vitamin D deficiency      Past Surgical History:   Procedure Laterality Date    CATARACT EXTRACTION      COLONOSCOPY  2004    Colon polyps; family history of colon cancer    COLONOSCOPY N/A 09/29/2022    Procedure: COLONOSCOPY to cecum and TI with biopsies, cold forceps and hot snare polypectomies with 2cc orise lift and clip x 2;  Surgeon: Chirag Tony MD;  Location: [facility] ENDOSCOPY;  Service: Gastroenterology;  Laterality: N/A;  PRE- abnormal imaging, hx of polyps, family hx of colon cancer  POST- polyps, internal/external hemorrhoids    KNEE ARTHROPLASTY, PARTIAL REPLACEMENT  2015    KNEE ARTHROSCOPY Left     Meniscus    POPLITEAL ARTERY ANGIOPLASTY Bilateral     STEROID INJECTION      TONSILLECTOMY      TUBAL ABDOMINAL LIGATION      WRIST FRACTURE SURGERY        General Information       Row Name 03/08/24 1138          Physical Therapy Time and Intention    Document Type therapy note (daily note)  -SV     Mode of Treatment individual therapy;physical therapy  -SV       Row Name 03/08/24 1138          General Information    Patient Profile Reviewed yes  -SV               User Key  (r) = Recorded By, (t) = Taken By, (c) = Cosigned By      Initials Name Provider Type    SV Joanne Shaikh, PT Physical Therapist                   Mobility       Row Name 03/08/24 1307          Bed Mobility    Supine-Sit [place] (Bed Mobility) minimum assist (75% patient effort)  -SV     Sit-Supine [place] (Bed Mobility) not  tested  -SV     Assistive Device (Bed Mobility) bed rails;head of bed elevated  -SV     Comment, (Bed Mobility) heavy use of bed rail and HOB elevated  -SV       Row Name 03/08/24 1307          Sit-Stand Transfer    Sit-Stand [place] (Transfers) minimum assist (75% patient effort);2 person assist  -SV     Assistive Device (Sit-Stand Transfers) walker, front-wheeled  -SV     Comment, (Sit-Stand Transfer) posterior lean in standing with min to maintain balance fo r10-25 seconds then cga  -SV       Row Name 03/08/24 1307          Gait/Stairs (Locomotion)    [place] Level (Gait) contact guard;2 person assist;minimum assist (75% patient effort)  -SV     Assistive Device (Gait) walker, front-wheeled  -SV     Distance in Feet (Gait) 3' to recliner, unsteady, vc for backing up WBAT with cam boot in place  -SV               User Key  (r) = Recorded By, (t) = Taken By, (c) = Cosigned By      Initials Name Provider Type    SV Joanne Shaikh, PT Physical Therapist                   Obj/Interventions       Row Name 03/08/24 1310          Motor Skills    Therapeutic Exercise --  laq in sitting 5 -7 reps neftaly  -SV       Row Name 03/08/24 1310          Balance    Balance Assessment sitting static balance;standing static balance  -SV     Static Sitting Balance supervision  -SV     Static Standing Balance minimal assist;2-person assist  -SV     Position/Device Used, Standing Balance walker, rolling  -SV               User Key  (r) = Recorded By, (t) = Taken By, (c) = Cosigned By      Initials Name Provider Type    SV Joanne Shaikh, PT Physical Therapist                   Goals/Plan    No documentation.                  Clinical Impression       Row Name 03/08/24 1311          Pain    Pretreatment Pain Rating 4/10  -SV     Posttreatment Pain Rating 6/10  -SV     Pain Location - Side/Orientation Right  -SV     Pain Location - foot  -SV               User Key  (r) = Recorded By, (t) = Taken By, (c) = Cosigned By       Initials Name Provider Type    Joanne Bledsoe, PT Physical Therapist                   Outcome Measures       Row Name 03/08/24 1311          How much help from another person do you currently need...    Turning from your back to your side while in flat bed without using bedrails? 3  -SV     Moving from lying on back to sitting on the side of a flat bed without bedrails? 3  -SV     Moving to and from a bed to a chair (including a wheelchair)? 2  -SV     Standing up from a chair using your arms (e.g., wheelchair, bedside chair)? 2  -SV     Climbing 3-5 steps with a railing? 1  -SV     To walk in hospital room? 2  -SV     AM-PAC 6 Clicks Score (PT) 13  -SV     Highest Level of Mobility Goal 4 --> Transfer to chair/commode  -SV               User Key  (r) = Recorded By, (t) = Taken By, (c) = Cosigned By      Initials Name Provider Type    Joanne Bledsoe, PT Physical Therapist                                 Physical Therapy Education       Title: PT OT SLP Therapies (In Progress)       Topic: Physical Therapy (In Progress)       Point: Mobility training (In Progress)       Learning Progress Summary             Patient Acceptance, E, NR by SV at 3/8/2024 1312    Acceptance, E,TB,D, VU,NR by SM at 3/7/2024 1419    Acceptance, E,D, VU,NR by MS at 3/6/2024 1511   Significant Other Acceptance, E, NR by SV at 3/8/2024 1312                         Point: Home exercise program (In Progress)       Learning Progress Summary             Patient Acceptance, E, NR by SV at 3/8/2024 1312    Acceptance, E,TB,D, VU,NR by SM at 3/7/2024 1419   Significant Other Acceptance, E, NR by SV at 3/8/2024 1312                         Point: Body mechanics (In Progress)       Learning Progress Summary             Patient Acceptance, E, NR by SV at 3/8/2024 1312    Acceptance, E,TB,D, VU,NR by SM at 3/7/2024 1419    Acceptance, E,D, VU,NR by MS at 3/6/2024 1511   Significant Other Acceptance, E, NR by SV at 3/8/2024 1312                          Point: Precautions (In Progress)       Learning Progress Summary             Patient Acceptance, E, NR by  at 3/8/2024 1312    Acceptance, E,TB,D, VU,NR by  at 3/7/2024 1419    Acceptance, E,D, VU,NR by MS at 3/6/2024 1511   Significant Other Acceptance, E, NR by  at 3/8/2024 1312                                         User Key       Initials Effective Dates Name Provider Type Discipline    MS 06/16/21 -  Didier Avila, PT Physical Therapist PT     07/11/23 -  Joanne Shaikh, PT Physical Therapist PT     03/07/18 -  Julia Brewer, WILLIAN Physical Therapist Assistant PT                  PT Recommendation and Plan           Time Calculation:         PT Charges       Row Name 03/08/24 1315             Time Calculation    Start Time 1137  -      Stop Time 1200  -      Time Calculation (min) 23 min  -      PT Received On 03/08/24  -      PT - Next Appointment 03/09/24  -                User Key  (r) = Recorded By, (t) = Taken By, (c) = Cosigned By      Initials Name Provider Type     Joanne Shaikh, PT Physical Therapist                  Therapy Charges for Today       Code Description Service Date Service Provider Modifiers Qty    03345750717 HC PT THERAPEUTIC ACT EA 15 MIN 3/8/2024 Joanne Shaikh, PT GP 2            PT G-Codes  Outcome Measure Options: AM-PAC 6 Clicks Basic Mobility (PT)  AM-PAC 6 Clicks Score (PT): 13       Joanne Shaikh PT  3/8/2024

## 2024-03-08 NOTE — PLAN OF CARE
Goal Outcome Evaluation:         Pt alert and agreeable.  present and requested asst of 2.Cam boot applied. Significant bruising and edema noted right foot. Pt moved to eob with head of bed elevated, bed railing use and min asst. Pt STS to rwx with min/mod of 2 with min for balance initially due to posterior lean for a short period. Pt able to amb 3' bed to recliner with cam boot in place wbat. She reported increased pain once recliner and feet elevated. Ice recommended but pt declined and will request once back in bed.

## 2024-03-08 NOTE — CASE MANAGEMENT/SOCIAL WORK
Continued Stay Note  [facility]     Patient Name: Brenda Michelle  MRN: [id number]  Today's Date: 3/8/2024    Admit Date: 3/3/2024    Plan: Plan [facility] for skilled care.  NEEL Roblero RN   Discharge Plan       Row Name 03/08/24 1233       Plan    Plan Plan [facility] for skilled care.  NEEL Roblero RN    Patient/Family in Agreement with Plan yes    Plan Comments Spoke with pt, pt's spouse, pt's son (Shiva) and pt's granddaughter Collette at bedside.  Per Ophelia  [phone]) [facility] will have a bed and can accept pt on 3/9.  Pt's son states he can transport pt.  Plan [facility] for skilled care.   CRISTINA Roblero RN                   Discharge Codes    No documentation.                 Expected Discharge Date and Time       Expected Discharge Date Expected Discharge Time    Mar 9, 2024               Ana Roblero RN

## 2024-03-08 NOTE — CASE MANAGEMENT/SOCIAL WORK
Continued Stay Note  [facility]     Patient Name: Brenda Michelle  MRN: [id number]  Today's Date: 3/8/2024    Admit Date: 3/3/2024    Plan: Plan [facility] for skilled care.  NEEL Roblero RN   Discharge Plan       Row Name 03/08/24 1233       Plan    Plan Plan [facility] for skilled care.  NEEL Roblero RN    Patient/Family in Agreement with Plan yes    Plan Comments Spoke with pt, pt's spouse  (Shiva) ,pt's son (Shiva) and pt's granddaughter (Collette) at bedside.  Per Ophelia  [phone]) [facility] will have a bed and can accept pt on 3/9.  Pt's son states he can transport pt.  Plan [facility] for skilled care.   CRISTINA Roblero RN                   Discharge Codes    No documentation.                 Expected Discharge Date and Time       Expected Discharge Date Expected Discharge Time    Mar 9, 2024               Ana Roblero RN

## 2024-03-09 VITALS
OXYGEN SATURATION: 97 % | HEART RATE: 92 BPM | HEIGHT: 63 IN | TEMPERATURE: 97.7 F | WEIGHT: 211 LBS | DIASTOLIC BLOOD PRESSURE: 63 MMHG | RESPIRATION RATE: 16 BRPM | BODY MASS INDEX: 37.39 KG/M2 | SYSTOLIC BLOOD PRESSURE: 133 MMHG

## 2024-03-09 LAB
ANION GAP SERPL CALCULATED.3IONS-SCNC: 10.7 MMOL/L (ref 5–15)
BUN SERPL-MCNC: 5 MG/DL (ref 8–23)
BUN/CREAT SERPL: 8.1 (ref 7–25)
CALCIUM SPEC-SCNC: 9.8 MG/DL (ref 8.6–10.5)
CHLORIDE SERPL-SCNC: 102 MMOL/L (ref 98–107)
CO2 SERPL-SCNC: 28.3 MMOL/L (ref 22–29)
CREAT SERPL-MCNC: 0.62 MG/DL (ref 0.57–1)
DEPRECATED RDW RBC AUTO: 45.6 FL (ref 37–54)
EGFRCR SERPLBLD CKD-EPI 2021: 97.1 ML/MIN/1.73
ERYTHROCYTE [DISTWIDTH] IN BLOOD BY AUTOMATED COUNT: 12.9 % (ref 12.3–15.4)
GLUCOSE SERPL-MCNC: 94 MG/DL (ref 65–99)
HCT VFR BLD AUTO: 33.3 % (ref 34–46.6)
HGB BLD-MCNC: 10.6 G/DL (ref 12–15.9)
MCH RBC QN AUTO: 30.9 PG (ref 26.6–33)
MCHC RBC AUTO-ENTMCNC: 31.8 G/DL (ref 31.5–35.7)
MCV RBC AUTO: 97.1 FL (ref 79–97)
PHOSPHATE SERPL-MCNC: 2 MG/DL (ref 2.5–4.5)
PLATELET # BLD AUTO: 218 10*3/MM3 (ref 140–450)
PMV BLD AUTO: 10.6 FL (ref 6–12)
POTASSIUM SERPL-SCNC: 4.3 MMOL/L (ref 3.5–5.2)
RBC # BLD AUTO: 3.43 10*6/MM3 (ref 3.77–5.28)
SODIUM SERPL-SCNC: 141 MMOL/L (ref 136–145)
WBC NRBC COR # BLD AUTO: 5.54 10*3/MM3 (ref 3.4–10.8)

## 2024-03-09 PROCEDURE — 84100 ASSAY OF PHOSPHORUS: CPT | Performed by: STUDENT IN AN ORGANIZED HEALTH CARE EDUCATION/TRAINING PROGRAM

## 2024-03-09 PROCEDURE — 25810000003 SODIUM CHLORIDE 0.9 % SOLUTION: Performed by: STUDENT IN AN ORGANIZED HEALTH CARE EDUCATION/TRAINING PROGRAM

## 2024-03-09 PROCEDURE — 85027 COMPLETE CBC AUTOMATED: CPT | Performed by: STUDENT IN AN ORGANIZED HEALTH CARE EDUCATION/TRAINING PROGRAM

## 2024-03-09 PROCEDURE — 80048 BASIC METABOLIC PNL TOTAL CA: CPT | Performed by: STUDENT IN AN ORGANIZED HEALTH CARE EDUCATION/TRAINING PROGRAM

## 2024-03-09 RX ORDER — PREGABALIN 150 MG/1
150 CAPSULE ORAL 3 TIMES DAILY
Qty: 9 CAPSULE | Refills: 0 | Status: SHIPPED | OUTPATIENT
Start: 2024-03-09

## 2024-03-09 RX ORDER — HYDROCODONE BITARTRATE AND ACETAMINOPHEN 10; 325 MG/1; MG/1
1 TABLET ORAL EVERY 8 HOURS PRN
Qty: 9 TABLET | Refills: 0 | Status: SHIPPED | OUTPATIENT
Start: 2024-03-09

## 2024-03-09 RX ORDER — PANTOPRAZOLE SODIUM 40 MG/1
40 TABLET, DELAYED RELEASE ORAL
Start: 2024-03-10

## 2024-03-09 RX ADMIN — PANTOPRAZOLE SODIUM 40 MG: 40 TABLET, DELAYED RELEASE ORAL at 05:42

## 2024-03-09 RX ADMIN — HYDROCODONE BITARTRATE AND ACETAMINOPHEN 1 TABLET: 10; 325 TABLET ORAL at 05:45

## 2024-03-09 RX ADMIN — PREGABALIN 150 MG: 75 CAPSULE ORAL at 09:06

## 2024-03-09 RX ADMIN — Medication 1 PACKET: at 10:44

## 2024-03-09 RX ADMIN — DULOXETINE HYDROCHLORIDE 60 MG: 60 CAPSULE, DELAYED RELEASE ORAL at 09:06

## 2024-03-09 RX ADMIN — CLOPIDOGREL BISULFATE 75 MG: 75 TABLET, FILM COATED ORAL at 09:06

## 2024-03-09 RX ADMIN — FOLIC ACID 1000 MCG: 1 TABLET ORAL at 09:06

## 2024-03-09 RX ADMIN — ASPIRIN 81 MG: 81 TABLET, COATED ORAL at 09:06

## 2024-03-09 RX ADMIN — DOCUSATE SODIUM 100 MG: 100 CAPSULE, LIQUID FILLED ORAL at 09:06

## 2024-03-09 NOTE — PLAN OF CARE
Goal Outcome Evaluation:      Pt resting in bed, Aox4 3 LNC , SR/ST on tele. No sign of acute distress noted. Plan of care is ongoing.

## 2024-03-09 NOTE — DISCHARGE SUMMARY
Patient Name: Brenda Michelle  : 1955  MRN: [id number]    Date of Admission: 3/3/2024  Date of Discharge:  3/9/2024  Primary Care Physician: Angelito Kline DO      Chief Complaint:   Syncope and Fall      Discharge Diagnoses     Active Hospital Problems    Diagnosis  POA    **Recurrent syncope [R55]  Yes    Closed nondisplaced fracture of fourth metatarsal bone of right foot [S92.344A]  Yes    Nondisplaced fracture of third metatarsal bone of right foot [S92.334A]  Unknown    Vasovagal episode [R55]  Unknown    Orthostatic syncope [I95.1]  Unknown    Polypharmacy [Z79.899]  Not Applicable    Supplemental oxygen dependent [Z99.81]  Not Applicable    STEVEN (obstructive sleep apnea) [G47.33]  Yes    Obesity, Class III, BMI 40-49.9 (morbid obesity) [E66.01]  Yes    Low back pain [M54.50]  Yes    Chronic respiratory failure with hypoxia [J96.11]  Yes    COPD mixed type [J44.9]  Yes    Peripheral arterial occlusive disease [I77.9]  Yes    Fibromyalgia [M79.7]  Yes    Gastroesophageal reflux disease [K21.9]  Yes    Irritable bowel syndrome [K58.9]  Yes      Resolved Hospital Problems   No resolved problems to display.        Hospital Course     Ms. Michelle is a 68 y.o. female with a history of COPD, IBS, peripheral neuropathy, chronic respiratory failure on home oxygen, and chronic pain who presented to [facility] initially complaining of syncope and fall.  Please see the admitting history and physical for further details.  She was admitted to the hospital for further evaluation and treatment.      Fairly extensive workup was completed with regards to her syncope.  Neurology and cardiology were consulted. The patient is on many medications that could have contributed to her presentation.  The patient chronically is on baclofen, Lyrica, Cymbalta, Mirapex, and hydrocodone.  In addition, she was on phentermine and Mounjaro for weight loss.  The patient's baclofen was discontinued and should not be  restarted at discharge.  In addition, the patient should not resume her phentermine or Mounjaro.  Echocardiogram and EKG were fairly unremarkable.  Neurology felt the tremor and syncope could be due to the high dose of Lyrica the patient was taking.  Her dose was decreased to 150 mg 3 times daily.  Ultimately, it would probably be beneficial to the patient to wean off of this medication.  Will defer this to the PCP.  Certainly polypharmacy and her peripheral neuropathy could have contributed to the syncopal episodes.  Patient was unwilling to complete orthostatics due to pain with standing on her foot, but there was no change in blood pressure from lying to sitting.  She did not have any further episodes of syncope or dizziness while admitted.      The patient did complain of right foot pain after her fall, and initial x-rays were negative.  However, she continued to have pain and swelling.  Orthopedic surgery was consulted for further evaluation.  Repeat x-rays did reveal a fracture.  The patient was placed in a Cam walking boot and weightbearing as tolerated while wearing the boot.  The patient should follow-up with orthopedic surgery in 1 week for repeat x-rays and fracture management.  Urology was consulted because the patient missed her schedule follow-up appointment.  There was no indication for intervention while admitted, and they will reschedule her for outpatient cystoscopy.  The patient worked with physical and occupational therapies and they felt she could benefit from subacute rehab.  [facility] was consulted to assist with placement.  The patient was ultimately discharged to SNF in good condition.    Day of Discharge     Subjective:  No acute events overnight.  Patient continues to have some right foot pain but otherwise feeling well.  No chest pain or shortness of breath.  She is looking forward to being discharged to rehab today.    Physical Exam:  Temp:  [97.3 °F (36.3 °C)-98.6 °F (37 °C)] 97.7 °F (36.5  °C)  Heart Rate:  [87-99] 92  Resp:  [16-20] 16  BP: (110-142)/(63-77) 133/63  Body mass index is 37.38 kg/m².  Physical Exam  General: Alert, no acute distress.  Sitting up in bed.  Answers questions appropriately.  ENT: No conjunctival injection or scleral icterus. Moist mucous membranes.   Neuro: Eyes open and moving in all directions, strength normal in all extremities, no focal deficits.   Lungs: Clear to auscultation bilaterally. No wheeze or crackles. No distress.   Heart: RRR, no murmurs.  Abdomen: Morbidly obese.  Soft, non-tender, non-distended. Normal bowel sounds.   Ext: Right foot with bruising and swelling.  Skin: No rashes or lesions. IV site without swelling or erythema.     Consultants     Consult Orders (all) (From admission, onward)       Start     Ordered    03/04/24 1247  Inpatient Urology Consult  Once        Specialty:  Urology  Provider:  Ti Hoyos MD    03/04/24 1247    03/03/24 2151  Inpatient Orthopedic Surgery Consult  Once        Specialty:  Orthopedic Surgery  Provider:  Lionel Fleming MD    03/03/24 2151 03/03/24 2149  Inpatient Cardiology Consult  Once        Specialty:  Cardiology  Provider:  He Bullard MD    03/03/24 2149 03/03/24 2149  Inpatient Neurology Consult General  Once        Specialty:  Neurology  Provider:  Chirag Hong MD    03/03/24 2149 03/03/24 1426  [facility] (on-call MD unless specified) Details  Once        Specialty:  Hospitalist  Provider:  Chiara Clements MD    03/03/24 1425                  Procedures     * Surgery not found *    Imaging Results (All)       Procedure Component Value Units Date/Time    XR Foot 3+ View Right [[id number]] Collected: 03/05/24 1118     Updated: 03/05/24 1128    Narrative:      XR FOOT 3+ VW RIGHT-     INDICATION: Right foot and ankle pain post fall     COMPARISON: Ankle radiograph 3/3/2024 and foot radiograph 11/23/2019       Impression:      Acute nondisplaced transverse fractures through the  third  and fourth metatarsal proximal diaphyses. Nondisplaced transverse distal  fibular fracture below the level of the syndesmosis. Low bone  mineralization. Normal midfoot alignment. Mild IP joint osteoarthritis.     This report was finalized on 3/5/2024 11:25 AM by Dr. Didier Mckeon M.D on Workstation: [id number]       XR Ankle 3+ View Right [[id number]] Collected: 03/03/24 1413     Updated: 03/03/24 1417    Narrative:      XR ANKLE 3+ VW RIGHT-        INDICATION: Swelling after fall     COMPARISON: None     TECHNIQUE: 3 view right ankle     FINDINGS:      Decreased bone mineralization. No fracture. No dislocation. Symmetric  mortise. Preserved tibiotalar joint. Lateral ankle soft tissue swelling.       Impression:      No fracture or dislocation. Lateral ankle soft tissue swelling     This report was finalized on 3/3/2024 2:14 PM by Dr. Abisai Berger M.D  on Workstation: [id number]       XR Knee 1 or 2 View Bilateral [[id number]] Collected: 03/03/24 1411     Updated: 03/03/24 1416    Narrative:      XR KNEE 1 OR 2 VW BILATERAL-        INDICATION: Pain after seizure or syncope     COMPARISON: None     TECHNIQUE: 2 view of the bilateral knees     FINDINGS:      Left knee: No fracture. No bone lesion. No dislocation. Tricompartmental  osteoarthritis with osteophytosis. No effusion.     Right knee: No fracture. Medial compartment arthroplasty. No lucency  surrounding the hardware. Lateral and patellofemoral joint  osteophytosis. No effusion.     Other: Vascular atherosclerotic calcifications.       Impression:         1. No fracture or dislocation.  2. Right knee medial compartment arthroplasty.  3. Mild left knee osteoarthritis.  4. Mild osteoarthritis in the right knee lateral and patellofemoral  joints     This report was finalized on 3/3/2024 2:13 PM by Dr. Abisai Berger M.D  on Workstation: [id number]       CT Cervical Spine Without Contrast [[id number]] Collected: 03/03/24 1404     Updated: 03/03/24  1411    Narrative:      CT CERVICAL SPINE WO CONTRAST-     INDICATION: Fall. Seizure versus syncope. Neck pain.     COMPARISON: Cervical spine CT June 4, 2021     TECHNIQUE:  Routine CT cervical spine without IV contrast. Coronal and sagittal  reformats. Radiation dose reduction techniques were utilized, including  automated exposure control and exposure modulation based on body size.     FINDINGS:      Lung apices: Emphysematous changes seen at the apices. Secretions seen  in the trachea.     Soft tissues: Bilateral carotid bifurcation atherosclerotic  calcifications.     Alignment: Cervical spine straightening. No spondylolisthesis.     Osseous structures: No fracture. Decreased bone mineralization.  Degenerative atlantodens articulation. Mild multilevel facet  degenerative arthropathy, greatest on the left at C3/C4, C4/C5, C5/C6  and C6/C7.       Impression:      No fracture. Chronic findings above.     This report was finalized on 3/3/2024 2:08 PM by Dr. Abisai Berger M.D  on Workstation: [id number]       CT Head Without Contrast [[id number]] Collected: 03/03/24 1402     Updated: 03/03/24 1407    Narrative:      CT HEAD WO CONTRAST-     INDICATION: Seizure versus syncope. Hit head.     COMPARISON: CT head June 4, 2021     TECHNIQUE:  Routine CT head without IV contrast. Coronal and sagittal reformats.  Radiation dose reduction techniques were utilized, including automated  exposure control and exposure modulation based on body size.     FINDINGS:      Brain: No intraparenchymal hemorrhage. Preserved gray-white  differentiation. No mass effect or midline shift. Chronic small vessel  ischemic changes.     Ventricles: No intraventricular hemorrhage. Ventriculomegaly, suspect ex  vacuo dilatation from central volume loss.     Extra-axial spaces: No extra-axial hemorrhage or fluid collection.     Orbits: Suspect cataract extractions.     Osseous structures: No fracture. No bone lesion.     Sinuses: Patent mastoid  air cells and middle ears.       Impression:      No acute intracranial process.     This report was finalized on 3/3/2024 2:04 PM by Dr. Abisai Berger M.D  on Workstation: [id number]             Results for orders placed in visit on 08/14/20    SCANNED VASCULAR STUDIES    Results for orders placed during the hospital encounter of 03/03/24    Adult Transthoracic Echo Complete W/ Cont if Necessary Per Protocol    Interpretation Summary    Left ventricular systolic function is normal. Left ventricular ejection fraction appears to be 66 - 70%.    Left ventricular diastolic function is consistent with (grade I) impaired relaxation.    Normal right ventricular cavity size and systolic function noted.    Calculated right ventricular systolic pressure from tricuspid regurgitation is 13 mmHg.    There is a small circumferential pericardial effusion which is most prominent anteriorly. There is no tamponade    Pertinent Labs     Results from last 7 days   Lab Units 03/09/24 0424 03/08/24 0949 03/07/24  0742 03/06/24  0311   WBC 10*3/mm3 5.54 5.62 5.17 5.75   HEMOGLOBIN g/dL 10.6* 11.2* 11.0* 11.2*   PLATELETS 10*3/mm3 218 194 191 168     Results from last 7 days   Lab Units 03/09/24 0424 03/08/24 2019 03/08/24 0949 03/07/24  0742 03/06/24  0311   SODIUM mmol/L 141  --  142 139 138   POTASSIUM mmol/L 4.3 4.6 3.6 4.3 4.1   CHLORIDE mmol/L 102  --  101 98 97*   CO2 mmol/L 28.3  --  31.1* 29.2* 23.7   BUN mg/dL 5*  --  7* 8 9   CREATININE mg/dL 0.62  --  0.63 0.65 0.70   GLUCOSE mg/dL 94  --  103* 87 50*   EGFR mL/min/1.73 97.1  --  96.8 96.0 94.3     Results from last 7 days   Lab Units 03/04/24  0304 03/03/24  1328   ALBUMIN g/dL 3.3* 3.7   BILIRUBIN mg/dL 0.4 0.4   ALK PHOS U/L 83 85   AST (SGOT) U/L 22 25   ALT (SGPT) U/L 12 13     Results from last 7 days   Lab Units 03/09/24 0424 03/08/24 0949 03/07/24  1956 03/07/24  0742 03/06/24  1622 03/06/24  0311 03/04/24  0304 03/03/24  1328   CALCIUM mg/dL 9.8 9.4  --   9.5  --  9.6 9.5 10.4   ALBUMIN g/dL  --   --   --   --   --   --  3.3* 3.7   MAGNESIUM mg/dL  --   --   --  1.7  --  1.7  --  1.6   PHOSPHORUS mg/dL 2.0* 2.6 2.1* 1.7*   < > 1.9*  --   --     < > = values in this interval not displayed.       Results from last 7 days   Lab Units 03/03/24  1328   HSTROP T ng/L 21*   PROBNP pg/mL 98.3       Results from last 7 days   Lab Units 03/04/24  0304   CHOLESTEROL mg/dL 166   TRIGLYCERIDES mg/dL 89   HDL CHOL mg/dL 57   LDL CHOL mg/dL 93             Test Results Pending at Discharge   No pending test results at time of discharge    Discharge Details        Discharge Medications        New Medications        Instructions Start Date   pantoprazole 40 MG EC tablet  Commonly known as: PROTONIX   40 mg, Oral, Every Early Morning   Start Date: March 10, 2024            Changes to Medications        Instructions Start Date   HYDROcodone-acetaminophen  MG per tablet  Commonly known as: NORCO  What changed: when to take this   1 tablet, Oral, Every 8 Hours PRN      pravastatin 80 MG tablet  Commonly known as: PRAVACHOL  What changed: See the new instructions.   TAKE 1 TABLET BY MOUTH DAILY  (DISCONTINUE ROSUVASTATIN AS  INTOLERANT.TRY NEW)      pregabalin 150 MG capsule  Commonly known as: LYRICA  What changed: when to take this   150 mg, Oral, 3 Times Daily             Continue These Medications        Instructions Start Date   ASPIR-81 PO   81 mg, Oral, Daily      clopidogrel 75 MG tablet  Commonly known as: PLAVIX   75 mg, Oral, Daily      docusate sodium 100 MG capsule  Commonly known as: COLACE   100 mg, Oral, 2 Times Daily      DULoxetine 60 MG capsule  Commonly known as: CYMBALTA   TAKE 1 CAPSULE DAILY      folic acid 1 MG tablet  Commonly known as: FOLVITE   TAKE 1 TABLET DAILY      meclizine 25 MG tablet  Commonly known as: ANTIVERT   25 mg, Oral, 3 Times Daily PRN      pramipexole 0.25 MG tablet  Commonly known as: MIRAPEX   TAKE ONE TABLET BY MOUTH ONCE NIGHTLY       risedronate 150 MG tablet  Commonly known as: ACTONEL   TAKE 1 TABLET BY MOUTH EVERY 30  DAYS WITH WATER ON AN EMPTY  STOMACH, NOTHING BY MOUTH AND DO NOT LIE DOWN FOR NEXT 30 MINUTES      vitamin B-12 1000 MCG tablet  Commonly known as: CYANOCOBALAMIN   1,000 mcg, Oral, Daily      vitamin D3 125 MCG (5000 UT) capsule capsule   5,000 Units, Oral, Daily             Stop These Medications      baclofen 10 MG tablet  Commonly known as: LIORESAL     phentermine 37.5 MG tablet  Commonly known as: ADIPEX-P     Tirzepatide 10 MG/0.5ML solution pen-injector pen  Commonly known as: MOUNJARO              Allergies   Allergen Reactions    Ambien [Zolpidem Tartrate] Other (See Comments)     Nightmares    Bupropion Itching    Codeine Sulfate Itching    Zolpidem Hives     Nightmares    Adhesive Tape Rash    Aripiprazole Unknown - Low Severity    Atorvastatin Other (See Comments)     MYALGIAS    Cilostazol Other (See Comments)     HA    Colesevelam Nausea Only    Ferrous Sulfate Nausea Only    Welchol [Colesevelam Hcl] Nausea Only    Wound Dressing Adhesive Rash       Discharge Disposition:  Skilled Nursing Facility (DC - External)      Discharge Diet:  Diet Order   Procedures    Diet: Cardiac, Renal; Healthy Heart (2-3 Na+); Low Sodium (2-3g); Fluid Consistency: Thin (IDDSI 0)       Discharge Activity: Weightbearing as tolerated      CODE STATUS:    Code Status and Medical Interventions:   Ordered at: 03/03/24 1654     Code Status (Patient has no pulse and is not breathing):    CPR (Attempt to Resuscitate)     Medical Interventions (Patient has pulse or is breathing):    Full Support       Future Appointments   Date Time Provider Department Center   5/16/2024  1:30 PM Angelito Kline DO [facility]      Contact information for follow-up providers       Lionel Fleming MD Follow up in 1 week(s).    Specialty: Orthopedic Surgery  Contact information:  [address] [id number] [phone]                Angelito Kline DO .    Specialties: Family Medicine, Urgent Care, Emergency Medicine  Contact information:  [address]  [address]  [facility] [address]  [phone]                       Contact information for after-discharge care       [facility] .    Service: Skilled Nursing  Contact information:  [address] [address] [phone]                                   Time Spent on Discharge:  Greater than 30 minutes      Julia Munoz MD  [facility]  03/09/24  09:09 EST

## 2024-03-09 NOTE — CASE MANAGEMENT/SOCIAL WORK
Continued Stay Note  [facility]     Patient Name: Brenda Michelle  MRN: [id number]  Today's Date: 3/9/2024    Admit Date: 3/3/2024    Plan: [facility] via family to transport   Discharge Plan       Row Name 03/09/24 0907       Plan    Plan [facility] via family to transport    Plan Comments Message to Ophelia/Samantha to notify that pt will be discharging to [facility] today. [facility]                   Discharge Codes    No documentation.                 Expected Discharge Date and Time       Expected Discharge Date Expected Discharge Time    Mar 9, 2024               Grady Wallace RN

## 2024-03-10 NOTE — CASE MANAGEMENT/SOCIAL WORK
Case Management Discharge Note      Final Note: [facility]    Provided Post Acute Provider List?: Yes  Post Acute Provider List: Home Health, Nursing Home  Provided Post Acute Provider Quality & Resource List?: Yes  Post Acute Provider Quality and Resource List: Home Health, Inpatient Rehab  Delivered To: Patient  Method of Delivery: In person    Selected Continued Care - Discharged on 3/9/2024 Admission date: 3/3/2024 - Discharge disposition: Skilled Nursing Facility (DC - External)      Destination Coordination complete.      Service Provider Selected Services Address Phone Fax Patient Preferred    [facility] [address] [address] [phone] --              Durable Medical Equipment    No services have been selected for the patient.                Dialysis/Infusion    No services have been selected for the patient.                Home Medical Care    No services have been selected for the patient.                Therapy    No services have been selected for the patient.                Community Resources    No services have been selected for the patient.                Community & DME    No services have been selected for the patient.                    Transportation Services  Private: Car    Final Discharge Disposition Code: 03 - skilled nursing facility (SNF)

## 2024-03-13 ENCOUNTER — READMISSION MANAGEMENT (OUTPATIENT)
Dept: CALL CENTER | Facility: HOSPITAL | Age: 69
End: 2024-03-13
Payer: MEDICARE

## 2024-03-13 NOTE — OUTREACH NOTE
Prep Survey      Flowsheet Row Responses   Confucianism facility patient discharged from? Non-BH   Is LACE score < 7 ? Non-BH Discharge   Eligibility [facility]   Date of Discharge 03/13/24   Discharge diagnosis Chronic obstructive pulmonary disease   Does the patient have one of the following disease processes/diagnoses(primary or secondary)? COPD   Prep survey completed? Yes            Mey SOUTH - Registered Nurse

## 2024-03-14 ENCOUNTER — TRANSITIONAL CARE MANAGEMENT TELEPHONE ENCOUNTER (OUTPATIENT)
Dept: CALL CENTER | Facility: HOSPITAL | Age: 69
End: 2024-03-14
Payer: MEDICARE

## 2024-03-14 NOTE — OUTREACH NOTE
Call Center TCM Note      Flowsheet Row Responses   [facility] [place] patient discharged from? Non-  [[facility]]   Does the patient have one of the following disease processes/diagnoses(primary or secondary)? COPD   TCM attempt successful? Yes   Call start time 1427   Call end time 1428   Discharge diagnosis Chronic obstructive pulmonary disease   Is patient permission given to speak with other caregiver? Yes   List who call center can speak with spouse- Shiva   Person spoke with today (if not patient) and relationship spouse   Does the patient have an appointment with their PCP within 7-14 days of discharge? No   Nursing Interventions Routed TCM call to PCP office   Psychosocial issues? No   What is the patient's perception of their health status since discharge? Improving   Is the patient/caregiver able to teach back signs and symptoms related to disease process for when to call PCP? Yes   Is the patient/caregiver able to teach back signs and symptoms related to disease process for when to call 911? Yes   Is the patient/caregiver able to teach back the hierarchy of who to call/visit for symptoms/problems? PCP, Specialist, Home health nurse, Urgent Care, ED, 911 Yes   TCM call completed? Yes   Wrap up additional comments Brief call with spouse, they were currently at an ortho appt f/u for patient.   Call end time 1428   Would this patient benefit from a Referral to Amb Social Work? No   Is the patient interested in additional calls from an ambulatory ? No            Mayte Quinteros RN    3/14/2024, 14:28 EDT

## 2024-03-18 ENCOUNTER — TELEPHONE (OUTPATIENT)
Dept: FAMILY MEDICINE CLINIC | Facility: CLINIC | Age: 69
End: 2024-03-18
Payer: MEDICARE

## 2024-03-19 NOTE — PROGRESS NOTES
Subjective   Brenda Michelle is a 68 y.o. female. Presents today for Transition of care Management - Recent hospitalization for loss of consciousness      History Of Present Illness    She had loss of consciousness and had to be transported to ED.  Upon arrival, they stopped all of her medications.  Upon discharge they restarted some of her medications but did not restart her Mounjaro or Phentermine.    Alopecia - she is having hair loss from her restless leg medication and would like that changed.          Patient Active Problem List   Diagnosis    Acute deep vein thrombosis of distal leg    Adenomatous polyp of colon    Depression    Fibromyalgia    Dyslipidemia    Peripheral arterial occlusive disease    Vitamin D deficiency    Gastroesophageal reflux disease    Irritable bowel syndrome    Disorder of intervertebral disc    Peripheral nerve disease    Fracture of multiple ribs of right side    Fall    Acute respiratory failure with hypoxia    COPD mixed type    Weakness    Hypopotassemia    Dysuria    Constipation    Hemorrhoids    Arthritis    Fibromyositis    Hernia of anterior abdominal wall    Homocystinemia    Injury of right ankle    Kidney stone    Knee pain    Moderate ankle sprain    Osteoarthritis of knee    Spinal stenosis, lumbar region without neurogenic claudication    Chronic respiratory failure with hypoxia    Bursitis of hip    Lumbar facet joint pain    Low back pain    Acute cystitis    Obesity, Class III, BMI 40-49.9 (morbid obesity)    Abnormal CT scan, gastrointestinal tract    Wheelchair dependence    Supplemental oxygen dependent    Dietary counseling    Pre-op evaluation    STEVEN (obstructive sleep apnea)    Lumbar spondylosis    Recurrent syncope    Vasovagal episode    Orthostatic syncope    Polypharmacy    Closed nondisplaced fracture of fourth metatarsal bone of right foot    Nondisplaced fracture of third metatarsal bone of right foot       Social History     Socioeconomic History     Marital status:     Number of children: 2    Years of education: 12    Tobacco Use    Smoking status: Former     Current packs/day: 0.00     Average packs/day: 0.8 packs/day for 60.8 years (45.6 ttl pk-yrs)     Types: Cigarettes     Start date: 1/1/1960     Quit date: 11/4/2020     Years since quitting: 3.3    Smokeless tobacco: Never   Vaping Use    Vaping status: Never Used   Substance and Sexual Activity    Alcohol use: No    Drug use: No    Sexual activity: Not Currently     Partners: Male       Allergies   Allergen Reactions    Ambien [Zolpidem Tartrate] Other (See Comments)     Nightmares    Bupropion Itching    Codeine Sulfate Itching    Zolpidem Hives     Nightmares    Adhesive Tape Rash    Aripiprazole Unknown - Low Severity    Atorvastatin Other (See Comments)     MYALGIAS    Cilostazol Other (See Comments)     HA    Colesevelam Nausea Only    Ferrous Sulfate Nausea Only    Welchol [Colesevelam Hcl] Nausea Only    Wound Dressing Adhesive Rash       Current Outpatient Medications on File Prior to Visit   Medication Sig Dispense Refill    Aspirin (ASPIR-81 PO) Take 81 mg by mouth Daily.      Cholecalciferol (Vitamin D3) 50 MCG (2000 UT) capsule       clopidogrel (PLAVIX) 75 MG tablet TAKE 1 TABLET BY MOUTH DAILY 90 tablet 1    docusate sodium (COLACE) 100 MG capsule Take 1 capsule by mouth 2 (Two) Times a Day.      DULoxetine (CYMBALTA) 60 MG capsule TAKE 1 CAPSULE DAILY 90 capsule 3    folic acid (FOLVITE) 1 MG tablet TAKE 1 TABLET DAILY 90 tablet 3    HYDROcodone-acetaminophen (NORCO)  MG per tablet Take 1 tablet by mouth Every 8 (Eight) Hours As Needed for Moderate Pain. 9 tablet 0    pravastatin (PRAVACHOL) 80 MG tablet TAKE 1 TABLET BY MOUTH DAILY  (DISCONTINUE ROSUVASTATIN AS  INTOLERANT.TRY NEW) (Patient taking differently: Every Night.) 90 tablet 3    pregabalin (LYRICA) 150 MG capsule Take 1 capsule by mouth 3 (Three) Times a Day. 9 capsule 0    risedronate (ACTONEL) 150 MG tablet TAKE  "1 TABLET BY MOUTH EVERY 30  DAYS WITH WATER ON AN EMPTY  STOMACH, NOTHING BY MOUTH AND DO NOT LIE DOWN FOR NEXT 30 MINUTES 3 tablet 3    vitamin B-12 (CYANOCOBALAMIN) 1000 MCG tablet Take 1 tablet by mouth Daily.      vitamin D3 125 MCG (5000 UT) capsule capsule Take 1 capsule by mouth Daily.      [DISCONTINUED] pantoprazole (PROTONIX) 40 MG EC tablet Take 1 tablet by mouth Every Morning.      [DISCONTINUED] pramipexole (MIRAPEX) 0.25 MG tablet TAKE ONE TABLET BY MOUTH ONCE NIGHTLY 90 tablet 3    meclizine (ANTIVERT) 25 MG tablet Take 1 tablet by mouth 3 (Three) Times a Day As Needed for Dizziness. (Patient not taking: Reported on 3/20/2024) 45 tablet 0     No current facility-administered medications on file prior to visit.       Objective   Vitals:    03/20/24 1424   BP: 122/68   Pulse: 112   SpO2: 98%   Weight: 96.6 kg (213 lb)   Height: 160 cm (63\")     Body mass index is 37.73 kg/m².    Physical Exam  Constitutional:       Appearance: She is obese.   HENT:      Head: Normocephalic and atraumatic.      Nose: Nose normal.      Mouth/Throat:      Mouth: Mucous membranes are moist. Mucous membranes are dry.   Eyes:      Pupils: Pupils are equal, round, and reactive to light.   Cardiovascular:      Rate and Rhythm: Normal rate and regular rhythm.      Pulses: Normal pulses.      Heart sounds: Normal heart sounds.   Pulmonary:      Effort: Pulmonary effort is normal.      Breath sounds: Normal breath sounds.   Abdominal:      General: Bowel sounds are normal.   Musculoskeletal:         General: Normal range of motion.   Skin:     General: Skin is warm and dry.      Capillary Refill: Capillary refill takes less than 2 seconds.   Neurological:      General: No focal deficit present.      Mental Status: She is alert.   Psychiatric:         Mood and Affect: Mood normal.     Procedures     Assessment & Plan   Diagnoses and all orders for this visit:    1. Gastroesophageal reflux disease without esophagitis (Primary)  -    "  Discontinue: pantoprazole (PROTONIX) 40 MG EC tablet; Take 1 tablet by mouth Every Morning.  -     pantoprazole (PROTONIX) 40 MG EC tablet; Take 1 tablet by mouth Every Morning.    2. Restless legs  -     Discontinue: rOPINIRole (REQUIP) 0.25 MG tablet; Take 1 tablet by mouth Every Night. Take 1 hour before bedtime.  Dispense: 90 tablet; Refill: 3  -     rOPINIRole (REQUIP) 0.25 MG tablet; Take 1 tablet by mouth Every Night. Take 1 hour before bedtime.  Dispense: 90 tablet; Refill: 3    3. Alopecia  -     spironolactone (Aldactone) 50 MG tablet; Take 1 tablet by mouth Daily.  Dispense: 90 tablet; Refill: 3    4. Type 2 diabetes mellitus with other diabetic arthropathy, without long-term current use of insulin  -     Discontinue: Tirzepatide (MOUNJARO) 10 MG/0.5ML solution pen-injector pen; Inject 0.5 mL under the skin into the appropriate area as directed 1 (One) Time Per Week.  Dispense: 2 mL; Refill: 5  -     Tirzepatide (MOUNJARO) 10 MG/0.5ML solution pen-injector pen; Inject 0.5 mL under the skin into the appropriate area as directed 1 (One) Time Per Week.  Dispense: 2 mL; Refill: 5    5. Loss of weight  -     Discontinue: phentermine 37.5 MG capsule; Take 1 capsule by mouth Every Morning.  Dispense: 30 capsule; Refill: 5  -     phentermine 37.5 MG capsule; Take 1 capsule by mouth Every Morning.  Dispense: 30 capsule; Refill: 5    Medications were sent to local pharmacy.  Then patient stated it need to go to mail order.  Medications were then sent to mail order to keep patient from having to pay so much for medications.    Discussed Care Gaps, ordered referrals and encouraged vaccination updates.       - Pt agrees with plan of care and denies further questions/concerns today  - This document is intended for medical expert use only. Persons  reading this document without medical staff guidance may result in misinterpretation and unintended morbidity     Go to the ER for any possible life-threatening symptoms  such as chest pain or shortness of air.      Please allow 3-5 business days for recommendations based on new results      I personally spent time with this patient, preparing for the visit, reviewing tests, obtaining and/or reviewing a separately obtained history, performing a medically appropriate examination and/or evaluation, counseling and educating the patient/family/caregiver, ordering medications,  documenting information in the medical record and indepentently interpreting results.                       Return in about 8 weeks (around 5/16/2024) for Next scheduled follow up with Dr. Kline.

## 2024-03-20 ENCOUNTER — OFFICE VISIT (OUTPATIENT)
Dept: FAMILY MEDICINE CLINIC | Facility: CLINIC | Age: 69
End: 2024-03-20
Payer: MEDICARE

## 2024-03-20 VITALS
OXYGEN SATURATION: 98 % | WEIGHT: 213 LBS | HEART RATE: 112 BPM | HEIGHT: 63 IN | SYSTOLIC BLOOD PRESSURE: 122 MMHG | BODY MASS INDEX: 37.74 KG/M2 | DIASTOLIC BLOOD PRESSURE: 68 MMHG

## 2024-03-20 DIAGNOSIS — E11.618 TYPE 2 DIABETES MELLITUS WITH OTHER DIABETIC ARTHROPATHY, WITHOUT LONG-TERM CURRENT USE OF INSULIN: ICD-10-CM

## 2024-03-20 DIAGNOSIS — K21.9 GASTROESOPHAGEAL REFLUX DISEASE WITHOUT ESOPHAGITIS: Primary | ICD-10-CM

## 2024-03-20 DIAGNOSIS — L65.9 ALOPECIA: ICD-10-CM

## 2024-03-20 DIAGNOSIS — R63.4 LOSS OF WEIGHT: ICD-10-CM

## 2024-03-20 DIAGNOSIS — G25.81 RESTLESS LEGS: ICD-10-CM

## 2024-03-20 RX ORDER — ROPINIROLE 0.25 MG/1
0.25 TABLET, FILM COATED ORAL NIGHTLY
Qty: 90 TABLET | Refills: 3 | Status: SHIPPED | OUTPATIENT
Start: 2024-03-20

## 2024-03-20 RX ORDER — SPIRONOLACTONE 50 MG/1
50 TABLET, FILM COATED ORAL DAILY
Qty: 90 TABLET | Refills: 3 | Status: SHIPPED | OUTPATIENT
Start: 2024-03-20

## 2024-03-20 RX ORDER — PANTOPRAZOLE SODIUM 40 MG/1
40 TABLET, DELAYED RELEASE ORAL
Start: 2024-03-20 | End: 2024-03-20 | Stop reason: SDUPTHER

## 2024-03-20 RX ORDER — ROPINIROLE 0.25 MG/1
0.25 TABLET, FILM COATED ORAL NIGHTLY
Qty: 90 TABLET | Refills: 3 | Status: SHIPPED | OUTPATIENT
Start: 2024-03-20 | End: 2024-03-20 | Stop reason: SDUPTHER

## 2024-03-20 RX ORDER — PHENTERMINE HYDROCHLORIDE 37.5 MG/1
37.5 CAPSULE ORAL EVERY MORNING
Qty: 30 CAPSULE | Refills: 5 | Status: SHIPPED | OUTPATIENT
Start: 2024-03-20 | End: 2024-03-20 | Stop reason: SDUPTHER

## 2024-03-20 RX ORDER — PHENTERMINE HYDROCHLORIDE 37.5 MG/1
37.5 CAPSULE ORAL EVERY MORNING
Qty: 30 CAPSULE | Refills: 5 | Status: SHIPPED | OUTPATIENT
Start: 2024-03-20

## 2024-03-20 RX ORDER — ACETAMINOPHEN 160 MG
TABLET,DISINTEGRATING ORAL
COMMUNITY

## 2024-03-20 RX ORDER — PANTOPRAZOLE SODIUM 40 MG/1
40 TABLET, DELAYED RELEASE ORAL
Start: 2024-03-20

## 2024-03-26 DIAGNOSIS — I70.0 ATHEROSCLEROSIS OF AORTA: Primary | ICD-10-CM

## 2024-03-26 DIAGNOSIS — I73.9 PERIPHERAL VASCULAR DISEASE, UNSPECIFIED: ICD-10-CM

## 2024-03-28 DIAGNOSIS — K21.9 GASTROESOPHAGEAL REFLUX DISEASE WITHOUT ESOPHAGITIS: ICD-10-CM

## 2024-03-28 RX ORDER — PANTOPRAZOLE SODIUM 40 MG/1
40 TABLET, DELAYED RELEASE ORAL
Qty: 90 TABLET | Refills: 1
Start: 2024-03-28 | End: 2024-03-29 | Stop reason: SDUPTHER

## 2024-03-28 NOTE — TELEPHONE ENCOUNTER
Caller: Shiva Michelle Sr    Relationship: Emergency Contact    Best call back number: [phone]     Requested Prescriptions:   Requested Prescriptions     Pending Prescriptions Disp Refills    pantoprazole (PROTONIX) 40 MG EC tablet       Sig: Take 1 tablet by mouth Every Morning.        Pharmacy where request should be sent: [address] [phone] [facility] [phone]      Last office visit with prescribing clinician: 11/16/2023   Last telemedicine visit with prescribing clinician: Visit date not found   Next office visit with prescribing clinician: 5/16/2024     Additional details provided by patient: WILL NEED NEW PRESCRIPTION WITH 90 DAY SUPPLY.  PATIENT IS COMPLETELY OUT.      Does the patient have less than a 3 day supply:  [x] Yes  [] No    Would you like a call back once the refill request has been completed: [] Yes [] No    If the office needs to give you a call back, can they leave a voicemail: [] Yes [] No    Avelina Tate Rep   03/28/24 15:30 EDT

## 2024-03-29 DIAGNOSIS — K21.9 GASTROESOPHAGEAL REFLUX DISEASE WITHOUT ESOPHAGITIS: ICD-10-CM

## 2024-03-29 RX ORDER — PANTOPRAZOLE SODIUM 40 MG/1
40 TABLET, DELAYED RELEASE ORAL
Qty: 90 TABLET | Refills: 1
Start: 2024-03-29

## 2024-04-01 DIAGNOSIS — G60.9 IDIOPATHIC PERIPHERAL NEUROPATHY: ICD-10-CM

## 2024-04-01 NOTE — TELEPHONE ENCOUNTER
Caller: Shiva Michelle Sr    Relationship: Emergency Contact    Best call back number: [phone]     Requested Prescriptions:   Requested Prescriptions     Pending Prescriptions Disp Refills    pregabalin (LYRICA) 150 MG capsule 9 capsule 0     Sig: Take 1 capsule by mouth 3 (Three) Times a Day.        Pharmacy where request should be sent: [address] [phone] [facility] [phone]      Last office visit with prescribing clinician: 11/16/2023   Last telemedicine visit with prescribing clinician: Visit date not found   Next office visit with prescribing clinician: 5/16/2024     Additional details provided by patient: DOSAGE CHANGED WHILE IN THE HOSPITAL FROM 4X'S DAILY TO 3X'S DAILY. PLEASE SEND NEW PRESCRIPTION.     Does the patient have less than a 3 day supply:  [] Yes  [x] No    Would you like a call back once the refill request has been completed: [] Yes [x] No    Avelina Tillman Rep   04/01/24 13:30 EDT

## 2024-04-02 DIAGNOSIS — K21.9 GASTROESOPHAGEAL REFLUX DISEASE WITHOUT ESOPHAGITIS: ICD-10-CM

## 2024-04-02 RX ORDER — PREGABALIN 150 MG/1
150 CAPSULE ORAL 3 TIMES DAILY
Qty: 90 CAPSULE | Refills: 2 | Status: SHIPPED | OUTPATIENT
Start: 2024-04-02

## 2024-04-02 RX ORDER — PANTOPRAZOLE SODIUM 40 MG/1
40 TABLET, DELAYED RELEASE ORAL
Qty: 90 TABLET | Refills: 1 | Status: SHIPPED | OUTPATIENT
Start: 2024-04-02 | End: 2024-04-02 | Stop reason: SDUPTHER

## 2024-04-02 RX ORDER — PANTOPRAZOLE SODIUM 40 MG/1
40 TABLET, DELAYED RELEASE ORAL
Qty: 90 TABLET | Refills: 1 | Status: SHIPPED | OUTPATIENT
Start: 2024-04-02

## 2024-04-11 DIAGNOSIS — I77.9 PERIPHERAL ARTERIAL OCCLUSIVE DISEASE: ICD-10-CM

## 2024-04-12 RX ORDER — CLOPIDOGREL BISULFATE 75 MG/1
75 TABLET ORAL DAILY
Qty: 90 TABLET | Refills: 3 | Status: SHIPPED | OUTPATIENT
Start: 2024-04-12

## 2024-05-01 DIAGNOSIS — M81.0 AGE-RELATED OSTEOPOROSIS WITHOUT CURRENT PATHOLOGICAL FRACTURE: ICD-10-CM

## 2024-05-01 DIAGNOSIS — E78.5 DYSLIPIDEMIA: ICD-10-CM

## 2024-05-02 RX ORDER — PRAVASTATIN SODIUM 80 MG/1
80 TABLET ORAL DAILY
Qty: 90 TABLET | Refills: 3 | Status: SHIPPED | OUTPATIENT
Start: 2024-05-02

## 2024-05-02 RX ORDER — RISEDRONATE SODIUM 150 MG/1
TABLET, FILM COATED ORAL
Qty: 3 TABLET | Refills: 3 | Status: SHIPPED | OUTPATIENT
Start: 2024-05-02

## 2024-05-16 ENCOUNTER — OFFICE VISIT (OUTPATIENT)
Dept: FAMILY MEDICINE CLINIC | Facility: CLINIC | Age: 69
End: 2024-05-16
Payer: MEDICARE

## 2024-05-16 VITALS
WEIGHT: 210 LBS | HEIGHT: 63 IN | OXYGEN SATURATION: 94 % | DIASTOLIC BLOOD PRESSURE: 72 MMHG | SYSTOLIC BLOOD PRESSURE: 112 MMHG | HEART RATE: 81 BPM | BODY MASS INDEX: 37.21 KG/M2

## 2024-05-16 DIAGNOSIS — R73.01 IFG (IMPAIRED FASTING GLUCOSE): ICD-10-CM

## 2024-05-16 DIAGNOSIS — Z00.00 MEDICARE ANNUAL WELLNESS VISIT, SUBSEQUENT: Primary | ICD-10-CM

## 2024-05-16 DIAGNOSIS — E78.5 DYSLIPIDEMIA: ICD-10-CM

## 2024-05-16 DIAGNOSIS — N81.10 BLADDER PROLAPSE, FEMALE, ACQUIRED: ICD-10-CM

## 2024-05-16 DIAGNOSIS — R63.4 LOSS OF WEIGHT: ICD-10-CM

## 2024-05-16 DIAGNOSIS — Z79.899 DRUG THERAPY: ICD-10-CM

## 2024-05-16 DIAGNOSIS — L65.9 ALOPECIA: ICD-10-CM

## 2024-05-16 DIAGNOSIS — E66.9 OBESITY (BMI 30-39.9): ICD-10-CM

## 2024-05-16 DIAGNOSIS — Z23 NEED FOR PNEUMOCOCCAL VACCINATION: ICD-10-CM

## 2024-05-16 PROBLEM — S82.64XA CLOSED NONDISPLACED FRACTURE OF LATERAL MALLEOLUS OF RIGHT FIBULA: Status: ACTIVE | Noted: 2024-03-14

## 2024-05-16 PROBLEM — S92.334A NONDISPLACED FRACTURE OF THIRD METATARSAL BONE, RIGHT FOOT, INITIAL ENCOUNTER FOR CLOSED FRACTURE: Status: ACTIVE | Noted: 2024-03-14

## 2024-05-16 RX ORDER — PHENTERMINE HYDROCHLORIDE 37.5 MG/1
37.5 CAPSULE ORAL EVERY MORNING
Qty: 30 CAPSULE | Refills: 5 | Status: SHIPPED | OUTPATIENT
Start: 2024-05-16

## 2024-05-16 NOTE — PATIENT INSTRUCTIONS
Advance Care Planning and Advance Directives     You make decisions on a daily basis - decisions about where you want to live, your career, your home, your life. Perhaps one of the most important decisions you face is your choice for future medical care. Take time to talk with your family and your healthcare team and start planning today.  Advance Care Planning is a process that can help you:  Understand possible future healthcare decisions in light of your own experiences  Reflect on those decision in light of your goals and values  Discuss your decisions with those closest to you and the healthcare professionals that care for you  Make a plan by creating a document that reflects your wishes    Surrogate Decision Maker  In the event of a medical emergency, which has left you unable to communicate or to make your own decisions, you would need someone to make decisions for you.  It is important to discuss your preferences for medical treatment with this person while you are in good health.     Qualities of a surrogate decision maker:  Willing to take on this role and responsibility  Knows what you want for future medical care  Willing to follow your wishes even if they don't agree with them  Able to make difficult medical decisions under stressful circumstances    Advance Directives  These are legal documents you can create that will guide your healthcare team and decision maker(s) when needed. These documents can be stored in the electronic medical record.    Living Will - a legal document to guide your care if you have a terminal condition or a serious illness and are unable to communicate. States vary by statute in document names/types, but most forms may include one or more of the following:        -  Directions regarding life-prolonging treatments        -  Directions regarding artificially provided nutrition/hydration        -  Choosing a healthcare decision maker        -  Direction regarding organ/tissue  donation    Durable Power of  for Healthcare - this document names an -in-fact to make medical decisions for you, but it may also allow this person to make personal and financial decisions for you. Please seek the advice of an  if you need this type of document.    **Advance Directives are not required and no one may discriminate against you if you do not sign one.    Medical Orders  Many states allow specific forms/orders signed by your physician to record your wishes for medical treatment in your current state of health. This form, signed in personal communication with your physician, addresses resuscitation and other medical interventions that you may or may not want.      For more information or to schedule a time with a [facility] Advance Care Planning Facilitator contact: [facility] or call [phone] and someone will contact you directly.Calorie Counting for Weight Loss  Calories are units of energy. Your body needs a certain number of calories from food to keep going throughout the day. When you eat or drink more calories than your body needs, your body stores the extra calories mostly as fat. When you eat or drink fewer calories than your body needs, your body burns fat to get the energy it needs.  Calorie counting means keeping track of how many calories you eat and drink each day. Calorie counting can be helpful if you need to lose weight. If you eat fewer calories than your body needs, you should lose weight. Ask your health care provider what a healthy weight is for you.  For calorie counting to work, you will need to eat the right number of calories each day to lose a healthy amount of weight per week. A dietitian can help you figure out how many calories you need in a day and will suggest ways to reach your calorie goal.  A healthy amount of weight to lose each week is usually 1-2 lb (0.5-0.9 kg). This usually means that your daily calorie intake should be  reduced by 500-750 calories.  Eating 1,200-1,500 calories a day can help most women lose weight.  Eating 1,500-1,800 calories a day can help most men lose weight.  What do I need to know about calorie counting?  Work with your health care provider or dietitian to determine how many calories you should get each day. To meet your daily calorie goal, you will need to:  Find out how many calories are in each food that you would like to eat. Try to do this before you eat.  Decide how much of the food you plan to eat.  Keep a food log. Do this by writing down what you ate and how many calories it had.  To successfully lose weight, it is important to balance calorie counting with a healthy lifestyle that includes regular activity.  Where do I find calorie information?  The number of calories in a food can be found on a Nutrition Facts label. If a food does not have a Nutrition Facts label, try to look up the calories online or ask your dietitian for help.  Remember that calories are listed per serving. If you choose to have more than one serving of a food, you will have to multiply the calories per serving by the number of servings you plan to eat. For example, the label on a package of bread might say that a serving size is 1 slice and that there are 90 calories in a serving. If you eat 1 slice, you will have eaten 90 calories. If you eat 2 slices, you will have eaten 180 calories.  How do I keep a food log?  After each time that you eat, record the following in your food log as soon as possible:  What you ate. Be sure to include toppings, sauces, and other extras on the food.  How much you ate. This can be measured in cups, ounces, or number of items.  How many calories were in each food and drink.  The total number of calories in the food you ate.  Keep your food log near you, such as in a pocket-sized notebook or on an chris or website on your mobile phone. Some programs will calculate calories for you and show you how  many calories you have left to meet your daily goal.  What are some portion-control tips?  Know how many calories are in a serving. This will help you know how many servings you can have of a certain food.  Use a measuring cup to measure serving sizes. You could also try weighing out portions on a kitchen scale. With time, you will be able to estimate serving sizes for some foods.  Take time to put servings of different foods on your favorite plates or in your favorite bowls and cups so you know what a serving looks like.  Try not to eat straight from a food's packaging, such as from a bag or box. Eating straight from the package makes it hard to see how much you are eating and can lead to overeating. Put the amount you would like to eat in a cup or on a plate to make sure you are eating the right portion.  Use smaller plates, glasses, and bowls for smaller portions and to prevent overeating.  Try not to multitask. For example, avoid watching TV or using your computer while eating. If it is time to eat, sit down at a table and enjoy your food. This will help you recognize when you are full. It will also help you be more mindful of what and how much you are eating.  What are tips for following this plan?  Reading food labels  Check the calorie count compared with the serving size. The serving size may be smaller than what you are used to eating.  Check the source of the calories. Try to choose foods that are high in protein, fiber, and vitamins, and low in saturated fat, trans fat, and sodium.  Shopping  Read nutrition labels while you shop. This will help you make healthy decisions about which foods to buy.  Pay attention to nutrition labels for low-fat or fat-free foods. These foods sometimes have the same number of calories or more calories than the full-fat versions. They also often have added sugar, starch, or salt to make up for flavor that was removed with the fat.  Make a grocery list of lower-calorie foods  and stick to it.  Cooking  Try to cook your favorite foods in a healthier way. For example, try baking instead of frying.  Use low-fat dairy products.  Meal planning  Use more fruits and vegetables. One-half of your plate should be fruits and vegetables.  Include lean proteins, such as chicken, turkey, and fish.  Lifestyle  Each week, aim to do one of the followin minutes of moderate exercise, such as walking.  75 minutes of vigorous exercise, such as running.  General information  Know how many calories are in the foods you eat most often. This will help you calculate calorie counts faster.  Find a way of tracking calories that works for you. Get creative. Try different apps or programs if writing down calories does not work for you.  What foods should I eat?    Eat nutritious foods. It is better to have a nutritious, high-calorie food, such as an avocado, than a food with few nutrients, such as a bag of potato chips.  Use your calories on foods and drinks that will fill you up and will not leave you hungry soon after eating.  Examples of foods that fill you up are nuts and nut butters, vegetables, lean proteins, and high-fiber foods such as whole grains. High-fiber foods are foods with more than 5 g of fiber per serving.  Pay attention to calories in drinks. Low-calorie drinks include water and unsweetened drinks.  The items listed above may not be a complete list of foods and beverages you can eat. Contact a dietitian for more information.  What foods should I limit?  Limit foods or drinks that are not good sources of vitamins, minerals, or protein or that are high in unhealthy fats. These include:  Candy.  Other sweets.  Sodas, specialty coffee drinks, alcohol, and juice.  The items listed above may not be a complete list of foods and beverages you should avoid. Contact a dietitian for more information.  How do I count calories when eating out?  Pay attention to portions. Often, portions are much larger  when eating out. Try these tips to keep portions smaller:  Consider sharing a meal instead of getting your own.  If you get your own meal, eat only half of it. Before you start eating, ask for a container and put half of your meal into it.  When available, consider ordering smaller portions from the menu instead of full portions.  Pay attention to your food and drink choices. Knowing the way food is cooked and what is included with the meal can help you eat fewer calories.  If calories are listed on the menu, choose the lower-calorie options.  Choose dishes that include vegetables, fruits, whole grains, low-fat dairy products, and lean proteins.  Choose items that are boiled, broiled, grilled, or steamed. Avoid items that are buttered, battered, fried, or served with cream sauce. Items labeled as crispy are usually fried, unless stated otherwise.  Choose water, low-fat milk, unsweetened iced tea, or other drinks without added sugar. If you want an alcoholic beverage, choose a lower-calorie option, such as a glass of wine or light beer.  Ask for dressings, sauces, and syrups on the side. These are usually high in calories, so you should limit the amount you eat.  If you want a salad, choose a garden salad and ask for grilled meats. Avoid extra toppings such as luna, cheese, or fried items. Ask for the dressing on the side, or ask for olive oil and vinegar or lemon to use as dressing.  Estimate how many servings of a food you are given. Knowing serving sizes will help you be aware of how much food you are eating at restaurants.  Where to find more information  Centers for Disease Control and Prevention: www.cdc.gov  [place] Department of Agriculture: myplate.gov  Summary  Calorie counting means keeping track of how many calories you eat and drink each day. If you eat fewer calories than your body needs, you should lose weight.  A healthy amount of weight to lose per week is usually 1-2 lb (0.5-0.9 kg). This usually  means reducing your daily calorie intake by 500-750 calories.  The number of calories in a food can be found on a Nutrition Facts label. If a food does not have a Nutrition Facts label, try to look up the calories online or ask your dietitian for help.  Use smaller plates, glasses, and bowls for smaller portions and to prevent overeating.  Use your calories on foods and drinks that will fill you up and not leave you hungry shortly after a meal.  This information is not intended to replace advice given to you by your health care provider. Make sure you discuss any questions you have with your health care provider.  Document Revised: 01/28/2021 Document Reviewed: 01/28/2021  [employer] Patient Education © 2022 [employer]    Exercising to Lose Weight  Getting regular exercise is important for everyone. It is especially important if you are overweight. Being overweight increases your risk of heart disease, stroke, diabetes, high blood pressure, and several types of cancer. Exercising, and reducing the calories you consume, can help you lose weight and improve fitness and health.  Exercise can be moderate or vigorous intensity. To lose weight, most people need to do a certain amount of moderate or vigorous-intensity exercise each week.  How can exercise affect me?  You lose weight when you exercise enough to burn more calories than you eat. Exercise also reduces body fat and builds muscle. The more muscle you have, the more calories you burn. Exercise also:  Improves mood.  Reduces stress and tension.  Improves your overall fitness, flexibility, and endurance.  Increases bone strength.  Moderate-intensity exercise  Moderate-intensity exercise is any activity that gets you moving enough to burn at least three times more energy (calories) than if you were sitting.  Examples of moderate exercise include:  Walking a mile in 15 minutes.  Doing light yard work.  Biking at an easy pace.  Most people should get at least 150  minutes of moderate-intensity exercise a week to maintain their body weight.  Vigorous-intensity exercise  Vigorous-intensity exercise is any activity that gets you moving enough to burn at least six times more calories than if you were sitting. When you exercise at this intensity, you should be working hard enough that you are not able to carry on a conversation.  Examples of vigorous exercise include:  Running.  Playing a team sport, such as football, basketball, and soccer.  Jumping rope.  Most people should get at least 75 minutes a week of vigorous exercise to maintain their body weight.  What actions can I take to lose weight?  The amount of exercise you need to lose weight depends on:  Your age.  The type of exercise.  Any health conditions you have.  Your overall physical ability.  Talk to your health care provider about how much exercise you need and what types of activities are safe for you.  Nutrition    Make changes to your diet as told by your health care provider or diet and nutrition specialist (dietitian). This may include:  Eating fewer calories.  Eating more protein.  Eating less unhealthy fats.  Eating a diet that includes fresh fruits and vegetables, whole grains, low-fat dairy products, and lean protein.  Avoiding foods with added fat, salt, and sugar.  Drink plenty of water while you exercise to prevent dehydration or heat stroke.  Activity  Choose an activity that you enjoy and set realistic goals. Your health care provider can help you make an exercise plan that works for you.  Exercise at a moderate or vigorous intensity most days of the week.  The intensity of exercise may vary from person to person. You can tell how intense a workout is for you by paying attention to your breathing and heartbeat. Most people will notice their breathing and heartbeat get faster with more intense exercise.  Do resistance training twice each week, such as:  Push-ups.  Sit-ups.  Lifting weights.  Using  resistance bands.  Getting short amounts of exercise can be just as helpful as long, structured periods of exercise. If you have trouble finding time to exercise, try doing these things as part of your daily routine:  Get up, stretch, and walk around every 30 minutes throughout the day.  Go for a walk during your lunch break.  Park your car farther away from your destination.  If you take public transportation, get off one stop early and walk the rest of the way.  Make phone calls while standing up and walking around.  Take the stairs instead of elevators or escalators.  Wear comfortable clothes and shoes with good support.  Do not exercise so much that you hurt yourself, feel dizzy, or get very short of breath.  Where to find more information  [place] Department of Health and Human Services: www.hhs.gov  Centers for Disease Control and Prevention: www.cdc.gov  Contact a health care provider:  Before starting a new exercise program.  If you have questions or concerns about your weight.  If you have a medical problem that keeps you from exercising.  Get help right away if:  You have any of the following while exercising:  Injury.  Dizziness.  Difficulty breathing or shortness of breath that does not go away when you stop exercising.  Chest pain.  Rapid heartbeat.  These symptoms may represent a serious problem that is an emergency. Do not wait to see if the symptoms will go away. Get medical help right away. Call your local emergency services (911 in the [place] Do not drive yourself to the hospital.  Summary  Getting regular exercise is especially important if you are overweight.  Being overweight increases your risk of heart disease, stroke, diabetes, high blood pressure, and several types of cancer.  Losing weight happens when you burn more calories than you eat.  Reducing the amount of calories you eat, and getting regular moderate or vigorous exercise each week, helps you lose weight.  This information is not  intended to replace advice given to you by your health care provider. Make sure you discuss any questions you have with your health care provider.  Document Revised: 02/13/2022 Document Reviewed: 02/13/2022  [employer] Patient Education © 2022 [employer] Below are four things you can do to prevent falls:   Begin an exercise program to improve your leg strength & balance  Ask your doctor or pharmacist to review your medicines   Get annual eye check-ups & update your eyeglasses  Make your home safer by:  Removing clutter & tripping hazards  Putting railings on all stairs & adding grab bars in the bathroom  Having good lighting, especially on stairs    Contact your local community or [facility] for information on exercise, fall prevention programs, or options for improving home safety.Opioid Use Disorder  Opioid use disorder is a condition in which opioids are used for reasons other than medical care. The person may use them even though taking them hurts the person's health and well-being. These drugs are powerful substances that relieve pain. Opioids include drugs such as heroin as well as prescription medicines for pain, such as:  Codeine.  Morphine.  Hydrocodone.  Oxycodone.  Fentanyl.  Taking prescribed opioids regularly can lead to dependence, especially if you take them in larger amounts or more often than they should be taken. Opioid use disorder can lead to problems with mental and physical health, including:  Depression or anxiety.  Severe constipation.  Malnutrition and weight loss.  Sleep problems.  Diseases caused by infections, such as hepatitis or HIV.  Sexual problems.  Opioid use disorder can be dangerous. It increases the risk of suicide and can lead to a life-threatening overdose.  What are the causes?  This condition is caused by taking opioids. Taking opioids again and again results in changes in the brain that make it hard to control opioid use. Many people develop this condition because they  like the way they feel when they take opioids or because they get addicted to them.  What increases the risk?  This condition is more likely to develop in people who:  Have a family history of opioid use disorder.  Misuse other drugs.  Have a mental illness, such as depression, post-traumatic stress disorder, or antisocial personality disorder.  Begin use at an early age, such as during their teenage years.  What are the signs or symptoms?  Symptoms of this condition include:  Taking opioids in larger amounts or for longer periods than you want to.  Spending an abnormal amount of time getting opioids, using them, or recovering from their effects.  Craving opioids.  Using opioids in a way that interferes with work, school, social activities, and personal relationships.  Giving up or cutting down on important life activities because of opioid use.  Using opioids when it is dangerous, such as when driving a car.  Continuing to use the drug even after it has led to problems such as:  Physical or mental health problems.  Legal or financial troubles.  Job loss.  Broken relationships.  Being unable to slow down or stop your use of the drug.  Needing more and more of an opioid to get the same effect (building up a tolerance).  Experiencing unpleasant symptoms if you do not use the opioid (withdrawal). Some symptoms of withdrawal include:  Depression, anxiety, or feeling irritable.  Nausea or vomiting.  Muscle aches or spasms.  Watery eyes.  Trouble sleeping.  Yawning.  How is this diagnosed?  This condition is diagnosed based on:  A physical exam.  Your history of opioid use.  Your symptoms. This includes:  How opioid use affects your life.  Changes in personality, behaviors, and mood.  Having at least two symptoms of opioid use disorder within a 12-month period.  Health issues related to using opioids.  Blood or urine tests to screen for drugs.  How is this treated?    The first goal of treatment is to stop your use of  opioids. This must be done safely and may involve taking medicines to lessen withdrawal symptoms. Treatment may also involve:  Taking part in group and individual counseling from mental health providers who have experience with substance use disorder.  Staying at a residential treatment center for several days or weeks.  Attending daily counseling sessions at a treatment center.  Taking medicines as told by your health care provider that:  Ease symptoms and prevent complications during withdrawal.  Block cravings and block the good feeling that you get from using opioids.  Treat other mental health issues, such as depression or anxiety.  Reduce agitation.  Participating in a support group to share your experience with others who are going through the same thing.  Using opioid maintenance treatment. This involves taking certain kinds of opioid medicines. These medicines satisfy cravings but are safer than opioids that are commonly misused.  Recovery can be a long process. Some people who undergo treatment start using opioids again after stopping (relapse). If you relapse, it does not mean that treatment will not work.  Follow these instructions at home:  Medicines  Take over-the-counter and prescription medicines only as told by your health care provider.  Check with your health care provider before starting any new medicines, herbs, or supplements.  General instructions  Do not use any drugs or alcohol.  Avoid people and activities that trigger your use of opioids.  Learn and practice techniques for managing stress.  Have a plan for vulnerable moments. These are times when you are most likely to relapse. Get phone numbers of those who are willing to help and who are committed to your recovery.  Attend support groups regularly. These groups provide emotional support, advice, and guidance.  Keep all follow-up visits. This is important. Follow-up visits include continuing to work with therapists and support  groups.  Where to find more information  [place] on Drug Abuse: drugabuse.gov  Substance Abuse and Mental Health Services Administration: samhsa.gov  Narcotics Anonymous: na.org  Contact a health care provider if:  You cannot take your medicines as told.  Your symptoms get worse.  You have a relapse.  Get help right away if:  You may have taken too much of an opioid (overdosed). Common symptoms of an overdose include:  Sleepiness or difficulty waking from sleep.  Decrease in attention or confusion.  Slurred speech.  Slowed breathing and a slow pulse (bradycardia).  Nausea and vomiting.  Abnormally small pupils.  You have serious thoughts about hurting yourself or others.  These symptoms may represent a serious problem that is an emergency. Do not wait to see if the symptoms will go away. Get medical help right away. Call your local emergency services (816 in the [place] Do not drive yourself to the hospital.  If you were prescribed a drug (naloxone) that reverses the effects of an opioid overdose, a friend, family member, or emergency services provider can administer the drug in an emergency.  If you ever feel like you may hurt yourself or others, or have thoughts about taking your own life, get help right away. You can go to your nearest emergency department or call:  Your local emergency services (349 in the [place]  A suicide crisis helpline, such as the National Suicide Prevention Lifeline at 1-350.797.3931 or 649 in the [place] This is open 24 hours a day.  Summary  Opioid use disorder is a condition in which opioids are used for reasons other than medical care.  Opioid use disorder can be dangerous. It can lead to various mental and physical problems, and an opioid overdose can be life-threatening.  The first goal of treatment is to stop your use of opioids. This must be done safely and may involve taking medicines to lessen withdrawal symptoms.  This information is not intended to replace advice given  to you by your health care provider. Make sure you discuss any questions you have with your health care provider.  Document Revised: 07/13/2022 Document Reviewed: 03/30/2022  Elsevier Patient Education © 2023 [employer]  Opioid Pain Medicine Management  Opioid pain medicines are strong medicines that are used to treat bad or very bad pain. When you take them for a short time, they can help you:  Sleep better.  Do better in physical therapy.  Feel better during the first few days after you get hurt.  Recover from surgery.  Only take these medicines if a doctor says that you can. You should only take them for a short time. This is because opioids can be very addictive. This means that they are hard to stop taking. The longer you take opioids, the harder it may be to stop taking them.  What are the risks?  Opioids can cause problems (side effects). Taking them for more than 3 days raises your chance of problems, such as:  Trouble pooping (constipation).  Feeling sick to your stomach (nausea).  Vomiting.  Feeling very sleepy.  Confusion.  Not being able to stop taking the medicine.  Breathing problems.  Taking opioids for a long time can make it hard for you to do daily tasks. It can also put you at risk for:  Car accidents.  Depression.  Suicide.  Heart attack.  Taking too much of the medicine (overdose). This can lead to death.  What is a pain treatment plan?  A pain treatment plan is a plan made by you and your doctor. Work with your doctor to make a plan for treating your pain. To help you do this:  Talk about the goals of your treatment, including:  How much pain you might expect to have.  How you will manage the pain.  Talk about the risks and benefits of taking these medicines for your condition.  Remember that a good treatment plan uses more than one approach and lowers the risks of side effects.  Tell your doctor about the amount of medicines you take and about any drug or alcohol use.  Get your pain  medicine prescriptions from only one doctor.  Pain can be managed with other treatments. Work with your doctor to find other ways to help your pain, such as:  Physical therapy or doing gentle exercises.  Counseling.  Eating healthy foods.  Massage.  Meditation.  Other pain medicines.  How to use opioid pain medicine safely  Taking medicine  Take your pain medicine exactly as told by your doctor. Take it only when you need it.  If your pain is not too bad, you may take less medicine if your doctor allows.  If you have no pain, do not take the medicine unless your doctor tells you to take it.  If your pain is very bad, do not take more medicine than your doctor told you to take. Call your doctor to know what to do.  Write down the times when you take your pain medicine. Look at the times before you take your next dose.  Take other over-the-counter or prescription medicines only as told by your doctor.  Keeping yourself and others safe    While you are taking opioids:  Do not drive, use machines, or power tools.  Do not sign important papers (legal documents).  Do not drink alcohol.  Do not take sleeping pills.  Do not take care of children by yourself.  Do not do activities where you need to climb or be in high places, like working on a ladder.  Do not go to a lake, river, ocean, swimming pool, or hot tub.  Keep your opioids locked up or in a place where children cannot reach them.  Do not share your pain medicine with anyone.  Stopping your use of opioids  If you have been taking opioids for more than a few weeks, you may need to slowly decrease (taper) how much you take until you stop taking them. Doing this can lower your chance of having symptoms.   Symptoms that come from suddenly stopping the use of opioids include:  Pain and cramping in your belly (abdomen).  Feeling sick to your stomach (nausea).z  Sweating.  Feeling very sleepy.  Feeling restless.  Shaking you cannot control (tremors).  Cravings for the  medicine.  Do not try to stop taking them by yourself. Work with your doctor to stop. Your doctor will help you take less until you are not taking the medicine at all.  Getting rid of unused pills  Do not save any pills that you did not use. Get rid of the pills by:  Taking them to a take-back program in your area.  Bringing them to a pharmacy that receives unused pills.  Flushing them down the toilet. Check the label or package insert of your medicine to see whether this is safe to do.  Throwing them in the trash. Check the label or package insert of your medicine to see whether this is safe to do. If it is safe to throw them out:  Take the pills out of their container.  Put the pills into a container you can seal.  Mix the pills with used coffee grounds, food scraps, dirt, or cat litter.  Put this in the trash.  Follow these instructions at home:  Activity  Do exercises as told by your doctor.  Avoid doing things that make your pain worse.  Return to your normal activities as told by your doctor. Ask your doctor what activities are safe for you.  General instructions  You may need to take these actions to prevent or treat constipation:  Drink enough fluid to keep your pee (urine) pale yellow.  Take over-the-counter or prescription medicines.  Eat foods that are high in fiber. These include beans, whole grains, and fresh fruits and vegetables.  Limit foods that are high in fat and sugar. These include fried or sweet foods.  Keep all follow-up visits.  Where to find support  If you have been taking opioids for a long time, get help from a local support group or counselor. Ask your doctor about this.  Where to find more information  Centers for Disease Control and Prevention (CDC): www.cdc.gov  [place] Food and Drug Administration (FDA): www.fda.gov  Get help right away if:  You may have taken too much of an opioid (overdosed). Common symptoms of an overdose:  Your breathing is slower or more shallow than normal.  You  have a very slow heartbeat.  Your speech is not normal.  You vomit or you feel as if you may vomit.  The black centers of your eyes (pupils) are smaller than normal.  You have other potential symptoms:  You feel very confused.  You faint.  You are very sleepy.  You have cold skin.  You have blue lips or fingernails.  You have thoughts of harming yourself or harming others.  These symptoms may be an emergency. Get help right away. Call your local emergency services (771 in the [place]  Do not wait to see if the symptoms will go away.  Do not drive yourself to the hospital.  Get help right away if you feel like you may hurt yourself or others, or have thoughts about taking your own life. Go to your nearest emergency room or:  Call your local emergency services (336 in the [place]  Call the National Poison Control Center at 1-898.595.8847.  Call a suicide crisis helpline, such as the National Suicide Prevention Lifeline at 1-167.922.3102 or 700 in the [place] This is open 24 hours a day.  Text the Crisis Text Line at 709915.  Summary  Opioid are strong medicines that are used to treat bad or very bad pain.  A pain treatment plan is a plan made by you and your doctor. Work with your doctor to make a plan for treating your pain.  If you think that you or someone else may have taken too much of an opioid, get help right away.  This information is not intended to replace advice given to you by your health care provider. Make sure you discuss any questions you have with your health care provider.  Document Revised: 07/13/2022 Document Reviewed: 03/30/2022  Elsevier Patient Education © 2023 [employer]

## 2024-05-16 NOTE — PROGRESS NOTES
QUICK REFERENCE INFORMATION:  The ABCs of the Annual Wellness Visit    subsequent Medicare Wellness Visit    HEALTH RISK ASSESSMENT    1955  Brenda Michelle is a 68 y.o. female who presents for an Subsequent Wellness Visit.    The following portions of the patient's history were reviewed and   updated as appropriate: allergies, current medications, past family history, past medical history, past social history, past surgical history, and problem list.    Compared to one year ago, the patient feels his physical   health is the same.    Compared to one year ago, the patient feels his mental   health is the same.    Recent Hospitalizations:  She was admitted within the past 365 days at [facility].   Hospital Course      Ms. Michelle is a 68 y.o. female with a history of COPD, IBS, peripheral neuropathy, chronic respiratory failure on home oxygen, and chronic pain who presented to [facility] initially complaining of syncope and fall.  Please see the admitting history and physical for further details.  She was admitted to the hospital for further evaluation and treatment.       Fairly extensive workup was completed with regards to her syncope.  Neurology and cardiology were consulted. The patient is on many medications that could have contributed to her presentation.  The patient chronically is on baclofen, Lyrica, Cymbalta, Mirapex, and hydrocodone.  In addition, she was on phentermine and Mounjaro for weight loss.  The patient's baclofen was discontinued and should not be restarted at discharge.  In addition, the patient should not resume her phentermine or Mounjaro.  Echocardiogram and EKG were fairly unremarkable.  Neurology felt the tremor and syncope could be due to the high dose of Lyrica the patient was taking.  Her dose was decreased to 150 mg 3 times daily.  Ultimately, it would probably be beneficial to the patient to wean off of this medication.  Will defer this to the PCP.  Certainly  polypharmacy and her peripheral neuropathy could have contributed to the syncopal episodes.  Patient was unwilling to complete orthostatics due to pain with standing on her foot, but there was no change in blood pressure from lying to sitting.  She did not have any further episodes of syncope or dizziness while admitted.       The patient did complain of right foot pain after her fall, and initial x-rays were negative.  However, she continued to have pain and swelling.  Orthopedic surgery was consulted for further evaluation.  Repeat x-rays did reveal a fracture.  The patient was placed in a Cam walking boot and weightbearing as tolerated while wearing the boot.  The patient should follow-up with orthopedic surgery in 1 week for repeat x-rays and fracture management.  Urology was consulted because the patient missed her schedule follow-up appointment.  There was no indication for intervention while admitted, and they will reschedule her for outpatient cystoscopy.  The patient worked with physical and occupational therapies and they felt she could benefit from subacute rehab.  CPP was consulted to assist with placement.  The patient was ultimately discharged to SNF in good condition.  Current Medical Providers:  Patient Care Team:  Angelito Kline DO as PCP - General (Family Medicine)  Jimbo Mock MD as Consulting Physician (Pain Medicine)  Lionel Fleming MD as Consulting Physician (Orthopedic Surgery)  Ignacio Cobos DPM (Podiatry)  Chirag Tony MD as Consulting Physician (Gastroenterology)  Lionel Sandoval MD as Surgeon (Vascular Surgery)  Lonnie Torres MD as Consulting Physician (Sleep Medicine)    I reviewed list of current Medical providers and suppliers with patient and are listed above to the best of the patient's and my knowledge.    Smoking Status:  Social History     Tobacco Use   Smoking Status Former    Current packs/day: 0.00    Average packs/day: 0.8 packs/day for 60.8 years  (45.6 ttl pk-yrs)    Types: Cigarettes    Start date: 1960    Quit date: 2020    Years since quitting: 3.5   Smokeless Tobacco Never       Alcohol Consumption:  Social History     Substance and Sexual Activity   Alcohol Use No       Depression Screen:       2024     1:00 PM   PHQ-2/PHQ-9 Depression Screening   Little Interest or Pleasure in Doing Things 0-->not at all   Feeling Down, Depressed or Hopeless 0-->not at all   PHQ-9: Brief Depression Severity Measure Score 0       Health Habits and Functional and Cognitive Screenin/16/2024     1:00 PM   Functional & Cognitive Status   Do you have difficulty preparing food and eating? No   Do you have difficulty bathing yourself, getting dressed or grooming yourself? No   Do you have difficulty using the toilet? No   Do you have difficulty moving around from place to place? Yes   Do you have trouble with steps or getting out of a bed or a chair? No   Current Diet Well Balanced Diet   Dental Exam Up to date   Eye Exam Up to date   Exercise (times per week) 0 times per week   Current Exercises Include No Regular Exercise   Do you need help using the phone?  No   Are you deaf or do you have serious difficulty hearing?  No   Do you need help to go to places out of walking distance? Yes   Do you need help shopping? Yes   Do you need help preparing meals?  Yes   Do you need help with housework?  Yes   Do you need help with laundry? Yes   Do you need help taking your medications? No   Do you need help managing money? No   Do you ever drive or ride in a car without wearing a seat belt? No   Have you felt unusual stress, anger or loneliness in the last month? No   Who do you live with? Spouse   If you need help, do you have trouble finding someone available to you? No   Have you been bothered in the last four weeks by sexual problems? No   Do you have difficulty concentrating, remembering or making decisions? No       Does the patient have evidence of  cognitive impairment? No    Aspirin use counseling: Taking ASA appropriately as indicated    Recent Lab Results:  CMP:  Lab Results   Component Value Date    BUN 5 (L) 03/09/2024    CREATININE 0.62 03/09/2024    EGFRIFNONA 95 02/21/2022    EGFRIFAFRI 109 02/21/2022    BCR 8.1 03/09/2024     03/09/2024    K 4.3 03/09/2024    CO2 28.3 03/09/2024    CALCIUM 9.8 03/09/2024    PROTENTOTREF 6.5 08/16/2023    ALBUMIN 3.3 (L) 03/04/2024    LABGLOBREF 2.4 08/16/2023    LABIL2 1.7 08/16/2023    BILITOT 0.4 03/04/2024    ALKPHOS 83 03/04/2024    AST 22 03/04/2024    ALT 12 03/04/2024     Lipid Panel:  Lab Results   Component Value Date    CHOL 166 03/04/2024    TRIG 89 03/04/2024    HDL 57 03/04/2024    VLDL 16 03/04/2024    LDLHDL 1.60 03/04/2024     HbA1c:  Lab Results   Component Value Date    HGBA1C 4.80 03/04/2024       Visual Acuity:  No results found.    Age-appropriate Screening Schedule:  Refer to the list below for future screening recommendations based on patient's age, sex and/or medical conditions. Orders for these recommended tests are listed in the plan section. The patient has been provided with a written plan.    Health Maintenance   Topic Date Due    URINE MICROALBUMIN  Never done    LUNG CANCER SCREENING  03/14/2023    PAP SMEAR  06/08/2023    COVID-19 Vaccine (10 - 2023-24 season) 02/18/2024    MAMMOGRAM  03/14/2024    DXA SCAN  03/21/2024    DIABETIC EYE EXAM  07/19/2024    INFLUENZA VACCINE  08/01/2024    HEMOGLOBIN A1C  09/04/2024    LIPID PANEL  03/04/2025    BMI FOLLOWUP  03/20/2025    ANNUAL WELLNESS VISIT  05/16/2025    DIABETIC FOOT EXAM  05/16/2025    COLORECTAL CANCER SCREENING  09/29/2025    TDAP/TD VACCINES (3 - Td or Tdap) 10/11/2032    HEPATITIS C SCREENING  Completed    RSV Vaccine - Adults  Completed    Pneumococcal Vaccine 65+  Completed          Outpatient Medications Prior to Visit   Medication Sig Dispense Refill    Aspirin (ASPIR-81 PO) Take 81 mg by mouth Daily.       Cholecalciferol (Vitamin D3) 50 MCG (2000 UT) capsule       clopidogrel (PLAVIX) 75 MG tablet TAKE 1 TABLET BY MOUTH DAILY 90 tablet 3    docusate sodium (COLACE) 100 MG capsule Take 1 capsule by mouth 2 (Two) Times a Day.      DULoxetine (CYMBALTA) 60 MG capsule TAKE 1 CAPSULE DAILY 90 capsule 3    folic acid (FOLVITE) 1 MG tablet TAKE 1 TABLET DAILY 90 tablet 3    HYDROcodone-acetaminophen (NORCO)  MG per tablet Take 1 tablet by mouth Every 8 (Eight) Hours As Needed for Moderate Pain. 9 tablet 0    meclizine (ANTIVERT) 25 MG tablet Take 1 tablet by mouth 3 (Three) Times a Day As Needed for Dizziness. 45 tablet 0    pantoprazole (PROTONIX) 40 MG EC tablet Take 1 tablet by mouth Every Morning. 90 tablet 1    pravastatin (PRAVACHOL) 80 MG tablet TAKE 1 TABLET BY MOUTH DAILY 90 tablet 3    pregabalin (LYRICA) 150 MG capsule Take 1 capsule by mouth 3 (Three) Times a Day. 90 capsule 2    risedronate (ACTONEL) 150 MG tablet TAKE 1 TABLET BY MOUTH EVERY 30  DAYS WITH WATER ON AN EMPTY  STOMACH , NOTHING BY MOUTH AND  DO NOT LIE DOWN FOR NEXT 1/2  HOUR 3 tablet 3    rOPINIRole (REQUIP) 0.25 MG tablet Take 1 tablet by mouth Every Night. Take 1 hour before bedtime. 90 tablet 3    vitamin B-12 (CYANOCOBALAMIN) 1000 MCG tablet Take 1 tablet by mouth Daily.      vitamin D3 125 MCG (5000 UT) capsule capsule Take 1 capsule by mouth Daily.      spironolactone (Aldactone) 50 MG tablet Take 1 tablet by mouth Daily. 90 tablet 3    phentermine 37.5 MG capsule Take 1 capsule by mouth Every Morning. (Patient not taking: Reported on 5/16/2024) 30 capsule 5    Tirzepatide (MOUNJARO) 10 MG/0.5ML solution pen-injector pen Inject 0.5 mL under the skin into the appropriate area as directed 1 (One) Time Per Week. (Patient not taking: Reported on 5/16/2024) 2 mL 5     No facility-administered medications prior to visit.       Patient Active Problem List   Diagnosis    Acute deep vein thrombosis of distal leg    Adenomatous polyp of colon     Depression    Fibromyalgia    Dyslipidemia    Peripheral arterial occlusive disease    Vitamin D deficiency    Gastroesophageal reflux disease    Irritable bowel syndrome    Disorder of intervertebral disc    Peripheral nerve disease    Fracture of multiple ribs of right side    Fall    Acute respiratory failure with hypoxia    COPD mixed type    Weakness    Hypopotassemia    Dysuria    Constipation    Hemorrhoids    Arthritis    Fibromyositis    Hernia of anterior abdominal wall    Homocystinemia    Injury of right ankle    Kidney stone    Knee pain    Moderate ankle sprain    Osteoarthritis of knee    Spinal stenosis, lumbar region without neurogenic claudication    Chronic respiratory failure with hypoxia    Bursitis of hip    Lumbar facet joint pain    Low back pain    Acute cystitis    Obesity, Class III, BMI 40-49.9 (morbid obesity)    Abnormal CT scan, gastrointestinal tract    Wheelchair dependence    Supplemental oxygen dependent    Dietary counseling    Pre-op evaluation    STEVEN (obstructive sleep apnea)    Lumbar spondylosis    Recurrent syncope    Vasovagal episode    Orthostatic syncope    Polypharmacy    Closed nondisplaced fracture of fourth metatarsal bone of right foot    Nondisplaced fracture of third metatarsal bone of right foot    Closed nondisplaced fracture of lateral malleolus of right fibula    Nondisplaced fracture of third metatarsal bone, right foot, initial encounter for closed fracture       Advance Care Planning:  ACP discussion was held with the patient during this visit. Patient does not have an advance directive, information provided.    Identification of Risk Factors:  Risk factors include: Fall Risk  Inactivity/Sedentary  Obesity/Overweight .    Review of Systems   Respiratory:  Positive for shortness of breath.    Cardiovascular:  Negative for chest pain.   Gastrointestinal:  Negative for abdominal pain.   Musculoskeletal:  Positive for arthralgias, back pain and gait problem.  "      Compared to one year ago, the patient feels her physical health is worse.  Compared to one year ago, the patient feels her mental health is the same.    Objective     Physical Exam  Vitals and nursing note reviewed.   Constitutional:       Appearance: She is well-developed.   HENT:      Head: Normocephalic and atraumatic.      Right Ear: Tympanic membrane normal.      Left Ear: Tympanic membrane normal.   Neck:      Thyroid: No thyromegaly.      Vascular: No JVD.   Cardiovascular:      Rate and Rhythm: Normal rate and regular rhythm.      Heart sounds: Normal heart sounds. No murmur heard.     No friction rub. No gallop.   Pulmonary:      Effort: Pulmonary effort is normal. No respiratory distress.      Breath sounds: Normal breath sounds. No wheezing or rales.   Abdominal:      General: Bowel sounds are normal. There is no distension.      Palpations: Abdomen is soft.      Tenderness: There is no abdominal tenderness. There is no guarding or rebound.   Musculoskeletal:      Cervical back: Neck supple.   Feet:      Comments: Diabetic foot exam and monofilament completed, see scanned report.        Skin:     General: Skin is warm and dry.   Neurological:      Mental Status: She is alert.   Psychiatric:         Behavior: Behavior normal.         Vitals:    05/16/24 1326   BP: 112/72   Pulse: 81   SpO2: 94%   Weight: 95.3 kg (210 lb)   Height: 160 cm (63\")   PainSc:   2   PainLoc: Generalized     Body mass index is 37.2 kg/m².           Assessment & Plan   Patient Self-Management and Personalized Health Advice  The patient has been provided with information about: diet and exercise and preventive services including:   Annual Wellness Visit (AWV).    Visit Diagnoses:    ICD-10-CM ICD-9-CM   1. Medicare annual wellness visit, subsequent  Z00.00 V70.0   2. Need for pneumococcal vaccination  Z23 V03.82   3. Loss of weight  R63.4 783.21   4. Obesity (BMI 30-39.9)  E66.9 278.00   5. Dyslipidemia  E78.5 272.4   6. " Alopecia  L65.9 704.00   7. IFG (impaired fasting glucose)  R73.01 790.21   8. Drug therapy  Z79.899 V58.69   9. Bladder prolapse, female, acquired  N81.10 618.01       Orders Placed This Encounter   Procedures    Pneumococcal Conjugate Vaccine 20-Valent (PCV20)    Comprehensive Metabolic Panel     Order Specific Question:   Release to patient     Answer:   Routine Release [[id number]]    CBC (No Diff)     Order Specific Question:   Release to patient     Answer:   Routine Release [[id number]]    Hemoglobin A1c     Order Specific Question:   Release to patient     Answer:   Routine Release [[id number]]    TSH     Order Specific Question:   Release to patient     Answer:   Routine Release [[id number]]    Lipid Panel     Order Specific Question:   Release to patient     Answer:   Routine Release [[id number]]    Ambulatory Referral to Gynecologic Urology     Referral Priority:   Routine     Referral Type:   Consultation     Referral Reason:   Specialty Services Required     Requested Specialty:   Urogynecology     Number of Visits Requested:   1       Outpatient Encounter Medications as of 5/16/2024   Medication Sig Dispense Refill    Aspirin (ASPIR-81 PO) Take 81 mg by mouth Daily.      Cholecalciferol (Vitamin D3) 50 MCG (2000 UT) capsule       clopidogrel (PLAVIX) 75 MG tablet TAKE 1 TABLET BY MOUTH DAILY 90 tablet 3    docusate sodium (COLACE) 100 MG capsule Take 1 capsule by mouth 2 (Two) Times a Day.      DULoxetine (CYMBALTA) 60 MG capsule TAKE 1 CAPSULE DAILY 90 capsule 3    folic acid (FOLVITE) 1 MG tablet TAKE 1 TABLET DAILY 90 tablet 3    HYDROcodone-acetaminophen (NORCO)  MG per tablet Take 1 tablet by mouth Every 8 (Eight) Hours As Needed for Moderate Pain. 9 tablet 0    meclizine (ANTIVERT) 25 MG tablet Take 1 tablet by mouth 3 (Three) Times a Day As Needed for Dizziness. 45 tablet 0    pantoprazole (PROTONIX) 40 MG EC tablet Take 1 tablet by mouth Every Morning. 90 tablet 1    phentermine 37.5 MG  capsule Take 1 capsule by mouth Every Morning. 30 capsule 5    pravastatin (PRAVACHOL) 80 MG tablet TAKE 1 TABLET BY MOUTH DAILY 90 tablet 3    pregabalin (LYRICA) 150 MG capsule Take 1 capsule by mouth 3 (Three) Times a Day. 90 capsule 2    risedronate (ACTONEL) 150 MG tablet TAKE 1 TABLET BY MOUTH EVERY 30  DAYS WITH WATER ON AN EMPTY  STOMACH , NOTHING BY MOUTH AND  DO NOT LIE DOWN FOR NEXT 1/2  HOUR 3 tablet 3    rOPINIRole (REQUIP) 0.25 MG tablet Take 1 tablet by mouth Every Night. Take 1 hour before bedtime. 90 tablet 3    vitamin B-12 (CYANOCOBALAMIN) 1000 MCG tablet Take 1 tablet by mouth Daily.      vitamin D3 125 MCG (5000 UT) capsule capsule Take 1 capsule by mouth Daily.      [DISCONTINUED] spironolactone (Aldactone) 50 MG tablet Take 1 tablet by mouth Daily. 90 tablet 3    Tirzepatide (Mounjaro) 7.5 MG/0.5ML solution pen-injector pen Inject 0.5 mL under the skin into the appropriate area as directed 1 (One) Time Per Week. 6 mL 3    [DISCONTINUED] phentermine 37.5 MG capsule Take 1 capsule by mouth Every Morning. (Patient not taking: Reported on 5/16/2024) 30 capsule 5    [DISCONTINUED] Tirzepatide (MOUNJARO) 10 MG/0.5ML solution pen-injector pen Inject 0.5 mL under the skin into the appropriate area as directed 1 (One) Time Per Week. (Patient not taking: Reported on 5/16/2024) 2 mL 5     No facility-administered encounter medications on file as of 5/16/2024.       Reviewed use of high risk medication in the elderly: yes  Reviewed for potential of harmful drug interactions in the elderly: yes  Yes opioid medication identified on active medication list. I have reviewed chart for other potential  high risk medication/s and harmful drug interactions in the elderly.  She sees pain management for her opioids.    Social History     Socioeconomic History    Marital status:     Number of children: 2    Years of education: 12    Tobacco Use    Smoking status: Former     Current packs/day: 0.00     Average  packs/day: 0.8 packs/day for 60.8 years (45.6 ttl pk-yrs)     Types: Cigarettes     Start date: 1/1/1960     Quit date: 11/4/2020     Years since quitting: 3.5    Smokeless tobacco: Never   Vaping Use    Vaping status: Never Used   Substance and Sexual Activity    Alcohol use: No    Drug use: No    Sexual activity: Not Currently     Partners: Male     Patient was screened for OUD and RUSSELL risk factors.  Brenda Michelle's pain level was assessed as well today.  I included a review current recommendations on pain management, best use practices, current CDC guidelines, alternatives to opioids including OTC & Rx topicals, non-opioid oral medications (eg nsaids, acetominophen) and life style interventions such as yoga, virgil chi, stretching, regular exercise, PT/OT and referral to specialty care like Pain Management that specialize in pain treatment when appropriate.   I also included a review of serious risks of opioid use and substance use disorder.  I have included all of this in the after visit summary along with other risk factors identified that are pertinent to the patient today.      Follow Up:  Return in about 3 months (around 8/16/2024), or if symptoms worsen or fail to improve.     An After Visit Summary and PPPS with all of these plans were given to the patient.           ++++++++++++++++++++++++++++++++++++++++++++++++++++++++++++++++++   Additional E&M Note during same encounter follows:  Patient has multiple medical problems which are significant and separately identifiable that require additional work above and beyond the Medicare Wellness Visit.   Chief Complaint   Patient presents with    Diabetes    Medicare Wellness-subsequent    Hyperlipidemia    Hypertension     HPI  Patient 67 y/o female was getting up and trying ambulate more felt very light headed and dizzy subsequently passing out.  She sustained ankle/foot injury with distal fibular, 3rd/4th metatarsal fx.  Was in CAM boot, but out now and doing  "wel;   No dizziness now, but bp low normal;  was placed on spironolactone for hair loss.  She is taking biotin as well;  Of mounjaro and phentermine,would liek restart to further lose weight;  Losing to take pressure off of back and joints, hard time losign as severe lung disease and musculoskeletal issues;  had some meds stopped inpatient which was good as on several.   No further syncope;  dyspnea baseline;  no cp    Patient relates she has prolapsed bladder, prefers see uro gyn chyna soto only   Review of Systems   Cardiovascular:  Negative for chest pain.   Respiratory:  Positive for shortness of breath.    Musculoskeletal:  Positive for arthralgias, back pain and gait problem.   Gastrointestinal:  Negative for abdominal pain.       Social History     Tobacco Use    Smoking status: Former     Current packs/day: 0.00     Average packs/day: 0.8 packs/day for 60.8 years (45.6 ttl pk-yrs)     Types: Cigarettes     Start date: 1/1/1960     Quit date: 11/4/2020     Years since quitting: 3.5    Smokeless tobacco: Never   Substance Use Topics    Alcohol use: No     O:   Vitals:    05/16/24 1326   BP: 112/72   Pulse: 81   SpO2: 94%   Weight: 95.3 kg (210 lb)   Height: 160 cm (63\")   PainSc:   2   PainLoc: Generalized     Weight Weight (kg) Weight (lbs) Weight Method Visit Report                 5/16/2024 95.255 kg 210 lb - Report   3/20/2024 96.616 kg 213 lb - Report   3/4/2024 95.709 kg 211 lb - -   3/3/2024 95.709 kg 211 lb - -   11/16/2023 105.688 kg 233 lb - Report   8/16/2023 111.585 kg 246 lb - Report   5/24/2023 110.678 kg 244 lb - Report   4/14/2023 111.585 kg 246 lb Stated -   4/11/2023 111.585 kg 246 lb - Report   3/16/2023 118.842 kg 262 lb - Report   12/2/2022 118.842 kg 262 lb - Report   10/4/2022 119.296 kg 263 lb - -   9/29/2022 121.11 kg 267 lb Standing scale -   9/9/2022 123.9 kg 273 lb 2.4 oz Bed scale      Body mass index is 37.2 kg/m².  Vitals and nursing note reviewed.   Constitutional:       " Appearance: Well-developed.   HENT:      Head: Normocephalic and atraumatic.      Right Ear: Tympanic membrane normal.      Left Ear: Tympanic membrane normal.   Neck:      Thyroid: No thyromegaly.      Vascular: No JVD.   Pulmonary:      Effort: Pulmonary effort is normal. No respiratory distress.      Breath sounds: Normal breath sounds. No wheezing. No rales.   Cardiovascular:      Normal rate. Regular rhythm. Normal heart sounds.      No gallop.  No friction rub.   Abdominal:      General: Bowel sounds are normal. There is no distension.      Palpations: Abdomen is soft.      Tenderness: There is no abdominal tenderness. There is no guarding or rebound.   Musculoskeletal:      Cervical back: Neck supple. Skin:     General: Skin is warm and dry.   Feet:      Comments: Diabetic foot exam and monofilament completed, see scanned report.        Neurological:      Mental Status: Alert.   Psychiatric:         Behavior: Behavior normal.         Diagnoses and all orders for this visit:    1. Medicare annual wellness visit, subsequent (Primary)    2. Need for pneumococcal vaccination  -     Pneumococcal Conjugate Vaccine 20-Valent (PCV20)    3. Loss of weight  -     phentermine 37.5 MG capsule; Take 1 capsule by mouth Every Morning.  Dispense: 30 capsule; Refill: 5    4. Obesity (BMI 30-39.9)  -     Tirzepatide (Mounjaro) 7.5 MG/0.5ML solution pen-injector pen; Inject 0.5 mL under the skin into the appropriate area as directed 1 (One) Time Per Week.  Dispense: 6 mL; Refill: 3    5. Dyslipidemia  -     Comprehensive Metabolic Panel  -     Lipid Panel    6. Alopecia  -     TSH    7. IFG (impaired fasting glucose)  -     Hemoglobin A1c    8. Drug therapy  -     Comprehensive Metabolic Panel  -     CBC (No Diff)    9. Bladder prolapse, female, acquired  -     Ambulatory Referral to Gynecologic Urology    Ok refer to urogynecology  Stop spironolactone as bp lwo nromal and reporting orthostatic symptoms;  for alopecia - otc  biotin and saw palmetto ok take  Sees pain mgmt for controlled substances  Restart weight loss meds, has dropped signficiant weight;  caution with meds given fall;  She is down 63 lbs now since started workign with her to lose  Due check lipids an dlabs  Due dm2 screen;  A1c improved on mounjaro    Return in about 3 months (around 8/16/2024), or if symptoms worsen or fail to improve.

## 2024-05-17 LAB
ALBUMIN SERPL-MCNC: 4 G/DL (ref 3.9–4.9)
ALBUMIN/GLOB SERPL: 1.6 {RATIO} (ref 1.2–2.2)
ALP SERPL-CCNC: 93 IU/L (ref 44–121)
ALT SERPL-CCNC: 5 IU/L (ref 0–32)
AST SERPL-CCNC: 17 IU/L (ref 0–40)
BILIRUB SERPL-MCNC: 0.3 MG/DL (ref 0–1.2)
BUN SERPL-MCNC: 11 MG/DL (ref 8–27)
BUN/CREAT SERPL: 13 (ref 12–28)
CALCIUM SERPL-MCNC: 10.4 MG/DL (ref 8.7–10.3)
CHLORIDE SERPL-SCNC: 98 MMOL/L (ref 96–106)
CHOLEST SERPL-MCNC: 189 MG/DL (ref 100–199)
CO2 SERPL-SCNC: 29 MMOL/L (ref 20–29)
CREAT SERPL-MCNC: 0.82 MG/DL (ref 0.57–1)
EGFRCR SERPLBLD CKD-EPI 2021: 78 ML/MIN/1.73
ERYTHROCYTE [DISTWIDTH] IN BLOOD BY AUTOMATED COUNT: 12 % (ref 11.7–15.4)
GLOBULIN SER CALC-MCNC: 2.5 G/DL (ref 1.5–4.5)
GLUCOSE SERPL-MCNC: 84 MG/DL (ref 70–99)
HBA1C MFR BLD: 4.7 % (ref 4.8–5.6)
HCT VFR BLD AUTO: 39.6 % (ref 34–46.6)
HDLC SERPL-MCNC: 49 MG/DL
HGB BLD-MCNC: 12.7 G/DL (ref 11.1–15.9)
LDLC SERPL CALC-MCNC: 107 MG/DL (ref 0–99)
MCH RBC QN AUTO: 31.8 PG (ref 26.6–33)
MCHC RBC AUTO-ENTMCNC: 32.1 G/DL (ref 31.5–35.7)
MCV RBC AUTO: 99 FL (ref 79–97)
PLATELET # BLD AUTO: 247 X10E3/UL (ref 150–450)
POTASSIUM SERPL-SCNC: 5 MMOL/L (ref 3.5–5.2)
PROT SERPL-MCNC: 6.5 G/DL (ref 6–8.5)
RBC # BLD AUTO: 4 X10E6/UL (ref 3.77–5.28)
SODIUM SERPL-SCNC: 139 MMOL/L (ref 134–144)
TRIGL SERPL-MCNC: 190 MG/DL (ref 0–149)
TSH SERPL DL<=0.005 MIU/L-ACNC: 1.22 UIU/ML (ref 0.45–4.5)
VLDLC SERPL CALC-MCNC: 33 MG/DL (ref 5–40)
WBC # BLD AUTO: 6.7 X10E3/UL (ref 3.4–10.8)

## 2024-05-17 NOTE — PROGRESS NOTES
Brenda Michelle is a 61 y.o. female who presents with   Chief Complaint   Patient presents with   • Hyperlipidemia     Checkup; lab updates   • Wants a flu shot     As noted   • History of colon polyps/family history of colon cancer.  Las     As noted.  Patient intolerant of the oral liquid preps.  She is willing to do a pill prep if that would be sufficient.   .    Hyperlipidemia   This is a chronic problem. The current episode started more than 1 year ago. The problem is controlled. Current antihyperlipidemic treatment includes fibric acid derivatives. The current treatment provides moderate improvement of lipids. There are no compliance problems.       History as noted above regarding colon polyps.  Her last colonoscopy was 2004.  She has been reluctant to do the follow-up colonoscopies as advised because she cannot tolerate the liquid preps.  She is willing to do a tilt prep if possible she says.      The following portions of the patient's history were reviewed and updated as appropriate: allergies, current medications, past medical history and problem list.    Review of Systems   Constitutional: Negative.    HENT: Negative.    Eyes: Negative.    Respiratory: Negative.    Cardiovascular: Negative.    Genitourinary: Negative.    Musculoskeletal: Negative.    Skin: Negative.    Neurological: Negative.    Psychiatric/Behavioral: Negative.        Objective   Physical Exam   Constitutional: She is oriented to person, place, and time. She appears well-developed and well-nourished. No distress.   HENT:   Head: Normocephalic and atraumatic.   Eyes: Conjunctivae and EOM are normal. Pupils are equal, round, and reactive to light.   Neck: Normal range of motion. Neck supple. No thyromegaly present.   Neck exam negative.  Carotid auscultation normal-no bruits heard.   Cardiovascular: Normal rate, regular rhythm, normal heart sounds and intact distal pulses.  Exam reveals no gallop and no friction rub.    No murmur  From: Jameel Donis  To: Ayse Duff MD  Sent: 5/17/2024 9:38 AM CDT  Subject: Plan B    Marita wanted to know if possible can you write a scrip for a plan b pill?   "heard.  Pulmonary/Chest: Effort normal and breath sounds normal. No respiratory distress. She has no wheezes. She has no rales. She exhibits no tenderness.   Neurological: She is alert and oriented to person, place, and time.   Psychiatric: She has a normal mood and affect. Her behavior is normal. Judgment and thought content normal.   Nursing note and vitals reviewed.      Assessment/Plan   Brenda was seen today for hyperlipidemia, wants a flu shot and history of colon polyps/family history of colon cancer.  las.    Diagnoses and all orders for this visit:    Mixed hyperlipidemia  -     Comprehensive Metabolic Panel  -     Lipid Panel    Adenomatous polyp of colon, unspecified part of colon  -     Ambulatory Referral to Gastroenterology    Encounter for immunization  -     Flu Vaccine High Dose PF 65YR+    Healthcare maintenance  -     Ambulatory Referral to Gastroenterology      Plan: Labs as above for the issues as outlined.  Tentatively we will plan on seeing her in June for a follow-up checkup/lab update visit.    She notes that her urine drug screen that she had done in May cost her $300 out of pocket and her insurance would not cover it.  She would like to be able to do it such that it is more cost affordable.  The patient  takes Lyrica for her controlled substance that necessitates a urine drug screen.  The Lyrica keeps her fibromyalgia symptoms under extremely good control and makes life bearable for her.  She does not abuse the medication in the urine drug screen in May showed appropriate use.  Because of this I do not feel it is necessary to do urine drug screens on her every 6 months but I have suggested that we would do them on a yearly basis and she is agreeable with that.  When she returns in June we will do one then in addition to the rest of her labs.    We will make a GI referral to Dr. Vinny Lopez .  Hopefully he can work with her to do a \"pill prep\" since she cannot tolerate the citrus liquid " preps.

## 2024-05-19 NOTE — PROGRESS NOTES
Mail copy of results to patient.  A1c normal to low normal  Cholesterol mildly elevated, continue work on diet  Blood counts normalized  Kidney and liver function normal  Thyroid appropriate range

## 2024-06-19 ENCOUNTER — CLINICAL SUPPORT (OUTPATIENT)
Dept: FAMILY MEDICINE CLINIC | Facility: CLINIC | Age: 69
End: 2024-06-19
Payer: MEDICARE

## 2024-06-19 DIAGNOSIS — N34.2 INFECTIVE URETHRITIS: Primary | ICD-10-CM

## 2024-06-19 DIAGNOSIS — R35.0 FREQUENCY OF URINATION: Primary | ICD-10-CM

## 2024-06-19 LAB
BILIRUB BLD-MCNC: NEGATIVE MG/DL
CLARITY, POC: ABNORMAL
COLOR UR: YELLOW
EXPIRATION DATE: ABNORMAL
GLUCOSE UR STRIP-MCNC: NEGATIVE MG/DL
KETONES UR QL: NEGATIVE
LEUKOCYTE EST, POC: ABNORMAL
Lab: ABNORMAL
NITRITE UR-MCNC: NEGATIVE MG/ML
PH UR: 6 [PH] (ref 5–8)
PROT UR STRIP-MCNC: ABNORMAL MG/DL
RBC # UR STRIP: ABNORMAL /UL
SP GR UR: 1.01 (ref 1–1.03)
UROBILINOGEN UR QL: NORMAL

## 2024-06-19 PROCEDURE — 81003 URINALYSIS AUTO W/O SCOPE: CPT | Performed by: FAMILY MEDICINE

## 2024-06-19 RX ORDER — CEPHALEXIN 500 MG/1
500 CAPSULE ORAL 2 TIMES DAILY
Qty: 14 CAPSULE | Refills: 0 | Status: SHIPPED | OUTPATIENT
Start: 2024-06-19

## 2024-06-19 NOTE — PROGRESS NOTES
Pt called office stating has UTI Symptoms   Dr. Kline asked pt to bring Urine Sample    Sample tested and was abnormal   Culture sent and Claudia sent Rx to Pharmacy

## 2024-06-20 DIAGNOSIS — E53.8 FOLIC ACID DEFICIENCY: ICD-10-CM

## 2024-06-20 RX ORDER — FOLIC ACID 1 MG/1
1000 TABLET ORAL DAILY
Qty: 90 TABLET | Refills: 3 | Status: SHIPPED | OUTPATIENT
Start: 2024-06-20

## 2024-06-21 LAB
BACTERIA UR CULT: NORMAL
BACTERIA UR CULT: NORMAL

## 2024-06-23 NOTE — PROGRESS NOTES
Call results to patient.  Urine dip suggests infection though culture jsut normal erlinda, if still symptoms repeat urine culture and u/a with micro.

## 2024-07-03 DIAGNOSIS — G89.29 OTHER CHRONIC PAIN: ICD-10-CM

## 2024-07-03 RX ORDER — HYDROCODONE BITARTRATE AND ACETAMINOPHEN 10; 325 MG/1; MG/1
1 TABLET ORAL EVERY 8 HOURS PRN
Qty: 90 TABLET | Refills: 0 | Status: SHIPPED | OUTPATIENT
Start: 2024-07-03

## 2024-07-03 NOTE — TELEPHONE ENCOUNTER
Caller: Shiva Michelle Sr    Relationship: Emergency Contact    Best call back number: [phone]    Requested Prescriptions:   Requested Prescriptions     Pending Prescriptions Disp Refills    HYDROcodone-acetaminophen (NORCO)  MG per tablet 9 tablet 0     Sig: Take 1 tablet by mouth Every 8 (Eight) Hours As Needed for Moderate Pain.        Pharmacy where request should be sent: [facility] [id number] [place] [id number] - [phone]  - [phone] FX     Last office visit with prescribing clinician: 5/16/2024   Last telemedicine visit with prescribing clinician: Visit date not found   Next office visit with prescribing clinician: 8/22/2024     Additional details provided by patient: DR. [facility] LICENSE WAS SUSPENDED AND THEY SUGGESTED REACHING OUT TO DR. LAURENT TO SEE IF HE CAN FILL IT FOR A MONTH.  SHE WILL BE OUT IN 2 DAYS.     Does the patient have less than a 3 day supply:  [x] Yes  [] No    Would you like a call back once the refill request has been completed: [x] Yes [] No    If the office needs to give you a call back, can they leave a voicemail: [x] Yes [] No    Avelina Montero Rep   07/03/24 10:53 EDT

## 2024-07-09 DIAGNOSIS — G60.9 IDIOPATHIC PERIPHERAL NEUROPATHY: ICD-10-CM

## 2024-07-09 RX ORDER — PREGABALIN 150 MG/1
CAPSULE ORAL
Qty: 120 CAPSULE | Refills: 2 | Status: SHIPPED | OUTPATIENT
Start: 2024-07-09

## 2024-07-09 NOTE — TELEPHONE ENCOUNTER
Caller: Shiva Michelle Sr    Relationship: Emergency Contact    Best call back number: [phone]    What medication are you requesting: pregabalin (LYRICA) 150 MG capsule- 2 DURING THE DAY AND 2 AT BEDTIME    If a prescription is needed, what is your preferred pharmacy and phone number:  [facility] [address] - [phone] [facility] [phone]  [phone]     Additional notes:PATIENT'S  STATED THAT PATIENT'S PAIN MANAGEMENT DOCTOR HAD HIS LICENSED REVOKED AND IS REQUESTING IF DR LAURENT COULD REFILL HER LYRICA FOR THE MOMENT. PLEASE ADVISE AND LET THEM KNOW ONCE IT HAS BEEN SENT.

## 2024-08-02 DIAGNOSIS — G89.29 OTHER CHRONIC PAIN: ICD-10-CM

## 2024-08-02 RX ORDER — HYDROCODONE BITARTRATE AND ACETAMINOPHEN 10; 325 MG/1; MG/1
1 TABLET ORAL EVERY 8 HOURS PRN
Qty: 90 TABLET | Refills: 0 | OUTPATIENT
Start: 2024-08-02

## 2024-08-02 NOTE — TELEPHONE ENCOUNTER
Hub staff attempted to follow warm transfer process and was unsuccessful     Caller: Brenda Michelle    Relationship to patient: Self    Best call back number: [phone]     Patient is needing: AN UPDATE ON MEDICATION REFILL

## 2024-08-02 NOTE — TELEPHONE ENCOUNTER
Caller: Shiva Michelle Sr    Relationship: Emergency Contact    Best call back number: [phone]    Requested Prescriptions:   Requested Prescriptions     Pending Prescriptions Disp Refills    HYDROcodone-acetaminophen (NORCO)  MG per tablet 90 tablet 0     Sig: Take 1 tablet by mouth Every 8 (Eight) Hours As Needed for Moderate Pain.        Pharmacy where request should be sent: [facility] [id number] [facility] [address] [address] - [phone]  - [phone] FX     Last office visit with prescribing clinician: 5/16/2024   Last telemedicine visit with prescribing clinician: Visit date not found   Next office visit with prescribing clinician: 8/22/2024     Additional details provided by patient: PATIENTS  IS CALLING NEEDS A REFILL     Does the patient have less than a 3 day supply:  [x] Yes  [] No    Would you like a call back once the refill request has been completed: [] Yes [x] No    If the office needs to give you a call back, can they leave a voicemail: [] Yes [x] No    Avelina Mejia Rep   08/02/24 09:29 EDT

## 2024-08-09 ENCOUNTER — TELEPHONE (OUTPATIENT)
Dept: FAMILY MEDICINE CLINIC | Facility: CLINIC | Age: 69
End: 2024-08-09
Payer: MEDICARE

## 2024-08-09 NOTE — TELEPHONE ENCOUNTER
Caller: Shiva Michelle Sr    Relationship: Emergency Contact    Best call back number: [phone]     What was the call regarding: PATIENT'S  TESTED POSITIVE FOR COVID. ASKING IF THERE ARE ANY HE IS SUGGESTIONS ON WHAT SHE WOULD NEED TO DO. PATIENT IS NOT CURRENTLY EXPERIENCING ANY SYMPTOMS. PLEASE ADVISE.

## 2024-08-12 RX ORDER — DULOXETIN HYDROCHLORIDE 60 MG/1
60 CAPSULE, DELAYED RELEASE ORAL DAILY
Qty: 90 CAPSULE | Refills: 3 | Status: SHIPPED | OUTPATIENT
Start: 2024-08-12

## 2024-08-22 ENCOUNTER — OFFICE VISIT (OUTPATIENT)
Dept: FAMILY MEDICINE CLINIC | Facility: CLINIC | Age: 69
End: 2024-08-22
Payer: MEDICARE

## 2024-08-22 VITALS
DIASTOLIC BLOOD PRESSURE: 60 MMHG | OXYGEN SATURATION: 97 % | HEART RATE: 77 BPM | BODY MASS INDEX: 35.44 KG/M2 | HEIGHT: 63 IN | SYSTOLIC BLOOD PRESSURE: 102 MMHG | WEIGHT: 200 LBS

## 2024-08-22 DIAGNOSIS — E66.9 OBESITY (BMI 30-39.9): ICD-10-CM

## 2024-08-22 DIAGNOSIS — E78.5 DYSLIPIDEMIA: Primary | ICD-10-CM

## 2024-08-22 DIAGNOSIS — E11.9 TYPE 2 DIABETES MELLITUS WITHOUT COMPLICATION, WITHOUT LONG-TERM CURRENT USE OF INSULIN: ICD-10-CM

## 2024-08-22 RX ORDER — BIOTIN 10000 MCG
1 CAPSULE ORAL DAILY
COMMUNITY
Start: 2024-04-10

## 2024-08-22 NOTE — PROGRESS NOTES
Subjective   Brenda Michelle is a 68 y.o. female. Presents today for   Chief Complaint   Patient presents with    Weight Check    Hyperlipidemia    Diabetes       History of Present Illness  Patient 67 y/o female with hld, past history of borderline dm2 with normalization of A1c on mounjaro and weight loss;   doing well, lost 10 more lbs and tolerating mounjaro 10mg well, ready to go up.   No cp/soa;    Review of Systems   Respiratory:  Negative for shortness of breath.    Cardiovascular:  Negative for chest pain and palpitations.       Patient Active Problem List   Diagnosis    Acute deep vein thrombosis of distal leg    Adenomatous polyp of colon    Depression    Fibromyalgia    Dyslipidemia    Peripheral arterial occlusive disease    Vitamin D deficiency    Gastroesophageal reflux disease    Irritable bowel syndrome    Disorder of intervertebral disc    Peripheral nerve disease    Fracture of multiple ribs of right side    Fall    Acute respiratory failure with hypoxia    COPD mixed type    Weakness    Hypopotassemia    Dysuria    Constipation    Hemorrhoids    Arthritis    Fibromyositis    Hernia of anterior abdominal wall    Homocystinemia    Injury of right ankle    Kidney stone    Knee pain    Moderate ankle sprain    Osteoarthritis of knee    Spinal stenosis, lumbar region without neurogenic claudication    Chronic respiratory failure with hypoxia    Bursitis of hip    Lumbar facet joint pain    Low back pain    Acute cystitis    Obesity, Class III, BMI 40-49.9 (morbid obesity)    Abnormal CT scan, gastrointestinal tract    Wheelchair dependence    Supplemental oxygen dependent    Dietary counseling    Pre-op evaluation    STEVEN (obstructive sleep apnea)    Lumbar spondylosis    Recurrent syncope    Vasovagal episode    Orthostatic syncope    Polypharmacy    Closed nondisplaced fracture of fourth metatarsal bone of right foot    Nondisplaced fracture of third metatarsal bone of right foot    Closed nondisplaced  fracture of lateral malleolus of right fibula    Nondisplaced fracture of third metatarsal bone, right foot, initial encounter for closed fracture       Social History     Socioeconomic History    Marital status:     Number of children: 2    Years of education: 12    Tobacco Use    Smoking status: Former     Current packs/day: 0.00     Average packs/day: 0.8 packs/day for 60.8 years (45.6 ttl pk-yrs)     Types: Cigarettes     Start date: 1/1/1960     Quit date: 11/4/2020     Years since quitting: 3.8    Smokeless tobacco: Never   Vaping Use    Vaping status: Never Used   Substance and Sexual Activity    Alcohol use: No    Drug use: No    Sexual activity: Not Currently     Partners: Male       Allergies   Allergen Reactions    Ambien [Zolpidem Tartrate] Other (See Comments)     Nightmares    Bupropion Itching    Codeine Sulfate Itching    Zolpidem Hives     Nightmares    Adhesive Tape Rash    Aripiprazole Unknown - Low Severity    Atorvastatin Other (See Comments)     MYALGIAS    Cilostazol Other (See Comments)     HA    Colesevelam Nausea Only    Ferrous Sulfate Nausea Only    Welchol [Colesevelam Hcl] Nausea Only    Wound Dressing Adhesive Rash       Current Outpatient Medications on File Prior to Visit   Medication Sig Dispense Refill    Aspirin (ASPIR-81 PO) Take 81 mg by mouth Daily.      Biotin 10 MG capsule Take 1 tablet by mouth Daily.      Cholecalciferol (Vitamin D3) 50 MCG (2000 UT) capsule       clopidogrel (PLAVIX) 75 MG tablet TAKE 1 TABLET BY MOUTH DAILY 90 tablet 3    docusate sodium (COLACE) 100 MG capsule Take 1 capsule by mouth 2 (Two) Times a Day.      DULoxetine (CYMBALTA) 60 MG capsule TAKE 1 CAPSULE BY MOUTH DAILY 90 capsule 3    folic acid (FOLVITE) 1 MG tablet TAKE 1 TABLET BY MOUTH DAILY 90 tablet 3    HYDROcodone-acetaminophen (NORCO)  MG per tablet Take 1 tablet by mouth Every 8 (Eight) Hours As Needed for Moderate Pain. 90 tablet 0    meclizine (ANTIVERT) 25 MG tablet Take 1  tablet by mouth 3 (Three) Times a Day As Needed for Dizziness. 45 tablet 0    pantoprazole (PROTONIX) 40 MG EC tablet Take 1 tablet by mouth Every Morning. 90 tablet 1    pravastatin (PRAVACHOL) 80 MG tablet TAKE 1 TABLET BY MOUTH DAILY 90 tablet 3    pregabalin (LYRICA) 150 MG capsule Take 2 capsules by mouth Every Morning AND 2 capsules Every Evening. 120 capsule 2    risedronate (ACTONEL) 150 MG tablet TAKE 1 TABLET BY MOUTH EVERY 30  DAYS WITH WATER ON AN EMPTY  STOMACH , NOTHING BY MOUTH AND  DO NOT LIE DOWN FOR NEXT 1/2  HOUR 3 tablet 3    rOPINIRole (REQUIP) 0.25 MG tablet Take 1 tablet by mouth Every Night. Take 1 hour before bedtime. 90 tablet 3    vitamin B-12 (CYANOCOBALAMIN) 1000 MCG tablet Take 1 tablet by mouth Daily.      vitamin D3 125 MCG (5000 UT) capsule capsule Take 1 capsule by mouth Daily.      [DISCONTINUED] Tirzepatide (MOUNJARO) 10 MG/0.5ML solution pen-injector pen Inject 0.5 mL under the skin into the appropriate area as directed 1 (One) Time Per Week.      [DISCONTINUED] cephalexin (Keflex) 500 MG capsule Take 1 capsule by mouth 2 (Two) Times a Day. (Patient not taking: Reported on 8/22/2024) 14 capsule 0    [DISCONTINUED] Nirmatrelvir & Ritonavir, 300mg/100mg, (PAXLOVID) Take 3 tablets by mouth 2 (Two) Times a Day. STOP TAKING YOUR PRAVASTATIN WHILE TAKING PAXLOVID.  You may resume your Pravastatin the day after finishing the Paxlovid. (Patient not taking: Reported on 8/22/2024) 30 tablet 0    [DISCONTINUED] phentermine 37.5 MG capsule Take 1 capsule by mouth Every Morning. (Patient not taking: Reported on 8/22/2024) 30 capsule 5    [DISCONTINUED] Tirzepatide (Mounjaro) 7.5 MG/0.5ML solution pen-injector pen Inject 0.5 mL under the skin into the appropriate area as directed 1 (One) Time Per Week. (Patient not taking: Reported on 8/22/2024) 6 mL 3     No current facility-administered medications on file prior to visit.       Objective   Vitals:    08/22/24 1342   BP: 102/60   Pulse: 77  "  SpO2: 97%   Weight: 90.7 kg (200 lb)   Height: 160 cm (63\")     Body mass index is 35.43 kg/m².   9/9/2022 9/21/2022 9/29/2022 10/4/2022 12/2/2022   Weight        Weight (kg) 123.9 kg  --  121.11 kg  119.296 kg  118.842 kg    Weight (lbs) 273 lb 2.4 oz  --  267 lb  263 lb  262 lb    Weight Method Bed scale   Standing scale      Visit Report  --    --       3/16/2023 4/11/2023 4/14/2023 5/24/2023 8/16/2023   Weight        Weight (kg) 118.842 kg  111.585 kg  111.585 kg  110.678 kg  111.585 kg    Weight (lbs) 262 lb  246 lb  246 lb  244 lb  246 lb    Weight Method   Stated      Visit Report --  --   --  --       11/16/2023 3/3/2024 3/4/2024 3/20/2024 5/16/2024   Weight        Weight (kg) 105.688 kg  95.709 kg  95.709 kg  96.616 kg  95.255 kg    Weight (lbs) 233 lb  211 lb  211 lb  213 lb  210 lb    Weight Method        Visit Report --    --  --       8/22/2024   Weight    Weight (kg) 90.719 kg    Weight (lbs) 200 lb    Weight Method    Visit Report --        Physical Exam  Vitals and nursing note reviewed.   Constitutional:       Appearance: She is well-developed.   HENT:      Head: Normocephalic and atraumatic.   Neck:      Thyroid: No thyromegaly.      Vascular: No JVD.   Cardiovascular:      Rate and Rhythm: Normal rate and regular rhythm.      Heart sounds: Normal heart sounds. No murmur heard.     No friction rub. No gallop.   Pulmonary:      Effort: Pulmonary effort is normal. No respiratory distress.      Breath sounds: Normal breath sounds. No wheezing or rales.   Abdominal:      General: Bowel sounds are normal. There is no distension.      Palpations: Abdomen is soft.      Tenderness: There is no abdominal tenderness. There is no guarding or rebound.   Musculoskeletal:      Cervical back: Neck supple.   Skin:     General: Skin is warm and dry.   Neurological:      Mental Status: She is alert.      Gait: Gait normal.   Psychiatric:         Behavior: Behavior normal.         Assessment & Plan   Diagnoses and " all orders for this visit:    1. Dyslipidemia (Primary)    2. Obesity (BMI 30-39.9)    3. Type 2 diabetes mellitus without complication, without long-term current use of insulin  -     Tirzepatide (MOUNJARO) 12.5 MG/0.5ML solution pen-injector pen; Inject 0.5 mL under the skin into the appropriate area as directed 1 (One) Time Per Week.  Dispense: 6 mL; Refill: 3    Other orders  -     Discontinue: Tirzepatide (MOUNJARO) 10 MG/0.5ML solution pen-injector pen; Inject 0.5 mL under the skin into the appropriate area as directed 1 (One) Time Per Week.  Dispense: 6 mL; Refill: 1    Down now 73 lbs from when started losing;  Continue mounjaro, cancel 10mg and go up to 12.5mg                   -Follow up: 3 months and prn

## 2024-08-28 ENCOUNTER — TELEPHONE (OUTPATIENT)
Dept: FAMILY MEDICINE CLINIC | Facility: CLINIC | Age: 69
End: 2024-08-28
Payer: MEDICARE

## 2024-08-28 DIAGNOSIS — M47.816 LUMBAR SPONDYLOSIS: ICD-10-CM

## 2024-08-28 DIAGNOSIS — M48.061 SPINAL STENOSIS, LUMBAR REGION WITHOUT NEUROGENIC CLAUDICATION: ICD-10-CM

## 2024-08-28 DIAGNOSIS — G60.9 IDIOPATHIC PERIPHERAL NEUROPATHY: Primary | ICD-10-CM

## 2024-08-28 DIAGNOSIS — I77.9 PERIPHERAL ARTERIAL OCCLUSIVE DISEASE: ICD-10-CM

## 2024-08-28 DIAGNOSIS — M51.9 DISORDER OF INTERVERTEBRAL DISC: ICD-10-CM

## 2024-08-28 NOTE — TELEPHONE ENCOUNTER
Previous patient of Dr Mock.   Asking for a referral to [facility] in [place]    Fax [phone]    Shiva  [phone]

## 2024-09-04 RX ORDER — PREGABALIN 150 MG/1
150 CAPSULE ORAL 3 TIMES DAILY
Qty: 90 CAPSULE | Refills: 2 | Status: CANCELLED | OUTPATIENT
Start: 2024-09-04

## 2024-09-10 ENCOUNTER — TRANSCRIBE ORDERS (OUTPATIENT)
Dept: ADMINISTRATIVE | Facility: HOSPITAL | Age: 69
End: 2024-09-10
Payer: MEDICARE

## 2024-09-10 DIAGNOSIS — Z87.891 PERSONAL HISTORY OF TOBACCO USE, PRESENTING HAZARDS TO HEALTH: Primary | ICD-10-CM

## 2024-09-30 DIAGNOSIS — G60.9 IDIOPATHIC PERIPHERAL NEUROPATHY: ICD-10-CM

## 2024-09-30 RX ORDER — PREGABALIN 150 MG/1
CAPSULE ORAL
Qty: 120 CAPSULE | Refills: 5 | Status: SHIPPED | OUTPATIENT
Start: 2024-09-30

## 2024-10-03 DIAGNOSIS — K55.1 MESENTERIC ARTERY STENOSIS: ICD-10-CM

## 2024-10-03 DIAGNOSIS — I71.40 ABDOMINAL AORTIC ANEURYSM (AAA) WITHOUT RUPTURE, UNSPECIFIED PART: ICD-10-CM

## 2024-10-03 DIAGNOSIS — I77.4 CELIAC ARTERY STENOSIS: ICD-10-CM

## 2024-10-03 DIAGNOSIS — I73.9 PERIPHERAL VASCULAR DISEASE, UNSPECIFIED: ICD-10-CM

## 2024-10-08 ENCOUNTER — FLU SHOT (OUTPATIENT)
Dept: FAMILY MEDICINE CLINIC | Facility: CLINIC | Age: 69
End: 2024-10-08
Payer: MEDICARE

## 2024-10-08 DIAGNOSIS — Z23 NEED FOR COVID-19 VACCINE: Primary | ICD-10-CM

## 2024-10-08 DIAGNOSIS — Z23 NEED FOR INFLUENZA VACCINATION: ICD-10-CM

## 2024-10-08 PROCEDURE — 91320 SARSCV2 VAC 30MCG TRS-SUC IM: CPT | Performed by: FAMILY MEDICINE

## 2024-10-08 PROCEDURE — 90480 ADMN SARSCOV2 VAC 1/ONLY CMP: CPT | Performed by: FAMILY MEDICINE

## 2024-10-08 PROCEDURE — G0008 ADMIN INFLUENZA VIRUS VAC: HCPCS | Performed by: FAMILY MEDICINE

## 2024-10-08 PROCEDURE — 90662 IIV NO PRSV INCREASED AG IM: CPT | Performed by: FAMILY MEDICINE

## 2024-10-08 NOTE — PROGRESS NOTES
Brenda came in today to get her yearly Flu vaccine ant to get her next Covid Vaccine.  Covid Comirnaty Vaccine given in Rt Deltoid Fluzone HD given Left Deltoid.  Pt tolerated both vaccines well

## 2024-11-04 DIAGNOSIS — K21.9 GASTROESOPHAGEAL REFLUX DISEASE WITHOUT ESOPHAGITIS: ICD-10-CM

## 2024-11-04 RX ORDER — PANTOPRAZOLE SODIUM 40 MG/1
40 TABLET, DELAYED RELEASE ORAL
Qty: 90 TABLET | Refills: 3 | Status: SHIPPED | OUTPATIENT
Start: 2024-11-04

## 2024-11-21 ENCOUNTER — OFFICE VISIT (OUTPATIENT)
Dept: FAMILY MEDICINE CLINIC | Facility: CLINIC | Age: 69
End: 2024-11-21
Payer: MEDICARE

## 2024-11-21 VITALS
WEIGHT: 191 LBS | OXYGEN SATURATION: 85 % | BODY MASS INDEX: 33.84 KG/M2 | HEART RATE: 85 BPM | HEIGHT: 63 IN | SYSTOLIC BLOOD PRESSURE: 90 MMHG | DIASTOLIC BLOOD PRESSURE: 60 MMHG

## 2024-11-21 DIAGNOSIS — J96.11 CHRONIC HYPOXEMIC RESPIRATORY FAILURE: ICD-10-CM

## 2024-11-21 DIAGNOSIS — E11.9 TYPE 2 DIABETES MELLITUS WITHOUT COMPLICATION, WITHOUT LONG-TERM CURRENT USE OF INSULIN: Primary | ICD-10-CM

## 2024-11-21 DIAGNOSIS — E66.9 OBESITY (BMI 30-39.9): ICD-10-CM

## 2024-11-21 DIAGNOSIS — J44.9 CHRONIC OBSTRUCTIVE PULMONARY DISEASE, UNSPECIFIED COPD TYPE: ICD-10-CM

## 2024-11-21 NOTE — PROGRESS NOTES
Subjective   Brenda Michelle is a 69 y.o. female. Presents today for   Chief Complaint   Patient presents with    Weight Check    Diabetes    COPD       History of Present Illness  Patient 70 yo borderlien blood sugar normalized on mounjaro, doing well and losing weight despite severe oa, chronic pain and oxygen dependent.  Also needs to recert for oxygen has copd.    Review of Systems   Respiratory:  Positive for shortness of breath.    Cardiovascular:  Negative for chest pain and palpitations.   Gastrointestinal:  Negative for abdominal pain.       Patient Active Problem List   Diagnosis    Acute deep vein thrombosis of distal leg    Adenomatous polyp of colon    Depression    Fibromyalgia    Dyslipidemia    Peripheral arterial occlusive disease    Vitamin D deficiency    Gastroesophageal reflux disease    Irritable bowel syndrome    Disorder of intervertebral disc    Peripheral nerve disease    Fracture of multiple ribs of right side    Fall    Acute respiratory failure with hypoxia    COPD mixed type    Weakness    Hypopotassemia    Dysuria    Constipation    Hemorrhoids    Arthritis    Fibromyositis    Hernia of anterior abdominal wall    Homocystinemia    Injury of right ankle    Kidney stone    Knee pain    Moderate ankle sprain    Osteoarthritis of knee    Spinal stenosis, lumbar region without neurogenic claudication    Chronic respiratory failure with hypoxia    Bursitis of hip    Lumbar facet joint pain    Low back pain    Acute cystitis    Obesity, Class III, BMI 40-49.9 (morbid obesity)    Abnormal CT scan, gastrointestinal tract    Wheelchair dependence    Supplemental oxygen dependent    Dietary counseling    Pre-op evaluation    STEVEN (obstructive sleep apnea)    Lumbar spondylosis    Recurrent syncope    Vasovagal episode    Orthostatic syncope    Polypharmacy    Closed nondisplaced fracture of fourth metatarsal bone of right foot    Nondisplaced fracture of third metatarsal bone of right foot     Closed nondisplaced fracture of lateral malleolus of right fibula    Nondisplaced fracture of third metatarsal bone, right foot, initial encounter for closed fracture       Social History     Socioeconomic History    Marital status:     Number of children: 2    Years of education: 12    Tobacco Use    Smoking status: Former     Current packs/day: 0.00     Average packs/day: 0.8 packs/day for 60.8 years (45.6 ttl pk-yrs)     Types: Cigarettes     Start date: 1960     Quit date: 2020     Years since quittin.0     Passive exposure: Past    Smokeless tobacco: Never   Vaping Use    Vaping status: Never Used   Substance and Sexual Activity    Alcohol use: No    Drug use: No    Sexual activity: Not Currently     Partners: Male       Allergies   Allergen Reactions    Ambien [Zolpidem Tartrate] Other (See Comments)     Nightmares    Bupropion Itching    Codeine Sulfate Itching    Zolpidem Hives     Nightmares    Adhesive Tape Rash    Aripiprazole Unknown - Low Severity    Atorvastatin Other (See Comments)     MYALGIAS    Cilostazol Other (See Comments)     HA    Colesevelam Nausea Only    Ferrous Sulfate Nausea Only    Welchol [Colesevelam Hcl] Nausea Only    Wound Dressing Adhesive Rash       Current Outpatient Medications on File Prior to Visit   Medication Sig Dispense Refill    Aspirin (ASPIR-81 PO) Take 81 mg by mouth Daily.      Biotin 10 MG capsule Take 1 tablet by mouth Daily.      Cholecalciferol (Vitamin D3) 50 MCG ( UT) capsule       clopidogrel (PLAVIX) 75 MG tablet TAKE 1 TABLET BY MOUTH DAILY 90 tablet 3    docusate sodium (COLACE) 100 MG capsule Take 1 capsule by mouth 2 (Two) Times a Day.      DULoxetine (CYMBALTA) 60 MG capsule TAKE 1 CAPSULE BY MOUTH DAILY 90 capsule 3    folic acid (FOLVITE) 1 MG tablet TAKE 1 TABLET BY MOUTH DAILY 90 tablet 3    HYDROcodone-acetaminophen (NORCO)  MG per tablet Take 1 tablet by mouth Every 8 (Eight) Hours As Needed for Moderate Pain. 90 tablet  "0    meclizine (ANTIVERT) 25 MG tablet Take 1 tablet by mouth 3 (Three) Times a Day As Needed for Dizziness. 45 tablet 0    pantoprazole (PROTONIX) 40 MG EC tablet TAKE 1 TABLET BY MOUTH EVERY  MORNING 90 tablet 3    pravastatin (PRAVACHOL) 80 MG tablet TAKE 1 TABLET BY MOUTH DAILY 90 tablet 3    pregabalin (LYRICA) 150 MG capsule TAKE 2 CAPSULES BY MOUTH EVERY  MORNING AND 2 CAPSULES EVERY  EVENING 120 capsule 5    risedronate (ACTONEL) 150 MG tablet TAKE 1 TABLET BY MOUTH EVERY 30  DAYS WITH WATER ON AN EMPTY  STOMACH , NOTHING BY MOUTH AND  DO NOT LIE DOWN FOR NEXT 1/2  HOUR 3 tablet 3    rOPINIRole (REQUIP) 0.25 MG tablet Take 1 tablet by mouth Every Night. Take 1 hour before bedtime. 90 tablet 3    Tirzepatide (MOUNJARO) 12.5 MG/0.5ML solution pen-injector pen Inject 0.5 mL under the skin into the appropriate area as directed 1 (One) Time Per Week. 6 mL 3    vitamin B-12 (CYANOCOBALAMIN) 1000 MCG tablet Take 1 tablet by mouth Daily.      vitamin D3 125 MCG (5000 UT) capsule capsule Take 1 capsule by mouth Daily.       No current facility-administered medications on file prior to visit.       Objective   Vitals:    11/21/24 1438 11/21/24 1516   BP: 90/60    Pulse: 85    SpO2: 93%  Comment: on 3 lpm (!) 85%  Comment: on room air   Weight: 86.6 kg (191 lb)    Height: 160 cm (63\")      Weight Weight (kg) Weight (lbs) Weight Method Visit Report                 11/21/2024 86.637 kg 191 lb - Report   8/22/2024 90.719 kg 200 lb - Report   5/16/2024 95.255 kg 210 lb - Report   3/20/2024 96.616 kg 213 lb - Report   3/4/2024 95.709 kg 211 lb - -   3/3/2024 95.709 kg 211 lb - -   11/16/2023 105.688 kg 233 lb - Report   8/16/2023 111.585 kg 246 lb - Report   5/24/2023 110.678 kg 244 lb - Report   4/14/2023 111.585 kg 246 lb Stated -   4/11/2023 111.585 kg 246 lb - Report   3/16/2023 118.842 kg 262 lb - Report   12/2/2022 118.842 kg 262 lb - Report   10/4/2022 119.296 kg 263 lb - -   9/29/2022 121.11 kg 267 lb Standing scale " -     Body mass index is 33.83 kg/m².    Physical Exam  Vitals and nursing note reviewed.   Constitutional:       Appearance: Normal appearance. She is not toxic-appearing or diaphoretic.   HENT:      Head: Normocephalic and atraumatic.   Musculoskeletal:      Cervical back: Neck supple.   Skin:     General: Skin is warm and dry.      Capillary Refill: Capillary refill takes less than 2 seconds.   Neurological:      Mental Status: She is alert.   Psychiatric:         Mood and Affect: Mood normal.         Behavior: Behavior normal.         Assessment & Plan   Diagnoses and all orders for this visit:    1. Type 2 diabetes mellitus without complication, without long-term current use of insulin (Primary)  -     Microalbumin / Creatinine Urine Ratio - Urine, Clean Catch  -     Lipid Panel  -     Comprehensive Metabolic Panel  -     Hemoglobin A1c    2. Obesity (BMI 30-39.9)    3. Chronic obstructive pulmonary disease, unspecified COPD type    4. Chronic hypoxemic respiratory failure    By our scale down 76 lbs, conitnue mounjaro and due labs         Face-to-Face O2 Recert and Revision  Patient Basic Information:  Ms. Michelle is a 69-year-old female, currently diagnosed with chronic obstructive pulmonary disease, unspecified and  chronic respiratory failure with hypoxia.  Encounter:  The patient had a face-to-face encounter on 11/21/2024 for recertification of oxygen therapy. She is still using  supplemental oxygen.  Treatment Plan:  The patient will continue to use and benefit from oxygen therapy.  Additional Notes:  Patient SaO2 85% on room air and 93% on 3 lpm via nasal cannula.  Current Diagnosis:  J44.9 - Chronic obstructive pulmonary disease, unspecified  J96.11 - Chronic respiratory failure with hypoxia  Products:  O2 Recert Document   Home O2 Concentrator   Portable O2 Concentrator    BMI is >= 30 and <35. (Class 1 Obesity). The following options were offered after discussion;: weight loss educational  material (shared in after visit summary)     -Follow up: 3 months and prn

## 2024-11-22 DIAGNOSIS — E11.9 TYPE 2 DIABETES MELLITUS WITHOUT COMPLICATION, WITHOUT LONG-TERM CURRENT USE OF INSULIN: Primary | ICD-10-CM

## 2024-11-22 DIAGNOSIS — Z79.899 HIGH RISK MEDICATION USE: ICD-10-CM

## 2024-11-22 LAB
ALBUMIN SERPL-MCNC: 4.1 G/DL (ref 3.9–4.9)
ALP SERPL-CCNC: 93 IU/L (ref 44–121)
ALT SERPL-CCNC: 10 IU/L (ref 0–32)
AST SERPL-CCNC: 28 IU/L (ref 0–40)
BILIRUB SERPL-MCNC: 0.4 MG/DL (ref 0–1.2)
BUN SERPL-MCNC: 9 MG/DL (ref 8–27)
BUN/CREAT SERPL: 14 (ref 12–28)
CALCIUM SERPL-MCNC: 10.7 MG/DL (ref 8.7–10.3)
CHLORIDE SERPL-SCNC: 102 MMOL/L (ref 96–106)
CHOLEST SERPL-MCNC: 194 MG/DL (ref 100–199)
CO2 SERPL-SCNC: 29 MMOL/L (ref 20–29)
CREAT SERPL-MCNC: 0.65 MG/DL (ref 0.57–1)
EGFRCR SERPLBLD CKD-EPI 2021: 95 ML/MIN/1.73
GLOBULIN SER CALC-MCNC: 2.8 G/DL (ref 1.5–4.5)
GLUCOSE SERPL-MCNC: 87 MG/DL (ref 70–99)
HBA1C MFR BLD: 5.2 % (ref 4.8–5.6)
HDLC SERPL-MCNC: 56 MG/DL
LDLC SERPL CALC-MCNC: 115 MG/DL (ref 0–99)
POTASSIUM SERPL-SCNC: 4.6 MMOL/L (ref 3.5–5.2)
PROT SERPL-MCNC: 6.9 G/DL (ref 6–8.5)
SODIUM SERPL-SCNC: 141 MMOL/L (ref 134–144)
TRIGL SERPL-MCNC: 129 MG/DL (ref 0–149)
UNABLE TO VOID: NORMAL
VLDLC SERPL CALC-MCNC: 23 MG/DL (ref 5–40)

## 2024-11-24 NOTE — PROGRESS NOTES
Mail copy of results to patient.  Cholesterol mildly elevated, work on diet as doing  A1c normalized with weight loss  Kidney and liver function normal  Calcium level borderline, will recheck next office visit.

## 2024-12-02 LAB
ALBUMIN/CREAT UR: 5 MG/G CREAT (ref 0–29)
CREAT UR-MCNC: 70.7 MG/DL
DRUGS UR: NORMAL
MICROALBUMIN UR-MCNC: 3.8 UG/ML

## 2024-12-12 ENCOUNTER — TELEPHONE (OUTPATIENT)
Dept: FAMILY MEDICINE CLINIC | Facility: CLINIC | Age: 69
End: 2024-12-12
Payer: MEDICARE

## 2024-12-12 NOTE — TELEPHONE ENCOUNTER
PATIENT WOULD LIKE TO KNOW IF SHE NEEDS TO CHANGE HER MEDICATION BECAUSE HER CALCIUM CAME BACK HIGH.     PLEASE CALL PATIENT TO DISCUSS.

## 2024-12-13 NOTE — TELEPHONE ENCOUNTER
Hub to relay    No, calcium level of 10.7 is ok and safe range, will just recheck it again in a few months to make sure does not change further.  If progressively rises we can then change meds.  RRJ   RRJ     I left detailed msg informing pt

## 2025-01-14 ENCOUNTER — TELEPHONE (OUTPATIENT)
Dept: FAMILY MEDICINE CLINIC | Facility: CLINIC | Age: 70
End: 2025-01-14
Payer: MEDICARE

## 2025-01-14 NOTE — TELEPHONE ENCOUNTER
Caller: Shiva Michelle Sr    Relationship to patient: Emergency Contact    Best call back number:      Patient is needing: PATIENT'S  WOULD LIKE A CALLBACK TO LET THEM KNOW IF THERE IS ANY ASSISTANCE DR. LAURENT CAN PROVIDE THE PATIENT AS FAR AS HER GETTING HER OXYGEN SUPPLIES FROM [facility].   MALI ADVISED TODAY THAT THERE IS MISSING DOCUMENTATION SUCH AS THE 6-MINUTE-WALKING-TEST.   HE STATES DR. LAURENT COMPLETED PAPERWORK BACK IN NOVEMBER AND PATIENT HAD NO ISSUES GETTING SUPPLIES IN NOVEMBER AND DECEMBER.   PATIENT IS NEEDING HER SUPPLIES, AND WOULD LIKE TO KNOW IF DR. LAURENT CAN CONTACT HER INSURANCE OR ULDS.

## 2025-01-21 NOTE — TELEPHONE ENCOUNTER
Caller: [facility]    Relationship to patient: Other    Best call back number: [phone]    Patient is needing: THEY ARE REQUESTING A COPY OF THE ORDER FOR THE OXYGEN. PLEASE SEND     FAX: [phone]

## 2025-01-21 NOTE — TELEPHONE ENCOUNTER
Per jesus from [facility] nothing is needed from us at this time. They have everything they need and will start billing pt's insurance.

## 2025-02-13 DIAGNOSIS — I77.9 PERIPHERAL ARTERIAL OCCLUSIVE DISEASE: ICD-10-CM

## 2025-02-14 RX ORDER — CLOPIDOGREL BISULFATE 75 MG/1
75 TABLET ORAL DAILY
Qty: 90 TABLET | Refills: 3 | Status: SHIPPED | OUTPATIENT
Start: 2025-02-14

## 2025-02-27 DIAGNOSIS — G25.81 RESTLESS LEGS: ICD-10-CM

## 2025-02-27 RX ORDER — ROPINIROLE 0.25 MG/1
TABLET, FILM COATED ORAL
Qty: 90 TABLET | Refills: 3 | Status: SHIPPED | OUTPATIENT
Start: 2025-02-27

## 2025-03-04 ENCOUNTER — OFFICE VISIT (OUTPATIENT)
Dept: FAMILY MEDICINE CLINIC | Facility: CLINIC | Age: 70
End: 2025-03-04
Payer: MEDICARE

## 2025-03-04 VITALS
BODY MASS INDEX: 32.6 KG/M2 | SYSTOLIC BLOOD PRESSURE: 106 MMHG | WEIGHT: 184 LBS | DIASTOLIC BLOOD PRESSURE: 64 MMHG | HEIGHT: 63 IN | OXYGEN SATURATION: 90 % | HEART RATE: 80 BPM

## 2025-03-04 DIAGNOSIS — G60.9 IDIOPATHIC NEUROPATHY: ICD-10-CM

## 2025-03-04 DIAGNOSIS — E11.42 TYPE 2 DIABETES MELLITUS WITH DIABETIC POLYNEUROPATHY, WITHOUT LONG-TERM CURRENT USE OF INSULIN: ICD-10-CM

## 2025-03-04 DIAGNOSIS — R07.89 CHEST WALL PAIN: Primary | ICD-10-CM

## 2025-03-04 RX ORDER — PREGABALIN 150 MG/1
CAPSULE ORAL
Qty: 120 CAPSULE | Refills: 5 | Status: SHIPPED | OUTPATIENT
Start: 2025-03-04

## 2025-03-05 LAB
ALBUMIN SERPL-MCNC: 3.9 G/DL (ref 3.5–5.2)
ALBUMIN/CREAT UR: 12 MG/G CREAT (ref 0–29)
ALBUMIN/GLOB SERPL: 1.4 G/DL
ALP SERPL-CCNC: 84 U/L (ref 39–117)
ALT SERPL-CCNC: 10 U/L (ref 1–33)
AST SERPL-CCNC: 24 U/L (ref 1–32)
BILIRUB SERPL-MCNC: 0.4 MG/DL (ref 0–1.2)
BUN SERPL-MCNC: 7 MG/DL (ref 8–23)
BUN/CREAT SERPL: 10.4 (ref 7–25)
CALCIUM SERPL-MCNC: 10.2 MG/DL (ref 8.6–10.5)
CHLORIDE SERPL-SCNC: 100 MMOL/L (ref 98–107)
CHOLEST SERPL-MCNC: 196 MG/DL (ref 0–200)
CO2 SERPL-SCNC: 31.5 MMOL/L (ref 22–29)
CREAT SERPL-MCNC: 0.67 MG/DL (ref 0.57–1)
CREAT UR-MCNC: 96.3 MG/DL
EGFRCR SERPLBLD CKD-EPI 2021: 94.7 ML/MIN/1.73
GLOBULIN SER CALC-MCNC: 2.8 GM/DL
GLUCOSE SERPL-MCNC: 89 MG/DL (ref 65–99)
HBA1C MFR BLD: 4.9 % (ref 4.8–5.6)
HDLC SERPL-MCNC: 56 MG/DL (ref 40–60)
LDLC SERPL CALC-MCNC: 119 MG/DL (ref 0–100)
MICROALBUMIN UR-MCNC: 12 UG/ML
POTASSIUM SERPL-SCNC: 4.6 MMOL/L (ref 3.5–5.2)
PROT SERPL-MCNC: 6.7 G/DL (ref 6–8.5)
SODIUM SERPL-SCNC: 141 MMOL/L (ref 136–145)
TRIGL SERPL-MCNC: 116 MG/DL (ref 0–150)
TSH SERPL DL<=0.005 MIU/L-ACNC: 1.18 UIU/ML (ref 0.27–4.2)
VLDLC SERPL CALC-MCNC: 21 MG/DL (ref 5–40)

## 2025-03-09 ENCOUNTER — RESULTS FOLLOW-UP (OUTPATIENT)
Dept: FAMILY MEDICINE CLINIC | Facility: CLINIC | Age: 70
End: 2025-03-09
Payer: MEDICARE

## 2025-03-09 NOTE — PROGRESS NOTES
Mail results to patient  Diabetes is very well controlled and normalized A1c on mounjaro  Cholesterol is near goal, continue diet and weight loss as doing.  Rest of labs normal or stable.

## 2025-03-09 NOTE — LETTER
Brenda Michelle  [address] [facility] [address]    March 9, 2025     Dear Ms. Michelle:    Below are the results from your recent visit:    Diabetes is very well controlled and normalized A1c on mounjaro  Cholesterol is near goal, continue diet and weight loss as doing.  Rest of labs normal or stable.        Resulted Orders   Microalbumin / Creatinine Urine Ratio - Urine, Clean Catch   Result Value Ref Range    Creatinine, Urine 96.3 Not Estab. mg/dL    Microalbumin, Urine 12.0 Not Estab. ug/mL    Microalbumin/Creatinine Ratio 12 0 - 29 mg/g creat      Comment:                             Normal:                0 -  29                         Moderately increased: 30 - 300                         Severely increased:       >300     Lipid Panel   Result Value Ref Range    Total Cholesterol 196 0 - 200 mg/dL      Comment:      Cholesterol Reference Ranges  (U.S. Department of Health and Human Services ATP III  Classifications)  Desirable          <200 mg/dL  Borderline High    200-239 mg/dL  High Risk          >240 mg/dL  Triglyceride Reference Ranges  (U.S. Department of Health and Human Services ATP III  Classifications)  Normal           <150 mg/dL  Borderline High  150-199 mg/dL  High             200-499 mg/dL  Very High        >500 mg/dL  HDL Reference Ranges  (U.S. Department of Health and Human Services ATP III  Classifications)  Low     <40 mg/dl (major risk factor for CHD)  High    >60 mg/dl ('negative' risk factor for CHD)  LDL Reference Ranges  (U.S. Department of Health and Human Services ATP III  Classifications)  Optimal          <100 mg/dL  Near Optimal     100-129 mg/dL  Borderline High  130-159 mg/dL  High             160-189 mg/dL  Very High        >189 mg/dL  LDL is calculated using the NIH LDL-C calculation.      Triglycerides 116 0 - 150 mg/dL    HDL Cholesterol 56 40 - 60 mg/dL    VLDL Cholesterol Dileep 21 5 - 40 mg/dL    LDL Chol Calc (NIH) 119 (H) 0 - 100 mg/dL   Comprehensive Metabolic  Panel   Result Value Ref Range    Glucose 89 65 - 99 mg/dL    BUN 7 (L) 8 - 23 mg/dL    Creatinine 0.67 0.57 - 1.00 mg/dL    EGFR Result 94.7 >60.0 mL/min/1.73      Comment:      GFR Categories in Chronic Kidney Disease (CKD)/X09/  /X09/  GFR Category          GFR (mL/min/1.73)    Interpretation  G1/X09/                    90 or greater/X09/        Normal or high  (1)  G2//                    60-89                Mild decrease  (1)  G3a                   45-59                Mild to moderate  decrease  G3b                   30-44                Moderate to  severe decrease  G4                    15-29                Severe decrease  G5                    14 or less           Kidney failure//  /[id number]/  (1)In the absence of evidence of kidney disease, neither  GFR category G1 or G2 fulfill the criteria for CKD.  eGFR calculation 2021 CKD-EPI creatinine equation, which  does not include race as a factor      BUN/Creatinine Ratio 10.4 7.0 - 25.0    Sodium 141 136 - 145 mmol/L    Potassium 4.6 3.5 - 5.2 mmol/L    Chloride 100 98 - 107 mmol/L    Total CO2 31.5 (H) 22.0 - 29.0 mmol/L    Calcium 10.2 8.6 - 10.5 mg/dL    Total Protein 6.7 6.0 - 8.5 g/dL    Albumin 3.9 3.5 - 5.2 g/dL    Globulin 2.8 gm/dL    A/G Ratio 1.4 g/dL    Total Bilirubin 0.4 0.0 - 1.2 mg/dL    Alkaline Phosphatase 84 39 - 117 U/L    AST (SGOT) 24 1 - 32 U/L    ALT (SGPT) 10 1 - 33 U/L   Hemoglobin A1c   Result Value Ref Range    Hemoglobin A1C 4.90 4.80 - 5.60 %      Comment:      Hemoglobin A1C Ranges:  Increased Risk for Diabetes  5.7% to 6.4%  Diabetes                     >= 6.5%  Diabetic Goal                < 7.0%     TSH   Result Value Ref Range    TSH 1.180 0.270 - 4.200 uIU/mL     If you have any questions or concerns, please don't hesitate to call.    Sincerely,        Angelito Kline, DO

## 2025-03-09 NOTE — PROGRESS NOTES
Subjective   Brenda Michelle is a 69 y.o. female. Presents today for   Chief Complaint   Patient presents with    Diabetes           Diabetes  Associated symptoms include chest pain. Diabetic complications include peripheral neuropathy.   Chest Pain   Associated symptoms include shortness of breath. Pertinent negatives include no abdominal pain or cough.   Peripheral Neuropathy  Symptoms include chest pain.    Pertinent negative symptoms include no abdominal pain and no cough.     Patient 68 y/o with dm2, very well controlled after major weight loss;  Neuropatheis despite controlled dm2, needs refill pregabalin;   Hurt left side, feels like rib fx;   Has chronic respiratory failure on oxygen and WC bound.     Review of Systems   Respiratory:  Positive for shortness of breath. Negative for cough.    Cardiovascular:  Positive for chest pain.   Gastrointestinal:  Negative for abdominal pain.       Patient Active Problem List   Diagnosis    Acute deep vein thrombosis of distal leg    Adenomatous polyp of colon    Depression    Fibromyalgia    Dyslipidemia    Peripheral arterial occlusive disease    Vitamin D deficiency    Gastroesophageal reflux disease    Irritable bowel syndrome    Disorder of intervertebral disc    Peripheral nerve disease    Fracture of multiple ribs of right side    Fall    Acute respiratory failure with hypoxia    COPD mixed type    Weakness    Hypopotassemia    Dysuria    Constipation    Hemorrhoids    Arthritis    Fibromyositis    Hernia of anterior abdominal wall    Homocystinemia    Injury of right ankle    Kidney stone    Knee pain    Moderate ankle sprain    Osteoarthritis of knee    Spinal stenosis, lumbar region without neurogenic claudication    Chronic respiratory failure with hypoxia    Bursitis of hip    Lumbar facet joint pain    Low back pain    Acute cystitis    Obesity, Class III, BMI 40-49.9 (morbid obesity)    Abnormal CT scan, gastrointestinal tract    Wheelchair dependence     "Supplemental oxygen dependent    Dietary counseling    Pre-op evaluation    STEVEN (obstructive sleep apnea)    Lumbar spondylosis    Recurrent syncope    Vasovagal episode    Orthostatic syncope    Polypharmacy    Closed nondisplaced fracture of fourth metatarsal bone of right foot    Nondisplaced fracture of third metatarsal bone of right foot    Closed nondisplaced fracture of lateral malleolus of right fibula    Nondisplaced fracture of third metatarsal bone, right foot, initial encounter for closed fracture       Social History     Socioeconomic History    Marital status:     Number of children: 2    Years of education: 12    Tobacco Use    Smoking status: Former     Current packs/day: 0.00     Average packs/day: 0.8 packs/day for 60.8 years (45.6 ttl pk-yrs)     Types: Cigarettes     Start date: 1960     Quit date: 2020     Years since quittin.3     Passive exposure: Past    Smokeless tobacco: Never   Vaping Use    Vaping status: Never Used   Substance and Sexual Activity    Alcohol use: No    Drug use: No    Sexual activity: Not Currently     Partners: Male       Allergies   Allergen Reactions    Ambien [Zolpidem Tartrate] Other (See Comments)     Nightmares    Bupropion Itching    Codeine Sulfate Itching    Zolpidem Hives     Nightmares    Adhesive Tape Rash    Aripiprazole Unknown - Low Severity    Atorvastatin Other (See Comments)     MYALGIAS    Cilostazol Other (See Comments)     HA    Colesevelam Nausea Only    Ferrous Sulfate Nausea Only    Welchol [Colesevelam Hcl] Nausea Only    Wound Dressing Adhesive Rash         Objective   Vitals:    25 1452   BP: 106/64   Pulse: 80   SpO2: 90%  Comment: on 3 liters O2   Weight: 83.5 kg (184 lb)   Height: 160 cm (63\")     Body mass index is 32.59 kg/m².    Physical Exam  Vitals and nursing note reviewed.   Constitutional:       Appearance: She is well-developed.   HENT:      Head: Normocephalic and atraumatic.   Neck:      Thyroid: No " thyromegaly.      Vascular: No JVD.   Cardiovascular:      Rate and Rhythm: Normal rate and regular rhythm.      Heart sounds: Normal heart sounds. No murmur heard.     No friction rub. No gallop.   Pulmonary:      Effort: Pulmonary effort is normal. No respiratory distress.      Breath sounds: Decreased air movement present. Decreased breath sounds present. No wheezing or rales.   Chest:      Chest wall: Tenderness present.   Abdominal:      General: Bowel sounds are normal. There is no distension.      Palpations: Abdomen is soft.      Tenderness: There is no abdominal tenderness. There is no guarding or rebound.   Musculoskeletal:      Cervical back: Neck supple.   Skin:     General: Skin is warm and dry.   Neurological:      Mental Status: She is alert.      Gait: Gait normal.   Psychiatric:         Behavior: Behavior normal.         Results       Assessment & Plan   Diagnoses and all orders for this visit:    1. Chest wall pain (Primary)  -     XR Ribs Left With PA Chest; Future    2. Type 2 diabetes mellitus with diabetic polyneuropathy, without long-term current use of insulin  -     Tirzepatide 15 MG/0.5ML solution auto-injector; Inject 15 mg under the skin into the appropriate area as directed 1 (One) Time Per Week.  Dispense: 6 mL; Refill: 3  -     Microalbumin / Creatinine Urine Ratio - Urine, Clean Catch  -     Lipid Panel  -     Comprehensive Metabolic Panel  -     Hemoglobin A1c  -     TSH    3. Idiopathic neuropathy  -     pregabalin (LYRICA) 150 MG capsule; Take 2 capsules by mouth Every Morning AND 2 capsules Every Evening.  Dispense: 120 capsule; Refill: 5    Go up on mounjaro ok;  check full labs  Refill pregabalin  Gave xray as LMR not here today       Assessment & Plan            -Follow up: 6 months and prn     ________________________________________  Angelito Kline DO, MS    Current Outpatient Medications on File Prior to Visit   Medication Sig Dispense Refill    Aspirin (ASPIR-81 PO)  Take 81 mg by mouth Daily.      Biotin 10 MG capsule Take 1 tablet by mouth Daily.      Cholecalciferol (Vitamin D3) 50 MCG (2000 UT) capsule       clopidogrel (PLAVIX) 75 MG tablet TAKE 1 TABLET BY MOUTH DAILY 90 tablet 3    docusate sodium (COLACE) 100 MG capsule Take 1 capsule by mouth 2 (Two) Times a Day.      DULoxetine (CYMBALTA) 60 MG capsule TAKE 1 CAPSULE BY MOUTH DAILY 90 capsule 3    folic acid (FOLVITE) 1 MG tablet TAKE 1 TABLET BY MOUTH DAILY 90 tablet 3    HYDROcodone-acetaminophen (NORCO)  MG per tablet Take 1 tablet by mouth Every 8 (Eight) Hours As Needed for Moderate Pain. 90 tablet 0    meclizine (ANTIVERT) 25 MG tablet Take 1 tablet by mouth 3 (Three) Times a Day As Needed for Dizziness. 45 tablet 0    pantoprazole (PROTONIX) 40 MG EC tablet TAKE 1 TABLET BY MOUTH EVERY  MORNING 90 tablet 3    pravastatin (PRAVACHOL) 80 MG tablet TAKE 1 TABLET BY MOUTH DAILY 90 tablet 3    risedronate (ACTONEL) 150 MG tablet TAKE 1 TABLET BY MOUTH EVERY 30  DAYS WITH WATER ON AN EMPTY  STOMACH , NOTHING BY MOUTH AND  DO NOT LIE DOWN FOR NEXT 1/2  HOUR 3 tablet 3    rOPINIRole (REQUIP) 0.25 MG tablet TAKE ONE TABLET BY MOUTH ONE HOUR BEFORE BEDTIME 90 tablet 3    vitamin B-12 (CYANOCOBALAMIN) 1000 MCG tablet Take 1 tablet by mouth Daily.      vitamin D3 125 MCG (5000 UT) capsule capsule Take 1 capsule by mouth Daily.       No current facility-administered medications on file prior to visit.

## 2025-04-11 DIAGNOSIS — E78.5 DYSLIPIDEMIA: ICD-10-CM

## 2025-04-13 RX ORDER — PRAVASTATIN SODIUM 80 MG/1
80 TABLET ORAL DAILY
Qty: 90 TABLET | Refills: 3 | Status: SHIPPED | OUTPATIENT
Start: 2025-04-13

## 2025-04-19 DIAGNOSIS — E53.8 FOLIC ACID DEFICIENCY: ICD-10-CM

## 2025-04-21 RX ORDER — FOLIC ACID 1 MG/1
1000 TABLET ORAL DAILY
Qty: 90 TABLET | Refills: 3 | Status: SHIPPED | OUTPATIENT
Start: 2025-04-21

## 2025-05-02 ENCOUNTER — TELEPHONE (OUTPATIENT)
Dept: FAMILY MEDICINE CLINIC | Facility: CLINIC | Age: 70
End: 2025-05-02
Payer: MEDICARE

## 2025-05-02 DIAGNOSIS — M51.9 DISORDER OF INTERVERTEBRAL DISC: ICD-10-CM

## 2025-05-02 DIAGNOSIS — M48.061 SPINAL STENOSIS, LUMBAR REGION WITHOUT NEUROGENIC CLAUDICATION: Primary | ICD-10-CM

## 2025-05-02 DIAGNOSIS — M81.0 AGE-RELATED OSTEOPOROSIS WITHOUT CURRENT PATHOLOGICAL FRACTURE: ICD-10-CM

## 2025-05-02 DIAGNOSIS — G62.9 PERIPHERAL NERVE DISEASE: ICD-10-CM

## 2025-05-02 DIAGNOSIS — M54.59 LUMBAR FACET JOINT PAIN: ICD-10-CM

## 2025-05-02 NOTE — TELEPHONE ENCOUNTER
Caller: Brenda Michelle    Relationship: Self    Best call back number:[phone]      What is the medical concern/diagnosis: PAIN    What specialty or service is being requested: PAIN MANAGEMENT    What is the provider, practice or medical service name: [facility]      SHE IS NOT HAPPY WITH WHERE SHE IS GOING NOW AND WANTS TO SWITCH

## 2025-05-02 NOTE — TELEPHONE ENCOUNTER
DELETE AFTER REVIEWING: Send the encounter HIGH priority, If patient has less than a 3 day supply. If the patient will run out of medication over the weekend add that information to the additional details line. Send this encounter to the clinical pool.    Caller: Shiva Michelle Sr    Relationship: Emergency Contact    Best call back number: [phone]    Requested Prescriptions:   Requested Prescriptions     Pending Prescriptions Disp Refills   • doxycycline (VIBRAMYCIN) 50 MG capsule       Sig: Take 1 capsule by mouth 3 (Three) Times a Day Before Meals.        Pharmacy where request should be sent: [facility] [id number] [facility] [id number] [address] - [phone]  - [phone]     Last office visit with prescribing clinician: 4/11/2023   Last telemedicine visit with prescribing clinician: 5/24/2023   Next office visit with prescribing clinician: 5/24/2023     Additional details provided by patient: COMPLETELY OUT     Does the patient have less than a 3 day supply:  [x] Yes  [] No    Would you like a call back once the refill request has been completed: [x] Yes [] No    If the office needs to give you a call back, can they leave a voicemail: [x] Yes [] No    Avelina Ordaz Rep   05/04/23 13:46 EDT           
Her/She

## 2025-05-04 RX ORDER — RISEDRONATE SODIUM 150 MG/1
TABLET, FILM COATED ORAL
Qty: 3 TABLET | Refills: 3 | Status: SHIPPED | OUTPATIENT
Start: 2025-05-04

## 2025-05-06 ENCOUNTER — TELEPHONE (OUTPATIENT)
Dept: FAMILY MEDICINE CLINIC | Facility: CLINIC | Age: 70
End: 2025-05-06
Payer: MEDICARE

## 2025-05-06 NOTE — TELEPHONE ENCOUNTER
Caller: Trinh Michelle Sr    Relationship: Emergency Contact    Best call back number: [phone]     What form or medical record are you requesting:      Who is requesting this form or medical record from you:      How would you like to receive the form or medical records (pick-up, mail, fax):    If fax, what is the fax number:    If mail, what is the address:    If pick-up, provide patient with address and location details    Timeframe paperwork needed:      Additional notes: PATIENT  TRINH CALLED STATED THAT [facility] OR [facility]\"" WILL BE CALLING OR FAXING OVER FOR AUTHORIZATION FOR RENEWAL ON THE PATIENT OXYGEN SUPPLIES.

## 2025-05-23 ENCOUNTER — OFFICE VISIT (OUTPATIENT)
Age: 70
End: 2025-05-23
Payer: MEDICARE

## 2025-05-23 ENCOUNTER — HOSPITAL ENCOUNTER (OUTPATIENT)
Facility: HOSPITAL | Age: 70
Discharge: HOME | End: 2025-05-23
Payer: MEDICARE

## 2025-05-23 VITALS
SYSTOLIC BLOOD PRESSURE: 111 MMHG | BODY MASS INDEX: 31.54 KG/M2 | HEIGHT: 63 IN | DIASTOLIC BLOOD PRESSURE: 65 MMHG | WEIGHT: 178 LBS | HEART RATE: 68 BPM

## 2025-05-23 DIAGNOSIS — K55.1 MESENTERIC ARTERY STENOSIS: ICD-10-CM

## 2025-05-23 DIAGNOSIS — I73.9 PERIPHERAL VASCULAR DISEASE, UNSPECIFIED: ICD-10-CM

## 2025-05-23 DIAGNOSIS — I71.40 ABDOMINAL AORTIC ANEURYSM (AAA) WITHOUT RUPTURE, UNSPECIFIED PART: ICD-10-CM

## 2025-05-23 DIAGNOSIS — I65.23 CAROTID STENOSIS, BILATERAL: ICD-10-CM

## 2025-05-23 DIAGNOSIS — I70.0 ATHEROSCLEROSIS OF AORTA: ICD-10-CM

## 2025-05-23 DIAGNOSIS — Z98.890 PERIPHERAL ARTERIAL DISEASE WITH HISTORY OF REVASCULARIZATION: Primary | ICD-10-CM

## 2025-05-23 DIAGNOSIS — I73.9 PERIPHERAL ARTERIAL DISEASE WITH HISTORY OF REVASCULARIZATION: Primary | ICD-10-CM

## 2025-05-23 DIAGNOSIS — I77.4 CELIAC ARTERY STENOSIS: ICD-10-CM

## 2025-05-23 LAB
ABDOMINAL DIST AORTA AP: 2.3 CM
ABDOMINAL DIST AORTA TRANS: 1.6 CM
ABDOMINAL DIST AORTA VEL: 94.7 CM/S
ABDOMINAL LT COM ILIAC AP: 1 CM
ABDOMINAL LT COM ILIAC TRANS: 0.7 CM
ABDOMINAL LT COM ILIAC VEL: 216 CM/S
ABDOMINAL LT EXT ILIAC VEL: 155 CM/S
ABDOMINAL LT INT ILIAC VEL: 103 CM/S
ABDOMINAL MID AORTA AP: 1.7 CM
ABDOMINAL MID AORTA TRANS: 1.5 CM
ABDOMINAL MID AORTA VEL: 68.4 CM/S
ABDOMINAL PROX AORTA AP: 1.8 CM
ABDOMINAL PROX AORTA TRANS: 2.2 CM
ABDOMINAL PROX AORTA VEL: 58.7 CM/S
ABDOMINAL RT COM ILIAC AP: 0.7 CM
ABDOMINAL RT COM ILIAC TRANS: 0.8 CM
ABDOMINAL RT COM ILIAC VEL: 207 CM/S
ABDOMINAL RT EXT ILIAC VEL: 134 CM/S
ABDOMINAL RT INT ILIAC VEL: 134 CM/S
BH CV BAS DIALYSIS STENT TWO DIST STENT PSV: 216 CM/SEC
BH CV LOWER ARTERIAL LEFT ABI RATIO: 0.94
BH CV LOWER ARTERIAL LEFT DORSALIS PEDIS SYS MAX: 120
BH CV LOWER ARTERIAL LEFT POST TIBIAL SYS MAX: 130
BH CV LOWER ARTERIAL RIGHT ABI RATIO: 1.01
BH CV LOWER ARTERIAL RIGHT DORSALIS PEDIS SYS MAX: 132
BH CV LOWER ARTERIAL RIGHT POST TIBIAL SYS MAX: 140
BH CV VAS ABD AO LT EXTERNAL ILIAC AP: 0.9 CM
BH CV VAS ABD AO LT INTERNAL ILIAC AP: 0.8 CM
BH CV VAS ABD AO RT EXTERNAL ILIAC AP: 0.7 CM
BH CV VAS ABD AO RT INTERNAL ILIAC AP: 0.5 CM
BH CV VAS DIALYSIS STENT ONE  DIST STENT PSV: 207 CM/SEC
BH CV VAS DIALYSIS STENT ONE MID STENT PSV: 152 CM/SEC
BH CV VAS DIALYSIS STENT ONE POST STENT PSV: 134 CM/SEC
BH CV VAS DIALYSIS STENT ONE PRE STENT PSV: 94.7 CM/SEC
BH CV VAS DIALYSIS STENT ONE PROX STENT PSV: 175 CM/SEC
BH CV VAS DIALYSIS STENT TWO MID STENT PSV: 167 CM/SEC
BH CV VAS DIALYSIS STENT TWO POST STENT PSV: 203 CM/SEC
BH CV VAS DIALYSIS STENT TWO PRE STENT PSV: 94.7 CM/SEC
BH CV VAS DIALYSIS STENT TWO PROX STENT PSV: 177 CM/SEC
BH CV VAS FREE TEXT STENT ONE: NORMAL
BH CV VAS FREE TEXT STENT TWO: NORMAL
UPPER ARTERIAL LEFT ARM BRACHIAL SYS MAX: 138
UPPER ARTERIAL RIGHT ARM BRACHIAL SYS MAX: 124

## 2025-05-23 PROCEDURE — 93922 UPR/L XTREMITY ART 2 LEVELS: CPT

## 2025-05-23 PROCEDURE — 93978 VASCULAR STUDY: CPT

## 2025-05-23 NOTE — PROGRESS NOTES
Chief Complaint  peripheral arterial disease     Subjective        Brenda Michelle presents to [facility] VASCULAR SURGERY  HPI   Brenda Michelle is a 69 y.o. female that has been followed in our office for peripheral arterial disease and carotid artery stenosis.  In 2004, she had bilateral common iliac artery stents.  In 2009, she had a right common iliac artery Viabahn stent graft and bilateral common iliac artery angioplasty.  She returns today in follow up along with ABIs and iliac stent scan. She reports she  has been doing well without hospitalizations or surgeries. She denies any worsening claudication symptoms, rest pain, or tissue loss. She denies any symptoms consistent with CVA, TIA, or amaurosis fugax.  Her biggest complaint is peripheral neuropathy.  She reports this is getting worse.  She is unable to walk due to balance issues from this.  There has not been a definitive cause identified.    Review of Systems   Constitutional:  Negative for fever.   Eyes:  Negative for visual disturbance.   Cardiovascular:  Negative for leg swelling.   Gastrointestinal:  Negative for abdominal pain.   Musculoskeletal:  Negative for back pain.   Skin:  Negative for color change, pallor and wound.   Neurological:  Negative for dizziness, facial asymmetry, speech difficulty and weakness.        Brenda Michelle  reports that she quit smoking about 4 years ago. Her smoking use included cigarettes. She started smoking about 65 years ago. She has a 45.6 pack-year smoking history. She has been exposed to tobacco smoke. She has never used smokeless tobacco..        Objective   Vital Signs:  Vitals:    05/23/25 0849   BP: 111/65   Pulse: 68      Body mass index is 31.53 kg/m².           Physical Exam  Vitals reviewed.   Constitutional:       Appearance: Normal appearance.   HENT:      Head: Normocephalic.   Cardiovascular:      Rate and Rhythm: Normal rate and regular rhythm.      Pulses:           Dorsalis pedis  pulses are 3+ on the right side and 3+ on the left side.        Posterior tibial pulses are 3+ on the right side and 3+ on the left side.   Pulmonary:      Effort: Pulmonary effort is normal.   Skin:     General: Skin is warm.   Neurological:      General: No focal deficit present.      Mental Status: She is alert and oriented to person, place, and time.   Psychiatric:         Mood and Affect: Mood normal.        Result Review :        ABIs from today: Doppler Ankle Brachial Index Single Level CAR (05/23/2025 08:46)   Duplex Aorta IVC Iliac Graft Complete CAR (05/23/2025 08:46)                  Assessment and Plan     Diagnoses and all orders for this visit:    1. Peripheral arterial disease with history of revascularization (Primary)  -     Doppler Ankle Brachial Index Single Level CAR; Future  -     Duplex Aorta IVC Iliac Graft Complete CAR; Future    2. Carotid stenosis, bilateral  -     Duplex Carotid Ultrasound CAR; Future    3. Atherosclerosis of aorta  -     Duplex Aorta IVC Iliac Graft Complete CAR; Future          Patient presents today for follow up of her peripheral arterial disease.  Today, her stents are patent and her ABIs are normal.  She  is to continue her antiplatelet agent which is aspirin.  She is also on Plavix.  She is on a statin for cholesterol control.  She is to continue her statin for cholesterol control.  We discussed adequate blood pressure management.  We discussed diligent foot inspection with her severe neuropathy we discussed continuing her walking protocol. She will return in 1 year along with ABIs and iliac stent scan.  Is also been over 4 years since she had a carotid duplex therefore we will recheck that as well.  She had minimal disease seen on the last scan 2021.         Follow Up     Return in about 1 year (around 5/23/2026) for mack, aortic duplex, carotid duplex.  Patient was given instructions and counseling regarding her condition or for health maintenance advice. Please  see specific information pulled into the AVS if appropriate.     Lawanda Del Rosario, APRN

## 2025-06-09 ENCOUNTER — OFFICE VISIT (OUTPATIENT)
Dept: FAMILY MEDICINE CLINIC | Facility: CLINIC | Age: 70
End: 2025-06-09
Payer: MEDICARE

## 2025-06-09 VITALS
BODY MASS INDEX: 31.18 KG/M2 | OXYGEN SATURATION: 97 % | HEART RATE: 76 BPM | WEIGHT: 176 LBS | SYSTOLIC BLOOD PRESSURE: 110 MMHG | DIASTOLIC BLOOD PRESSURE: 68 MMHG | HEIGHT: 63 IN

## 2025-06-09 DIAGNOSIS — Z00.00 MEDICARE ANNUAL WELLNESS VISIT, SUBSEQUENT: Primary | ICD-10-CM

## 2025-06-09 DIAGNOSIS — E11.9 ENCOUNTER FOR DIABETIC FOOT EXAM: ICD-10-CM

## 2025-06-09 PROBLEM — S92.334A: Status: RESOLVED | Noted: 2024-03-06 | Resolved: 2025-06-09

## 2025-06-09 PROBLEM — R55 VASOVAGAL EPISODE: Status: RESOLVED | Noted: 2024-03-03 | Resolved: 2025-06-09

## 2025-06-09 PROBLEM — R53.1 WEAKNESS: Status: RESOLVED | Noted: 2019-11-15 | Resolved: 2025-06-09

## 2025-06-09 PROBLEM — Z79.899 POLYPHARMACY: Status: RESOLVED | Noted: 2024-03-03 | Resolved: 2025-06-09

## 2025-06-09 PROBLEM — K64.9 HEMORRHOIDS: Status: RESOLVED | Noted: 2021-06-11 | Resolved: 2025-06-09

## 2025-06-09 PROBLEM — N30.00 ACUTE CYSTITIS: Status: RESOLVED | Noted: 2022-09-09 | Resolved: 2025-06-09

## 2025-06-09 PROBLEM — S92.334A NONDISPLACED FRACTURE OF THIRD METATARSAL BONE, RIGHT FOOT, INITIAL ENCOUNTER FOR CLOSED FRACTURE: Status: RESOLVED | Noted: 2024-03-14 | Resolved: 2025-06-09

## 2025-06-09 PROBLEM — S82.64XA CLOSED NONDISPLACED FRACTURE OF LATERAL MALLEOLUS OF RIGHT FIBULA: Status: RESOLVED | Noted: 2024-03-14 | Resolved: 2025-06-09

## 2025-06-09 PROBLEM — Z01.818 PRE-OP EVALUATION: Status: RESOLVED | Noted: 2022-10-04 | Resolved: 2025-06-09

## 2025-06-09 PROBLEM — S92.344A CLOSED NONDISPLACED FRACTURE OF FOURTH METATARSAL BONE OF RIGHT FOOT: Status: RESOLVED | Noted: 2024-03-06 | Resolved: 2025-06-09

## 2025-06-09 PROBLEM — Z71.3 DIETARY COUNSELING: Status: RESOLVED | Noted: 2022-10-04 | Resolved: 2025-06-09

## 2025-06-09 PROBLEM — E87.6 HYPOPOTASSEMIA: Status: RESOLVED | Noted: 2019-11-16 | Resolved: 2025-06-09

## 2025-06-09 PROBLEM — R93.3 ABNORMAL CT SCAN, GASTROINTESTINAL TRACT: Status: RESOLVED | Noted: 2022-09-09 | Resolved: 2025-06-09

## 2025-06-09 PROBLEM — R30.0 DYSURIA: Status: RESOLVED | Noted: 2019-11-20 | Resolved: 2025-06-09

## 2025-06-09 PROBLEM — E66.01 OBESITY, CLASS III, BMI 40-49.9 (MORBID OBESITY): Status: RESOLVED | Noted: 2022-09-09 | Resolved: 2025-06-09

## 2025-06-09 PROBLEM — M79.7 FIBROMYOSITIS: Status: RESOLVED | Noted: 2021-06-11 | Resolved: 2025-06-09

## 2025-06-09 PROBLEM — M19.90 ARTHRITIS: Status: RESOLVED | Noted: 2021-06-11 | Resolved: 2025-06-09

## 2025-06-09 PROBLEM — K43.9 HERNIA OF ANTERIOR ABDOMINAL WALL: Status: RESOLVED | Noted: 2021-06-11 | Resolved: 2025-06-09

## 2025-06-09 PROBLEM — M70.70 BURSITIS OF HIP: Status: RESOLVED | Noted: 2022-01-25 | Resolved: 2025-06-09

## 2025-06-09 PROBLEM — M54.50 LOW BACK PAIN: Status: RESOLVED | Noted: 2022-01-25 | Resolved: 2025-06-09

## 2025-06-09 PROBLEM — S22.41XA FRACTURE OF MULTIPLE RIBS OF RIGHT SIDE: Status: RESOLVED | Noted: 2017-12-10 | Resolved: 2025-06-09

## 2025-06-09 PROBLEM — E66.813 OBESITY, CLASS III, BMI 40-49.9 (MORBID OBESITY): Status: RESOLVED | Noted: 2022-09-09 | Resolved: 2025-06-09

## 2025-06-09 PROBLEM — Z99.81 SUPPLEMENTAL OXYGEN DEPENDENT: Status: RESOLVED | Noted: 2022-10-04 | Resolved: 2025-06-09

## 2025-06-09 PROBLEM — J96.01 ACUTE RESPIRATORY FAILURE WITH HYPOXIA: Status: RESOLVED | Noted: 2017-12-11 | Resolved: 2025-06-09
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
## 2025-06-09 PROCEDURE — 1160F RVW MEDS BY RX/DR IN RCRD: CPT | Performed by: FAMILY MEDICINE

## 2025-06-09 PROCEDURE — G0439 PPPS, SUBSEQ VISIT: HCPCS | Performed by: FAMILY MEDICINE

## 2025-06-09 PROCEDURE — 1159F MED LIST DOCD IN RCRD: CPT | Performed by: FAMILY MEDICINE

## 2025-06-09 PROCEDURE — 1125F AMNT PAIN NOTED PAIN PRSNT: CPT | Performed by: FAMILY MEDICINE

## 2025-06-09 PROCEDURE — 1170F FXNL STATUS ASSESSED: CPT | Performed by: FAMILY MEDICINE

## 2025-06-09 PROCEDURE — 3044F HG A1C LEVEL LT 7.0%: CPT | Performed by: FAMILY MEDICINE

## 2025-06-09 NOTE — PROGRESS NOTES
QUICK REFERENCE INFORMATION:  The ABCs of the Annual Wellness Visit    Subsequent Medicare Wellness Visit    HEALTH RISK ASSESSMENT    1955  Brenda Michelle is a 69 y.o. female who presents for an Subsequent Wellness Visit.  The patient is a 69-year-old female with type 2 diabetes, mixed COPD, chronic respiratory failure with oxygen dependency on nasal cannula, and peripheral arterial disease. She has severe spinal stenosis with neurogenic claudication and is wheelchair-bound. The primary reason for this visit is to discuss her weight loss progress and ongoing pain management.    She reports an overall improvement in her physical health and has been doing well with the GLP-1 for her diabetes as well as to help with weight loss. Her weight today is 176 pounds, down 8 pounds from her visit in 03/2025. Her peak weight was 273 pounds, and she has lost a total of 97 pounds since 09/09/2022, which is 35.5% of her body weight. Her A1c in 03/2025 was 4.9%, indicating significant improvement in her diabetes management.   The following portions of the patient's history were reviewed and   updated as appropriate: allergies, current medications, past family history, past medical history, past social history, past surgical history, and problem list.    Compared to one year ago, the patient feels his physical   health is the same.    Compared to one year ago, the patient feels his mental   health is the same.    Recent Hospitalizations:  She was not admitted to the hospital during the last year.     Current Medical Providers:  Patient Care Team:  Angelito Kline DO as PCP - General (Family Medicine)  Jimbo Mock MD as Consulting Physician (Pain Medicine)  Lionel Fleming MD as Consulting Physician (Orthopedic Surgery)  Ignacio Cobos DPM (Podiatry)  Chirag Tony MD as Consulting Physician (Gastroenterology)  Lionel Sandoval MD as Surgeon (Vascular Surgery)  Lonnie Torres MD as Consulting Physician  (Sleep Medicine)    I reviewed list of current Medical providers and suppliers with patient and are listed above to the best of the patient's and my knowledge.    Smoking Status:  Social History     Tobacco Use   Smoking Status Former    Current packs/day: 0.00    Average packs/day: 0.8 packs/day for 60.8 years (45.6 ttl pk-yrs)    Types: Cigarettes    Start date: 1960    Quit date: 2020    Years since quittin.5    Passive exposure: Past   Smokeless Tobacco Never       Alcohol Consumption:  Social History     Substance and Sexual Activity   Alcohol Use No       Depression Screen:       2025    10:00 AM   PHQ-2/PHQ-9 Depression Screening   Little interest or pleasure in doing things Not at all   Feeling down, depressed, or hopeless Not at all       Health Habits and Functional and Cognitive Screenin/9/2025    10:00 AM   Functional & Cognitive Status   Do you have difficulty preparing food and eating? No   Do you have difficulty bathing yourself, getting dressed or grooming yourself? Yes   Do you have difficulty using the toilet? No   Do you have difficulty moving around from place to place? No   Do you have trouble with steps or getting out of a bed or a chair? No   Current Diet Well Balanced Diet   Dental Exam Up to date   Eye Exam Up to date   Exercise (times per week) 0 times per week   Current Exercises Include No Regular Exercise   Do you need help using the phone?  No   Are you deaf or do you have serious difficulty hearing?  No   Do you need help to go to places out of walking distance? Yes   Do you need help shopping? No   Do you need help preparing meals?  Yes   Do you need help with housework?  Yes   Do you need help with laundry? Yes   Do you need help taking your medications? No   Do you need help managing money? No   Do you ever drive or ride in a car without wearing a seat belt? No   Have you felt unusual stress, anger or loneliness in the last month? No   Who do you live with?  "Spouse   If you need help, do you have trouble finding someone available to you? No   Have you been bothered in the last four weeks by sexual problems? No   Do you have difficulty concentrating, remembering or making decisions? No       Does the patient have evidence of cognitive impairment? No    Aspirin use counseling: Taking ASA appropriately as indicated    Recent Lab Results:  CMP:  Lab Results   Component Value Date    Glucose 89 03/04/2025    Glucose 94 03/09/2024    Glucose 90 03/08/2024    Glucose, CSF 72 (H) 11/16/2019    Glucose, UA Negative 06/19/2024    Glucose, UA Negative 04/14/2023    BUN 7 (L) 03/04/2025    BUN 5 (L) 03/09/2024    BUN/Creatinine Ratio 10.4 03/04/2025    BUN/Creatinine Ratio 8.1 03/09/2024    Creatinine 0.67 03/04/2025    Creatinine 0.62 03/09/2024    Creatinine 0.90 09/22/2023    Creatinine, Arterial 1.4 (H) 02/17/2015    Creatinine, Urine 96.3 03/04/2025    Ketones, UA Negative 06/19/2024    Ketones, UA Negative 04/14/2023    CO2 28.3 03/09/2024    Total CO2 31.5 (H) 03/04/2025    Calcium 10.2 03/04/2025    Calcium 9.8 03/09/2024    Albumin 3.9 03/04/2025    Albumin 3.3 (L) 03/04/2024    Albumin, CSF 17 11/16/2019    CSF/Serum Albumin Index 5 11/16/2019    AST (SGOT) 24 03/04/2025    AST (SGOT) 22 03/04/2024    ALT (SGPT) 10 03/04/2025    ALT (SGPT) 12 03/04/2024     Lipid Panel:  Lab Results   Component Value Date    Total Cholesterol 196 03/04/2025    Total Cholesterol 166 03/04/2024    Triglycerides 116 03/04/2025    Triglycerides 89 03/04/2024    HDL Cholesterol 56 03/04/2025    HDL Cholesterol 57 03/04/2024    VLDL Cholesterol 16 03/04/2024    VLDL Cholesterol Dileep 21 03/04/2025     HbA1c:  No components found for: \"HGBA1C\"    Visual Acuity:  No results found.    Age-appropriate Screening Schedule:  Refer to the list below for future screening recommendations based on patient's age, sex and/or medical conditions. Orders for these recommended tests are listed in the plan " section. The patient has been provided with a written plan.    Health Maintenance   Topic Date Due    LUNG CANCER SCREENING  03/14/2023    MAMMOGRAM  03/14/2024    DXA SCAN  03/21/2024    DIABETIC EYE EXAM  07/19/2024    HEMOGLOBIN A1C  09/04/2025    COLORECTAL CANCER SCREENING  09/29/2025    COVID-19 Vaccine (11 - 2024-25 season) 12/09/2025 (Originally 4/8/2025)    INFLUENZA VACCINE  07/01/2025    URINE MICROALBUMIN-CREATININE RATIO (uACR)  03/04/2026    ANNUAL WELLNESS VISIT  06/09/2026    DIABETIC FOOT EXAM  06/09/2026    TDAP/TD VACCINES (3 - Td or Tdap) 10/11/2032    HEPATITIS C SCREENING  Completed    Pneumococcal Vaccine 50+  Completed          Outpatient Medications Prior to Visit   Medication Sig Dispense Refill    Aspirin (ASPIR-81 PO) Take 81 mg by mouth Daily.      Biotin 10 MG capsule Take 1 tablet by mouth Daily.      Cholecalciferol (Vitamin D3) 50 MCG (2000 UT) capsule       clopidogrel (PLAVIX) 75 MG tablet TAKE 1 TABLET BY MOUTH DAILY 90 tablet 3    docusate sodium (COLACE) 100 MG capsule Take 1 capsule by mouth 2 (Two) Times a Day.      DULoxetine (CYMBALTA) 60 MG capsule TAKE 1 CAPSULE BY MOUTH DAILY 90 capsule 3    folic acid (FOLVITE) 1 MG tablet TAKE 1 TABLET BY MOUTH DAILY 90 tablet 3    HYDROcodone-acetaminophen (NORCO)  MG per tablet Take 1 tablet by mouth Every 8 (Eight) Hours As Needed for Moderate Pain. 90 tablet 0    meclizine (ANTIVERT) 25 MG tablet Take 1 tablet by mouth 3 (Three) Times a Day As Needed for Dizziness. 45 tablet 0    pantoprazole (PROTONIX) 40 MG EC tablet TAKE 1 TABLET BY MOUTH EVERY  MORNING 90 tablet 3    pravastatin (PRAVACHOL) 80 MG tablet TAKE 1 TABLET BY MOUTH DAILY 90 tablet 3    pregabalin (LYRICA) 150 MG capsule Take 2 capsules by mouth Every Morning AND 2 capsules Every Evening. 120 capsule 5    risedronate (ACTONEL) 150 MG tablet TAKE 1 TABLET BY MOUTH EVERY 30  DAYS WITH WATER ON AN EMPTY  STOMACH , NOTHING BY MOUTH AND  DO NOT LIE DOWN FOR NEXT 1/2   HOUR 3 tablet 3    rOPINIRole (REQUIP) 0.25 MG tablet TAKE ONE TABLET BY MOUTH ONE HOUR BEFORE BEDTIME 90 tablet 3    Tirzepatide 15 MG/0.5ML solution auto-injector Inject 15 mg under the skin into the appropriate area as directed 1 (One) Time Per Week. 6 mL 3    vitamin B-12 (CYANOCOBALAMIN) 1000 MCG tablet Take 1 tablet by mouth Daily.      vitamin D3 125 MCG (5000 UT) capsule capsule Take 1 capsule by mouth Daily.       No facility-administered medications prior to visit.       Patient Active Problem List   Diagnosis    Depression    Fibromyalgia    Dyslipidemia    Peripheral arterial occlusive disease    Vitamin D deficiency    Gastroesophageal reflux disease    Irritable bowel syndrome    Disorder of intervertebral disc    Peripheral nerve disease    COPD mixed type    Constipation    Homocystinemia    Osteoarthritis of knee    Spinal stenosis, lumbar region without neurogenic claudication    Chronic respiratory failure with hypoxia    Lumbar facet joint pain    Wheelchair dependence    STEVEN (obstructive sleep apnea)    Lumbar spondylosis    Recurrent syncope    Orthostatic syncope    Peripheral arterial disease with history of revascularization       Advance Care Planning:  ACP discussion was held with the patient during this visit. Patient does not have an advance directive, information provided.    Identification of Risk Factors:  Risk factors include: Obesity/Overweight .    Review of Systems    Objective     Physical Exam  Vitals and nursing note reviewed.   Constitutional:       Appearance: Normal appearance. She is well-developed. She is not toxic-appearing or diaphoretic.   HENT:      Head: Normocephalic and atraumatic.   Neck:      Thyroid: No thyromegaly.      Vascular: No JVD.   Cardiovascular:      Rate and Rhythm: Normal rate and regular rhythm.      Heart sounds: Normal heart sounds. No murmur heard.     No friction rub. No gallop.   Pulmonary:      Effort: Pulmonary effort is normal. No  "respiratory distress.      Breath sounds: Normal breath sounds. No wheezing or rales.   Abdominal:      General: Bowel sounds are normal. There is no distension.      Palpations: Abdomen is soft.      Tenderness: There is no abdominal tenderness. There is no guarding or rebound.   Musculoskeletal:      Cervical back: Neck supple.   Feet:      Comments: Diabetic foot exam and monofilament completed, see scanned report.        Skin:     General: Skin is warm and dry.      Capillary Refill: Capillary refill takes less than 2 seconds.   Neurological:      Mental Status: She is alert.      Gait: Gait normal.   Psychiatric:         Mood and Affect: Mood normal.         Behavior: Behavior normal.       Physical Exam      Vitals:    06/09/25 1037   BP: 110/68   Pulse: 76   SpO2: 97%   Weight: 79.8 kg (176 lb)   Height: 160 cm (63\")   PainSc: 10-Worst pain ever   PainLoc: Foot     Body mass index is 31.18 kg/m².           Assessment & Plan   Patient Self-Management and Personalized Health Advice  The patient has been provided with information about: diet and weight management and preventive services including:   Annual Wellness Visit (AWV).    Visit Diagnoses:    ICD-10-CM ICD-9-CM   1. Medicare annual wellness visit, subsequent  Z00.00 V70.0   2. Encounter for diabetic foot exam  E11.9 250.00       No orders of the defined types were placed in this encounter.      Outpatient Encounter Medications as of 6/9/2025   Medication Sig Dispense Refill    Aspirin (ASPIR-81 PO) Take 81 mg by mouth Daily.      Biotin 10 MG capsule Take 1 tablet by mouth Daily.      Cholecalciferol (Vitamin D3) 50 MCG (2000 UT) capsule       clopidogrel (PLAVIX) 75 MG tablet TAKE 1 TABLET BY MOUTH DAILY 90 tablet 3    docusate sodium (COLACE) 100 MG capsule Take 1 capsule by mouth 2 (Two) Times a Day.      DULoxetine (CYMBALTA) 60 MG capsule TAKE 1 CAPSULE BY MOUTH DAILY 90 capsule 3    folic acid (FOLVITE) 1 MG tablet TAKE 1 TABLET BY MOUTH DAILY 90 " tablet 3    HYDROcodone-acetaminophen (NORCO)  MG per tablet Take 1 tablet by mouth Every 8 (Eight) Hours As Needed for Moderate Pain. 90 tablet 0    meclizine (ANTIVERT) 25 MG tablet Take 1 tablet by mouth 3 (Three) Times a Day As Needed for Dizziness. 45 tablet 0    pantoprazole (PROTONIX) 40 MG EC tablet TAKE 1 TABLET BY MOUTH EVERY  MORNING 90 tablet 3    pravastatin (PRAVACHOL) 80 MG tablet TAKE 1 TABLET BY MOUTH DAILY 90 tablet 3    pregabalin (LYRICA) 150 MG capsule Take 2 capsules by mouth Every Morning AND 2 capsules Every Evening. 120 capsule 5    risedronate (ACTONEL) 150 MG tablet TAKE 1 TABLET BY MOUTH EVERY 30  DAYS WITH WATER ON AN EMPTY  STOMACH , NOTHING BY MOUTH AND  DO NOT LIE DOWN FOR NEXT 1/2  HOUR 3 tablet 3    rOPINIRole (REQUIP) 0.25 MG tablet TAKE ONE TABLET BY MOUTH ONE HOUR BEFORE BEDTIME 90 tablet 3    Tirzepatide 15 MG/0.5ML solution auto-injector Inject 15 mg under the skin into the appropriate area as directed 1 (One) Time Per Week. 6 mL 3    vitamin B-12 (CYANOCOBALAMIN) 1000 MCG tablet Take 1 tablet by mouth Daily.      vitamin D3 125 MCG (5000 UT) capsule capsule Take 1 capsule by mouth Daily.       No facility-administered encounter medications on file as of 2025.       Reviewed use of high risk medication in the elderly: yes  Reviewed for potential of harmful drug interactions in the elderly: yes  Yes opioid medication identified on active medication list. I have reviewed chart for other potential  high risk medication/s and harmful drug interactions in the elderly.  Management and monitoring by pain mgmt.    Social History     Socioeconomic History    Marital status:     Number of children: 2    Years of education: 12    Tobacco Use    Smoking status: Former     Current packs/day: 0.00     Average packs/day: 0.8 packs/day for 60.8 years (45.6 ttl pk-yrs)     Types: Cigarettes     Start date: 1960     Quit date: 2020     Years since quittin.5      Passive exposure: Past    Smokeless tobacco: Never   Vaping Use    Vaping status: Never Used   Substance and Sexual Activity    Alcohol use: No    Drug use: No    Sexual activity: Not Currently     Partners: Male     Patient was screened for OUD and RUSSELL risk factors.  Brenda Michelle's pain level was assessed as well today.  I included a review current recommendations on pain management, best use practices, current CDC guidelines, alternatives to opioids including OTC & Rx topicals, non-opioid oral medications (eg nsaids, acetominophen) and life style interventions such as yoga, virgil chi, stretching, regular exercise, PT/OT and referral to specialty care like Pain Management that specialize in pain treatment when appropriate.   I also included a review of serious risks of opioid use and substance use disorder.  I have included all of this in the after visit summary along with other risk factors identified that are pertinent to the patient today.      Follow Up:  Return in about 3 months (around 9/9/2025), or if symptoms worsen or fail to improve.     An After Visit Summary and PPPS with all of these plans were given to the patient.           ++++++++++++++++++++++++++++++++++++++++++++++++++++++++++++++++++   Return in about 3 months (around 9/9/2025), or if symptoms worsen or fail to improve.      _____________________________________  Angelito Kline DO, MS    Current Outpatient Medications on File Prior to Visit   Medication Sig Dispense Refill    Aspirin (ASPIR-81 PO) Take 81 mg by mouth Daily.      Biotin 10 MG capsule Take 1 tablet by mouth Daily.      Cholecalciferol (Vitamin D3) 50 MCG (2000 UT) capsule       clopidogrel (PLAVIX) 75 MG tablet TAKE 1 TABLET BY MOUTH DAILY 90 tablet 3    docusate sodium (COLACE) 100 MG capsule Take 1 capsule by mouth 2 (Two) Times a Day.      DULoxetine (CYMBALTA) 60 MG capsule TAKE 1 CAPSULE BY MOUTH DAILY 90 capsule 3    folic acid (FOLVITE) 1 MG tablet TAKE 1 TABLET BY  MOUTH DAILY 90 tablet 3    HYDROcodone-acetaminophen (NORCO)  MG per tablet Take 1 tablet by mouth Every 8 (Eight) Hours As Needed for Moderate Pain. 90 tablet 0    meclizine (ANTIVERT) 25 MG tablet Take 1 tablet by mouth 3 (Three) Times a Day As Needed for Dizziness. 45 tablet 0    pantoprazole (PROTONIX) 40 MG EC tablet TAKE 1 TABLET BY MOUTH EVERY  MORNING 90 tablet 3    pravastatin (PRAVACHOL) 80 MG tablet TAKE 1 TABLET BY MOUTH DAILY 90 tablet 3    pregabalin (LYRICA) 150 MG capsule Take 2 capsules by mouth Every Morning AND 2 capsules Every Evening. 120 capsule 5    risedronate (ACTONEL) 150 MG tablet TAKE 1 TABLET BY MOUTH EVERY 30  DAYS WITH WATER ON AN EMPTY  STOMACH , NOTHING BY MOUTH AND  DO NOT LIE DOWN FOR NEXT 1/2  HOUR 3 tablet 3    rOPINIRole (REQUIP) 0.25 MG tablet TAKE ONE TABLET BY MOUTH ONE HOUR BEFORE BEDTIME 90 tablet 3    Tirzepatide 15 MG/0.5ML solution auto-injector Inject 15 mg under the skin into the appropriate area as directed 1 (One) Time Per Week. 6 mL 3    vitamin B-12 (CYANOCOBALAMIN) 1000 MCG tablet Take 1 tablet by mouth Daily.      vitamin D3 125 MCG (5000 UT) capsule capsule Take 1 capsule by mouth Daily.       No current facility-administered medications on file prior to visit.

## 2025-06-09 NOTE — PATIENT INSTRUCTIONS
Advance Care Planning and Advance Directives     You make decisions on a daily basis - decisions about where you want to live, your career, your home, your life. Perhaps one of the most important decisions you face is your choice for future medical care. Take time to talk with your family and your healthcare team and start planning today.  Advance Care Planning is a process that can help you:  Understand possible future healthcare decisions in light of your own experiences  Reflect on those decision in light of your goals and values  Discuss your decisions with those closest to you and the healthcare professionals that care for you  Make a plan by creating a document that reflects your wishes    Surrogate Decision Maker  In the event of a medical emergency, which has left you unable to communicate or to make your own decisions, you would need someone to make decisions for you.  It is important to discuss your preferences for medical treatment with this person while you are in good health.     Qualities of a surrogate decision maker:  Willing to take on this role and responsibility  Knows what you want for future medical care  Willing to follow your wishes even if they don't agree with them  Able to make difficult medical decisions under stressful circumstances    Advance Directives  These are legal documents you can create that will guide your healthcare team and decision maker(s) when needed. These documents can be stored in the electronic medical record.    Living Will - a legal document to guide your care if you have a terminal condition or a serious illness and are unable to communicate. States vary by statute in document names/types, but most forms may include one or more of the following:        -  Directions regarding life-prolonging treatments        -  Directions regarding artificially provided nutrition/hydration        -  Choosing a healthcare decision maker        -  Direction regarding organ/tissue  donation    Durable Power of  for Healthcare - this document names an -in-fact to make medical decisions for you, but it may also allow this person to make personal and financial decisions for you. Please seek the advice of an  if you need this type of document.    **Advance Directives are not required and no one may discriminate against you if you do not sign one.    Medical Orders  Many states allow specific forms/orders signed by your physician to record your wishes for medical treatment in your current state of health. This form, signed in personal communication with your physician, addresses resuscitation and other medical interventions that you may or may not want.      For more information or to schedule a time with a [facility] Advance Care Planning Facilitator contact: [facility]/ACP or call [phone] and someone will contact you directly.Calorie Counting for Weight Loss  Calories are units of energy. Your body needs a certain number of calories from food to keep going throughout the day. When you eat or drink more calories than your body needs, your body stores the extra calories mostly as fat. When you eat or drink fewer calories than your body needs, your body burns fat to get the energy it needs.  Calorie counting means keeping track of how many calories you eat and drink each day. Calorie counting can be helpful if you need to lose weight. If you eat fewer calories than your body needs, you should lose weight. Ask your health care provider what a healthy weight is for you.  For calorie counting to work, you will need to eat the right number of calories each day to lose a healthy amount of weight per week. A dietitian can help you figure out how many calories you need in a day and will suggest ways to reach your calorie goal.  A healthy amount of weight to lose each week is usually 1-2 lb (0.5-0.9 kg). This usually means that your daily calorie intake should be  reduced by 500-750 calories.  Eating 1,200-1,500 calories a day can help most women lose weight.  Eating 1,500-1,800 calories a day can help most men lose weight.  What do I need to know about calorie counting?  Work with your health care provider or dietitian to determine how many calories you should get each day. To meet your daily calorie goal, you will need to:  Find out how many calories are in each food that you would like to eat. Try to do this before you eat.  Decide how much of the food you plan to eat.  Keep a food log. Do this by writing down what you ate and how many calories it had.  To successfully lose weight, it is important to balance calorie counting with a healthy lifestyle that includes regular activity.  Where do I find calorie information?  The number of calories in a food can be found on a Nutrition Facts label. If a food does not have a Nutrition Facts label, try to look up the calories online or ask your dietitian for help.  Remember that calories are listed per serving. If you choose to have more than one serving of a food, you will have to multiply the calories per serving by the number of servings you plan to eat. For example, the label on a package of bread might say that a serving size is 1 slice and that there are 90 calories in a serving. If you eat 1 slice, you will have eaten 90 calories. If you eat 2 slices, you will have eaten 180 calories.  How do I keep a food log?  After each time that you eat, record the following in your food log as soon as possible:  What you ate. Be sure to include toppings, sauces, and other extras on the food.  How much you ate. This can be measured in cups, ounces, or number of items.  How many calories were in each food and drink.  The total number of calories in the food you ate.  Keep your food log near you, such as in a pocket-sized notebook or on an chris or website on your mobile phone. Some programs will calculate calories for you and show you how  many calories you have left to meet your daily goal.  What are some portion-control tips?  Know how many calories are in a serving. This will help you know how many servings you can have of a certain food.  Use a measuring cup to measure serving sizes. You could also try weighing out portions on a kitchen scale. With time, you will be able to estimate serving sizes for some foods.  Take time to put servings of different foods on your favorite plates or in your favorite bowls and cups so you know what a serving looks like.  Try not to eat straight from a food's packaging, such as from a bag or box. Eating straight from the package makes it hard to see how much you are eating and can lead to overeating. Put the amount you would like to eat in a cup or on a plate to make sure you are eating the right portion.  Use smaller plates, glasses, and bowls for smaller portions and to prevent overeating.  Try not to multitask. For example, avoid watching TV or using your computer while eating. If it is time to eat, sit down at a table and enjoy your food. This will help you recognize when you are full. It will also help you be more mindful of what and how much you are eating.  What are tips for following this plan?  Reading food labels  Check the calorie count compared with the serving size. The serving size may be smaller than what you are used to eating.  Check the source of the calories. Try to choose foods that are high in protein, fiber, and vitamins, and low in saturated fat, trans fat, and sodium.  Shopping  Read nutrition labels while you shop. This will help you make healthy decisions about which foods to buy.  Pay attention to nutrition labels for low-fat or fat-free foods. These foods sometimes have the same number of calories or more calories than the full-fat versions. They also often have added sugar, starch, or salt to make up for flavor that was removed with the fat.  Make a grocery list of lower-calorie foods  and stick to it.  Cooking  Try to cook your favorite foods in a healthier way. For example, try baking instead of frying.  Use low-fat dairy products.  Meal planning  Use more fruits and vegetables. One-half of your plate should be fruits and vegetables.  Include lean proteins, such as chicken, turkey, and fish.  Lifestyle  Each week, aim to do one of the followin minutes of moderate exercise, such as walking.  75 minutes of vigorous exercise, such as running.  General information  Know how many calories are in the foods you eat most often. This will help you calculate calorie counts faster.  Find a way of tracking calories that works for you. Get creative. Try different apps or programs if writing down calories does not work for you.  What foods should I eat?    Eat nutritious foods. It is better to have a nutritious, high-calorie food, such as an avocado, than a food with few nutrients, such as a bag of potato chips.  Use your calories on foods and drinks that will fill you up and will not leave you hungry soon after eating.  Examples of foods that fill you up are nuts and nut butters, vegetables, lean proteins, and high-fiber foods such as whole grains. High-fiber foods are foods with more than 5 g of fiber per serving.  Pay attention to calories in drinks. Low-calorie drinks include water and unsweetened drinks.  The items listed above may not be a complete list of foods and beverages you can eat. Contact a dietitian for more information.  What foods should I limit?  Limit foods or drinks that are not good sources of vitamins, minerals, or protein or that are high in unhealthy fats. These include:  Candy.  Other sweets.  Sodas, specialty coffee drinks, alcohol, and juice.  The items listed above may not be a complete list of foods and beverages you should avoid. Contact a dietitian for more information.  How do I count calories when eating out?  Pay attention to portions. Often, portions are much larger  when eating out. Try these tips to keep portions smaller:  Consider sharing a meal instead of getting your own.  If you get your own meal, eat only half of it. Before you start eating, ask for a container and put half of your meal into it.  When available, consider ordering smaller portions from the menu instead of full portions.  Pay attention to your food and drink choices. Knowing the way food is cooked and what is included with the meal can help you eat fewer calories.  If calories are listed on the menu, choose the lower-calorie options.  Choose dishes that include vegetables, fruits, whole grains, low-fat dairy products, and lean proteins.  Choose items that are boiled, broiled, grilled, or steamed. Avoid items that are buttered, battered, fried, or served with cream sauce. Items labeled as crispy are usually fried, unless stated otherwise.  Choose water, low-fat milk, unsweetened iced tea, or other drinks without added sugar. If you want an alcoholic beverage, choose a lower-calorie option, such as a glass of wine or light beer.  Ask for dressings, sauces, and syrups on the side. These are usually high in calories, so you should limit the amount you eat.  If you want a salad, choose a garden salad and ask for grilled meats. Avoid extra toppings such as luna, cheese, or fried items. Ask for the dressing on the side, or ask for olive oil and vinegar or lemon to use as dressing.  Estimate how many servings of a food you are given. Knowing serving sizes will help you be aware of how much food you are eating at restaurants.  Where to find more information  Centers for Disease Control and Prevention: www.cdc.gov  [place] Department of Agriculture: myplate.gov  Summary  Calorie counting means keeping track of how many calories you eat and drink each day. If you eat fewer calories than your body needs, you should lose weight.  A healthy amount of weight to lose per week is usually 1-2 lb (0.5-0.9 kg). This usually  means reducing your daily calorie intake by 500-750 calories.  The number of calories in a food can be found on a Nutrition Facts label. If a food does not have a Nutrition Facts label, try to look up the calories online or ask your dietitian for help.  Use smaller plates, glasses, and bowls for smaller portions and to prevent overeating.  Use your calories on foods and drinks that will fill you up and not leave you hungry shortly after a meal.  This information is not intended to replace advice given to you by your health care provider. Make sure you discuss any questions you have with your health care provider.  Document Revised: 01/28/2021 Document Reviewed: 01/28/2021  [employer] Patient Education © 2022 [employer]    Exercising to Lose Weight  Getting regular exercise is important for everyone. It is especially important if you are overweight. Being overweight increases your risk of heart disease, stroke, diabetes, high blood pressure, and several types of cancer. Exercising, and reducing the calories you consume, can help you lose weight and improve fitness and health.  Exercise can be moderate or vigorous intensity. To lose weight, most people need to do a certain amount of moderate or vigorous-intensity exercise each week.  How can exercise affect me?  You lose weight when you exercise enough to burn more calories than you eat. Exercise also reduces body fat and builds muscle. The more muscle you have, the more calories you burn. Exercise also:  Improves mood.  Reduces stress and tension.  Improves your overall fitness, flexibility, and endurance.  Increases bone strength.  Moderate-intensity exercise  Moderate-intensity exercise is any activity that gets you moving enough to burn at least three times more energy (calories) than if you were sitting.  Examples of moderate exercise include:  Walking a mile in 15 minutes.  Doing light yard work.  Biking at an easy pace.  Most people should get at least 150  minutes of moderate-intensity exercise a week to maintain their body weight.  Vigorous-intensity exercise  Vigorous-intensity exercise is any activity that gets you moving enough to burn at least six times more calories than if you were sitting. When you exercise at this intensity, you should be working hard enough that you are not able to carry on a conversation.  Examples of vigorous exercise include:  Running.  Playing a team sport, such as football, basketball, and soccer.  Jumping rope.  Most people should get at least 75 minutes a week of vigorous exercise to maintain their body weight.  What actions can I take to lose weight?  The amount of exercise you need to lose weight depends on:  Your age.  The type of exercise.  Any health conditions you have.  Your overall physical ability.  Talk to your health care provider about how much exercise you need and what types of activities are safe for you.  Nutrition    Make changes to your diet as told by your health care provider or diet and nutrition specialist (dietitian). This may include:  Eating fewer calories.  Eating more protein.  Eating less unhealthy fats.  Eating a diet that includes fresh fruits and vegetables, whole grains, low-fat dairy products, and lean protein.  Avoiding foods with added fat, salt, and sugar.  Drink plenty of water while you exercise to prevent dehydration or heat stroke.  Activity  Choose an activity that you enjoy and set realistic goals. Your health care provider can help you make an exercise plan that works for you.  Exercise at a moderate or vigorous intensity most days of the week.  The intensity of exercise may vary from person to person. You can tell how intense a workout is for you by paying attention to your breathing and heartbeat. Most people will notice their breathing and heartbeat get faster with more intense exercise.  Do resistance training twice each week, such as:  Push-ups.  Sit-ups.  Lifting weights.  Using  resistance bands.  Getting short amounts of exercise can be just as helpful as long, structured periods of exercise. If you have trouble finding time to exercise, try doing these things as part of your daily routine:  Get up, stretch, and walk around every 30 minutes throughout the day.  Go for a walk during your lunch break.  Park your car farther away from your destination.  If you take public transportation, get off one stop early and walk the rest of the way.  Make phone calls while standing up and walking around.  Take the stairs instead of elevators or escalators.  Wear comfortable clothes and shoes with good support.  Do not exercise so much that you hurt yourself, feel dizzy, or get very short of breath.  Where to find more information  [place] Department of Health and Human Services: www.hhs.gov  Centers for Disease Control and Prevention: www.cdc.gov  Contact a health care provider:  Before starting a new exercise program.  If you have questions or concerns about your weight.  If you have a medical problem that keeps you from exercising.  Get help right away if:  You have any of the following while exercising:  Injury.  Dizziness.  Difficulty breathing or shortness of breath that does not go away when you stop exercising.  Chest pain.  Rapid heartbeat.  These symptoms may represent a serious problem that is an emergency. Do not wait to see if the symptoms will go away. Get medical help right away. Call your local emergency services (911 in the [place] Do not drive yourself to the hospital.  Summary  Getting regular exercise is especially important if you are overweight.  Being overweight increases your risk of heart disease, stroke, diabetes, high blood pressure, and several types of cancer.  Losing weight happens when you burn more calories than you eat.  Reducing the amount of calories you eat, and getting regular moderate or vigorous exercise each week, helps you lose weight.  This information is not  intended to replace advice given to you by your health care provider. Make sure you discuss any questions you have with your health care provider.  Document Revised: 02/13/2022 Document Reviewed: 02/13/2022  [employer] Patient Education © 2022 [employer] Opioid Use Disorder  Opioid use disorder is a condition in which opioids are used for reasons other than medical care. The person may use them even though taking them hurts the person's health and well-being. These drugs are powerful substances that relieve pain. Opioids include drugs such as heroin as well as prescription medicines for pain, such as:  Codeine.  Morphine.  Hydrocodone.  Oxycodone.  Fentanyl.  Taking prescribed opioids regularly can lead to dependence, especially if you take them in larger amounts or more often than they should be taken. Opioid use disorder can lead to problems with mental and physical health, including:  Depression or anxiety.  Severe constipation.  Malnutrition and weight loss.  Sleep problems.  Diseases caused by infections, such as hepatitis or HIV.  Sexual problems.  Opioid use disorder can be dangerous. It increases the risk of suicide and can lead to a life-threatening overdose.  What are the causes?  This condition is caused by taking opioids. Taking opioids again and again results in changes in the brain that make it hard to control opioid use. Many people develop this condition because they like the way they feel when they take opioids or because they get addicted to them.  What increases the risk?  This condition is more likely to develop in people who:  Have a family history of opioid use disorder.  Misuse other drugs.  Have a mental illness, such as depression, post-traumatic stress disorder, or antisocial personality disorder.  Begin use at an early age, such as during their teenage years.  What are the signs or symptoms?  Symptoms of this condition include:  Taking opioids in larger amounts or for longer periods than  you want to.  Spending an abnormal amount of time getting opioids, using them, or recovering from their effects.  Craving opioids.  Using opioids in a way that interferes with work, school, social activities, and personal relationships.  Giving up or cutting down on important life activities because of opioid use.  Using opioids when it is dangerous, such as when driving a car.  Continuing to use the drug even after it has led to problems such as:  Physical or mental health problems.  Legal or financial troubles.  Job loss.  Broken relationships.  Being unable to slow down or stop your use of the drug.  Needing more and more of an opioid to get the same effect (building up a tolerance).  Experiencing unpleasant symptoms if you do not use the opioid (withdrawal). Some symptoms of withdrawal include:  Depression, anxiety, or feeling irritable.  Nausea or vomiting.  Muscle aches or spasms.  Watery eyes.  Trouble sleeping.  Yawning.  How is this diagnosed?  This condition is diagnosed based on:  A physical exam.  Your history of opioid use.  Your symptoms. This includes:  How opioid use affects your life.  Changes in personality, behaviors, and mood.  Having at least two symptoms of opioid use disorder within a 12-month period.  Health issues related to using opioids.  Blood or urine tests to screen for drugs.  How is this treated?    The first goal of treatment is to stop your use of opioids. This must be done safely and may involve taking medicines to lessen withdrawal symptoms. Treatment may also involve:  Taking part in group and individual counseling from mental health providers who have experience with substance use disorder.  Staying at a residential treatment center for several days or weeks.  Attending daily counseling sessions at a treatment center.  Taking medicines as told by your health care provider that:  Ease symptoms and prevent complications during withdrawal.  Block cravings and block the good feeling  that you get from using opioids.  Treat other mental health issues, such as depression or anxiety.  Reduce agitation.  Participating in a support group to share your experience with others who are going through the same thing.  Using opioid maintenance treatment. This involves taking certain kinds of opioid medicines. These medicines satisfy cravings but are safer than opioids that are commonly misused.  Recovery can be a long process. Some people who undergo treatment start using opioids again after stopping (relapse). If you relapse, it does not mean that treatment will not work.  Follow these instructions at home:  Medicines  Take over-the-counter and prescription medicines only as told by your health care provider.  Check with your health care provider before starting any new medicines, herbs, or supplements.  General instructions  Do not use any drugs or alcohol.  Avoid people and activities that trigger your use of opioids.  Learn and practice techniques for managing stress.  Have a plan for vulnerable moments. These are times when you are most likely to relapse. Get phone numbers of those who are willing to help and who are committed to your recovery.  Attend support groups regularly. These groups provide emotional support, advice, and guidance.  Keep all follow-up visits. This is important. Follow-up visits include continuing to work with therapists and support groups.  Where to find more information  [place] on Drug Abuse: drugabuse.gov  Substance Abuse and Mental Health Services Administration: samhsa.gov  Narcotics Anonymous: na.org  Contact a health care provider if:  You cannot take your medicines as told.  Your symptoms get worse.  You have a relapse.  Get help right away if:  You may have taken too much of an opioid (overdosed). Common symptoms of an overdose include:  Sleepiness or difficulty waking from sleep.  Decrease in attention or confusion.  Slurred speech.  Slowed breathing and a  slow pulse (bradycardia).  Nausea and vomiting.  Abnormally small pupils.  You have serious thoughts about hurting yourself or others.  These symptoms may represent a serious problem that is an emergency. Do not wait to see if the symptoms will go away. Get medical help right away. Call your local emergency services (700 in the [place] Do not drive yourself to the hospital.  If you were prescribed a drug (naloxone) that reverses the effects of an opioid overdose, a friend, family member, or emergency services provider can administer the drug in an emergency.  If you ever feel like you may hurt yourself or others, or have thoughts about taking your own life, get help right away. You can go to your nearest emergency department or call:  Your local emergency services (215 in the [place]  A suicide crisis helpline, such as the National Suicide Prevention Lifeline at 1-900.306.8561 or 452 in the [place] This is open 24 hours a day.  Summary  Opioid use disorder is a condition in which opioids are used for reasons other than medical care.  Opioid use disorder can be dangerous. It can lead to various mental and physical problems, and an opioid overdose can be life-threatening.  The first goal of treatment is to stop your use of opioids. This must be done safely and may involve taking medicines to lessen withdrawal symptoms.  This information is not intended to replace advice given to you by your health care provider. Make sure you discuss any questions you have with your health care provider.  Document Revised: 07/13/2022 Document Reviewed: 03/30/2022  [email or web address] Patient Education © 2023 [email or web address]  Opioid Pain Medicine Management  Opioid pain medicines are strong medicines that are used to treat bad or very bad pain. When you take them for a short time, they can help you:  Sleep better.  Do better in physical therapy.  Feel better during the first few days after you get hurt.  Recover from surgery.  Only take these medicines  if a doctor says that you can. You should only take them for a short time. This is because opioids can be very addictive. This means that they are hard to stop taking. The longer you take opioids, the harder it may be to stop taking them.  What are the risks?  Opioids can cause problems (side effects). Taking them for more than 3 days raises your chance of problems, such as:  Trouble pooping (constipation).  Feeling sick to your stomach (nausea).  Vomiting.  Feeling very sleepy.  Confusion.  Not being able to stop taking the medicine.  Breathing problems.  Taking opioids for a long time can make it hard for you to do daily tasks. It can also put you at risk for:  Car accidents.  Depression.  Suicide.  Heart attack.  Taking too much of the medicine (overdose). This can lead to death.  What is a pain treatment plan?  A pain treatment plan is a plan made by you and your doctor. Work with your doctor to make a plan for treating your pain. To help you do this:  Talk about the goals of your treatment, including:  How much pain you might expect to have.  How you will manage the pain.  Talk about the risks and benefits of taking these medicines for your condition.  Remember that a good treatment plan uses more than one approach and lowers the risks of side effects.  Tell your doctor about the amount of medicines you take and about any drug or alcohol use.  Get your pain medicine prescriptions from only one doctor.  Pain can be managed with other treatments. Work with your doctor to find other ways to help your pain, such as:  Physical therapy or doing gentle exercises.  Counseling.  Eating healthy foods.  Massage.  Meditation.  Other pain medicines.  How to use opioid pain medicine safely  Taking medicine  Take your pain medicine exactly as told by your doctor. Take it only when you need it.  If your pain is not too bad, you may take less medicine if your doctor allows.  If you have no pain, do not take the medicine unless  your doctor tells you to take it.  If your pain is very bad, do not take more medicine than your doctor told you to take. Call your doctor to know what to do.  Write down the times when you take your pain medicine. Look at the times before you take your next dose.  Take other over-the-counter or prescription medicines only as told by your doctor.  Keeping yourself and others safe    While you are taking opioids:  Do not drive, use machines, or power tools.  Do not sign important papers (legal documents).  Do not drink alcohol.  Do not take sleeping pills.  Do not take care of children by yourself.  Do not do activities where you need to climb or be in high places, like working on a ladder.  Do not go to a lake, river, ocean, swimming pool, or hot tub.  Keep your opioids locked up or in a place where children cannot reach them.  Do not share your pain medicine with anyone.  Stopping your use of opioids  If you have been taking opioids for more than a few weeks, you may need to slowly decrease (taper) how much you take until you stop taking them. Doing this can lower your chance of having symptoms.   Symptoms that come from suddenly stopping the use of opioids include:  Pain and cramping in your belly (abdomen).  Feeling sick to your stomach (nausea).z  Sweating.  Feeling very sleepy.  Feeling restless.  Shaking you cannot control (tremors).  Cravings for the medicine.  Do not try to stop taking them by yourself. Work with your doctor to stop. Your doctor will help you take less until you are not taking the medicine at all.  Getting rid of unused pills  Do not save any pills that you did not use. Get rid of the pills by:  Taking them to a take-back program in your area.  Bringing them to a pharmacy that receives unused pills.  Flushing them down the toilet. Check the label or package insert of your medicine to see whether this is safe to do.  Throwing them in the trash. Check the label or package insert of your  medicine to see whether this is safe to do. If it is safe to throw them out:  Take the pills out of their container.  Put the pills into a container you can seal.  Mix the pills with used coffee grounds, food scraps, dirt, or cat litter.  Put this in the trash.  Follow these instructions at home:  Activity  Do exercises as told by your doctor.  Avoid doing things that make your pain worse.  Return to your normal activities as told by your doctor. Ask your doctor what activities are safe for you.  General instructions  You may need to take these actions to prevent or treat constipation:  Drink enough fluid to keep your pee (urine) pale yellow.  Take over-the-counter or prescription medicines.  Eat foods that are high in fiber. These include beans, whole grains, and fresh fruits and vegetables.  Limit foods that are high in fat and sugar. These include fried or sweet foods.  Keep all follow-up visits.  Where to find support  If you have been taking opioids for a long time, get help from a local support group or counselor. Ask your doctor about this.  Where to find more information  Centers for Disease Control and Prevention (CDC): www.cdc.gov  [place] Food and Drug Administration (FDA): www.fda.gov  Get help right away if:  You may have taken too much of an opioid (overdosed). Common symptoms of an overdose:  Your breathing is slower or more shallow than normal.  You have a very slow heartbeat.  Your speech is not normal.  You vomit or you feel as if you may vomit.  The black centers of your eyes (pupils) are smaller than normal.  You have other potential symptoms:  You feel very confused.  You faint.  You are very sleepy.  You have cold skin.  You have blue lips or fingernails.  You have thoughts of harming yourself or harming others.  These symptoms may be an emergency. Get help right away. Call your local emergency services (911 in the [place]  Do not wait to see if the symptoms will go away.  Do not drive yourself to  the hospital.  Get help right away if you feel like you may hurt yourself or others, or have thoughts about taking your own life. Go to your nearest emergency room or:  Call your local emergency services (911 in the [place]  Call the National Poison Control Center at 1-240.581.5357.  Call a suicide crisis helpline, such as the National Suicide Prevention Lifeline at 1-193.487.1110 or 373 in the [place] This is open 24 hours a day.  Text the Crisis Text Line at 247691.  Summary  Opioid are strong medicines that are used to treat bad or very bad pain.  A pain treatment plan is a plan made by you and your doctor. Work with your doctor to make a plan for treating your pain.  If you think that you or someone else may have taken too much of an opioid, get help right away.  This information is not intended to replace advice given to you by your health care provider. Make sure you discuss any questions you have with your health care provider.  Document Revised: 07/13/2022 Document Reviewed: 03/30/2022  [employer] Patient Education © 2023 [employer]

## 2025-06-26 RX ORDER — DULOXETIN HYDROCHLORIDE 60 MG/1
60 CAPSULE, DELAYED RELEASE ORAL DAILY
Qty: 90 CAPSULE | Refills: 3 | Status: SHIPPED | OUTPATIENT
Start: 2025-06-26

## 2025-07-17 ENCOUNTER — TELEPHONE (OUTPATIENT)
Dept: FAMILY MEDICINE CLINIC | Facility: CLINIC | Age: 70
End: 2025-07-17

## 2025-07-17 DIAGNOSIS — Z87.891 PERSONAL HISTORY OF TOBACCO USE, PRESENTING HAZARDS TO HEALTH: Primary | ICD-10-CM

## 2025-08-12 ENCOUNTER — TRANSCRIBE ORDERS (OUTPATIENT)
Dept: ADMINISTRATIVE | Facility: HOSPITAL | Age: 70
End: 2025-08-12
Payer: MEDICARE

## 2025-08-12 DIAGNOSIS — Z87.891 PERSONAL HISTORY OF TOBACCO USE, PRESENTING HAZARDS TO HEALTH: Primary | ICD-10-CM

## 2025-08-18 ENCOUNTER — TELEPHONE (OUTPATIENT)
Dept: FAMILY MEDICINE CLINIC | Facility: CLINIC | Age: 70
End: 2025-08-18
Payer: MEDICARE

## 2025-08-19 RX ORDER — DOXYCYCLINE 100 MG/1
100 CAPSULE ORAL 2 TIMES DAILY
Qty: 20 CAPSULE | Refills: 0 | Status: SHIPPED | OUTPATIENT
Start: 2025-08-19

## (undated) DEVICE — KT SYR GEL ORISE SNGL PK 10ML

## (undated) DEVICE — SNAR POLYP CAPTIVATOR2 RND STFF 2.4 25MM 240CM

## (undated) DEVICE — TUBING, SUCTION, 1/4" X 10', STRAIGHT: Brand: MEDLINE

## (undated) DEVICE — MSK ENDO PORT O2 POM ELITE CURAPLEX A/

## (undated) DEVICE — SINGLE-USE BIOPSY FORCEPS: Brand: RADIAL JAW 4

## (undated) DEVICE — ADAPT CLN BIOGUARD AIR/H2O DISP

## (undated) DEVICE — NET RETRV ROTHNET SELCT 2.5MM 3X6X230CM NS

## (undated) DEVICE — KT ORCA ORCAPOD DISP STRL

## (undated) DEVICE — THE CARR-LOCKE INJECTION NEEDLE IS A SINGLE USE, DISPOSABLE, FLEXIBLE SHEATH INJECTION NEEDLE USED FOR THE INJECTION OF VARIOUS TYPES OF MEDIA THROUGH FLEXIBLE ENDOSCOPES.

## (undated) DEVICE — THE SINGLE USE ETRAP – POLYP TRAP IS USED FOR SUCTION RETRIEVAL OF ENDOSCOPICALLY REMOVED POLYPS.: Brand: ETRAP

## (undated) DEVICE — SENSR O2 OXIMAX FNGR A/ 18IN NONSTR

## (undated) DEVICE — LN SMPL CO2 SHTRM SD STREAM W/M LUER